# Patient Record
Sex: FEMALE | Race: WHITE | NOT HISPANIC OR LATINO | Employment: UNEMPLOYED | RURAL
[De-identification: names, ages, dates, MRNs, and addresses within clinical notes are randomized per-mention and may not be internally consistent; named-entity substitution may affect disease eponyms.]

---

## 2017-01-03 ENCOUNTER — OFFICE VISIT (OUTPATIENT)
Dept: FAMILY MEDICINE CLINIC | Facility: CLINIC | Age: 82
End: 2017-01-03

## 2017-01-03 VITALS
HEIGHT: 66 IN | TEMPERATURE: 98 F | DIASTOLIC BLOOD PRESSURE: 68 MMHG | BODY MASS INDEX: 26.16 KG/M2 | HEART RATE: 51 BPM | OXYGEN SATURATION: 97 % | SYSTOLIC BLOOD PRESSURE: 180 MMHG | WEIGHT: 162.8 LBS

## 2017-01-03 DIAGNOSIS — I10 ESSENTIAL HYPERTENSION: Primary | ICD-10-CM

## 2017-01-03 DIAGNOSIS — E03.8 OTHER SPECIFIED HYPOTHYROIDISM: ICD-10-CM

## 2017-01-03 DIAGNOSIS — R53.1 LEFT-SIDED WEAKNESS: ICD-10-CM

## 2017-01-03 DIAGNOSIS — I51.9 HEART DISEASE: ICD-10-CM

## 2017-01-03 DIAGNOSIS — E78.2 MIXED HYPERLIPIDEMIA: ICD-10-CM

## 2017-01-03 DIAGNOSIS — E03.9 HYPOTHYROIDISM, UNSPECIFIED TYPE: ICD-10-CM

## 2017-01-03 DIAGNOSIS — R42 DIZZINESS: ICD-10-CM

## 2017-01-03 LAB
ALBUMIN SERPL-MCNC: 4.6 G/DL (ref 3.4–4.8)
ALBUMIN/GLOB SERPL: 1.2 G/DL (ref 1.5–2.5)
ALP SERPL-CCNC: 64 U/L (ref 46–116)
ALT SERPL W P-5'-P-CCNC: 9 U/L (ref 10–36)
ANION GAP SERPL CALCULATED.3IONS-SCNC: 7 MMOL/L (ref 3.6–11.2)
AST SERPL-CCNC: 16 U/L (ref 10–30)
BASOPHILS # BLD AUTO: 0.03 10*3/MM3 (ref 0–0.3)
BASOPHILS NFR BLD AUTO: 0.6 % (ref 0–2)
BILIRUB SERPL-MCNC: 0.4 MG/DL (ref 0.2–1.8)
BUN BLD-MCNC: 17 MG/DL (ref 7–21)
BUN/CREAT SERPL: 16.5 (ref 7–25)
CALCIUM SPEC-SCNC: 10.7 MG/DL (ref 7.7–10)
CHLORIDE SERPL-SCNC: 102 MMOL/L (ref 99–112)
CHOLEST SERPL-MCNC: 328 MG/DL (ref 0–200)
CO2 SERPL-SCNC: 32 MMOL/L (ref 24.3–31.9)
CREAT BLD-MCNC: 1.03 MG/DL (ref 0.43–1.29)
DEPRECATED RDW RBC AUTO: 45.2 FL (ref 37–54)
EOSINOPHIL # BLD AUTO: 0.07 10*3/MM3 (ref 0–0.7)
EOSINOPHIL NFR BLD AUTO: 1.3 % (ref 0–7)
ERYTHROCYTE [DISTWIDTH] IN BLOOD BY AUTOMATED COUNT: 12.6 % (ref 11.5–14.5)
GFR SERPL CREATININE-BSD FRML MDRD: 52 ML/MIN/1.73
GLOBULIN UR ELPH-MCNC: 3.7 GM/DL
GLUCOSE BLD-MCNC: 109 MG/DL (ref 70–110)
HCT VFR BLD AUTO: 36.9 % (ref 37–47)
HDLC SERPL-MCNC: 57 MG/DL (ref 60–100)
HGB BLD-MCNC: 12 G/DL (ref 12–16)
IMM GRANULOCYTES # BLD: 0 10*3/MM3 (ref 0–0.03)
IMM GRANULOCYTES NFR BLD: 0 % (ref 0–0.5)
LDLC SERPL CALC-MCNC: 199 MG/DL (ref 0–100)
LDLC/HDLC SERPL: 3.49 {RATIO}
LYMPHOCYTES # BLD AUTO: 1.56 10*3/MM3 (ref 1–3)
LYMPHOCYTES NFR BLD AUTO: 29.9 % (ref 16–46)
MCH RBC QN AUTO: 32.9 PG (ref 27–33)
MCHC RBC AUTO-ENTMCNC: 32.5 G/DL (ref 33–37)
MCV RBC AUTO: 101.1 FL (ref 80–94)
MONOCYTES # BLD AUTO: 0.58 10*3/MM3 (ref 0.1–0.9)
MONOCYTES NFR BLD AUTO: 11.1 % (ref 0–12)
NEUTROPHILS # BLD AUTO: 2.97 10*3/MM3 (ref 1.4–6.5)
NEUTROPHILS NFR BLD AUTO: 57.1 % (ref 40–75)
OSMOLALITY SERPL CALC.SUM OF ELEC: 283.4 MOSM/KG (ref 273–305)
PLATELET # BLD AUTO: 212 10*3/MM3 (ref 130–400)
PMV BLD AUTO: 10.8 FL (ref 6–10)
POTASSIUM BLD-SCNC: 4 MMOL/L (ref 3.5–5.3)
PROT SERPL-MCNC: 8.3 G/DL (ref 6–8)
RBC # BLD AUTO: 3.65 10*6/MM3 (ref 4.2–5.4)
SODIUM BLD-SCNC: 141 MMOL/L (ref 135–153)
T4 FREE SERPL-MCNC: 0.6 NG/DL (ref 0.89–1.76)
TRIGL SERPL-MCNC: 361 MG/DL (ref 0–150)
TSH SERPL DL<=0.05 MIU/L-ACNC: 22.77 MIU/ML (ref 0.55–4.78)
VLDLC SERPL-MCNC: 72.2 MG/DL
WBC NRBC COR # BLD: 5.21 10*3/MM3 (ref 4.5–12.5)

## 2017-01-03 PROCEDURE — 36415 COLL VENOUS BLD VENIPUNCTURE: CPT | Performed by: NURSE PRACTITIONER

## 2017-01-03 PROCEDURE — 84439 ASSAY OF FREE THYROXINE: CPT | Performed by: NURSE PRACTITIONER

## 2017-01-03 PROCEDURE — 84443 ASSAY THYROID STIM HORMONE: CPT | Performed by: NURSE PRACTITIONER

## 2017-01-03 PROCEDURE — 99214 OFFICE O/P EST MOD 30 MIN: CPT | Performed by: NURSE PRACTITIONER

## 2017-01-03 PROCEDURE — 80053 COMPREHEN METABOLIC PANEL: CPT | Performed by: NURSE PRACTITIONER

## 2017-01-03 PROCEDURE — 85025 COMPLETE CBC W/AUTO DIFF WBC: CPT | Performed by: NURSE PRACTITIONER

## 2017-01-03 PROCEDURE — 80061 LIPID PANEL: CPT | Performed by: NURSE PRACTITIONER

## 2017-01-03 RX ORDER — METOPROLOL SUCCINATE 100 MG/1
100 TABLET, EXTENDED RELEASE ORAL DAILY
Qty: 30 TABLET | Refills: 0 | Status: SHIPPED | OUTPATIENT
Start: 2017-01-03 | End: 2017-01-24 | Stop reason: SDUPTHER

## 2017-01-03 RX ORDER — ATORVASTATIN CALCIUM 20 MG/1
20 TABLET, FILM COATED ORAL DAILY
Qty: 30 TABLET | Refills: 5 | Status: ON HOLD | OUTPATIENT
Start: 2017-01-03 | End: 2018-01-14

## 2017-01-03 RX ORDER — LISINOPRIL AND HYDROCHLOROTHIAZIDE 25; 20 MG/1; MG/1
1 TABLET ORAL DAILY
Qty: 30 TABLET | Refills: 0 | Status: SHIPPED | OUTPATIENT
Start: 2017-01-03 | End: 2017-01-24 | Stop reason: SDUPTHER

## 2017-01-03 RX ORDER — LEVOTHYROXINE SODIUM 0.03 MG/1
25 TABLET ORAL DAILY
Qty: 30 TABLET | Refills: 2 | Status: SHIPPED | OUTPATIENT
Start: 2017-01-03 | End: 2017-09-28 | Stop reason: SDUPTHER

## 2017-01-03 RX ORDER — AMLODIPINE BESYLATE 5 MG/1
5 TABLET ORAL DAILY
Qty: 30 TABLET | Refills: 5 | Status: SHIPPED | OUTPATIENT
Start: 2017-01-03 | End: 2017-01-24 | Stop reason: SDUPTHER

## 2017-01-03 NOTE — PROGRESS NOTES
"Subjective   Lashell Case is a 81 y.o. female.   Chief Complaint   Patient presents with   • Hypertension   • Med Refill     Synthroid,lisinopril,metoprolol 90 days     History of Present Illness     The patient presents today for routine follow-up.  She has a history of breast cancer.  Continues to follow routinely with breast surgeon, Dr. Ward.  She has history of hypertension.  This has been uncontrolled for years.  Reports she has been taking medication daily.  Also has a history of hyperlipidemia.  States she has never taken a statin, although this has been prescribed in the past.  Her last TSH was 17.  Reports she was not taking the levothyroxine daily at that time.  Has now resumed taking daily.  Today, she has complaints of dizziness and left-sided weakness.  Reports 2 days ago was working in the kitchen when she felt dizzy.  She then became very weak on the left side and had to have assistance to sit down.  After 20 minutes, her symptoms resolved.  Yesterday morning had a similar episode of left-sided weakness and dizziness.  She did not seek evaluation for this at the time.  She has no personal history of CVA or MI.  She did have a cardiac evaluation approximately one year ago.  This has been reviewed.  Overall, this is variable.    The following portions of the patient's history were reviewed and updated as appropriate: allergies, current medications, past family history, past medical history, past social history, past surgical history and problem list.  Visit Vitals   • /68 (BP Location: Right arm, Patient Position: Sitting)   • Pulse 51   • Temp 98 °F (36.7 °C) (Oral)   • Ht 66\" (167.6 cm)   • Wt 162 lb 12.8 oz (73.8 kg)   • SpO2 97%  Comment: Room air   • BMI 26.28 kg/m2     Review of Systems   Constitutional: Negative for chills, fatigue and fever.   HENT: Negative for congestion, ear pain, rhinorrhea and sore throat.    Respiratory: Negative for cough, shortness of breath and wheezing.  "   Cardiovascular: Negative for chest pain, palpitations and leg swelling.   Gastrointestinal: Negative for abdominal pain, diarrhea, nausea and vomiting.   Genitourinary: Negative for dysuria and urgency.   Musculoskeletal: Negative for back pain and myalgias.   Skin: Negative for rash and wound.   Neurological: Positive for dizziness, weakness and numbness. Negative for light-headedness and headaches.   Psychiatric/Behavioral: Negative for sleep disturbance. The patient is not nervous/anxious.        Objective   Physical Exam   Constitutional: She is oriented to person, place, and time. She appears well-developed and well-nourished.   HENT:   Head: Normocephalic and atraumatic.   Right Ear: External ear normal.   Left Ear: External ear normal.   Mouth/Throat: Oropharynx is clear and moist.   Eyes: EOM are normal. Pupils are equal, round, and reactive to light.   Cardiovascular: Normal rate, regular rhythm and normal heart sounds.    Pulmonary/Chest: Effort normal and breath sounds normal.   Abdominal: Soft. Bowel sounds are normal.   Musculoskeletal: Normal range of motion.   Neurological: She is alert and oriented to person, place, and time. No cranial nerve deficit.   Finger to nose test normal   Skin: Skin is warm and dry.   Psychiatric: She has a normal mood and affect. Her behavior is normal.       Assessment/Plan   Lashell was seen today for hypertension and med refill.    Diagnoses and all orders for this visit:    Essential hypertension  -     CBC & Differential  -     Comprehensive Metabolic Panel  -     TSH  -     T4, Free  -     Lipid Panel  -     metoprolol succinate XL (TOPROL-XL) 100 MG 24 hr tablet; Take 1 tablet by mouth Daily.  -     lisinopril-hydrochlorothiazide (PRINZIDE,ZESTORETIC) 20-25 MG per tablet; Take 1 tablet by mouth Daily.  -     amLODIPine (NORVASC) 5 MG tablet; Take 1 tablet by mouth Daily.  -     CBC Auto Differential  -     Osmolality, Calculated; Future  -     Osmolality,  Calculated    Hypothyroidism, unspecified type  -     CBC & Differential  -     Comprehensive Metabolic Panel  -     TSH  -     T4, Free  -     Lipid Panel  -     CBC Auto Differential  -     Osmolality, Calculated; Future  -     Osmolality, Calculated    Mixed hyperlipidemia  -     CBC & Differential  -     Comprehensive Metabolic Panel  -     TSH  -     T4, Free  -     Lipid Panel  -     atorvastatin (LIPITOR) 20 MG tablet; Take 1 tablet by mouth Daily.  -     US Carotid Bilateral; Future  -     aspirin 81 MG tablet; Take 1 tablet by mouth Daily.  -     CBC Auto Differential  -     Osmolality, Calculated; Future  -     Osmolality, Calculated    Dizziness  -     CBC & Differential  -     Comprehensive Metabolic Panel  -     TSH  -     T4, Free  -     Lipid Panel  -     US Carotid Bilateral; Future  -     MRI Angiogram Head With Contrast; Future  -     MRI Brain With & Without Contrast; Future  -     CBC Auto Differential  -     Osmolality, Calculated; Future  -     Osmolality, Calculated    Left-sided weakness  -     CBC & Differential  -     Comprehensive Metabolic Panel  -     TSH  -     T4, Free  -     Lipid Panel  -     US Carotid Bilateral; Future  -     MRI Angiogram Head With Contrast; Future  -     MRI Brain With & Without Contrast; Future  -     CBC Auto Differential  -     Osmolality, Calculated; Future  -     Osmolality, Calculated    Heart disease  -     CBC & Differential  -     Comprehensive Metabolic Panel  -     TSH  -     T4, Free  -     Lipid Panel  -     US Carotid Bilateral; Future  -     aspirin 81 MG tablet; Take 1 tablet by mouth Daily.  -     CBC Auto Differential  -     Osmolality, Calculated; Future  -     Osmolality, Calculated    Other specified hypothyroidism  -     CBC & Differential  -     Comprehensive Metabolic Panel  -     TSH  -     T4, Free  -     Lipid Panel  -     levothyroxine (SYNTHROID, LEVOTHROID) 25 MCG tablet; Take 1 tablet by mouth Daily.  -     CBC Auto Differential  -      Osmolality, Calculated; Future  -     Osmolality, Calculated      The recent symptoms are very concerning.  I have discussed her plan of care with Dr. Wolf.  Continue the current medication regimen.  I have stressed the importance of medication compliance.  Will obtain labs as noted.  We will start aspirin 81 mg daily.  Will also initiate Lipitor and amlodipine.  We have discussed risk factor reduction.  Will also evaluate with MRI/MRA of the brain and carotid Dopplers.  I have instructed her to call 911 immediately if the symptoms ever occur again in the future.  She voices understanding.  Follow-up in 2 weeks and as needed.

## 2017-01-03 NOTE — MR AVS SNAPSHOT
Lashell Case   1/3/2017 2:40 PM   Office Visit    Dept Phone:  [phone]   Encounter #:  [id number]    Provider:  SOFI Luevano   Department:  [facility]                Your Full Care Plan              Today's Medication Changes          These changes are accurate as of: 1/3/17  4:11 PM.  If you have any questions, ask your nurse or doctor.               New Medication(s)Ordered:     amLODIPine 5 MG tablet   Commonly known as:  NORVASC   Take 1 tablet by mouth Daily.   Started by:  SOFI Luevano       aspirin 81 MG tablet   Take 1 tablet by mouth Daily.   Started by:  SOFI Luevano       atorvastatin 20 MG tablet   Commonly known as:  LIPITOR   Take 1 tablet by mouth Daily.   Started by:  SOFI Luevano            Where to Get Your Medications      These medications were sent to [facility] [address] [place] [address] [phone] [place] [phone] [address] [address]     Phone:  [phone]     amLODIPine 5 MG tablet    aspirin 81 MG tablet    atorvastatin 20 MG tablet    levothyroxine 25 MCG tablet    lisinopril-hydrochlorothiazide 20-25 MG per tablet    metoprolol succinate  MG 24 hr tablet                  Your Updated Medication List          This list is accurate as of: 1/3/17  4:11 PM.  Always use your most recent med list.                amLODIPine 5 MG tablet   Commonly known as:  NORVASC   Take 1 tablet by mouth Daily.       anastrozole 1 MG tablet   Commonly known as:  ARIMIDEX       aspirin 81 MG tablet   Take 1 tablet by mouth Daily.       atorvastatin 20 MG tablet   Commonly known as:  LIPITOR   Take 1 tablet by mouth Daily.       levothyroxine 25 MCG tablet   Commonly known as:  SYNTHROID, LEVOTHROID   Take 1 tablet by mouth Daily.       lisinopril-hydrochlorothiazide 20-25 MG per tablet   Commonly known as:  PRINZIDE,ZESTORETIC   Take 1 tablet by mouth Daily.  "      metoprolol succinate  MG 24 hr tablet   Commonly known as:  TOPROL-XL   Take 1 tablet by mouth Daily.               We Performed the Following     CBC & Differential     CBC Auto Differential     Comprehensive Metabolic Panel     Lipid Panel     T4, Free     TSH       You Were Diagnosed With        Codes Comments    Essential hypertension    -  Primary ICD-10-CM: I10  ICD-9-CM: 401.9     Hypothyroidism, unspecified type     ICD-10-CM: E03.9  ICD-9-CM: 244.9     Mixed hyperlipidemia     ICD-10-CM: E78.2  ICD-9-CM: 272.2     Dizziness     ICD-10-CM: R42  ICD-9-CM: 780.4     Left-sided weakness     ICD-10-CM: M62.81  ICD-9-CM: 728.87     Heart disease     ICD-10-CM: I51.9  ICD-9-CM: 429.9     Other specified hypothyroidism     ICD-10-CM: E03.8  ICD-9-CM: 244.8       Instructions     None    Patient Instructions History      Upcoming Appointments     Visit Type Date Time Department    FOLLOW UP 1/3/2017  2:40 PM [facility]    FOLLOW UP 1/17/2017  2:40 PM [facility]      MyChart Signup     Our records indicate that you have declined [facility] MyMusict signup. If you would like to sign up for [employer], please email [email or web address] or call [phone] to obtain an activation code.             Other Info from Your Visit           Your Appointments     Jan 17, 2017  2:40 PM EST   Follow Up with SOFI Luevano   [facility] (--)    [address] [place] [id number] [phone]           Arrive 15 minutes prior to appointment.              Allergies     Bactrim [Sulfamethoxazole-trimethoprim]  GI Intolerance    Codeine      Penicillins        Reason for Visit     Hypertension     Med Refill Synthroid,lisinopril,metoprolol 90 days      Vital Signs     Blood Pressure Pulse Temperature Height Weight Oxygen Saturation    180/68 (BP Location: Right arm, Patient Position: Sitting) 51 98 °F (36.7 °C) (Oral) 66\" (167.6 cm) 162 lb " 12.8 oz (73.8 kg) 97%    Body Mass Index Smoking Status                26.28 kg/m2 Never Smoker          Problems and Diagnoses Noted     Dizziness    High blood pressure    Underactive thyroid    Mixed hyperlipidemia    Left-sided weakness        Heart disease          Results

## 2017-01-16 ENCOUNTER — APPOINTMENT (OUTPATIENT)
Dept: ULTRASOUND IMAGING | Facility: HOSPITAL | Age: 82
End: 2017-01-16

## 2017-01-16 ENCOUNTER — APPOINTMENT (OUTPATIENT)
Dept: MRI IMAGING | Facility: HOSPITAL | Age: 82
End: 2017-01-16

## 2017-01-20 ENCOUNTER — HOSPITAL ENCOUNTER (OUTPATIENT)
Dept: ULTRASOUND IMAGING | Facility: HOSPITAL | Age: 82
Discharge: HOME | End: 2017-01-20

## 2017-01-20 ENCOUNTER — HOSPITAL ENCOUNTER (OUTPATIENT)
Dept: MRI IMAGING | Facility: HOSPITAL | Age: 82
Discharge: HOME | End: 2017-01-20
Admitting: NURSE PRACTITIONER

## 2017-01-20 ENCOUNTER — HOSPITAL ENCOUNTER (OUTPATIENT)
Dept: MRI IMAGING | Facility: HOSPITAL | Age: 82
Discharge: HOME | End: 2017-01-20

## 2017-01-20 DIAGNOSIS — R42 DIZZINESS: ICD-10-CM

## 2017-01-20 DIAGNOSIS — R53.1 LEFT-SIDED WEAKNESS: ICD-10-CM

## 2017-01-20 DIAGNOSIS — I51.9 HEART DISEASE: ICD-10-CM

## 2017-01-20 DIAGNOSIS — E78.2 MIXED HYPERLIPIDEMIA: ICD-10-CM

## 2017-01-20 PROCEDURE — 93880 EXTRACRANIAL BILAT STUDY: CPT

## 2017-01-20 PROCEDURE — A9577 INJ MULTIHANCE: HCPCS | Performed by: NURSE PRACTITIONER

## 2017-01-20 PROCEDURE — 0 GADOBENATE DIMEGLUMINE 529 MG/ML SOLUTION: Performed by: NURSE PRACTITIONER

## 2017-01-20 PROCEDURE — 70553 MRI BRAIN STEM W/O & W/DYE: CPT

## 2017-01-20 PROCEDURE — 70553 MRI BRAIN STEM W/O & W/DYE: CPT | Performed by: RADIOLOGY

## 2017-01-20 PROCEDURE — 70544 MR ANGIOGRAPHY HEAD W/O DYE: CPT

## 2017-01-20 PROCEDURE — 93880 EXTRACRANIAL BILAT STUDY: CPT | Performed by: RADIOLOGY

## 2017-01-20 RX ADMIN — GADOBENATE DIMEGLUMINE 15 ML: 529 INJECTION, SOLUTION INTRAVENOUS at 09:45

## 2017-01-24 ENCOUNTER — OFFICE VISIT (OUTPATIENT)
Dept: FAMILY MEDICINE CLINIC | Facility: CLINIC | Age: 82
End: 2017-01-24

## 2017-01-24 VITALS
TEMPERATURE: 98.5 F | BODY MASS INDEX: 26.03 KG/M2 | OXYGEN SATURATION: 98 % | HEIGHT: 66 IN | SYSTOLIC BLOOD PRESSURE: 155 MMHG | WEIGHT: 162 LBS | DIASTOLIC BLOOD PRESSURE: 66 MMHG | HEART RATE: 53 BPM

## 2017-01-24 DIAGNOSIS — I10 ESSENTIAL HYPERTENSION: ICD-10-CM

## 2017-01-24 PROCEDURE — 99213 OFFICE O/P EST LOW 20 MIN: CPT | Performed by: NURSE PRACTITIONER

## 2017-01-24 RX ORDER — LISINOPRIL AND HYDROCHLOROTHIAZIDE 25; 20 MG/1; MG/1
1 TABLET ORAL DAILY
Qty: 30 TABLET | Refills: 0 | Status: SHIPPED | OUTPATIENT
Start: 2017-01-24 | End: 2017-03-15 | Stop reason: SDUPTHER

## 2017-01-24 RX ORDER — METOPROLOL SUCCINATE 100 MG/1
100 TABLET, EXTENDED RELEASE ORAL DAILY
Qty: 30 TABLET | Refills: 0 | Status: SHIPPED | OUTPATIENT
Start: 2017-01-24 | End: 2017-03-15 | Stop reason: SDUPTHER

## 2017-01-24 RX ORDER — AMLODIPINE BESYLATE 5 MG/1
7.5 TABLET ORAL DAILY
Qty: 45 TABLET | Refills: 5 | Status: ON HOLD | OUTPATIENT
Start: 2017-01-24 | End: 2018-01-18

## 2017-01-24 NOTE — PROGRESS NOTES
"Subjective   Lashell Case is a 81 y.o. female.   Chief Complaint   Patient presents with   • Med Refill     metoprolol   • Hypertension     History of Present Illness     The patient presents today for follow up regarding hypertension. She has been taking medications daily as prescribed. She has not monitored her blood pressure at home. She was having left sided weakness and numbness.  She did have an MRI and MRA of the brain as well as a carotid Doppler.  All of these were unremarkable.  Since starting the medication, blood pressure is improved.  Has had no more episodes of the weakness and paresthesias. She is tolerating the medications well. She denies chest pain, shortness of breath, and dizziness. Overall, this is improving.    The following portions of the patient's history were reviewed and updated as appropriate: allergies, current medications, past family history, past medical history, past social history, past surgical history and problem list.  Visit Vitals   • /66 (BP Location: Right arm, Patient Position: Sitting)   • Pulse 53   • Temp 98.5 °F (36.9 °C) (Oral)   • Ht 66\" (167.6 cm)   • Wt 162 lb (73.5 kg)   • SpO2 98%  Comment: Room air   • BMI 26.15 kg/m2     Review of Systems   Constitutional: Negative for chills, fatigue and fever.   HENT: Negative for congestion, ear pain, rhinorrhea and sore throat.    Respiratory: Negative for cough, shortness of breath and wheezing.    Cardiovascular: Negative for chest pain, palpitations and leg swelling.   Gastrointestinal: Negative for abdominal pain, diarrhea, nausea and vomiting.   Genitourinary: Negative for dysuria.   Musculoskeletal: Negative for back pain and myalgias.   Skin: Negative for rash and wound.   Neurological: Negative for dizziness, light-headedness and headaches.   Psychiatric/Behavioral: Negative for sleep disturbance. The patient is not nervous/anxious.        Objective   Physical Exam   Constitutional: She is oriented to " person, place, and time. She appears well-developed and well-nourished.   HENT:   Head: Normocephalic and atraumatic.   Eyes: EOM are normal. Pupils are equal, round, and reactive to light.   Cardiovascular: Normal rate, regular rhythm and normal heart sounds.    Pulmonary/Chest: Effort normal and breath sounds normal.   Abdominal: Soft. Bowel sounds are normal.   Neurological: She is alert and oriented to person, place, and time. No cranial nerve deficit.   Finger to nose test normal   Skin: Skin is warm and dry.   Psychiatric: She has a normal mood and affect. Her behavior is normal.       Assessment/Plan   Lashell was seen today for med refill and hypertension.    Diagnoses and all orders for this visit:    Essential hypertension  -     metoprolol succinate XL (TOPROL-XL) 100 MG 24 hr tablet; Take 1 tablet by mouth Daily.  -     lisinopril-hydrochlorothiazide (PRINZIDE,ZESTORETIC) 20-25 MG per tablet; Take 1 tablet by mouth Daily.  -     amLODIPine (NORVASC) 5 MG tablet; Take 1.5 tablets by mouth Daily.      Overall she is doing much better.  We will continue the current medication regimen.  Increase the Norvasc to 7.5 mg daily.  Monitor blood pressure at home.  Appropriate goals discussed.  Let us know if any new symptoms develop.  We'll follow-up with her in one month and as needed. Concerning signs and symptoms that would prompt urgent evaluation discussed. Patient voiced understanding.

## 2017-01-24 NOTE — MR AVS SNAPSHOT
Lashell Case   1/24/2017 1:00 PM   Office Visit    Dept Phone:  [phone]   Encounter #:  [id number]    Provider:  SOFI Luevano   Department:  [facility]                Your Full Care Plan              Today's Medication Changes          These changes are accurate as of: 1/24/17  2:26 PM.  If you have any questions, ask your nurse or doctor.               Medication(s)that have changed:     amLODIPine 5 MG tablet   Commonly known as:  NORVASC   Take 1.5 tablets by mouth Daily.   What changed:  how much to take            Where to Get Your Medications      These medications were sent to [facility] [address] [place] [address] - [phone] [place] [phone] [address] [address]     Phone:  [phone]     amLODIPine 5 MG tablet    lisinopril-hydrochlorothiazide 20-25 MG per tablet    metoprolol succinate  MG 24 hr tablet                  Your Updated Medication List          This list is accurate as of: 1/24/17  2:26 PM.  Always use your most recent med list.                amLODIPine 5 MG tablet   Commonly known as:  NORVASC   Take 1.5 tablets by mouth Daily.       anastrozole 1 MG tablet   Commonly known as:  ARIMIDEX       aspirin 81 MG tablet   Take 1 tablet by mouth Daily.       atorvastatin 20 MG tablet   Commonly known as:  LIPITOR   Take 1 tablet by mouth Daily.       levothyroxine 25 MCG tablet   Commonly known as:  SYNTHROID, LEVOTHROID   Take 1 tablet by mouth Daily.       lisinopril-hydrochlorothiazide 20-25 MG per tablet   Commonly known as:  PRINZIDE,ZESTORETIC   Take 1 tablet by mouth Daily.       metoprolol succinate  MG 24 hr tablet   Commonly known as:  TOPROL-XL   Take 1 tablet by mouth Daily.               You Were Diagnosed With        Codes Comments    Essential hypertension     ICD-10-CM: I10  ICD-9-CM: 401.9       Instructions     None    Patient Instructions History       "  Upcoming Appointments     Visit Type Date Time Department    FOLLOW UP 1/24/2017  1:00 PM [facility]    FOLLOW UP 2/21/2017 11:40 AM [facility]      MyChart Signup     Our records indicate that you have declined [facility]. If you would like to sign up for MyChart, please email [email or web address] or call [phone] to obtain an activation code.             Other Info from Your Visit           Your Appointments     Feb 21, 2017 11:40 AM EST   Follow Up with SOFI Luevano   [facility] (--)    [address] [place] [phone]           Arrive 15 minutes prior to appointment.              Allergies     Bactrim [Sulfamethoxazole-trimethoprim]  GI Intolerance    Codeine      Penicillins        Reason for Visit     Med Refill metoprolol    Hypertension           Vital Signs     Blood Pressure Pulse Temperature Height Weight Oxygen Saturation    155/66 (BP Location: Right arm, Patient Position: Sitting) 53 98.5 °F (36.9 °C) (Oral) 66\" (167.6 cm) 162 lb (73.5 kg) 98%    Body Mass Index Smoking Status                26.15 kg/m2 Never Smoker          Problems and Diagnoses Noted     High blood pressure        "

## 2017-02-08 ENCOUNTER — TELEPHONE (OUTPATIENT)
Dept: FAMILY MEDICINE CLINIC | Facility: CLINIC | Age: 82
End: 2017-02-08

## 2017-02-08 NOTE — TELEPHONE ENCOUNTER
PATIENT CALLED REQUESTING ANTIBIOTIC BE SENT IN FOR A COUGH SHE HAS HAD FOR 10 DAYS. I LET HER KNOW WE DON'T NORMALY SEND IN ANTIBIOTICS BUT I WOULD GET THE MESSAGE TO JUAN AND SHE STATED THAT IF THE COULD NOT BE CALLED IN SO WOULD GO TO THE HOSPITAL OR URGENT AND HUNG UP THE PHONE.

## 2017-03-15 DIAGNOSIS — I10 ESSENTIAL HYPERTENSION: ICD-10-CM

## 2017-03-15 RX ORDER — LISINOPRIL AND HYDROCHLOROTHIAZIDE 25; 20 MG/1; MG/1
1 TABLET ORAL DAILY
Qty: 30 TABLET | Refills: 5 | Status: SHIPPED | OUTPATIENT
Start: 2017-03-15 | End: 2018-01-03 | Stop reason: SDUPTHER

## 2017-03-15 RX ORDER — METOPROLOL SUCCINATE 100 MG/1
100 TABLET, EXTENDED RELEASE ORAL DAILY
Qty: 30 TABLET | Refills: 5 | Status: SHIPPED | OUTPATIENT
Start: 2017-03-15 | End: 2017-09-28 | Stop reason: SDUPTHER

## 2017-03-15 NOTE — TELEPHONE ENCOUNTER
LAST OV 1/24/17     ~METOPROLOL RENEWED 1/24/17 #30 0 REILLS  ~LISINOPRIL RENEWED 1/24/17 #30 0 REFILLS

## 2017-04-18 ENCOUNTER — TELEPHONE (OUTPATIENT)
Dept: FAMILY MEDICINE CLINIC | Facility: CLINIC | Age: 82
End: 2017-04-18

## 2017-04-18 NOTE — TELEPHONE ENCOUNTER
PATIENT CALLED REQUESTING REFILL ON LISINOPRIL. I ADVISED HER IT WAS SENT TO PHARMACY ON 3/15/17 #30 WITH 5 REFILLS    SHE VOICED UNDERSTANDING AND WOULD CALL PHARMACY

## 2017-07-12 ENCOUNTER — HOSPITAL ENCOUNTER (EMERGENCY)
Facility: HOSPITAL | Age: 82
Discharge: HOME | End: 2017-07-12
Attending: EMERGENCY MEDICINE | Admitting: EMERGENCY MEDICINE

## 2017-07-12 VITALS
WEIGHT: 162 LBS | HEART RATE: 58 BPM | RESPIRATION RATE: 16 BRPM | DIASTOLIC BLOOD PRESSURE: 67 MMHG | TEMPERATURE: 97.4 F | HEIGHT: 66 IN | SYSTOLIC BLOOD PRESSURE: 147 MMHG | OXYGEN SATURATION: 97 % | BODY MASS INDEX: 26.03 KG/M2

## 2017-07-12 DIAGNOSIS — J02.9 PHARYNGITIS, UNSPECIFIED ETIOLOGY: Primary | ICD-10-CM

## 2017-07-12 PROCEDURE — 99282 EMERGENCY DEPT VISIT SF MDM: CPT

## 2017-07-12 RX ORDER — AZITHROMYCIN 250 MG/1
TABLET, FILM COATED ORAL
Qty: 6 TABLET | Refills: 0 | Status: SHIPPED | OUTPATIENT
Start: 2017-07-12 | End: 2017-08-10

## 2017-07-12 NOTE — ED NOTES
Discharge instructions reviewed with patient, patient instructed to return to ED if symptoms worsen or if any new problems arise. Patient verbalizes understanding of discharge instructions, patient ambulatory out of ED. No acute distress noted.       Taylor Ervin RN  07/12/17 [id number]

## 2017-07-12 NOTE — ED PROVIDER NOTES
Subjective   Patient is a 82 y.o. female presenting with sore throat.   History provided by:  Patient   used: No    Sore Throat   Location:  Generalized  Quality:  Sore  Severity:  Moderate  Onset quality:  Sudden  Duration:  3 days  Timing:  Constant  Progression:  Unchanged  Chronicity:  New  Relieved by:  None tried  Worsened by:  Swallowing  Ineffective treatments:  None tried  Associated symptoms: ear pain and trouble swallowing    Associated symptoms: no chest pain, no cough, no fever, no headaches, no neck stiffness, no rash, no shortness of breath and no stridor    Risk factors: no exposure to strep and no exposure to mono        Review of Systems   Constitutional: Negative for activity change and fever.   HENT: Positive for ear pain, sore throat and trouble swallowing. Negative for congestion.    Eyes: Negative for pain.   Respiratory: Negative for cough, shortness of breath, wheezing and stridor.    Cardiovascular: Negative for chest pain.   Gastrointestinal: Negative for abdominal distention, diarrhea, nausea and vomiting.   Genitourinary: Negative for difficulty urinating and dysuria.   Musculoskeletal: Negative for arthralgias, myalgias and neck stiffness.   Skin: Negative for rash and wound.   Neurological: Negative for dizziness and headaches.   Psychiatric/Behavioral: Negative for agitation.   All other systems reviewed and are negative.      Past Medical History:   Diagnosis Date   • Arthritis    • Breast cancer 2015    lt br ca   • Breast cancer 10 yrs ago    rt br ca   • Hypertension    • Hypothyroidism    • Scabies exposure        Allergies   Allergen Reactions   • Bactrim [Sulfamethoxazole-Trimethoprim] GI Intolerance   • Codeine    • Penicillins        Past Surgical History:   Procedure Laterality Date   • BREAST BIOPSY     • BREAST SURGERY     • MASTECTOMY Left 2015    ca       Family History   Problem Relation Age of Onset   • Hypertension Mother    • Uterine cancer Mother     • Diabetes Mother    • Diabetes Daughter    • Prostate cancer Son    • Diabetes Son    • Throat cancer Son    • Hypertension Child        Social History     Social History   • Marital status:      Spouse name: N/A   • Number of children: N/A   • Years of education: N/A     Social History Main Topics   • Smoking status: Never Smoker   • Smokeless tobacco: Never Used   • Alcohol use None   • Drug use: Yes   • Sexual activity: Not Asked     Other Topics Concern   • None     Social History Narrative           Objective   Physical Exam   Constitutional: She is oriented to person, place, and time. She appears well-developed and well-nourished.   HENT:   Head: Normocephalic and atraumatic.   Mouth/Throat: Oropharyngeal exudate and posterior oropharyngeal erythema present. No tonsillar abscesses.   Eyes: EOM are normal. Pupils are equal, round, and reactive to light.   Neck: Normal range of motion. Neck supple.   Cardiovascular: Normal rate, regular rhythm and normal heart sounds.    Pulmonary/Chest: Effort normal and breath sounds normal.   Abdominal: Soft. Bowel sounds are normal.   Musculoskeletal: Normal range of motion.   Neurological: She is alert and oriented to person, place, and time.   Skin: Skin is warm and dry.   Psychiatric: She has a normal mood and affect. Her behavior is normal. Judgment and thought content normal.   Nursing note and vitals reviewed.      Procedures         ED Course  ED Course   Comment By Time   Pt would like to forego the strep screen and be started on abx first. Advised to return to ED with any worsening symptoms. JOHN Mathur 07/12 1452                  MDM  Number of Diagnoses or Management Options  Pharyngitis, unspecified etiology:      Amount and/or Complexity of Data Reviewed  Clinical lab tests: ordered and reviewed  Tests in the radiology section of CPT®: reviewed and ordered  Tests in the medicine section of CPT®: ordered and reviewed    Patient Progress  Patient  progress: stable      Final diagnoses:   Pharyngitis, unspecified etiology            JOHN Mathur  07/12/17 3720

## 2017-08-10 ENCOUNTER — OFFICE VISIT (OUTPATIENT)
Dept: SURGERY | Facility: CLINIC | Age: 82
End: 2017-08-10

## 2017-08-10 VITALS
BODY MASS INDEX: 26.42 KG/M2 | HEART RATE: 43 BPM | WEIGHT: 164.4 LBS | HEIGHT: 66 IN | DIASTOLIC BLOOD PRESSURE: 64 MMHG | SYSTOLIC BLOOD PRESSURE: 175 MMHG

## 2017-08-10 DIAGNOSIS — C50.912 MALIGNANT NEOPLASM OF LEFT FEMALE BREAST, UNSPECIFIED SITE OF BREAST: Primary | ICD-10-CM

## 2017-08-10 DIAGNOSIS — M85.80 OSTEOPENIA: ICD-10-CM

## 2017-08-10 DIAGNOSIS — Z79.811 AROMATASE INHIBITOR USE: ICD-10-CM

## 2017-08-10 PROCEDURE — 99214 OFFICE O/P EST MOD 30 MIN: CPT | Performed by: SURGERY

## 2017-08-10 PROCEDURE — 93702 BIS XTRACELL FLUID ANALYSIS: CPT | Performed by: SURGERY

## 2017-08-10 NOTE — PROGRESS NOTES
Subjective   Lashell Case is a 82 y.o. female is here today for follow-up breast care no studies.    History of Present Illness  Ms. Case was seen in the office today for breast cancer follow-up. She is status post a left modified radical mastectomy on 11/24/15 for a T2 N1 infiltrating ductal carcinoma, ER positive, SD positive, HER-2 negative. Mammaprint was low risk.  The patient is on anastrozole which she is tolerating well. She does state that she has some soreness under the right arm but she denies palpable mass.  She denies any palpable abnormalities in the chest wall. She denies any symptoms of metastatic disease.  The patient had a bone density on 1/14/16 which demonstrated osteopenia with a T score of -1.6.  Patient presents to the office today having had a right mammogram on 11/22/16 which demonstrated no evidence of malignancy.  Allergies   Allergen Reactions   • Bactrim [Sulfamethoxazole-Trimethoprim] GI Intolerance   • Codeine    • Penicillins          Current Outpatient Prescriptions   Medication Sig Dispense Refill   • amLODIPine (NORVASC) 5 MG tablet Take 1.5 tablets by mouth Daily. 45 tablet 5   • anastrozole (ARIMIDEX) 1 MG chemo tablet Take 1 mg by mouth Daily.     • aspirin 81 MG tablet Take 1 tablet by mouth Daily. 30 tablet 5   • atorvastatin (LIPITOR) 20 MG tablet Take 1 tablet by mouth Daily. 30 tablet 5   • levothyroxine (SYNTHROID, LEVOTHROID) 25 MCG tablet Take 1 tablet by mouth Daily. 30 tablet 2   • lisinopril-hydrochlorothiazide (PRINZIDE,ZESTORETIC) 20-25 MG per tablet Take 1 tablet by mouth Daily. 30 tablet 5   • metoprolol succinate XL (TOPROL-XL) 100 MG 24 hr tablet Take 1 tablet by mouth Daily. 30 tablet 5     No current facility-administered medications for this visit.      Past Medical History:   Diagnosis Date   • Arthritis    • Breast cancer 2015    lt br ca   • Breast cancer 10 yrs ago    rt br ca   • Hypertension    • Hypothyroidism    • Scabies exposure      Past  "Surgical History:   Procedure Laterality Date   • BREAST BIOPSY     • BREAST SURGERY     • MASTECTOMY Left 2015    ca     The following portions of the patient's history were reviewed and updated as appropriate: allergies, current medications, past family history, past medical history, past social history, past surgical history and problem list.    Review of Systems  General: negative  Integumentary: negative  Eyes: negative  ENT: negative  Respiratory: negative  Gastrointestinal: negative  Cardiovascular: negative  Neurological: negative  Psychiatric: negative  Hematologic/Lymphatic: negative  Genitourinary: negative  Musculoskeletal: negative  Endocrine: negative  Breasts: negative    Objective   /64 (BP Location: Left arm, Patient Position: Sitting)  Pulse (!) 43  Ht 66\" (167.6 cm)  Wt 164 lb 6.4 oz (74.6 kg)  BMI 26.53 kg/m2   Physical Exam  General: This is a WD WN white female in no acute distress  HEENT exam: WNL. Sclera are anicteric. EOMI  Neck: supple, FROM, without thyromegaly, cervical or supraclavicular adenopathy  Lungs: Respiratory effort normal. Auscultation: Clear, without wheezes, rhonchi, rales  Heart: Regular rate and rhythm, without murmur, gallop, rub. No pedal edema  Breasts: On visual inspection the left breast is surgically absent.Examination of the right breast demonstrates no discrete mass, skin change, or axillary adenopathy. Examination of the left chest wall demonstrates no palpable mass or axillary adenopathy  Abdomen: Nontender, without hepatosplenomegaly  Musculoskeletal: muscle strength/tone is normal. Gait and station: normal. No digital cyanosis  Psyc: alert, oriented x 3. Mood and affect are appropriate  skin: Warm with good turgor. Without rash or lesion  extremities: Examination of the extremities revealed no cyanosis, clubbing or edema.  Results/Data      Procedures   L dex was performed and was 16.7, with the prior being 13.5  Assessment/Plan     T2 N1 infiltrating " ductal carcinoma of the left breast, ER positive, LA positive, HER-2 negative. Preoperative PET CT negative for malignancy.    Low risk mammaprint    left arm lymphedema, improved  Osteopenia      Continue anastrozole  Continue calcium and vitamin D  Right mammogram in November 2017 with return to the office following  Bone density in January 2018           Discussion/Summary    Errors in dictation may reflect use of voice recognition software and not all errors in transcription may have been detected prior to signing.    No future appointments.

## 2017-08-27 PROBLEM — Z79.811 AROMATASE INHIBITOR USE: Status: ACTIVE | Noted: 2017-08-27

## 2017-08-27 PROBLEM — M85.80 OSTEOPENIA: Status: ACTIVE | Noted: 2017-08-27

## 2017-09-28 DIAGNOSIS — I10 ESSENTIAL HYPERTENSION: ICD-10-CM

## 2017-09-28 DIAGNOSIS — E03.8 OTHER SPECIFIED HYPOTHYROIDISM: ICD-10-CM

## 2017-09-28 RX ORDER — METOPROLOL SUCCINATE 100 MG/1
100 TABLET, EXTENDED RELEASE ORAL DAILY
Qty: 30 TABLET | Refills: 5 | Status: SHIPPED | OUTPATIENT
Start: 2017-09-28 | End: 2018-01-18 | Stop reason: HOSPADM

## 2017-09-28 RX ORDER — LEVOTHYROXINE SODIUM 0.03 MG/1
25 TABLET ORAL DAILY
Qty: 30 TABLET | Refills: 2 | Status: ON HOLD | OUTPATIENT
Start: 2017-09-28 | End: 2018-01-18

## 2017-09-28 NOTE — TELEPHONE ENCOUNTER
Pt requesting refills for    Metoprolol  Last refill 03/15/17 #30 with 5 refills    Levothyroxine  Last refill 01/03/17 #30 with 2 refills    Last appt 01/24/17  No appt [facility]

## 2017-09-28 NOTE — TELEPHONE ENCOUNTER
PATIENT CALLED REQUESTING REFILL ON LEVOTHYROXINE AND METOPROLOL SEND TO [facility]

## 2017-11-03 DIAGNOSIS — C50.912 INFILTRATING DUCTAL CARCINOMA OF LEFT FEMALE BREAST (HCC): Primary | ICD-10-CM

## 2017-11-22 ENCOUNTER — HOSPITAL ENCOUNTER (OUTPATIENT)
Dept: MAMMOGRAPHY | Facility: HOSPITAL | Age: 82
Discharge: HOME | End: 2017-11-22
Attending: SURGERY | Admitting: SURGERY

## 2017-11-22 DIAGNOSIS — C50.912 INFILTRATING DUCTAL CARCINOMA OF LEFT FEMALE BREAST (HCC): ICD-10-CM

## 2017-11-22 PROCEDURE — G0206 DX MAMMO INCL CAD UNI: HCPCS | Performed by: RADIOLOGY

## 2017-11-22 PROCEDURE — G0206 DX MAMMO INCL CAD UNI: HCPCS

## 2017-11-22 PROCEDURE — G0279 TOMOSYNTHESIS, MAMMO: HCPCS

## 2017-11-22 PROCEDURE — G0279 TOMOSYNTHESIS, MAMMO: HCPCS | Performed by: RADIOLOGY

## 2018-01-03 DIAGNOSIS — I10 ESSENTIAL HYPERTENSION: ICD-10-CM

## 2018-01-03 RX ORDER — LISINOPRIL AND HYDROCHLOROTHIAZIDE 25; 20 MG/1; MG/1
1 TABLET ORAL DAILY
Qty: 30 TABLET | Refills: 1 | Status: SHIPPED | OUTPATIENT
Start: 2018-01-03 | End: 2018-01-18 | Stop reason: HOSPADM

## 2018-01-09 ENCOUNTER — HOSPITAL ENCOUNTER (EMERGENCY)
Facility: HOSPITAL | Age: 83
Discharge: HOME | End: 2018-01-09
Attending: EMERGENCY MEDICINE | Admitting: EMERGENCY MEDICINE

## 2018-01-09 ENCOUNTER — APPOINTMENT (OUTPATIENT)
Dept: GENERAL RADIOLOGY | Facility: HOSPITAL | Age: 83
End: 2018-01-09

## 2018-01-09 VITALS
OXYGEN SATURATION: 97 % | BODY MASS INDEX: 25.23 KG/M2 | TEMPERATURE: 100.6 F | SYSTOLIC BLOOD PRESSURE: 114 MMHG | WEIGHT: 157 LBS | RESPIRATION RATE: 18 BRPM | HEART RATE: 68 BPM | HEIGHT: 66 IN | DIASTOLIC BLOOD PRESSURE: 58 MMHG

## 2018-01-09 DIAGNOSIS — J10.1 INFLUENZA A: Primary | ICD-10-CM

## 2018-01-09 DIAGNOSIS — J40 BRONCHITIS: ICD-10-CM

## 2018-01-09 LAB
ALBUMIN SERPL-MCNC: 4.4 G/DL (ref 3.4–4.8)
ALBUMIN/GLOB SERPL: 1.3 G/DL (ref 1.5–2.5)
ALP SERPL-CCNC: 60 U/L (ref 35–104)
ALT SERPL W P-5'-P-CCNC: 15 U/L (ref 10–36)
ANION GAP SERPL CALCULATED.3IONS-SCNC: 7.9 MMOL/L (ref 3.6–11.2)
AST SERPL-CCNC: 25 U/L (ref 10–30)
BASOPHILS # BLD AUTO: 0.02 10*3/MM3 (ref 0–0.3)
BASOPHILS NFR BLD AUTO: 0.4 % (ref 0–2)
BILIRUB SERPL-MCNC: 0.6 MG/DL (ref 0.2–1.8)
BILIRUB UR QL STRIP: NEGATIVE
BUN BLD-MCNC: 23 MG/DL (ref 7–21)
BUN/CREAT SERPL: 19.2 (ref 7–25)
CALCIUM SPEC-SCNC: 9.7 MG/DL (ref 7.7–10)
CHLORIDE SERPL-SCNC: 97 MMOL/L (ref 99–112)
CLARITY UR: CLEAR
CO2 SERPL-SCNC: 28.1 MMOL/L (ref 24.3–31.9)
COLOR UR: ABNORMAL
CREAT BLD-MCNC: 1.2 MG/DL (ref 0.43–1.29)
DEPRECATED RDW RBC AUTO: 43.9 FL (ref 37–54)
EOSINOPHIL # BLD AUTO: 0.01 10*3/MM3 (ref 0–0.7)
EOSINOPHIL NFR BLD AUTO: 0.2 % (ref 0–7)
ERYTHROCYTE [DISTWIDTH] IN BLOOD BY AUTOMATED COUNT: 12.4 % (ref 11.5–14.5)
FLUAV AG NPH QL: POSITIVE
FLUBV AG NPH QL IA: NEGATIVE
GFR SERPL CREATININE-BSD FRML MDRD: 43 ML/MIN/1.73
GLOBULIN UR ELPH-MCNC: 3.3 GM/DL
GLUCOSE BLD-MCNC: 93 MG/DL (ref 70–110)
GLUCOSE UR STRIP-MCNC: NEGATIVE MG/DL
HCT VFR BLD AUTO: 35.2 % (ref 37–47)
HGB BLD-MCNC: 11.8 G/DL (ref 12–16)
HGB UR QL STRIP.AUTO: NEGATIVE
IMM GRANULOCYTES # BLD: 0.01 10*3/MM3 (ref 0–0.03)
IMM GRANULOCYTES NFR BLD: 0.2 % (ref 0–0.5)
KETONES UR QL STRIP: ABNORMAL
LEUKOCYTE ESTERASE UR QL STRIP.AUTO: NEGATIVE
LYMPHOCYTES # BLD AUTO: 0.87 10*3/MM3 (ref 1–3)
LYMPHOCYTES NFR BLD AUTO: 17.8 % (ref 16–46)
MCH RBC QN AUTO: 33.6 PG (ref 27–33)
MCHC RBC AUTO-ENTMCNC: 33.5 G/DL (ref 33–37)
MCV RBC AUTO: 100.3 FL (ref 80–94)
MONOCYTES # BLD AUTO: 1.13 10*3/MM3 (ref 0.1–0.9)
MONOCYTES NFR BLD AUTO: 23.1 % (ref 0–12)
NEUTROPHILS # BLD AUTO: 2.86 10*3/MM3 (ref 1.4–6.5)
NEUTROPHILS NFR BLD AUTO: 58.3 % (ref 40–75)
NITRITE UR QL STRIP: NEGATIVE
OSMOLALITY SERPL CALC.SUM OF ELEC: 269.8 MOSM/KG (ref 273–305)
PH UR STRIP.AUTO: <=5 [PH] (ref 5–8)
PLAT MORPH BLD: NORMAL
PLATELET # BLD AUTO: 153 10*3/MM3 (ref 130–400)
PMV BLD AUTO: 10.6 FL (ref 6–10)
POTASSIUM BLD-SCNC: 4 MMOL/L (ref 3.5–5.3)
PROT SERPL-MCNC: 7.7 G/DL (ref 6–8)
PROT UR QL STRIP: NEGATIVE
RBC # BLD AUTO: 3.51 10*6/MM3 (ref 4.2–5.4)
RBC MORPH BLD: NORMAL
S PYO AG THROAT QL: NEGATIVE
SODIUM BLD-SCNC: 133 MMOL/L (ref 135–153)
SP GR UR STRIP: 1.02 (ref 1–1.03)
UROBILINOGEN UR QL STRIP: ABNORMAL
WBC NRBC COR # BLD: 4.9 10*3/MM3 (ref 4.5–12.5)

## 2018-01-09 PROCEDURE — 87880 STREP A ASSAY W/OPTIC: CPT | Performed by: PHYSICIAN ASSISTANT

## 2018-01-09 PROCEDURE — 80053 COMPREHEN METABOLIC PANEL: CPT | Performed by: PHYSICIAN ASSISTANT

## 2018-01-09 PROCEDURE — 71046 X-RAY EXAM CHEST 2 VIEWS: CPT

## 2018-01-09 PROCEDURE — 96361 HYDRATE IV INFUSION ADD-ON: CPT

## 2018-01-09 PROCEDURE — 87081 CULTURE SCREEN ONLY: CPT | Performed by: PHYSICIAN ASSISTANT

## 2018-01-09 PROCEDURE — 99284 EMERGENCY DEPT VISIT MOD MDM: CPT

## 2018-01-09 PROCEDURE — 87804 INFLUENZA ASSAY W/OPTIC: CPT | Performed by: PHYSICIAN ASSISTANT

## 2018-01-09 PROCEDURE — 87040 BLOOD CULTURE FOR BACTERIA: CPT | Performed by: PHYSICIAN ASSISTANT

## 2018-01-09 PROCEDURE — 85007 BL SMEAR W/DIFF WBC COUNT: CPT | Performed by: PHYSICIAN ASSISTANT

## 2018-01-09 PROCEDURE — 85025 COMPLETE CBC W/AUTO DIFF WBC: CPT | Performed by: PHYSICIAN ASSISTANT

## 2018-01-09 PROCEDURE — 71046 X-RAY EXAM CHEST 2 VIEWS: CPT | Performed by: RADIOLOGY

## 2018-01-09 PROCEDURE — 81003 URINALYSIS AUTO W/O SCOPE: CPT | Performed by: PHYSICIAN ASSISTANT

## 2018-01-09 PROCEDURE — 96360 HYDRATION IV INFUSION INIT: CPT

## 2018-01-09 RX ORDER — ONDANSETRON 4 MG/1
4 TABLET, ORALLY DISINTEGRATING ORAL EVERY 6 HOURS PRN
Qty: 12 TABLET | Refills: 0 | Status: SHIPPED | OUTPATIENT
Start: 2018-01-09 | End: 2018-07-31

## 2018-01-09 RX ORDER — ACETAMINOPHEN 325 MG/1
975 TABLET ORAL ONCE
Status: COMPLETED | OUTPATIENT
Start: 2018-01-09 | End: 2018-01-09

## 2018-01-09 RX ORDER — SODIUM CHLORIDE 9 MG/ML
125 INJECTION, SOLUTION INTRAVENOUS CONTINUOUS
Status: DISCONTINUED | OUTPATIENT
Start: 2018-01-09 | End: 2018-01-09 | Stop reason: HOSPADM

## 2018-01-09 RX ORDER — IBUPROFEN 800 MG/1
800 TABLET ORAL
Qty: 60 TABLET | Refills: 0 | Status: ON HOLD | OUTPATIENT
Start: 2018-01-09 | End: 2018-01-18

## 2018-01-09 RX ORDER — SODIUM CHLORIDE 0.9 % (FLUSH) 0.9 %
10 SYRINGE (ML) INJECTION AS NEEDED
Status: DISCONTINUED | OUTPATIENT
Start: 2018-01-09 | End: 2018-01-09 | Stop reason: HOSPADM

## 2018-01-09 RX ORDER — DOXYCYCLINE 100 MG/1
100 CAPSULE ORAL EVERY 12 HOURS SCHEDULED
Qty: 20 CAPSULE | Refills: 0 | Status: SHIPPED | OUTPATIENT
Start: 2018-01-09 | End: 2018-01-18 | Stop reason: HOSPADM

## 2018-01-09 RX ORDER — ACETAMINOPHEN 500 MG
1000 TABLET ORAL EVERY 6 HOURS PRN
Qty: 60 TABLET | Refills: 0 | Status: ON HOLD | OUTPATIENT
Start: 2018-01-09 | End: 2018-01-14

## 2018-01-09 RX ADMIN — ACETAMINOPHEN 975 MG: 325 TABLET ORAL at 13:53

## 2018-01-09 RX ADMIN — SODIUM CHLORIDE 125 ML/HR: 9 INJECTION, SOLUTION INTRAVENOUS at 13:20

## 2018-01-09 NOTE — ED PROVIDER NOTES
Subjective   Patient is a 82 y.o. female presenting with flu symptoms.   History provided by:  Patient   used: No    Flu Symptoms   Presenting symptoms: cough, fatigue, headache, myalgias and rhinorrhea    Presenting symptoms: no diarrhea, no nausea and no sore throat    Severity:  Moderate  Onset quality:  Sudden  Duration:  5 days  Progression:  Worsening  Chronicity:  New  Relieved by:  Nothing  Worsened by:  Nothing  Associated symptoms: nasal congestion and neck stiffness        Review of Systems   Constitutional: Positive for fatigue.   HENT: Positive for congestion and rhinorrhea. Negative for sore throat.    Respiratory: Positive for cough.    Gastrointestinal: Negative for diarrhea and nausea.   Musculoskeletal: Positive for myalgias and neck stiffness.   Neurological: Positive for headaches.   All other systems reviewed and are negative.      Past Medical History:   Diagnosis Date   • Arthritis    • Breast cancer 2015    lt br ca   • Breast cancer 2006    rt br ca   • Hypertension    • Hypothyroidism    • Scabies exposure        Allergies   Allergen Reactions   • Bactrim [Sulfamethoxazole-Trimethoprim] GI Intolerance   • Codeine    • Penicillins        Past Surgical History:   Procedure Laterality Date   • BREAST BIOPSY     • BREAST LUMPECTOMY Right 2006   • BREAST SURGERY     • MASTECTOMY Left 2015    ca       Family History   Problem Relation Age of Onset   • Hypertension Mother    • Uterine cancer Mother    • Diabetes Mother    • Diabetes Daughter    • Prostate cancer Son    • Diabetes Son    • Throat cancer Son    • Hypertension Child        Social History     Social History   • Marital status:      Spouse name: N/A   • Number of children: N/A   • Years of education: N/A     Social History Main Topics   • Smoking status: Never Smoker   • Smokeless tobacco: Never Used   • Alcohol use No   • Drug use: No   • Sexual activity: Defer     Other Topics Concern   • None     Social  History Narrative   • None           Objective   Physical Exam   Constitutional: She is oriented to person, place, and time. She appears well-developed and well-nourished.   HENT:   Head: Normocephalic.   Right Ear: External ear normal.   Left Ear: External ear normal.   Nose: Nose normal.   Mouth/Throat: Oropharynx is clear and moist.   Eyes: Conjunctivae and EOM are normal. Pupils are equal, round, and reactive to light.   Neck: Normal range of motion. Neck supple. No tracheal deviation present. No thyromegaly present.   Cardiovascular: Normal rate, regular rhythm, normal heart sounds and intact distal pulses.    Pulmonary/Chest: Effort normal and breath sounds normal.   Abdominal: Soft. Bowel sounds are normal.   Musculoskeletal: Normal range of motion.   Neurological: She is alert and oriented to person, place, and time. She has normal reflexes.   Skin: Skin is warm and dry.   Psychiatric: She has a normal mood and affect. Her behavior is normal. Judgment and thought content normal.   Nursing note and vitals reviewed.      Procedures         ED Course  ED Course   Comment By Time   This is a 82-year-old female that presents with chief complaint cough, congestion, flulike symptoms for the past several days.  Patient did have influenza A+.  Patient be started on doxycycline for a bronchitis. Advised to return should her symptoms worsen. Patient is stable at this time. Dani Acevedo PA-C 01/09 1516                  MDM  Number of Diagnoses or Management Options  Bronchitis: new and requires workup  Influenza A: new and requires workup     Amount and/or Complexity of Data Reviewed  Clinical lab tests: ordered and reviewed  Tests in the radiology section of CPT®: ordered and reviewed  Independent visualization of images, tracings, or specimens: yes    Risk of Complications, Morbidity, and/or Mortality  Presenting problems: moderate  Diagnostic procedures: moderate  Management options: moderate    Patient  Progress  Patient progress: stable      Final diagnoses:   Influenza A   Bronchitis            Dani Acevedo PA-C  01/09/18 1519       Dani Acevedo PA-C  01/09/18 1519

## 2018-01-10 LAB — BACTERIA SPEC AEROBE CULT: NORMAL

## 2018-01-14 ENCOUNTER — APPOINTMENT (OUTPATIENT)
Dept: CT IMAGING | Facility: HOSPITAL | Age: 83
End: 2018-01-14

## 2018-01-14 ENCOUNTER — HOSPITAL ENCOUNTER (INPATIENT)
Facility: HOSPITAL | Age: 83
LOS: 4 days | Discharge: HOME | End: 2018-01-18
Attending: EMERGENCY MEDICINE | Admitting: INTERNAL MEDICINE

## 2018-01-14 ENCOUNTER — APPOINTMENT (OUTPATIENT)
Dept: GENERAL RADIOLOGY | Facility: HOSPITAL | Age: 83
End: 2018-01-14

## 2018-01-14 DIAGNOSIS — R00.1 BRADYCARDIA: Primary | ICD-10-CM

## 2018-01-14 DIAGNOSIS — E03.8 OTHER SPECIFIED HYPOTHYROIDISM: ICD-10-CM

## 2018-01-14 DIAGNOSIS — I10 ESSENTIAL HYPERTENSION: ICD-10-CM

## 2018-01-14 DIAGNOSIS — I95.1 ORTHOSTASIS: ICD-10-CM

## 2018-01-14 DIAGNOSIS — J10.1 INFLUENZA A: ICD-10-CM

## 2018-01-14 DIAGNOSIS — R11.2 NON-INTRACTABLE VOMITING WITH NAUSEA, UNSPECIFIED VOMITING TYPE: ICD-10-CM

## 2018-01-14 LAB
A-A DO2: 24.6 MMHG (ref 0–300)
ALBUMIN SERPL-MCNC: 4.8 G/DL (ref 3.4–4.8)
ALBUMIN/GLOB SERPL: 1.3 G/DL (ref 1.5–2.5)
ALP SERPL-CCNC: 57 U/L (ref 35–104)
ALT SERPL W P-5'-P-CCNC: 13 U/L (ref 10–36)
ANION GAP SERPL CALCULATED.3IONS-SCNC: 8.5 MMOL/L (ref 3.6–11.2)
ARTERIAL PATENCY WRIST A: ABNORMAL
AST SERPL-CCNC: 24 U/L (ref 10–30)
ATMOSPHERIC PRESS: 737 MMHG
BACTERIA SPEC AEROBE CULT: NORMAL
BACTERIA SPEC AEROBE CULT: NORMAL
BASE EXCESS BLDA CALC-SCNC: -0.5 MMOL/L
BASOPHILS # BLD AUTO: 0.02 10*3/MM3 (ref 0–0.3)
BASOPHILS NFR BLD AUTO: 0.6 % (ref 0–2)
BDY SITE: ABNORMAL
BILIRUB SERPL-MCNC: 0.7 MG/DL (ref 0.2–1.8)
BILIRUB UR QL STRIP: NEGATIVE
BODY TEMPERATURE: 98.7 C
BUN BLD-MCNC: 43 MG/DL (ref 7–21)
BUN/CREAT SERPL: 29.5 (ref 7–25)
CALCIUM SPEC-SCNC: 10.3 MG/DL (ref 7.7–10)
CHLORIDE SERPL-SCNC: 98 MMOL/L (ref 99–112)
CK MB SERPL-CCNC: 1.3 NG/ML (ref 0–5)
CK MB SERPL-CCNC: 1.84 NG/ML (ref 0–5)
CK MB SERPL-RTO: 1.7 % (ref 0–3)
CK MB SERPL-RTO: 2.2 % (ref 0–3)
CK SERPL-CCNC: 77 U/L (ref 24–173)
CK SERPL-CCNC: 84 U/L (ref 24–173)
CLARITY UR: ABNORMAL
CO2 SERPL-SCNC: 26.5 MMOL/L (ref 24.3–31.9)
COHGB MFR BLD: 1.5 % (ref 0–5)
COLOR UR: YELLOW
CREAT BLD-MCNC: 1.46 MG/DL (ref 0.43–1.29)
CRP SERPL-MCNC: <0.5 MG/DL (ref 0–0.99)
D-LACTATE SERPL-SCNC: 1 MMOL/L (ref 0.5–2)
DEPRECATED RDW RBC AUTO: 43 FL (ref 37–54)
EOSINOPHIL # BLD AUTO: 0.02 10*3/MM3 (ref 0–0.7)
EOSINOPHIL NFR BLD AUTO: 0.6 % (ref 0–7)
ERYTHROCYTE [DISTWIDTH] IN BLOOD BY AUTOMATED COUNT: 12.4 % (ref 11.5–14.5)
GFR SERPL CREATININE-BSD FRML MDRD: 34 ML/MIN/1.73
GLOBULIN UR ELPH-MCNC: 3.6 GM/DL
GLUCOSE BLD-MCNC: 107 MG/DL (ref 70–110)
GLUCOSE UR STRIP-MCNC: NEGATIVE MG/DL
HCO3 BLDA-SCNC: 24.5 MMOL/L (ref 22–26)
HCT VFR BLD AUTO: 39.2 % (ref 37–47)
HCT VFR BLD CALC: 39 % (ref 37–47)
HGB BLD-MCNC: 13.3 G/DL (ref 12–16)
HGB BLDA-MCNC: 13.1 G/DL (ref 12–16)
HGB UR QL STRIP.AUTO: NEGATIVE
HOROWITZ INDEX BLD+IHG-RTO: 21 %
IMM GRANULOCYTES # BLD: 0 10*3/MM3 (ref 0–0.03)
IMM GRANULOCYTES NFR BLD: 0 % (ref 0–0.5)
KETONES UR QL STRIP: ABNORMAL
LEUKOCYTE ESTERASE UR QL STRIP.AUTO: NEGATIVE
LIPASE SERPL-CCNC: 82 U/L (ref 13–60)
LYMPHOCYTES # BLD AUTO: 1.63 10*3/MM3 (ref 1–3)
LYMPHOCYTES NFR BLD AUTO: 47.9 % (ref 16–46)
MAGNESIUM SERPL-MCNC: 2.3 MG/DL (ref 1.7–2.6)
MCH RBC QN AUTO: 33.3 PG (ref 27–33)
MCHC RBC AUTO-ENTMCNC: 33.9 G/DL (ref 33–37)
MCV RBC AUTO: 98.2 FL (ref 80–94)
METHGB BLD QL: 0.2 % (ref 0–3)
MODALITY: ABNORMAL
MONOCYTES # BLD AUTO: 0.49 10*3/MM3 (ref 0.1–0.9)
MONOCYTES NFR BLD AUTO: 14.4 % (ref 0–12)
NEUTROPHILS # BLD AUTO: 1.24 10*3/MM3 (ref 1.4–6.5)
NEUTROPHILS NFR BLD AUTO: 36.5 % (ref 40–75)
NITRITE UR QL STRIP: NEGATIVE
OSMOLALITY SERPL CALC.SUM OF ELEC: 277.7 MOSM/KG (ref 273–305)
OXYHGB MFR BLDV: 91.9 % (ref 85–100)
PCO2 BLDA: 41.7 MM HG (ref 35–45)
PH BLDA: 7.39 PH UNITS (ref 7.35–7.45)
PH UR STRIP.AUTO: <=5 [PH] (ref 5–8)
PLATELET # BLD AUTO: 167 10*3/MM3 (ref 130–400)
PMV BLD AUTO: 10.7 FL (ref 6–10)
PO2 BLDA: 69.6 MM HG (ref 80–100)
POTASSIUM BLD-SCNC: 4.3 MMOL/L (ref 3.5–5.3)
PROT SERPL-MCNC: 8.4 G/DL (ref 6–8)
PROT UR QL STRIP: NEGATIVE
RBC # BLD AUTO: 3.99 10*6/MM3 (ref 4.2–5.4)
SAO2 % BLDCOA: 93.5 % (ref 90–100)
SODIUM BLD-SCNC: 133 MMOL/L (ref 135–153)
SP GR UR STRIP: 1.02 (ref 1–1.03)
T3FREE SERPL-MCNC: 2.3 PG/ML (ref 2.3–4.2)
T4 FREE SERPL-MCNC: 0.79 NG/DL (ref 0.89–1.76)
TROPONIN I SERPL-MCNC: 0.02 NG/ML
TROPONIN I SERPL-MCNC: 0.02 NG/ML
TROPONIN I SERPL-MCNC: 0.03 NG/ML
TSH SERPL DL<=0.05 MIU/L-ACNC: 9.15 MIU/ML (ref 0.55–4.78)
UROBILINOGEN UR QL STRIP: ABNORMAL
WBC NRBC COR # BLD: 3.4 10*3/MM3 (ref 4.5–12.5)

## 2018-01-14 PROCEDURE — 99285 EMERGENCY DEPT VISIT HI MDM: CPT

## 2018-01-14 PROCEDURE — 81003 URINALYSIS AUTO W/O SCOPE: CPT | Performed by: PHYSICIAN ASSISTANT

## 2018-01-14 PROCEDURE — 99223 1ST HOSP IP/OBS HIGH 75: CPT | Performed by: HOSPITALIST

## 2018-01-14 PROCEDURE — 70450 CT HEAD/BRAIN W/O DYE: CPT | Performed by: RADIOLOGY

## 2018-01-14 PROCEDURE — 93005 ELECTROCARDIOGRAM TRACING: CPT | Performed by: HOSPITALIST

## 2018-01-14 PROCEDURE — 87040 BLOOD CULTURE FOR BACTERIA: CPT | Performed by: PHYSICIAN ASSISTANT

## 2018-01-14 PROCEDURE — 83605 ASSAY OF LACTIC ACID: CPT | Performed by: PHYSICIAN ASSISTANT

## 2018-01-14 PROCEDURE — 80053 COMPREHEN METABOLIC PANEL: CPT | Performed by: PHYSICIAN ASSISTANT

## 2018-01-14 PROCEDURE — 99222 1ST HOSP IP/OBS MODERATE 55: CPT | Performed by: INTERNAL MEDICINE

## 2018-01-14 PROCEDURE — 83690 ASSAY OF LIPASE: CPT | Performed by: PHYSICIAN ASSISTANT

## 2018-01-14 PROCEDURE — 85025 COMPLETE CBC W/AUTO DIFF WBC: CPT | Performed by: PHYSICIAN ASSISTANT

## 2018-01-14 PROCEDURE — 84439 ASSAY OF FREE THYROXINE: CPT | Performed by: HOSPITALIST

## 2018-01-14 PROCEDURE — 71045 X-RAY EXAM CHEST 1 VIEW: CPT | Performed by: RADIOLOGY

## 2018-01-14 PROCEDURE — 84484 ASSAY OF TROPONIN QUANT: CPT | Performed by: PHYSICIAN ASSISTANT

## 2018-01-14 PROCEDURE — 82550 ASSAY OF CK (CPK): CPT | Performed by: PHYSICIAN ASSISTANT

## 2018-01-14 PROCEDURE — 93010 ELECTROCARDIOGRAM REPORT: CPT | Performed by: INTERNAL MEDICINE

## 2018-01-14 PROCEDURE — 82375 ASSAY CARBOXYHB QUANT: CPT | Performed by: PHYSICIAN ASSISTANT

## 2018-01-14 PROCEDURE — 25010000002 HYDRALAZINE PER 20 MG: Performed by: PHYSICIAN ASSISTANT

## 2018-01-14 PROCEDURE — 25010000002 HEPARIN (PORCINE) PER 1000 UNITS: Performed by: PHYSICIAN ASSISTANT

## 2018-01-14 PROCEDURE — 83735 ASSAY OF MAGNESIUM: CPT | Performed by: HOSPITALIST

## 2018-01-14 PROCEDURE — 36600 WITHDRAWAL OF ARTERIAL BLOOD: CPT | Performed by: PHYSICIAN ASSISTANT

## 2018-01-14 PROCEDURE — 25010000002 ONDANSETRON PER 1 MG: Performed by: PHYSICIAN ASSISTANT

## 2018-01-14 PROCEDURE — 82553 CREATINE MB FRACTION: CPT | Performed by: PHYSICIAN ASSISTANT

## 2018-01-14 PROCEDURE — 71045 X-RAY EXAM CHEST 1 VIEW: CPT

## 2018-01-14 PROCEDURE — 82805 BLOOD GASES W/O2 SATURATION: CPT | Performed by: PHYSICIAN ASSISTANT

## 2018-01-14 PROCEDURE — 86140 C-REACTIVE PROTEIN: CPT | Performed by: PHYSICIAN ASSISTANT

## 2018-01-14 PROCEDURE — 84481 FREE ASSAY (FT-3): CPT | Performed by: HOSPITALIST

## 2018-01-14 PROCEDURE — 70450 CT HEAD/BRAIN W/O DYE: CPT

## 2018-01-14 PROCEDURE — 93005 ELECTROCARDIOGRAM TRACING: CPT | Performed by: PHYSICIAN ASSISTANT

## 2018-01-14 PROCEDURE — 84443 ASSAY THYROID STIM HORMONE: CPT | Performed by: PHYSICIAN ASSISTANT

## 2018-01-14 PROCEDURE — 25010000002 PROMETHAZINE PER 50 MG: Performed by: PHYSICIAN ASSISTANT

## 2018-01-14 PROCEDURE — 83050 HGB METHEMOGLOBIN QUAN: CPT | Performed by: PHYSICIAN ASSISTANT

## 2018-01-14 RX ORDER — HYDRALAZINE HYDROCHLORIDE 20 MG/ML
10 INJECTION INTRAMUSCULAR; INTRAVENOUS EVERY 6 HOURS PRN
Status: DISCONTINUED | OUTPATIENT
Start: 2018-01-14 | End: 2018-01-18 | Stop reason: HOSPADM

## 2018-01-14 RX ORDER — ONDANSETRON 4 MG/1
4 TABLET, ORALLY DISINTEGRATING ORAL EVERY 6 HOURS PRN
Status: CANCELLED | OUTPATIENT
Start: 2018-01-14

## 2018-01-14 RX ORDER — ACETAMINOPHEN 500 MG
1000 TABLET ORAL EVERY 6 HOURS PRN
Status: DISCONTINUED | OUTPATIENT
Start: 2018-01-14 | End: 2018-01-18 | Stop reason: HOSPADM

## 2018-01-14 RX ORDER — LEVOTHYROXINE SODIUM 0.05 MG/1
50 TABLET ORAL
Status: DISCONTINUED | OUTPATIENT
Start: 2018-01-15 | End: 2018-01-18 | Stop reason: HOSPADM

## 2018-01-14 RX ORDER — ACETAMINOPHEN 500 MG
1000 TABLET ORAL EVERY 6 HOURS PRN
Status: ON HOLD | COMMUNITY
End: 2018-01-18

## 2018-01-14 RX ORDER — ASPIRIN 81 MG/1
81 TABLET ORAL DAILY
Status: DISCONTINUED | OUTPATIENT
Start: 2018-01-15 | End: 2018-01-18 | Stop reason: HOSPADM

## 2018-01-14 RX ORDER — SODIUM CHLORIDE 0.9 % (FLUSH) 0.9 %
1-10 SYRINGE (ML) INJECTION AS NEEDED
Status: DISCONTINUED | OUTPATIENT
Start: 2018-01-14 | End: 2018-01-18 | Stop reason: HOSPADM

## 2018-01-14 RX ORDER — NITROGLYCERIN 0.4 MG/1
0.4 TABLET SUBLINGUAL
Status: DISCONTINUED | OUTPATIENT
Start: 2018-01-14 | End: 2018-01-18 | Stop reason: HOSPADM

## 2018-01-14 RX ORDER — MAGNESIUM SULFATE HEPTAHYDRATE 40 MG/ML
4 INJECTION, SOLUTION INTRAVENOUS AS NEEDED
Status: DISCONTINUED | OUTPATIENT
Start: 2018-01-14 | End: 2018-01-18 | Stop reason: HOSPADM

## 2018-01-14 RX ORDER — HEPARIN SODIUM 5000 [USP'U]/ML
5000 INJECTION, SOLUTION INTRAVENOUS; SUBCUTANEOUS EVERY 12 HOURS SCHEDULED
Status: DISCONTINUED | OUTPATIENT
Start: 2018-01-14 | End: 2018-01-18 | Stop reason: HOSPADM

## 2018-01-14 RX ORDER — PROMETHAZINE HYDROCHLORIDE 25 MG/1
25 TABLET ORAL EVERY 6 HOURS PRN
Status: DISCONTINUED | OUTPATIENT
Start: 2018-01-14 | End: 2018-01-14

## 2018-01-14 RX ORDER — HYDRALAZINE HYDROCHLORIDE 25 MG/1
25 TABLET, FILM COATED ORAL EVERY 8 HOURS SCHEDULED
Status: DISCONTINUED | OUTPATIENT
Start: 2018-01-14 | End: 2018-01-18 | Stop reason: HOSPADM

## 2018-01-14 RX ORDER — IBUPROFEN 800 MG/1
800 TABLET ORAL
Status: CANCELLED | OUTPATIENT
Start: 2018-01-14

## 2018-01-14 RX ORDER — AMLODIPINE BESYLATE 5 MG/1
7.5 TABLET ORAL DAILY
Status: DISCONTINUED | OUTPATIENT
Start: 2018-01-15 | End: 2018-01-16

## 2018-01-14 RX ORDER — ACETAMINOPHEN 500 MG
500 TABLET ORAL EVERY 8 HOURS PRN
Status: DISCONTINUED | OUTPATIENT
Start: 2018-01-14 | End: 2018-01-14 | Stop reason: SDUPTHER

## 2018-01-14 RX ORDER — LEVOTHYROXINE SODIUM 0.03 MG/1
25 TABLET ORAL DAILY
Status: DISCONTINUED | OUTPATIENT
Start: 2018-01-15 | End: 2018-01-14 | Stop reason: DRUGHIGH

## 2018-01-14 RX ORDER — MAGNESIUM SULFATE 1 G/100ML
1 INJECTION INTRAVENOUS AS NEEDED
Status: DISCONTINUED | OUTPATIENT
Start: 2018-01-14 | End: 2018-01-18 | Stop reason: HOSPADM

## 2018-01-14 RX ORDER — LEVOTHYROXINE SODIUM 0.05 MG/1
50 TABLET ORAL
Status: DISCONTINUED | OUTPATIENT
Start: 2018-01-15 | End: 2018-01-14

## 2018-01-14 RX ORDER — SODIUM CHLORIDE 0.9 % (FLUSH) 0.9 %
10 SYRINGE (ML) INJECTION AS NEEDED
Status: DISCONTINUED | OUTPATIENT
Start: 2018-01-14 | End: 2018-01-18 | Stop reason: HOSPADM

## 2018-01-14 RX ORDER — MAGNESIUM SULFATE HEPTAHYDRATE 40 MG/ML
2 INJECTION, SOLUTION INTRAVENOUS AS NEEDED
Status: DISCONTINUED | OUTPATIENT
Start: 2018-01-14 | End: 2018-01-18 | Stop reason: HOSPADM

## 2018-01-14 RX ORDER — SODIUM CHLORIDE 9 MG/ML
100 INJECTION, SOLUTION INTRAVENOUS CONTINUOUS
Status: DISPENSED | OUTPATIENT
Start: 2018-01-14 | End: 2018-01-15

## 2018-01-14 RX ORDER — SODIUM CHLORIDE 9 MG/ML
125 INJECTION, SOLUTION INTRAVENOUS CONTINUOUS
Status: DISCONTINUED | OUTPATIENT
Start: 2018-01-14 | End: 2018-01-14

## 2018-01-14 RX ORDER — METOPROLOL SUCCINATE 50 MG/1
100 TABLET, EXTENDED RELEASE ORAL DAILY
Status: CANCELLED | OUTPATIENT
Start: 2018-01-14

## 2018-01-14 RX ORDER — DOXYCYCLINE 100 MG/1
100 CAPSULE ORAL EVERY 12 HOURS SCHEDULED
Status: CANCELLED | OUTPATIENT
Start: 2018-01-14 | End: 2018-01-19

## 2018-01-14 RX ORDER — ONDANSETRON 2 MG/ML
4 INJECTION INTRAMUSCULAR; INTRAVENOUS ONCE
Status: COMPLETED | OUTPATIENT
Start: 2018-01-14 | End: 2018-01-14

## 2018-01-14 RX ADMIN — SODIUM CHLORIDE 125 ML/HR: 9 INJECTION, SOLUTION INTRAVENOUS at 14:07

## 2018-01-14 RX ADMIN — ONDANSETRON 4 MG: 2 INJECTION, SOLUTION INTRAMUSCULAR; INTRAVENOUS at 14:07

## 2018-01-14 RX ADMIN — HYDRALAZINE HYDROCHLORIDE 25 MG: 25 TABLET ORAL at 18:17

## 2018-01-14 RX ADMIN — HYDRALAZINE HYDROCHLORIDE 10 MG: 20 INJECTION INTRAMUSCULAR; INTRAVENOUS at 19:18

## 2018-01-14 RX ADMIN — PROMETHAZINE HYDROCHLORIDE 12.5 MG: 25 INJECTION INTRAMUSCULAR; INTRAVENOUS at 19:32

## 2018-01-14 RX ADMIN — HEPARIN SODIUM 5000 UNITS: 5000 INJECTION, SOLUTION INTRAVENOUS; SUBCUTANEOUS at 20:01

## 2018-01-14 RX ADMIN — SODIUM CHLORIDE 1000 ML: 9 INJECTION, SOLUTION INTRAVENOUS at 14:08

## 2018-01-14 NOTE — PROGRESS NOTES
Discharge Planning Assessment   [facility]     Patient Name: Lashell Case  MRN: [id number]  Today's Date: 1/14/2018    Admit Date: 1/14/2018          Discharge Needs Assessment       01/14/18 1757    Living Environment    Lives With child(carmina), dependent    Living Arrangements apartment    Transportation Available car;family or friend will provide    Discharge Needs Assessment    Concerns To Be Addressed no discharge needs identified    Readmission Within The Last 30 Days no previous admission in last 30 days    Equipment Currently Used at Home none    Discharge Planning Comments PT BEING ADMITTED TO DR HERNANDEZ TO TELE FOR BRADYCARDIA, INTRACTABLE NAUSEA AND VOMITING, FLU A, AND ORTHOSTASIS. PT WAS IN CONTACT ISOLATION PER MD ORDERS, DID NOT GET TO SPEAK TO PT DIRECTLY.   VS: 97.5, 40-58 HR, 18, 139/72, 98%. ORTHOSTATIC BP LYING 187/73, SITTING 162/70, STANDING 135/73.  CO: WEAKNESS AND VOMITING , DX WITH FLU ON 1/9/18, S/S WORSENING   ER TX: ZOFRAN IV , NS BOLUS 1000 ML  LABS: BUN 43, WBC 3.40  EKG: SINUS MARIUM 41 BPM  HX: BREAST CANCER , HTN  FLOOR ORDERS: CARDIAC MONITORING, CONT PULSE OX, O2 TITRATE, EKG/ABG IN AM, BMP/CBC IN AM, TROPONIN Q 6 HRS, BMP/CBC IN AM, HEPARIN SQ BID, ECHO, ISOLATION CONTACT PRECAUTIONS, PHENERGAN IV/PO PRN NAUSEA, NS 75 ML/HR              Discharge Plan             Discharge Placement                     Demographic Summary       01/14/18 1756    Referral Information    Admission Type inpatient    Arrived From home or self-care    Referral Source admission list;emergency department    Reason For Consult discharge planning    Record Reviewed history and physical;medical record;patient profile    Primary Care Physician Information    Name JUAN BROWN            Functional Status       01/14/18 1756    Functional Status Current    Ambulation 0-->independent    Transferring 0-->independent    Toileting 0-->independent    Bathing 0-->independent    Dressing 0-->independent     Eating 0-->independent    Communication 0-->understands/communicates without difficulty    Current Functional Level Comment PER NURSING ASSESSMENT    Functional Status Prior    Ambulation 0-->independent    Transferring 0-->independent    Toileting 0-->independent    Bathing 0-->independent    Dressing 0-->independent    Eating 0-->independent    Communication 0-->understands/communicates without difficulty    Prior Functional Level Comment PER NURSING ASSESSMENT            Psychosocial                 Abuse/Neglect                 Legal       01/14/18 2604    Legal    Legal Comments SEE NURSING NOTES CONCERNING POA AND ADVANCED DIRECTIVES.            Substance Abuse                 Patient Forms               Jacquelyn Britt, RN

## 2018-01-14 NOTE — ED PROVIDER NOTES
Subjective   Patient is a 82 y.o. female presenting with vomiting.   Vomiting   The primary symptoms include fever, fatigue, nausea and vomiting. Primary symptoms do not include abdominal pain, diarrhea, dysuria or rash. Episode onset: 9 days ago. The onset was sudden.   The illness is also significant for chills. Associated symptoms comments: Patient reports that she began having symptoms 9 days ago.  She's had cough congestion.  She's had nausea vomiting.  No diarrhea.  She's not been eating or drinking well.  She's had progressive worsening weakness.  She was seen on Tuesday and diagnosed with influenza.  He is not been tolerating her medication.  She denies any abdominal pain.  No chest pain.  She has been extremely lightheaded and dizzy when she stands.  No syncope..       Review of Systems   Constitutional: Positive for chills, fatigue and fever.   HENT: Negative.    Respiratory: Negative.    Cardiovascular: Negative.  Negative for chest pain.   Gastrointestinal: Positive for nausea and vomiting. Negative for abdominal pain and diarrhea.   Endocrine: Negative.    Genitourinary: Negative.  Negative for dysuria.   Skin: Negative.  Negative for rash.   Neurological: Negative.    Psychiatric/Behavioral: Negative.    All other systems reviewed and are negative.      Past Medical History:   Diagnosis Date   • Arthritis    • Breast cancer 2015    lt br ca   • Breast cancer 2006    rt br ca   • Hypertension    • Hypothyroidism    • Scabies exposure        Allergies   Allergen Reactions   • Bactrim [Sulfamethoxazole-Trimethoprim] GI Intolerance   • Codeine    • Penicillins        Past Surgical History:   Procedure Laterality Date   • BREAST BIOPSY     • BREAST LUMPECTOMY Right 2006   • BREAST SURGERY     • MASTECTOMY Left 2015    ca       Family History   Problem Relation Age of Onset   • Hypertension Mother    • Uterine cancer Mother    • Diabetes Mother    • Diabetes Daughter    • Prostate cancer Son    • Diabetes  Son    • Throat cancer Son    • Hypertension Child        Social History     Social History   • Marital status:      Spouse name: N/A   • Number of children: N/A   • Years of education: N/A     Social History Main Topics   • Smoking status: Never Smoker   • Smokeless tobacco: Never Used   • Alcohol use No   • Drug use: No   • Sexual activity: Defer     Other Topics Concern   • None     Social History Narrative           Objective   Physical Exam   Constitutional: She is oriented to person, place, and time. She appears well-developed and well-nourished. No distress.   HENT:   Head: Normocephalic and atraumatic.   Right Ear: External ear normal.   Left Ear: External ear normal.   Nose: Nose normal.   Eyes: Conjunctivae and EOM are normal. Pupils are equal, round, and reactive to light.   Neck: Normal range of motion. Neck supple. No JVD present. No tracheal deviation present.   Cardiovascular: Normal rate and regular rhythm.    Murmur heard.  Pulmonary/Chest: Effort normal and breath sounds normal. No respiratory distress. She has no wheezes.   Abdominal: Soft. Bowel sounds are normal. There is no tenderness.   Musculoskeletal: Normal range of motion. She exhibits no edema or deformity.   Neurological: She is alert and oriented to person, place, and time. No cranial nerve deficit.   Skin: Skin is warm and dry. No rash noted. She is not diaphoretic. No erythema. No pallor.   Psychiatric: She has a normal mood and affect. Her behavior is normal. Thought content normal.   Nursing note and vitals reviewed.      Procedures         ED Course  ED Course   Comment By Time   Paged JOHN Leal 01/14 1601   D/w Dr Gant- tele admit JOHN Austin 01/14 1610                  MDM  Number of Diagnoses or Management Options  Bradycardia: new and requires workup  Influenza A: established and worsening  Non-intractable vomiting with nausea, unspecified vomiting type: new and requires workup  Orthostasis:  new and requires workup     Amount and/or Complexity of Data Reviewed  Clinical lab tests: reviewed and ordered  Tests in the radiology section of CPT®: reviewed and ordered  Tests in the medicine section of CPT®: reviewed and ordered  Decide to obtain previous medical records or to obtain history from someone other than the patient: yes  Discuss the patient with other providers: yes  Independent visualization of images, tracings, or specimens: yes    Risk of Complications, Morbidity, and/or Mortality  Presenting problems: moderate        Final diagnoses:   Bradycardia   Non-intractable vomiting with nausea, unspecified vomiting type   Influenza A   Orthostasis            JOHN Austin  01/14/18 5068

## 2018-01-14 NOTE — CONSULTS
Date of Admit: 1/14/2018  Date of Consult: 01/14/18  No ref. provider found      Active Problems:    Bradycardia        Assessment:    1. Sinus bradycardia in the 40s which is probably due to patient being on metoprolol (Toprol- mg daily which she has taken earlier today) along with some degree of hypothyroidism with a TSH level of 9.155 although patient is on levothyroxine 25 µg daily.  2. Influenza A illness.  3. Orthostasis due to possibly dehydration from nausea and vomiting due to influenza illness.  4. Acute renal failure possibly from dehydration.  5. Systolic murmur.  6. History of hypothyroidism.  7. Right-sided breast cancer, status post partial mastectomy on the right about 10 years ago followed by radiation chemotherapy with recurrence on the left side, status post left mastectomy about a year and half ago.  8. Essential hypertension.  9. Dyslipidemia, on atorvastatin.      Recommendations:    1. Hold metoprolol.  2. Increase levothyroxine to 50 µg daily.  3. Continue to monitor her heart rate and rhythm.  4. Adjust her antihypertensive medications as needed.  5. IV fluids as needed and tolerated.  6. Evaluate her heart murmur with an echo Doppler study.  7. I do not think that she would need a pacemaker implantation.  I think her sinus bradycardia should resolve with adjustment of her medications.    Reason for consultation: Sinus bradycardia.    Subjective     History of Present Illness: Ms. Case is a pleasant 82-year-old  female with history of hypertension, and previous history of breast cancer, status post partial mastectomy on the right side about 10 years ago followed by radiation and chemotherapy with recurrence on the left side about a year and a half ago for which she has had left mastectomy, presents with flulike illness since past Saturday.  She's been having fevers, chills, body aches since this past Saturday along with nausea and vomiting.  She was not able to keep  anything down.  She started to get weak and dizzy especially when she stands up.  She denies any syncope.  She presented to the emergency department here this past Tuesday and was prescribed some antibiotics and sent home.  She did not get any better and she presented again today with increasing weakness and dizziness.  She was noted to be also bradycardic with sinus bradycardia in the 40s.  And she is admitted for further management.  He denies any complaints of chest pains or shortness of breath.    Last Echo:      Past Medical History:   Diagnosis Date   • Arthritis    • Breast cancer 2015    lt br ca   • Breast cancer 2006    rt br ca   • Hypertension    • Hypothyroidism    • Scabies exposure      Past Surgical History:   Procedure Laterality Date   • BREAST BIOPSY     • BREAST LUMPECTOMY Right 2006   • BREAST SURGERY     • MASTECTOMY Left 2015    ca     Family History   Problem Relation Age of Onset   • Hypertension Mother    • Uterine cancer Mother    • Diabetes Mother    • Diabetes Daughter    • Prostate cancer Son    • Diabetes Son    • Throat cancer Son    • Hypertension Child      Social History   Substance Use Topics   • Smoking status: Never Smoker   • Smokeless tobacco: Never Used   • Alcohol use No     Prescriptions Prior to Admission   Medication Sig Dispense Refill Last Dose   • acetaminophen (TYLENOL) 500 MG tablet Take 2 tablets by mouth Every 6 (Six) Hours As Needed for Mild Pain . 60 tablet 0    • amLODIPine (NORVASC) 5 MG tablet Take 1.5 tablets by mouth Daily. 45 tablet 5 Taking   • anastrozole (ARIMIDEX) 1 MG chemo tablet Take 1 mg by mouth Daily.   Taking   • aspirin 81 MG tablet Take 1 tablet by mouth Daily. 30 tablet 5 Taking   • atorvastatin (LIPITOR) 20 MG tablet Take 1 tablet by mouth Daily. 30 tablet 5 Taking   • doxycycline (MONODOX) 100 MG capsule Take 1 capsule by mouth Every 12 (Twelve) Hours. 20 capsule 0    • ibuprofen (ADVIL,MOTRIN) 800 MG tablet Take 1 tablet by mouth 3 (Three)  Times a Day With Meals. 60 tablet 0    • levothyroxine (SYNTHROID, LEVOTHROID) 25 MCG tablet Take 1 tablet by mouth Daily. 30 tablet 2    • lisinopril-hydrochlorothiazide (PRINZIDE,ZESTORETIC) 20-25 MG per tablet Take 1 tablet by mouth Daily. 30 tablet 1    • metoprolol succinate XL (TOPROL-XL) 100 MG 24 hr tablet Take 1 tablet by mouth Daily. 30 tablet 5    • ondansetron ODT (ZOFRAN-ODT) 4 MG disintegrating tablet Take 1 tablet by mouth Every 6 (Six) Hours As Needed for Nausea or Vomiting. 12 tablet 0      Allergies:  Bactrim [sulfamethoxazole-trimethoprim]; Codeine; and Penicillins    Review of Systems   Constitutional: Positive for chills, fatigue and fever. Negative for appetite change.   HENT: Negative for congestion, ear discharge, ear pain and sore throat.    Eyes: Negative for pain and redness.   Respiratory: Negative for cough, shortness of breath and wheezing.    Cardiovascular: Negative for palpitations and leg swelling.   Gastrointestinal: Positive for nausea and vomiting. Negative for abdominal pain and diarrhea.   Endocrine: Negative for cold intolerance, heat intolerance, polydipsia and polyuria.   Genitourinary: Negative for dysuria and hematuria.   Skin: Negative for rash.   Neurological: Positive for dizziness and weakness. Negative for seizures, syncope and headaches.   Psychiatric/Behavioral: Negative for confusion. The patient is not nervous/anxious.    :      Objective      Vital Signs  Temp:  [97.5 °F (36.4 °C)-97.7 °F (36.5 °C)] 97.6 °F (36.4 °C)  Heart Rate:  [39-58] 55  Resp:  [18] 18  BP: (133-201)/(64-88) 201/88  Vital Signs (last 72 hrs)       01/11 0700  -  01/12 0659 01/12 0700  -  01/13 0659 01/13 0700  -  01/14 0659 01/14 0700  -  01/14 1825   Most Recent    Temp (°F)       97.5 -  97.7     97.6 (36.4)    Heart Rate       (!)39 -  58     55    Resp         18     18    BP       133/73 -  (!) 201/88     (!) 201/88    SpO2 (%)       98 -  99     98        Body mass index is 25.34  kg/(m^2).  No intake or output data in the 24 hours ending 01/14/18 1825  Physical Exam   Constitutional: She appears well-developed and well-nourished.   HENT:   Head: Normocephalic and atraumatic.   Mucous members are mildly dry.   Eyes: EOM are normal. No scleral icterus.   Neck: No JVD present. No tracheal deviation present. No thyromegaly present.   Cardiovascular: Normal rate, regular rhythm, S1 normal and S2 normal.  Exam reveals no gallop, no S3, no S4 and no friction rub.    Murmur heard.   Systolic murmur is present with a grade of 3/6   Pulses:       Dorsalis pedis pulses are 1+ on the right side, and 1+ on the left side.        Posterior tibial pulses are 2+ on the right side, and 2+ on the left side.   Grade 3/6 systolic ejection murmur at the left lower sternal border and right second intercostal space.   Pulmonary/Chest: No respiratory distress. She has no wheezes. She has no rales.   Abdominal: She exhibits no distension and no mass. There is no tenderness. There is no guarding.   Musculoskeletal: She exhibits no edema.   Neurological: She is alert. No cranial nerve deficit.   No focal neurologic deficits noted.   Skin: Skin is warm and dry.   Psychiatric: She has a normal mood and affect.         Results Review:    I reviewed the patient's new clinical results.    Results from last 7 days  Lab Units 01/14/18  1710 01/14/18  1346   CK TOTAL U/L 77  --    TROPONIN I ng/mL 0.024 0.022   CKMB ng/mL 1.30  --        Results from last 7 days  Lab Units 01/14/18  1346 01/09/18  1330   WBC 10*3/mm3 3.40* 4.90   HEMOGLOBIN g/dL 13.3 11.8*   PLATELETS 10*3/mm3 167 153       Results from last 7 days  Lab Units 01/14/18  1346 01/09/18  1330   SODIUM mmol/L 133* 133*   POTASSIUM mmol/L 4.3 4.0   CHLORIDE mmol/L 98* 97*   CO2 mmol/L 26.5 28.1   BUN mg/dL 43* 23*   CREATININE mg/dL 1.46* 1.20   CALCIUM mg/dL 10.3* 9.7   GLUCOSE mg/dL 107 93   ALT (SGPT) U/L 13 15   AST (SGOT) U/L 24 25     No results found for:  INR  No results found for: MG  Lab Results   Component Value Date    TSH 9.155 (H) 01/14/2018    CHLPL 241 (H) 05/06/2014    TRIG 361 (H) 01/03/2017    HDL 57 (L) 01/03/2017     (H) 05/06/2014      No results found for: BNP    ECG              Thank you very much for asking us to be involved in this patient's care.  We will follow along with you.      Raymon Corona MD,[facility]  01/14/18  6:25 PM    Dragon disclaimer:  Much of this encounter note is an electronic transcription/translation of spoken language to printed text. The electronic translation of spoken language may permit erroneous, or at times, nonsensical words or phrases to be inadvertently transcribed; Although I have reviewed the note for such errors, some may still exist.

## 2018-01-14 NOTE — H&P
Patient Identification:  Name:  Lashell Case  Age:  82 y.o.  Sex:  female  :  1935  MRN:  [id number]   Visit Number:  [id number]  Primary Care Physician:  SOFI Luevano    I have seen the patient in conjunction with Megha Bender PA-C and I agree with the following statements:     Chief complaint: Nausea, vomiting, dyspnea    History of presenting illness: Patient is an 82 y.o. female who presented to the ED due to nausea, vomiting, and near syncopal events.  Her heart rate was found to be in the 40s.  She was recently evaluated in the ED and diagnosed with Influenza A and bronchitis.  She was discharged with doxycycline.  She reports that she has been ill for around 10 total days started last 17.  She reports she has not left the bed very often since that time until Tuesday.  She reports when she attempts to stand she becomes light-headed and near syncopal.  She denies chest pain.  She reports nausea and vomiting on and off. She reports lack of appetite.  She continues to take Metoprolol for her blood pressure.  Heart rate has been in the 30s-50s in the ED.  She denies chest pain but does report near syncope.  She reports since diagnosis of influenza she continues to feel worse rather than better.  She reports due to difficulty eating and drinking with poor appetite and periodic vomiting, she has had little oral intake.      Mrs. Case reports that when she was hospitalized in  after her mastectomy she did have some bradycardia at that time as well.    ---------------------------------------------------------------------------------------------------------------------   Review of Systems   Constitutional: Positive for activity change, appetite change and fatigue. Negative for chills, diaphoresis and fever.   HENT: Negative for congestion, drooling, postnasal drip, rhinorrhea and sinus pressure.    Eyes: Negative for pain and discharge.   Respiratory: Positive for cough and  shortness of breath. Negative for wheezing.    Cardiovascular: Negative for chest pain, palpitations and leg swelling.   Gastrointestinal: Positive for nausea and vomiting. Negative for abdominal distention, abdominal pain, constipation and diarrhea.   Endocrine: Negative for cold intolerance, heat intolerance, polydipsia, polyphagia and polyuria.   Genitourinary: Negative for difficulty urinating, dysuria, flank pain and hematuria.   Musculoskeletal: Negative for arthralgias, gait problem, myalgias, neck pain and neck stiffness.   Skin: Negative for color change, pallor, rash and wound.   Allergic/Immunologic: Negative for environmental allergies, food allergies and immunocompromised state.   Neurological: Positive for weakness and light-headedness. Negative for dizziness, tremors and headaches.   Hematological: Negative for adenopathy. Does not bruise/bleed easily.   Psychiatric/Behavioral: Negative for agitation, behavioral problems and confusion.      ---------------------------------------------------------------------------------------------------------------------   Past Medical History:   Diagnosis Date   • Arthritis    • Breast cancer 2015    lt br ca   • Breast cancer 2006    rt br ca   • Hypertension    • Hypothyroidism    • Scabies exposure      Past Surgical History:   Procedure Laterality Date   • BREAST BIOPSY     • BREAST LUMPECTOMY Right 2006   • BREAST SURGERY     • MASTECTOMY Left 2015    ca     Family History   Problem Relation Age of Onset   • Hypertension Mother    • Uterine cancer Mother    • Diabetes Mother    • Diabetes Daughter    • Prostate cancer Son    • Diabetes Son    • Throat cancer Son    • Hypertension Child      Social History     Social History   • Marital status:      Spouse name: N/A   • Number of children: N/A   • Years of education: N/A     Social History Main Topics   • Smoking status: Never Smoker   • Smokeless tobacco: Never Used   • Alcohol use No   • Drug use: No    • Sexual activity: Defer     Other Topics Concern   • None     Social History Narrative     ---------------------------------------------------------------------------------------------------------------------   Allergies:  Bactrim [sulfamethoxazole-trimethoprim]; Codeine; Penicillins; and Phenergan [promethazine hcl]  ---------------------------------------------------------------------------------------------------------------------   Prior to Admission Medications     Prescriptions Last Dose Informant Patient Reported? Taking?    acetaminophen (TYLENOL) 500 MG tablet   No No    Take 2 tablets by mouth Every 6 (Six) Hours As Needed for Mild Pain .    amLODIPine (NORVASC) 5 MG tablet   No No    Take 1.5 tablets by mouth Daily.    anastrozole (ARIMIDEX) 1 MG chemo tablet  Self Yes No    Take 1 mg by mouth Daily.    aspirin 81 MG tablet   No No    Take 1 tablet by mouth Daily.    atorvastatin (LIPITOR) 20 MG tablet   No No    Take 1 tablet by mouth Daily.    doxycycline (MONODOX) 100 MG capsule   No No    Take 1 capsule by mouth Every 12 (Twelve) Hours.    ibuprofen (ADVIL,MOTRIN) 800 MG tablet   No No    Take 1 tablet by mouth 3 (Three) Times a Day With Meals.    levothyroxine (SYNTHROID, LEVOTHROID) 25 MCG tablet   No No    Take 1 tablet by mouth Daily.    lisinopril-hydrochlorothiazide (PRINZIDE,ZESTORETIC) 20-25 MG per tablet   No No    Take 1 tablet by mouth Daily.    metoprolol succinate XL (TOPROL-XL) 100 MG 24 hr tablet   No No    Take 1 tablet by mouth Daily.    ondansetron ODT (ZOFRAN-ODT) 4 MG disintegrating tablet   No No    Take 1 tablet by mouth Every 6 (Six) Hours As Needed for Nausea or Vomiting.        Hospital Scheduled Meds:    [START ON 1/15/2018] amLODIPine 7.5 mg Oral Daily   [START ON 1/15/2018] aspirin 81 mg Oral Daily   heparin (porcine) 5,000 Units Subcutaneous Q12H   hydrALAZINE 25 mg Oral Q8H   [START ON 1/15/2018] levothyroxine 25 mcg Oral Daily       sodium chloride 125 mL/hr Last  Rate: 125 mL/hr (01/14/18 1919)     ---------------------------------------------------------------------------------------------------------------------   Vital Signs:  Temp:  [97.5 °F (36.4 °C)-97.7 °F (36.5 °C)] 97.7 °F (36.5 °C)  Heart Rate:  [39-58] 56  Resp:  [18] 18  BP: (133-201)/(54-88) 170/60  Last 3 weights    01/14/18  1319 01/14/18  1651   Weight: 71.2 kg (157 lb) 71.2 kg (157 lb)     Body mass index is 25.34 kg/(m^2).  ---------------------------------------------------------------------------------------------------------------------       Physical Exam    Physical Exam:  Constitutional:  Well-developed and well-nourished. Uncomfortable but no acute distress.    HENT:  Head: Normocephalic and atraumatic.  Mouth:  Dry mucous membranes. Oropharynx clear.    Eyes:  Conjunctivae and EOM are normal.  Pupils are equal, round, and reactive to light.  No scleral icterus.  Neck:  Neck supple.  No JVD present.    Cardiovascular:  Regular rate, regular rhythm.  Normal s1/s2 with no murmur.  Pulmonary/Chest:  No respiratory distress, no wheezes, no crackles, with normal breath sounds and good air movement.  Abdominal:  Soft.  Bowel sounds are present.  No distension and no tenderness.   Musculoskeletal:  Left upper extremity edema (chronic), no tenderness, and no deformity.  No red or swollen joints anywhere.    Neurological:  Alert and oriented to person, place, and time.  No cranial nerve deficit.  No tongue deviation.  No facial droop.  No slurred speech. Having some lower extremity restlessness with occasional jerking, suspects this is related to phenergan she received earlier this evening for nausea.  Skin:  Skin is warm and dry.  No rash noted.  No pallor.   Psychiatric:  Normal mood and affect.  Behavior is normal.  Judgment and thought content normal.   Peripheral vascular:  Left upper extremity edema (chronic s/p mastectomy) and strong pulses on all 4  extremities.  ---------------------------------------------------------------------------------------------------------------------  EKG:  Marked sinus bradycardia, HR 41.  but QTc only 409 when corrected for bradycardia. Moderate voltage criteria for LVH. No overt ST changes to suggest acute ischemia.        ---------------------------------------------------------------------------------------------------------------------     Results from last 7 days  Lab Units 01/14/18  1616 01/14/18  1346 01/09/18  1330   CRP mg/dL <0.50  --   --    LACTATE mmol/L  --  1.0  --    WBC 10*3/mm3  --  3.40* 4.90   HEMOGLOBIN g/dL  --  13.3 11.8*   HEMATOCRIT %  --  39.2 35.2*   MCV fL  --  98.2* 100.3*   MCHC g/dL  --  33.9 33.5   PLATELETS 10*3/mm3  --  167 153       Results from last 7 days  Lab Units 01/14/18  1414   PH, ARTERIAL pH units 7.387   PO2 ART mm Hg 69.6*   PCO2, ARTERIAL mm Hg 41.7   HCO3 ART mmol/L 24.5       Results from last 7 days  Lab Units 01/14/18  1346 01/09/18  1330   SODIUM mmol/L 133* 133*   POTASSIUM mmol/L 4.3 4.0   CHLORIDE mmol/L 98* 97*   CO2 mmol/L 26.5 28.1   BUN mg/dL 43* 23*   CREATININE mg/dL 1.46* 1.20   EGFR IF NONAFRICN AM mL/min/1.73 34* 43*   CALCIUM mg/dL 10.3* 9.7   GLUCOSE mg/dL 107 93   ALBUMIN g/dL 4.80 4.40   BILIRUBIN mg/dL 0.7 0.6   ALK PHOS U/L 57 60   AST (SGOT) U/L 24 25   ALT (SGPT) U/L 13 15   Estimated Creatinine Clearance: 31.1 mL/min (by C-G formula based on Cr of 1.46).  No results found for: AMMONIA    Results from last 7 days  Lab Units 01/14/18  1710 01/14/18  1346   CK TOTAL U/L 77  --    TROPONIN I ng/mL 0.024 0.022   CK MB INDEX % 1.7  --          No results found for: HGBA1C  Lab Results   Component Value Date    TSH 9.155 (H) 01/14/2018    FREET4 0.60 (L) 01/03/2017     No results found for: PREGTESTUR, PREGSERUM, HCG, HCGQUANT  Pain Management Panel     There is no flowsheet data to display.        Blood Culture   Date Value Ref Range Status   01/09/2018 No  growth at 5 days  Final   01/09/2018 No growth at 5 days  Final                  ---------------------------------------------------------------------------------------------------------------------  Imaging Results (last 7 days)     Procedure Component Value Units Date/Time    CT Head Without Contrast [[id number]] Resulted:  01/14/18 1523     Updated:  01/14/18 1523    XR Chest 1 View [[id number]] Resulted:  01/14/18 1523     Updated:  01/14/18 1523          Cultures:  Blood Culture   Date Value Ref Range Status   01/09/2018 No growth at 5 days  Final   01/09/2018 No growth at 5 days  Final         I have personally reviewed the radiology images and read the final radiology report.  ---------------------------------------------------------------------------------------------------------------------  Assessment and Plan:    -Influenza A:  Given patient was diagnosed on 1/9, tamiflu would offer little if any benefit this far out and could cause more GI side effects so will hold and treat symptomatically with IV fluids.     -Persistent nausea and vomiting: responded to zofran in the ED. Was given phenergan upon arrival to the floor but suffered uncomfortable side effects (restless legs, dry mouth, confusion) so will discontinue. I'm assuming zofran was not continued because of her prolonged QT, although when corrected for bradycardia her QTc was actually normal. Repeating EKG now that heart rate has improved. Continue IV fluid hydration. If QTc ok then will make zofran available PRN.    -Near syncope, likely secondary to bradycardia and hypovolemia:  Will hydrate with IV fluids as noted above.  As part of syncope workup, will continue to monitor on telemetry, obtain US carotid doppler and ECHO. CT head unremarkable.     -Sinus bradycardia: Continue with telemetry monitoring.Cardiology following. Holding metoprolol. TSH came back elevated and free T4 was low recently; patient has hypothyroidism and based on these values  she is inadequately supplemented which could be playing a role in her bradycardia as well. Cardiology has already increased her dose of synthroid.    -Uncontrolled hypertension: IV Hydralazine 10 mg PRN q6 hours has been ordered. Add back home amlodipine but hold metoprolol (as noted above), along with HCTZ/lisinopril combo pill due to dehydration and acute on stage III CKD.    -Mild leukopenia: likely secondary to influenza, continue to follow.    -Acute on stage III CKD: hydrate with IV fluids as noted above. Hold nephrotoxic home meds. Repeat labs in the morning.    -Mild hypercalcemia, likely 2/2 to poor oral intake and dehydration: Will continue to follow calcium and hydrate with IV fluids.  Mrs. Case does have history of breast cancer with recent mammogram November 2017 with benign findings.      -Mild hyponatremia: again, likely secondary to hypovolemia/dehydration. Will hydrate and continue to follow.     -DVT prophylaxis with SQ Heparin    Plan of care discussed with patient and her RN April on [facility]. Also reviewed Dr Corona's consult note and appreciate his assistance.    * patient is high risk due to influenza A, acute on stage III CKD, near syncope, sinus bradycardia    González Rodríguez MD  01/14/18  8:42 PM  ---------------------------------------------------------------------------------------------------------------------     * I have seen the patient in conjunction with Megha Bender PA-C, and have amended her note to reflect my own findings, assessment and plan.

## 2018-01-14 NOTE — PLAN OF CARE
Problem: Patient Care Overview (Adult)  Goal: Plan of Care Review  Outcome: Ongoing (interventions implemented as appropriate)    Goal: Adult Individualization and Mutuality  Outcome: Ongoing (interventions implemented as appropriate)    Goal: Discharge Needs Assessment  Outcome: Ongoing (interventions implemented as appropriate)      Problem: Cardiac Output, Decreased (Adult)  Goal: Identify Related Risk Factors and Signs and Symptoms  Outcome: Ongoing (interventions implemented as appropriate)    Goal: Adequate Cardiac Output/Effective Tissue Perfusion  Outcome: Ongoing (interventions implemented as appropriate)

## 2018-01-15 ENCOUNTER — APPOINTMENT (OUTPATIENT)
Dept: CARDIOLOGY | Facility: HOSPITAL | Age: 83
End: 2018-01-15
Attending: INTERNAL MEDICINE

## 2018-01-15 ENCOUNTER — APPOINTMENT (OUTPATIENT)
Dept: ULTRASOUND IMAGING | Facility: HOSPITAL | Age: 83
End: 2018-01-15

## 2018-01-15 LAB
ALBUMIN SERPL-MCNC: 3.7 G/DL (ref 3.4–4.8)
ALBUMIN/GLOB SERPL: 1.2 G/DL (ref 1.5–2.5)
ALP SERPL-CCNC: 45 U/L (ref 35–104)
ALT SERPL W P-5'-P-CCNC: 13 U/L (ref 10–36)
ANION GAP SERPL CALCULATED.3IONS-SCNC: 7.7 MMOL/L (ref 3.6–11.2)
AST SERPL-CCNC: 20 U/L (ref 10–30)
BASOPHILS # BLD AUTO: 0.02 10*3/MM3 (ref 0–0.3)
BASOPHILS NFR BLD AUTO: 0.6 % (ref 0–2)
BH CV ECHO MEAS - % IVS THICK: 12 %
BH CV ECHO MEAS - % LVPW THICK: 100 %
BH CV ECHO MEAS - ACS: 1.4 CM
BH CV ECHO MEAS - AO MAX PG (FULL): 25.6 MMHG
BH CV ECHO MEAS - AO MAX PG: 32.8 MMHG
BH CV ECHO MEAS - AO MEAN PG (FULL): 12.8 MMHG
BH CV ECHO MEAS - AO MEAN PG: 17 MMHG
BH CV ECHO MEAS - AO ROOT AREA (BSA CORRECTED): 1.5
BH CV ECHO MEAS - AO ROOT AREA: 6 CM^2
BH CV ECHO MEAS - AO ROOT DIAM: 2.8 CM
BH CV ECHO MEAS - AO V2 MAX: 286.5 CM/SEC
BH CV ECHO MEAS - AO V2 MEAN: 186.2 CM/SEC
BH CV ECHO MEAS - AO V2 VTI: 54.4 CM
BH CV ECHO MEAS - AVA(I,A): 1.7 CM^2
BH CV ECHO MEAS - AVA(I,D): 1.7 CM^2
BH CV ECHO MEAS - AVA(V,A): 1.3 CM^2
BH CV ECHO MEAS - AVA(V,D): 1.3 CM^2
BH CV ECHO MEAS - BSA(HAYCOCK): 1.8 M^2
BH CV ECHO MEAS - BSA: 1.8 M^2
BH CV ECHO MEAS - BZI_BMI: 25 KILOGRAMS/M^2
BH CV ECHO MEAS - BZI_METRIC_HEIGHT: 167.6 CM
BH CV ECHO MEAS - BZI_METRIC_WEIGHT: 70.3 KG
BH CV ECHO MEAS - CONTRAST EF 4CH: 80 ML/M^2
BH CV ECHO MEAS - EDV(CUBED): 161.2 ML
BH CV ECHO MEAS - EDV(MOD-SP4): 50 ML
BH CV ECHO MEAS - EDV(TEICH): 143.9 ML
BH CV ECHO MEAS - EF(CUBED): 77.1 %
BH CV ECHO MEAS - EF(MOD-SP4): 80 %
BH CV ECHO MEAS - EF(TEICH): 68.7 %
BH CV ECHO MEAS - ESV(CUBED): 36.9 ML
BH CV ECHO MEAS - ESV(MOD-SP4): 10 ML
BH CV ECHO MEAS - ESV(TEICH): 45.1 ML
BH CV ECHO MEAS - FS: 38.8 %
BH CV ECHO MEAS - IVS/LVPW: 1.3
BH CV ECHO MEAS - IVSD: 0.9 CM
BH CV ECHO MEAS - IVSS: 1 CM
BH CV ECHO MEAS - LA DIMENSION: 3.6 CM
BH CV ECHO MEAS - LA/AO: 1.3
BH CV ECHO MEAS - LV DIASTOLIC VOL/BSA (35-75): 27.9 ML/M^2
BH CV ECHO MEAS - LV MASS(C)D: 158.5 GRAMS
BH CV ECHO MEAS - LV MASS(C)DI: 88.3 GRAMS/M^2
BH CV ECHO MEAS - LV MASS(C)S: 129.3 GRAMS
BH CV ECHO MEAS - LV MASS(C)SI: 72 GRAMS/M^2
BH CV ECHO MEAS - LV MAX PG: 7.2 MMHG
BH CV ECHO MEAS - LV MEAN PG: 4.2 MMHG
BH CV ECHO MEAS - LV SYSTOLIC VOL/BSA (12-30): 5.6 ML/M^2
BH CV ECHO MEAS - LV V1 MAX: 134.4 CM/SEC
BH CV ECHO MEAS - LV V1 MEAN: 93.1 CM/SEC
BH CV ECHO MEAS - LV V1 VTI: 33.1 CM
BH CV ECHO MEAS - LVIDD: 5.4 CM
BH CV ECHO MEAS - LVIDS: 3.3 CM
BH CV ECHO MEAS - LVLD AP4: 7.5 CM
BH CV ECHO MEAS - LVLS AP4: 5.7 CM
BH CV ECHO MEAS - LVOT AREA (M): 2.8 CM^2
BH CV ECHO MEAS - LVOT AREA: 2.8 CM^2
BH CV ECHO MEAS - LVOT DIAM: 1.9 CM
BH CV ECHO MEAS - LVPWD: 0.72 CM
BH CV ECHO MEAS - LVPWS: 1.4 CM
BH CV ECHO MEAS - MV A MAX VEL: 60.3 CM/SEC
BH CV ECHO MEAS - MV DEC SLOPE: 504.1 CM/SEC^2
BH CV ECHO MEAS - MV E MAX VEL: 84.3 CM/SEC
BH CV ECHO MEAS - MV E/A: 1.4
BH CV ECHO MEAS - MV P1/2T MAX VEL: 118.1 CM/SEC
BH CV ECHO MEAS - MV P1/2T: 68.6 MSEC
BH CV ECHO MEAS - MVA P1/2T LCG: 1.9 CM^2
BH CV ECHO MEAS - MVA(P1/2T): 3.2 CM^2
BH CV ECHO MEAS - PA ACC SLOPE: 1524 CM/SEC^2
BH CV ECHO MEAS - PA ACC TIME: 0.08 SEC
BH CV ECHO MEAS - PA PR(ACCEL): 44.1 MMHG
BH CV ECHO MEAS - RAP SYSTOLE: 10 MMHG
BH CV ECHO MEAS - RVDD: 2.6 CM
BH CV ECHO MEAS - RVSP: 42.8 MMHG
BH CV ECHO MEAS - SI(AO): 182.4 ML/M^2
BH CV ECHO MEAS - SI(CUBED): 69.2 ML/M^2
BH CV ECHO MEAS - SI(LVOT): 52.4 ML/M^2
BH CV ECHO MEAS - SI(MOD-SP4): 22.3 ML/M^2
BH CV ECHO MEAS - SI(TEICH): 55 ML/M^2
BH CV ECHO MEAS - SV(AO): 327.4 ML
BH CV ECHO MEAS - SV(CUBED): 124.3 ML
BH CV ECHO MEAS - SV(LVOT): 94.1 ML
BH CV ECHO MEAS - SV(MOD-SP4): 40 ML
BH CV ECHO MEAS - SV(TEICH): 98.8 ML
BH CV ECHO MEAS - TR MAX VEL: 286.4 CM/SEC
BILIRUB SERPL-MCNC: 0.7 MG/DL (ref 0.2–1.8)
BUN BLD-MCNC: 26 MG/DL (ref 7–21)
BUN/CREAT SERPL: 25.2 (ref 7–25)
CALCIUM SPEC-SCNC: 9.3 MG/DL (ref 7.7–10)
CHLORIDE SERPL-SCNC: 107 MMOL/L (ref 99–112)
CK MB SERPL-CCNC: 1.68 NG/ML (ref 0–5)
CK MB SERPL-RTO: 1.9 % (ref 0–3)
CK SERPL-CCNC: 88 U/L (ref 24–173)
CO2 SERPL-SCNC: 22.3 MMOL/L (ref 24.3–31.9)
CREAT BLD-MCNC: 1.03 MG/DL (ref 0.43–1.29)
DEPRECATED RDW RBC AUTO: 41.8 FL (ref 37–54)
EOSINOPHIL # BLD AUTO: 0.02 10*3/MM3 (ref 0–0.7)
EOSINOPHIL NFR BLD AUTO: 0.6 % (ref 0–7)
ERYTHROCYTE [DISTWIDTH] IN BLOOD BY AUTOMATED COUNT: 12.2 % (ref 11.5–14.5)
FOLATE SERPL-MCNC: >24 NG/ML (ref 5.4–20)
GFR SERPL CREATININE-BSD FRML MDRD: 52 ML/MIN/1.73
GLOBULIN UR ELPH-MCNC: 3.1 GM/DL
GLUCOSE BLD-MCNC: 86 MG/DL (ref 70–110)
HCT VFR BLD AUTO: 33.9 % (ref 37–47)
HGB BLD-MCNC: 11.6 G/DL (ref 12–16)
IMM GRANULOCYTES # BLD: 0.01 10*3/MM3 (ref 0–0.03)
IMM GRANULOCYTES NFR BLD: 0.3 % (ref 0–0.5)
LYMPHOCYTES # BLD AUTO: 1.89 10*3/MM3 (ref 1–3)
LYMPHOCYTES NFR BLD AUTO: 54.6 % (ref 16–46)
MAXIMAL PREDICTED HEART RATE: 140 BPM
MCH RBC QN AUTO: 33.3 PG (ref 27–33)
MCHC RBC AUTO-ENTMCNC: 34.2 G/DL (ref 33–37)
MCV RBC AUTO: 97.4 FL (ref 80–94)
MONOCYTES # BLD AUTO: 0.52 10*3/MM3 (ref 0.1–0.9)
MONOCYTES NFR BLD AUTO: 15 % (ref 0–12)
NEUTROPHILS # BLD AUTO: 1 10*3/MM3 (ref 1.4–6.5)
NEUTROPHILS NFR BLD AUTO: 28.9 % (ref 40–75)
OSMOLALITY SERPL CALC.SUM OF ELEC: 277.9 MOSM/KG (ref 273–305)
PLATELET # BLD AUTO: 156 10*3/MM3 (ref 130–400)
PMV BLD AUTO: 11.1 FL (ref 6–10)
POTASSIUM BLD-SCNC: 3.9 MMOL/L (ref 3.5–5.3)
PROT SERPL-MCNC: 6.8 G/DL (ref 6–8)
RBC # BLD AUTO: 3.48 10*6/MM3 (ref 4.2–5.4)
SODIUM BLD-SCNC: 137 MMOL/L (ref 135–153)
STRESS TARGET HR: 119 BPM
TROPONIN I SERPL-MCNC: 0.02 NG/ML
VIT B12 BLD-MCNC: 350 PG/ML (ref 211–911)
WBC NRBC COR # BLD: 3.46 10*3/MM3 (ref 4.5–12.5)

## 2018-01-15 PROCEDURE — 94799 UNLISTED PULMONARY SVC/PX: CPT

## 2018-01-15 PROCEDURE — 82746 ASSAY OF FOLIC ACID SERUM: CPT | Performed by: PHYSICIAN ASSISTANT

## 2018-01-15 PROCEDURE — 93010 ELECTROCARDIOGRAM REPORT: CPT | Performed by: INTERNAL MEDICINE

## 2018-01-15 PROCEDURE — 99232 SBSQ HOSP IP/OBS MODERATE 35: CPT | Performed by: INTERNAL MEDICINE

## 2018-01-15 PROCEDURE — 82550 ASSAY OF CK (CPK): CPT | Performed by: PHYSICIAN ASSISTANT

## 2018-01-15 PROCEDURE — 85025 COMPLETE CBC W/AUTO DIFF WBC: CPT | Performed by: PHYSICIAN ASSISTANT

## 2018-01-15 PROCEDURE — 80053 COMPREHEN METABOLIC PANEL: CPT | Performed by: HOSPITALIST

## 2018-01-15 PROCEDURE — 93005 ELECTROCARDIOGRAM TRACING: CPT | Performed by: HOSPITALIST

## 2018-01-15 PROCEDURE — 25010000002 HEPARIN (PORCINE) PER 1000 UNITS: Performed by: PHYSICIAN ASSISTANT

## 2018-01-15 PROCEDURE — 93306 TTE W/DOPPLER COMPLETE: CPT | Performed by: INTERNAL MEDICINE

## 2018-01-15 PROCEDURE — 93880 EXTRACRANIAL BILAT STUDY: CPT | Performed by: RADIOLOGY

## 2018-01-15 PROCEDURE — 84484 ASSAY OF TROPONIN QUANT: CPT | Performed by: PHYSICIAN ASSISTANT

## 2018-01-15 PROCEDURE — 93306 TTE W/DOPPLER COMPLETE: CPT

## 2018-01-15 PROCEDURE — 82553 CREATINE MB FRACTION: CPT | Performed by: PHYSICIAN ASSISTANT

## 2018-01-15 PROCEDURE — 93880 EXTRACRANIAL BILAT STUDY: CPT

## 2018-01-15 PROCEDURE — 82607 VITAMIN B-12: CPT | Performed by: PHYSICIAN ASSISTANT

## 2018-01-15 RX ORDER — SODIUM CHLORIDE 9 MG/ML
50 INJECTION, SOLUTION INTRAVENOUS CONTINUOUS
Status: DISCONTINUED | OUTPATIENT
Start: 2018-01-15 | End: 2018-01-16

## 2018-01-15 RX ORDER — ATORVASTATIN CALCIUM 40 MG/1
80 TABLET, FILM COATED ORAL NIGHTLY
Status: DISCONTINUED | OUTPATIENT
Start: 2018-01-15 | End: 2018-01-18 | Stop reason: HOSPADM

## 2018-01-15 RX ADMIN — LEVOTHYROXINE SODIUM 50 MCG: 50 TABLET ORAL at 04:51

## 2018-01-15 RX ADMIN — Medication 81 MG: at 08:00

## 2018-01-15 RX ADMIN — HEPARIN SODIUM 5000 UNITS: 5000 INJECTION, SOLUTION INTRAVENOUS; SUBCUTANEOUS at 08:00

## 2018-01-15 RX ADMIN — SODIUM CHLORIDE 50 ML/HR: 9 INJECTION, SOLUTION INTRAVENOUS at 21:27

## 2018-01-15 RX ADMIN — HYDRALAZINE HYDROCHLORIDE 25 MG: 25 TABLET ORAL at 17:48

## 2018-01-15 RX ADMIN — HYDRALAZINE HYDROCHLORIDE 25 MG: 25 TABLET ORAL at 21:02

## 2018-01-15 RX ADMIN — ATORVASTATIN CALCIUM 80 MG: 40 TABLET, FILM COATED ORAL at 21:02

## 2018-01-15 RX ADMIN — SODIUM CHLORIDE 100 ML/HR: 9 INJECTION, SOLUTION INTRAVENOUS at 13:47

## 2018-01-15 RX ADMIN — HYDRALAZINE HYDROCHLORIDE 25 MG: 25 TABLET ORAL at 04:51

## 2018-01-15 RX ADMIN — HEPARIN SODIUM 5000 UNITS: 5000 INJECTION, SOLUTION INTRAVENOUS; SUBCUTANEOUS at 21:01

## 2018-01-15 RX ADMIN — SODIUM CHLORIDE 100 ML/HR: 9 INJECTION, SOLUTION INTRAVENOUS at 01:57

## 2018-01-15 NOTE — PROGRESS NOTES
Home admission medication reconciliation is pending and ready for physician review.    Kenny Frank RPH  7:04 PM  01/14/18

## 2018-01-15 NOTE — PLAN OF CARE
Problem: Patient Care Overview (Adult)  Goal: Plan of Care Review  Outcome: Ongoing (interventions implemented as appropriate)      Problem: Cardiac Output, Decreased (Adult)  Goal: Identify Related Risk Factors and Signs and Symptoms  Outcome: Outcome(s) achieved Date Met: 01/15/18    Goal: Adequate Cardiac Output/Effective Tissue Perfusion  Outcome: Ongoing (interventions implemented as appropriate)

## 2018-01-15 NOTE — PLAN OF CARE
Problem: Patient Care Overview (Adult)  Goal: Plan of Care Review  Outcome: Ongoing (interventions implemented as appropriate)   01/14/18 1846 01/14/18 2000   Coping/Psychosocial Response Interventions   Plan Of Care Reviewed With --  patient   Patient Care Overview   Progress no change --      Goal: Discharge Needs Assessment  Outcome: Ongoing (interventions implemented as appropriate)   01/14/18 1757 01/14/18 1846   Discharge Needs Assessment   Concerns To Be Addressed --  no discharge needs identified   Readmission Within The Last 30 Days --  no previous admission in last 30 days   Equipment Needed After Discharge --  none   Discharge Planning Comments PT BEING ADMITTED TO DR HERNANDEZ TO [facility] FOR BRADYCARDIA, INTRACTABLE NAUSEA AND VOMITING, FLU A, AND ORTHOSTASIS. --    Current Health   Anticipated Changes Related to Illness --  none   Living Environment   Transportation Available car;family or friend will provide --    Self-Care   Equipment Currently Used at Home none --        Problem: Cardiac Output, Decreased (Adult)  Goal: Identify Related Risk Factors and Signs and Symptoms  Outcome: Ongoing (interventions implemented as appropriate)   01/14/18 1846   Cardiac Output, Decreased   Cardiac Output, Decreased: Related Risk Factors heart rate altered     Goal: Adequate Cardiac Output/Effective Tissue Perfusion  Outcome: Ongoing (interventions implemented as appropriate)   01/14/18 2227   Cardiac Output, Decreased (Adult)   Adequate Cardiac Output/Effective Tissue Perfusion making progress toward outcome

## 2018-01-15 NOTE — PROGRESS NOTES
Discharge Planning Assessment   [place]     Patient Name: Lashell Case  MRN: [id number]  Today's Date: 1/15/2018    Admit Date: 1/14/2018          Discharge Needs Assessment       01/15/18 1602    Living Environment    Lives With child(carmina), dependent    Living Arrangements house    Home Accessibility no concerns    Stair Railings at Home none    Type of Financial/Environmental Concern none    Transportation Available car    Living Environment    Quality Of Family Relationships supportive    Able to Return to Prior Living Arrangements yes            Discharge Plan       01/15/18 1603    Case Management/Social Work Plan    Plan Pt admitted on 1/14/18.  SS received consult per Case Management for discharge planning due to advanced age.  SS spoke with pt on this date.  Pt states she lives at home with her 17 year old Great Granddaughter of whom she has custody.   Pt currently uitlizes no home health or DME.  Pt utilizes [facility].  Pt states no POA or Living Will.  Pt's PCP is [facility] with Dr. Alli Wolf.  Pt states no needs at this time.  SS will follow and assist with discharge needs.     Patient/Family In Agreement With Plan yes        Discharge Placement     No information found        Expected Discharge Date and Time     Expected Discharge Date Expected Discharge Time    Jan 18, 2018               Demographic Summary       01/15/18 1602    Referral Information    Admission Type inpatient    Arrived From admitted as an inpatient    Referral Source nursing    Reason For Consult discharge planning            Functional Status     None            Psychosocial     None            Abuse/Neglect     None            Legal     None            Substance Abuse     None            Patient Forms     None          Shelly Servin

## 2018-01-15 NOTE — PROGRESS NOTES
LOS: 1 day   Patient Care Team:  SOFI Luevano as PCP - General (Family Medicine)  Cynthia Ward MD as PCP - Claims Attributed      Subjective     Admission information:    Ms. Case is a pleasant 82-year-old  female with history of hypertension, and previous history of breast cancer, status post partial mastectomy on the right side about 10 years ago followed by radiation and chemotherapy with recurrence on the left side about a year and a half ago for which she has had left mastectomy, presents with flulike illness since past Saturday.  She's been having fevers, chills, body aches since this past Saturday along with nausea and vomiting.  She was not able to keep anything down.  She started to get weak and dizzy especially when she stands up.  She denies any syncope.  She presented to the emergency department here this past Tuesday and was prescribed some antibiotics and sent home.  She did not get any better and she presented again today with increasing weakness and dizziness.  She was noted to be also bradycardic with sinus bradycardia in the 40s.  And she is admitted for further management.  He denies any complaints of chest pains or shortness of breath.    Interval History:     According to the patient she is doing better today.  She denies any chest pain, shortness breath or palpitations.  She is laying comfortably on bed.    History taken from: patient chart    Vital Signs  Temp:  [97.5 °F (36.4 °C)-97.7 °F (36.5 °C)] 97.7 °F (36.5 °C)  Heart Rate:  [39-61] 61  Resp:  [18] 18  BP: (120-201)/(52-88) 120/52    Physical Exam:     Physical Exam   Constitutional: She is oriented to person, place, and time. She appears well-developed and well-nourished.   Elderly white female lying comfortably on bed.   HENT:   Mouth/Throat: Oropharynx is clear and moist.   Eyes: EOM are normal. Pupils are equal, round, and reactive to light.   Neck: Neck supple. No JVD present. No tracheal deviation  present. No thyromegaly present.   Cardiovascular: Normal rate, regular rhythm, S1 normal and S2 normal.  Exam reveals no gallop and no friction rub.    Murmur heard.   Harsh midsystolic murmur is present with a grade of 2/6  at the upper right sternal border radiating to the neck  Pulmonary/Chest: Effort normal and breath sounds normal. No respiratory distress. She has no wheezes. She has no rales.   Abdominal: Soft. Bowel sounds are normal. She exhibits no mass. There is no tenderness.   Musculoskeletal: Normal range of motion. She exhibits no edema.   Lymphadenopathy:     She has no cervical adenopathy.   Neurological: She is alert and oriented to person, place, and time.   Skin: Skin is warm and dry. No rash noted.   Psychiatric: She has a normal mood and affect.       Results Review:     I reviewed the patient's new clinical results.  I reviewed the patient's new imaging results and agree with the interpretation.  I reviewed the patient's other test results and agree with the interpretation  I personally viewed and interpreted the patient's EKG/Telemetry data    Medication:  Scheduled Meds:  amLODIPine 7.5 mg Oral Daily   aspirin 81 mg Oral Daily   heparin (porcine) 5,000 Units Subcutaneous Q12H   hydrALAZINE 25 mg Oral Q8H   levothyroxine 50 mcg Oral Q AM     Continuous Infusions:  sodium chloride 100 mL/hr Last Rate: 100 mL/hr (01/15/18 0157)     PRN Meds:.•  acetaminophen  •  hydrALAZINE  •  magnesium sulfate **OR** magnesium sulfate in D5W 1g/100mL (PREMIX) **OR** magnesium sulfate  •  nitroglycerin  •  sodium chloride  •  Insert peripheral IV **AND** sodium chloride    Telemetry: Sinus rhythm and sinus tachycardia in the mid-40s to 60s.      Assessment/Plan     1. Sinus bradycardia in the 40s which is probably due to patient being on metoprolol (Toprol- mg daily which she has taken earlier today) along with some degree of hypothyroidism with a TSH level of 9.155 although patient is on levothyroxine  25 µg daily.  2. Influenza A illness.  3. Orthostasis due to possibly dehydration from nausea and vomiting due to influenza illness.  4. Acute renal failure possibly from dehydration.  5. Systolic ejection murmur.  6. History of hypothyroidism.  7. Right-sided breast cancer, status post partial mastectomy on the right about 10 years ago followed by radiation chemotherapy with recurrence on the left side, status post left mastectomy about a year and half ago.  8. Essential hypertension.  9. Dyslipidemia.    Plan:    1.  Bradycardia: Patient bradycardia appears to be related to metoprolol intake.  Bradycardia.  Improving but is still present.  At this point we will continue to hold rate control medications and monitor.  An echocardiogram has been ordered but is still pending.  Will follow results.    2.  Low blood pressure: Patient with what appears to be orthostasis due to dehydration admission that appears to have resolved.  We'll need to continue to monitor.    3.  Hypothyroidism: Patient with mild hypothyroidism who underwent increasing on thyroid supplementation.  This is being followed by the primary team.    4.  Murmur: Patient with a systolic ejection murmur that appears to be related to aortic stenosis.  An echocardiogram has been ordered but is still pending.  Will follow.    5.  Dyslipidemia: Patient with a history of dyslipidemia currently on no medication.  She has a very abnormal lipid panel.  We will start atorvastatin dose.      Anderson Burch MD  01/15/18  11:21 AM    Dragon disclaimer:  Much of this encounter note is an electronic transcription/translation of spoken language to printed text. The electronic translation of spoken language may permit erroneous, or at times, nonsensical words or phrases to be inadvertently transcribed; Although I have reviewed the note for such errors, some may still exist.

## 2018-01-15 NOTE — NURSING NOTE
Patient complaining of restless legs, jitters, and dry mouth after administration of Phenergan. MD notified and assessed pt at bedside. Phenergan discontinued and added to allergies. Will continue to monitor.

## 2018-01-15 NOTE — NURSING NOTE
Patient is resting comfortable and states that the restless legs, jitters, and dry mouth are gone. Not distress noted. Will continue to monitor.

## 2018-01-16 LAB
ANION GAP SERPL CALCULATED.3IONS-SCNC: 7.5 MMOL/L (ref 3.6–11.2)
BUN BLD-MCNC: 16 MG/DL (ref 7–21)
BUN/CREAT SERPL: 17.8 (ref 7–25)
CALCIUM SPEC-SCNC: 9 MG/DL (ref 7.7–10)
CHLORIDE SERPL-SCNC: 110 MMOL/L (ref 99–112)
CO2 SERPL-SCNC: 21.5 MMOL/L (ref 24.3–31.9)
CREAT BLD-MCNC: 0.9 MG/DL (ref 0.43–1.29)
DEPRECATED RDW RBC AUTO: 45.2 FL (ref 37–54)
EOSINOPHIL # BLD MANUAL: 0.03 10*3/MM3 (ref 0–0.7)
EOSINOPHIL NFR BLD MANUAL: 1 % (ref 0–7)
ERYTHROCYTE [DISTWIDTH] IN BLOOD BY AUTOMATED COUNT: 12.4 % (ref 11.5–14.5)
GFR SERPL CREATININE-BSD FRML MDRD: 60 ML/MIN/1.73
GLUCOSE BLD-MCNC: 94 MG/DL (ref 70–110)
HCT VFR BLD AUTO: 35 % (ref 37–47)
HGB BLD-MCNC: 11.2 G/DL (ref 12–16)
HYPOCHROMIA BLD QL: ABNORMAL
LYMPHOCYTES # BLD MANUAL: 1.94 10*3/MM3 (ref 1–3)
LYMPHOCYTES NFR BLD MANUAL: 12 % (ref 0–12)
LYMPHOCYTES NFR BLD MANUAL: 60 % (ref 16–46)
MACROCYTES BLD QL SMEAR: ABNORMAL
MCH RBC QN AUTO: 32.8 PG (ref 27–33)
MCHC RBC AUTO-ENTMCNC: 32 G/DL (ref 33–37)
MCV RBC AUTO: 102.6 FL (ref 80–94)
METAMYELOCYTES NFR BLD MANUAL: 1 % (ref 0–0)
MONOCYTES # BLD AUTO: 0.39 10*3/MM3 (ref 0.1–0.9)
NEUTROPHILS # BLD AUTO: 0.84 10*3/MM3 (ref 1.4–6.5)
NEUTROPHILS NFR BLD MANUAL: 26 % (ref 40–75)
OSMOLALITY SERPL CALC.SUM OF ELEC: 278.5 MOSM/KG (ref 273–305)
PLAT MORPH BLD: NORMAL
PLATELET # BLD AUTO: 170 10*3/MM3 (ref 130–400)
PMV BLD AUTO: 10.5 FL (ref 6–10)
POTASSIUM BLD-SCNC: 4.1 MMOL/L (ref 3.5–5.3)
RBC # BLD AUTO: 3.41 10*6/MM3 (ref 4.2–5.4)
SODIUM BLD-SCNC: 139 MMOL/L (ref 135–153)
WBC NRBC COR # BLD: 3.24 10*3/MM3 (ref 4.5–12.5)

## 2018-01-16 PROCEDURE — 80048 BASIC METABOLIC PNL TOTAL CA: CPT | Performed by: INTERNAL MEDICINE

## 2018-01-16 PROCEDURE — 85007 BL SMEAR W/DIFF WBC COUNT: CPT | Performed by: INTERNAL MEDICINE

## 2018-01-16 PROCEDURE — 99231 SBSQ HOSP IP/OBS SF/LOW 25: CPT | Performed by: INTERNAL MEDICINE

## 2018-01-16 PROCEDURE — 85025 COMPLETE CBC W/AUTO DIFF WBC: CPT | Performed by: INTERNAL MEDICINE

## 2018-01-16 PROCEDURE — 94799 UNLISTED PULMONARY SVC/PX: CPT

## 2018-01-16 PROCEDURE — 25010000002 HEPARIN (PORCINE) PER 1000 UNITS: Performed by: PHYSICIAN ASSISTANT

## 2018-01-16 RX ORDER — AMLODIPINE BESYLATE 10 MG/1
10 TABLET ORAL DAILY
Status: DISCONTINUED | OUTPATIENT
Start: 2018-01-17 | End: 2018-01-18 | Stop reason: HOSPADM

## 2018-01-16 RX ADMIN — HEPARIN SODIUM 5000 UNITS: 5000 INJECTION, SOLUTION INTRAVENOUS; SUBCUTANEOUS at 08:18

## 2018-01-16 RX ADMIN — HYDRALAZINE HYDROCHLORIDE 25 MG: 25 TABLET ORAL at 05:22

## 2018-01-16 RX ADMIN — SODIUM CHLORIDE 50 ML/HR: 9 INJECTION, SOLUTION INTRAVENOUS at 08:19

## 2018-01-16 RX ADMIN — LEVOTHYROXINE SODIUM 50 MCG: 50 TABLET ORAL at 05:22

## 2018-01-16 RX ADMIN — AMLODIPINE BESYLATE 7.5 MG: 5 TABLET ORAL at 08:17

## 2018-01-16 RX ADMIN — HEPARIN SODIUM 5000 UNITS: 5000 INJECTION, SOLUTION INTRAVENOUS; SUBCUTANEOUS at 20:06

## 2018-01-16 RX ADMIN — Medication 81 MG: at 08:17

## 2018-01-16 RX ADMIN — HYDRALAZINE HYDROCHLORIDE 25 MG: 25 TABLET ORAL at 17:09

## 2018-01-16 RX ADMIN — ATORVASTATIN CALCIUM 80 MG: 40 TABLET, FILM COATED ORAL at 20:07

## 2018-01-16 RX ADMIN — HYDRALAZINE HYDROCHLORIDE 25 MG: 25 TABLET ORAL at 20:07

## 2018-01-16 NOTE — NURSING NOTE
Patient noted with Limb alert bracelet and No Sticks in Left Arm, Sign above bed, and on patient board.

## 2018-01-16 NOTE — PROGRESS NOTES
HOSPITALIST PROGRESS NOTE    Patient Identification:  Name:  Lashell Case  Age:  82 y.o.  Sex:  female  :  1935  MRN:  [id number]  Visit Number:  [id number]  Primary Care Provider:  SOFI Luevano    Length of stay:  1     HPI: 83 yo female admitted with nausea, vomiting and shortness of breath    Subjective:  The patient was seen this evening and is sitting in bed watching TV.  She says that overall she is feeling much better and currently denies any shortness of breath, nausea and/or vomiting.  She has been up out of bed ambulating in her room and denies any dizziness and/or lightheadedness.    Present during exam: N/A    Current Hospital Meds:    amLODIPine 7.5 mg Oral Daily   aspirin 81 mg Oral Daily   atorvastatin 80 mg Oral Nightly   heparin (porcine) 5,000 Units Subcutaneous Q12H   hydrALAZINE 25 mg Oral Q8H   levothyroxine 50 mcg Oral Q AM     Vital Signs  Temp:  [97.7 °F (36.5 °C)-98 °F (36.7 °C)] 97.7 °F (36.5 °C)  Heart Rate:  [51-61] 52  Resp:  [18] 18  BP: (120-160)/(52-62) 160/60  Last 3 weights    18  1319 18  1651 01/15/18  0255   Weight: 71.2 kg (157 lb) 71.2 kg (157 lb) 70.4 kg (155 lb 1.6 oz)     Body mass index is 25.03 kg/(m^2).    Physical exam:  Physical Exam   Constitutional: She is oriented to person, place, and time. She appears well-developed and well-nourished. No distress.   HENT:   Head: Normocephalic and atraumatic.   Nose: Nose normal.   Mouth/Throat: Oropharynx is clear and moist and mucous membranes are normal.   Eyes: Conjunctivae and EOM are normal. Pupils are equal, round, and reactive to light. No scleral icterus.   Neck: Trachea normal. Neck supple. No JVD present. Carotid bruit is not present. No thyromegaly present.   Cardiovascular: Regular rhythm and normal pulses.  Bradycardia present.  Exam reveals no gallop and no friction rub.    Murmur heard.  Pulmonary/Chest: Effort normal. No respiratory distress. She has no wheezes. She has no rales.    Abdominal: Soft. Bowel sounds are normal. She exhibits no distension. There is no tenderness. There is no guarding.   Musculoskeletal: Normal range of motion.   Neurological: She is alert and oriented to person, place, and time. She has normal strength. No cranial nerve deficit.   Skin: Skin is warm, dry and intact. No rash noted. No erythema.   Psychiatric: She has a normal mood and affect. Her speech is normal.      Results Review:    Results from last 7 days  Lab Units 01/15/18  0420 01/14/18  1346 01/09/18  1330   WBC 10*3/mm3 3.46* 3.40* 4.90   HEMOGLOBIN g/dL 11.6* 13.3 11.8*   HEMATOCRIT % 33.9* 39.2 35.2*   PLATELETS 10*3/mm3 156 167 153       Results from last 7 days  Lab Units 01/15/18  0420 01/14/18  1346 01/09/18  1330   SODIUM mmol/L 137 133* 133*   POTASSIUM mmol/L 3.9 4.3 4.0   CHLORIDE mmol/L 107 98* 97*   CO2 mmol/L 22.3* 26.5 28.1   BUN mg/dL 26* 43* 23*   CREATININE mg/dL 1.03 1.46* 1.20   CALCIUM mg/dL 9.3 10.3* 9.7   GLUCOSE mg/dL 86 107 93       Results from last 7 days  Lab Units 01/15/18  0420 01/14/18  1346 01/09/18  1330   BILIRUBIN mg/dL 0.7 0.7 0.6   ALK PHOS U/L 45 57 60   AST (SGOT) U/L 20 24 25   ALT (SGPT) U/L 13 13 15       Results from last 7 days  Lab Units 01/14/18  1710   MAGNESIUM mg/dL 2.3           Results from last 7 days  Lab Units 01/15/18  0420 01/14/18  2313 01/14/18  1710   CK TOTAL U/L 88 84 77   TROPONIN I ng/mL 0.023 0.031 0.024   CK MB INDEX % 1.9 2.2 1.7     Portable CXR, 1/14/18:  FINDINGS:   The lungs remain aerated.  Heart and mediastinum contours are unremarkable.  No pleural effusion.  No pneumothorax.   Bony and soft tissue structures are unremarkable.      IMPRESSION:  No radiographic evidence of acute cardiac or pulmonary  disease.      Assessment/Plan     -Sinus bradycardia to be multifactorial in nature and possibly medication induced in setting of a beta blocker +/- controlled hypothyroidism  -Pre-syncope secondary to bradycardia and  hypovolemia  -Influenza A infection  -Uncontrolled hypertension   -Mild leukopenia  -Drop in hgb/hct; likely hemo-dilutional in the setting of dehydration  -RADHA in the setting of Stage III CKD  -Hyponatremia; resolved  -Mildly prolonged QT  -Pre-renal azotemia    Continue to hold the patient's metoprolol.  Her levothyroxine dose has been increased.  Her echocardiogram and bilateral carotid duplex ultrasound studies were overall unremarkable.    She was started on Tamiflu as she was out of the treatment window by the time of admission.  Continue with droplet precautions.    Continue with gentle IVF hydration.    Repeat her CBC and BMP in the morning.    I discussed the patients findings and my recommendations with patient.    Disposition  Home when medically stable      Shannan Ware,   01/15/18  8:07 PM

## 2018-01-16 NOTE — PLAN OF CARE
Problem: Patient Care Overview (Adult)  Goal: Plan of Care Review  Outcome: Ongoing (interventions implemented as appropriate)    Goal: Adult Individualization and Mutuality  Outcome: Ongoing (interventions implemented as appropriate)    Goal: Discharge Needs Assessment  Outcome: Ongoing (interventions implemented as appropriate)      Problem: Cardiac Output, Decreased (Adult)  Goal: Adequate Cardiac Output/Effective Tissue Perfusion  Outcome: Ongoing (interventions implemented as appropriate)

## 2018-01-16 NOTE — PROGRESS NOTES
LOS: 2 days     Name: Lashell Case  Age/Sex: 82 y.o. female  :  1935        PCP: SOFI Luevano  REF: No ref. provider found    Active Problems:    Bradycardia      Reason for follow-up: For sinus bradycardia.    Subjective  Ms. Case is a pleasant 82-year-old  female with history of hypertension, and previous history of breast cancer, status post partial mastectomy on the right side about 10 years ago followed by radiation and chemotherapy with recurrence on the left side about a year and a half ago for which she has had left mastectomy, presents with flulike illness since past Saturday.  She's been having fevers, chills, body aches since this past Saturday along with nausea and vomiting.  She was not able to keep anything down.  She started to get weak and dizzy especially when she stands up.  She denies any syncope.  She presented to the emergency department here this past Tuesday and was prescribed some antibiotics and sent home.  She did not get any better and she presented again today with increasing weakness and dizziness.  She was noted to be also bradycardic with sinus bradycardia in the 40s.  And she is admitted for further management.  He denies any complaints of chest pains or shortness of breath.      Interval History: No complaints of dizziness.    ROS    Vital Signs  Temp:  [97.7 °F (36.5 °C)-98.2 °F (36.8 °C)] 97.9 °F (36.6 °C)  Heart Rate:  [52-68] 64  Resp:  [18] 18  BP: (140-160)/(56-68) 140/60  Vital Signs (last 72 hrs)        0700  -   0659  07  -  01/15 0659 01/15 07  -   0659  07  -   1600   Most Recent    Temp (°F)   97.5 -  97.7    97.7 -  98.2      97.9     97.9 (36.6)    Heart Rate   (!)39 -  58    51 -  62    64 -  68     64    Resp     18      18      18     18    BP   133/73 -  (!) 201/88    120/52 -  160/60    140/60 -  148/68     140/60    SpO2 (%)   97 -  99    95 -  99    97 -  98     98        Body mass index  is 25.08 kg/(m^2).    Intake/Output Summary (Last 24 hours) at 01/16/18 1600  Last data filed at 01/16/18 0301   Gross per 24 hour   Intake              500 ml   Output              200 ml   Net              300 ml     Objective        Physical Exam:     General Appearance:    Alert, cooperative, in no acute distress   Head:    Normocephalic, without obvious abnormality, atraumatic   Eyes:            Conjunctivae and sclerae normal, no   icterus, no pallor, corneas clear.   Neck:   No adenopathy, supple, trachea midline, no thyromegaly, no   carotid bruit, no JVD   Lungs:     Clear to auscultation,respirations regular, even and                  unlabored    Heart:    Regular rhythm and normal rate, normal S1 and S2, no            murmur, no gallop, no rub, no click   Chest Wall:    No abnormalities observed   Abdomen:     Normal bowel sounds, no masses, no organomegaly, soft        non-tender, non-distended, no guarding, no rebound                tenderness   Extremities:   Moves all extremities well, no edema, no cyanosis, no             redness   Pulses:   Pulses palpable and equal bilaterally   Skin:   No bleeding, bruising or rash              Procedures    Results Review:     Results from last 7 days  Lab Units 01/16/18  0310 01/15/18  0420 01/14/18  1346   WBC 10*3/mm3 3.24* 3.46* 3.40*   HEMOGLOBIN g/dL 11.2* 11.6* 13.3   PLATELETS 10*3/mm3 170 156 167       Results from last 7 days  Lab Units 01/16/18  0310 01/15/18  0420 01/14/18  1346   SODIUM mmol/L 139 137 133*   POTASSIUM mmol/L 4.1 3.9 4.3   CHLORIDE mmol/L 110 107 98*   CO2 mmol/L 21.5* 22.3* 26.5   BUN mg/dL 16 26* 43*   CREATININE mg/dL 0.90 1.03 1.46*   CALCIUM mg/dL 9.0 9.3 10.3*   GLUCOSE mg/dL 94 86 107   ALT (SGPT) U/L  --  13 13   AST (SGOT) U/L  --  20 24       Results from last 7 days  Lab Units 01/15/18  0420 01/14/18  2313 01/14/18  1710 01/14/18  1346   CK TOTAL U/L 88 84 77  --    TROPONIN I ng/mL 0.023 0.031 0.024 0.022   CKMB ng/mL  1.68 1.84 1.30  --      No results found for: INR  Lab Results   Component Value Date    MG 2.3 01/14/2018     Lab Results   Component Value Date    TSH 9.155 (H) 01/14/2018    CHLPL 241 (H) 05/06/2014    TRIG 361 (H) 01/03/2017    HDL 57 (L) 01/03/2017     (H) 05/06/2014        ECG      Echo   No results found for: ECHOEFEST    Nuclear Stress Test  No components found for: NUCEF     I reviewed the patient's new clinical results.    Telemetry:Sinus rhythm in the 70s for the most part with occasional tachycardia up to 130s.       Medication Review:     amLODIPine 7.5 mg Oral Daily   aspirin 81 mg Oral Daily   atorvastatin 80 mg Oral Nightly   heparin (porcine) 5,000 Units Subcutaneous Q12H   hydrALAZINE 25 mg Oral Q8H   levothyroxine 50 mcg Oral Q AM         sodium chloride 50 mL/hr Last Rate: 50 mL/hr (01/16/18 0819)       Assessment:    1. Sinus bradycardia in the 40s which is probably due to patient being on metoprolol (Toprol- mg daily which she has taken earlier today) along with some degree of hypothyroidism with a TSH level of 9.155 although patient is on levothyroxine 25 µg daily.   2. Influenza A illness.  3. Orthostasis due to possibly dehydration from nausea and vomiting due to influenza illness.  4. Acute renal failure possibly from dehydration.  5. Systolic murmur.  6. History of hypothyroidism.  7. Right-sided breast cancer, status post partial mastectomy on the right about 10 years ago followed by radiation chemotherapy with recurrence on the left side, status post left mastectomy about a year and half ago.  8. Essential hypertension.  9. Dyslipidemia, on atorvastatin.    Recommendations:    Continue to monitor her heart rate and rhythm.  Restart smaller dose of metoprolol if she tends to have recurrent tachycardia.    I discussed the patients findings and my recommendations with patient and family        PARUL Vaca  01/16/18  4:00 PM    Dragon disclaimer:  Much of this  encounter note is an electronic transcription/translation of spoken language to printed text. The electronic translation of spoken language may permit erroneous, or at times, nonsensical words or phrases to be inadvertently transcribed; Although I have reviewed the note for such errors, some may still exist.

## 2018-01-16 NOTE — PLAN OF CARE
Problem: Patient Care Overview (Adult)  Goal: Plan of Care Review  Outcome: Ongoing (interventions implemented as appropriate)      Problem: Cardiac Output, Decreased (Adult)  Goal: Adequate Cardiac Output/Effective Tissue Perfusion  Outcome: Ongoing (interventions implemented as appropriate)

## 2018-01-17 LAB
ANION GAP SERPL CALCULATED.3IONS-SCNC: 5.6 MMOL/L (ref 3.6–11.2)
BASOPHILS # BLD AUTO: 0.01 10*3/MM3 (ref 0–0.3)
BASOPHILS NFR BLD AUTO: 0.3 % (ref 0–2)
BUN BLD-MCNC: 11 MG/DL (ref 7–21)
BUN/CREAT SERPL: 13.9 (ref 7–25)
CALCIUM SPEC-SCNC: 9.7 MG/DL (ref 7.7–10)
CHLORIDE SERPL-SCNC: 107 MMOL/L (ref 99–112)
CO2 SERPL-SCNC: 24.4 MMOL/L (ref 24.3–31.9)
CREAT BLD-MCNC: 0.79 MG/DL (ref 0.43–1.29)
DEPRECATED RDW RBC AUTO: 46.5 FL (ref 37–54)
EOSINOPHIL # BLD AUTO: 0.03 10*3/MM3 (ref 0–0.7)
EOSINOPHIL NFR BLD AUTO: 1 % (ref 0–7)
ERYTHROCYTE [DISTWIDTH] IN BLOOD BY AUTOMATED COUNT: 12.6 % (ref 11.5–14.5)
GFR SERPL CREATININE-BSD FRML MDRD: 70 ML/MIN/1.73
GLUCOSE BLD-MCNC: 87 MG/DL (ref 70–110)
HCT VFR BLD AUTO: 36.3 % (ref 37–47)
HGB BLD-MCNC: 11.9 G/DL (ref 12–16)
IMM GRANULOCYTES # BLD: 0.02 10*3/MM3 (ref 0–0.03)
IMM GRANULOCYTES NFR BLD: 0.6 % (ref 0–0.5)
LYMPHOCYTES # BLD AUTO: 1.35 10*3/MM3 (ref 1–3)
LYMPHOCYTES NFR BLD AUTO: 42.9 % (ref 16–46)
MCH RBC QN AUTO: 33 PG (ref 27–33)
MCHC RBC AUTO-ENTMCNC: 32.8 G/DL (ref 33–37)
MCV RBC AUTO: 100.6 FL (ref 80–94)
MONOCYTES # BLD AUTO: 0.53 10*3/MM3 (ref 0.1–0.9)
MONOCYTES NFR BLD AUTO: 16.8 % (ref 0–12)
NEUTROPHILS # BLD AUTO: 1.21 10*3/MM3 (ref 1.4–6.5)
NEUTROPHILS NFR BLD AUTO: 38.4 % (ref 40–75)
OSMOLALITY SERPL CALC.SUM OF ELEC: 272.6 MOSM/KG (ref 273–305)
PLATELET # BLD AUTO: 169 10*3/MM3 (ref 130–400)
PMV BLD AUTO: 11 FL (ref 6–10)
POTASSIUM BLD-SCNC: 3.9 MMOL/L (ref 3.5–5.3)
RBC # BLD AUTO: 3.61 10*6/MM3 (ref 4.2–5.4)
SODIUM BLD-SCNC: 137 MMOL/L (ref 135–153)
WBC NRBC COR # BLD: 3.15 10*3/MM3 (ref 4.5–12.5)

## 2018-01-17 PROCEDURE — 80048 BASIC METABOLIC PNL TOTAL CA: CPT | Performed by: INTERNAL MEDICINE

## 2018-01-17 PROCEDURE — 99232 SBSQ HOSP IP/OBS MODERATE 35: CPT | Performed by: INTERNAL MEDICINE

## 2018-01-17 PROCEDURE — 85025 COMPLETE CBC W/AUTO DIFF WBC: CPT | Performed by: INTERNAL MEDICINE

## 2018-01-17 PROCEDURE — 94799 UNLISTED PULMONARY SVC/PX: CPT

## 2018-01-17 PROCEDURE — 25010000002 HEPARIN (PORCINE) PER 1000 UNITS: Performed by: PHYSICIAN ASSISTANT

## 2018-01-17 RX ADMIN — HEPARIN SODIUM 5000 UNITS: 5000 INJECTION, SOLUTION INTRAVENOUS; SUBCUTANEOUS at 08:02

## 2018-01-17 RX ADMIN — HYDRALAZINE HYDROCHLORIDE 25 MG: 25 TABLET ORAL at 17:25

## 2018-01-17 RX ADMIN — LEVOTHYROXINE SODIUM 50 MCG: 50 TABLET ORAL at 05:19

## 2018-01-17 RX ADMIN — HYDRALAZINE HYDROCHLORIDE 25 MG: 25 TABLET ORAL at 20:24

## 2018-01-17 RX ADMIN — HEPARIN SODIUM 5000 UNITS: 5000 INJECTION, SOLUTION INTRAVENOUS; SUBCUTANEOUS at 20:24

## 2018-01-17 RX ADMIN — HYDRALAZINE HYDROCHLORIDE 25 MG: 25 TABLET ORAL at 05:19

## 2018-01-17 RX ADMIN — METOPROLOL TARTRATE 25 MG: 25 TABLET, FILM COATED ORAL at 21:29

## 2018-01-17 RX ADMIN — ATORVASTATIN CALCIUM 80 MG: 40 TABLET, FILM COATED ORAL at 21:29

## 2018-01-17 RX ADMIN — METOPROLOL TARTRATE 25 MG: 25 TABLET, FILM COATED ORAL at 12:21

## 2018-01-17 RX ADMIN — Medication 81 MG: at 08:02

## 2018-01-17 RX ADMIN — AMLODIPINE BESYLATE 10 MG: 10 TABLET ORAL at 08:02

## 2018-01-17 NOTE — PROGRESS NOTES
HOSPITALIST PROGRESS NOTE    Patient Identification:  Name:  Lashell Case  Age:  82 y.o.  Sex:  female  :  1935  MRN:  [id number]  Visit Number:  [id number]  Primary Care Provider:  SOFI Luevano    Length of stay:  2     HPI: 81 yo female admitted with nausea, vomiting and shortness of breath    Subjective:  The patient was seen this evening and is sitting in bed eating dinner.  She states that she is feeling much better.  She denies any shortness of breath at this time.  She denies any cough, chest pain and/or palpitations.  The patient continues to ambulate in her room and denies any symptoms.  She specifically denies any dizziness.    Patient had brief episode of tachycardia today.     Present during exam: Patient's granddaughter    Current Hospital Meds:    amLODIPine 7.5 mg Oral Daily   aspirin 81 mg Oral Daily   atorvastatin 80 mg Oral Nightly   heparin (porcine) 5,000 Units Subcutaneous Q12H   hydrALAZINE 25 mg Oral Q8H   levothyroxine 50 mcg Oral Q AM     Vital Signs  Temp:  [96.4 °F (35.8 °C)-98.2 °F (36.8 °C)] 96.4 °F (35.8 °C)  Heart Rate:  [] 107  Resp:  [18] 18  BP: (140-170)/(56-78) 158/74  Last 3 weights    18  1651 01/15/18  0255 18  0301   Weight: 71.2 kg (157 lb) 70.4 kg (155 lb 1.6 oz) 70.5 kg (155 lb 6.4 oz)     Body mass index is 25.08 kg/(m^2).    Physical exam:  Physical Exam   Constitutional: She is oriented to person, place, and time. She appears well-developed and well-nourished. No distress.   HENT:   Head: Normocephalic and atraumatic.   Nose: Nose normal.   Mouth/Throat: Oropharynx is clear and moist and mucous membranes are normal.   Eyes: Conjunctivae and EOM are normal. Pupils are equal, round, and reactive to light. No scleral icterus.   Neck: Trachea normal. Neck supple. No JVD present. Carotid bruit is not present. No thyromegaly present.   Cardiovascular: Normal rate, regular rhythm and normal pulses.  Exam reveals no gallop and no friction  rub.    Murmur heard.  No lower extremity edema is appreciated   Pulmonary/Chest: Effort normal. No respiratory distress. She has no wheezes. She has no rales.   Abdominal: Soft. Bowel sounds are normal. She exhibits no distension. There is no tenderness. There is no guarding.   Musculoskeletal: Normal range of motion.   Neurological: She is alert and oriented to person, place, and time. She has normal strength. No cranial nerve deficit.   Skin: Skin is warm, dry and intact. No rash noted. No erythema.   Psychiatric: She has a normal mood and affect. Her speech is normal.      Results Review:    Results from last 7 days  Lab Units 01/16/18  0310 01/15/18  0420 01/14/18  1346   WBC 10*3/mm3 3.24* 3.46* 3.40*   HEMOGLOBIN g/dL 11.2* 11.6* 13.3   HEMATOCRIT % 35.0* 33.9* 39.2   PLATELETS 10*3/mm3 170 156 167       Results from last 7 days  Lab Units 01/16/18  0310 01/15/18  0420 01/14/18  1346   SODIUM mmol/L 139 137 133*   POTASSIUM mmol/L 4.1 3.9 4.3   CHLORIDE mmol/L 110 107 98*   CO2 mmol/L 21.5* 22.3* 26.5   BUN mg/dL 16 26* 43*   CREATININE mg/dL 0.90 1.03 1.46*   CALCIUM mg/dL 9.0 9.3 10.3*   GLUCOSE mg/dL 94 86 107       Results from last 7 days  Lab Units 01/15/18  0420 01/14/18  1346   BILIRUBIN mg/dL 0.7 0.7   ALK PHOS U/L 45 57   AST (SGOT) U/L 20 24   ALT (SGPT) U/L 13 13       Results from last 7 days  Lab Units 01/14/18  1710   MAGNESIUM mg/dL 2.3           Results from last 7 days  Lab Units 01/15/18  0420 01/14/18  2313 01/14/18  1710   CK TOTAL U/L 88 84 77   TROPONIN I ng/mL 0.023 0.031 0.024   CK MB INDEX % 1.9 2.2 1.7     Portable CXR, 1/14/18:  FINDINGS:   The lungs remain aerated.  Heart and mediastinum contours are unremarkable.  No pleural effusion.  No pneumothorax.   Bony and soft tissue structures are unremarkable.      IMPRESSION:  No radiographic evidence of acute cardiac or pulmonary  disease.      Assessment/Plan     -Sinus bradycardia to be multifactorial in nature and possibly  medication induced in setting of a beta blocker +/- controlled hypothyroidism  -Pre-syncope secondary to bradycardia and hypovolemia  -Influenza A infection  -Mild leukopenia likely related to acute viral illness  -Uncontrolled hypertension   -Orthostasis due to dehydration  -Drop in hgb/hct; likely hemo-dilutional in the setting of dehydration  -Hyponatremia; resolved  -Mildly prolonged QT  -Pre-renal azotemia    Continue to hold the patient's metoprolol and restart a lower dose if patient becomes tachycardic again.  Her levothyroxine dose has been increased; continue the same for now.  Her echocardiogram and bilateral carotid duplex ultrasound studies were overall unremarkable.    She was started on Tamiflu as she was out of the treatment window by the time of admission.  Continue with droplet precautions.    Discontinue with gentle IVF hydration and repeat labs in the morning.     Repeat her CBC and BMP in the morning.    I discussed the patients findings and my recommendations with patient and family.    Disposition  Home when medically stable; possibly in 24 hours if remains stable    Shannan Ware,   01/16/18  7:01 PM

## 2018-01-17 NOTE — PROGRESS NOTES
Discharge Planning Assessment   [facility]     Patient Name: Lashell Case  MRN: [id number]  Today's Date: 1/17/2018    Admit Date: 1/14/2018          Discharge Needs Assessment     None            Discharge Plan       01/17/18 1422    Case Management/Social Work Plan    Plan Pt admitted on 1/14/18.  Pt lives at home with family and plans to return home at discharge.  Pt currently does not utilize home health services or DME.  SS will follow and assist with discharge needs.         Discharge Placement     No information found        Expected Discharge Date and Time     Expected Discharge Date Expected Discharge Time    Jan 18, 2018               Demographic Summary     None            Functional Status     None            Psychosocial     None            Abuse/Neglect     None            Legal     None            Substance Abuse     None            Patient Forms     None          Shelly Servin

## 2018-01-17 NOTE — PROGRESS NOTES
HOSPITALIST PROGRESS NOTE    Patient Identification:  Name:  Lashell Case  Age:  82 y.o.  Sex:  female  :  1935  MRN:  [id number]  Visit Number:  [id number]  Primary Care Provider:  SOFI Luevano    Length of stay:  3     HPI: 83 yo female admitted with nausea, vomiting and shortness of breath    Subjective:  The patient was seen this afternoon and is resting in bed.  The patient had an episode of asymptomatic tachycardia yesterday to the 130s with a symptomatic episode of tachycardia today into the 140s.  Reported palpitations at that time but states they have since resolved.  Otherwise she continues to do well and denies chest pain.  She denies shortness of breath, nausea, diaphoresis and/or dizziness.    Patient had brief episode of tachycardia today.     Present during exam: Patient's granddaughter and Aleta VALLEJO RN    Current Hospital Meds:    amLODIPine 10 mg Oral Daily   aspirin 81 mg Oral Daily   atorvastatin 80 mg Oral Nightly   heparin (porcine) 5,000 Units Subcutaneous Q12H   hydrALAZINE 25 mg Oral Q8H   levothyroxine 50 mcg Oral Q AM   metoprolol tartrate 25 mg Oral Q12H     Vital Signs  Temp:  [96.4 °F (35.8 °C)-98.7 °F (37.1 °C)] 98.7 °F (37.1 °C)  Heart Rate:  [] 81  Resp:  [18] 18  BP: (142-170)/(70-80) 170/80  Last 3 weights    01/15/18  0255 18  0301 18  0300   Weight: 70.4 kg (155 lb 1.6 oz) 70.5 kg (155 lb 6.4 oz) 69.9 kg (154 lb 3.2 oz)     Body mass index is 24.89 kg/(m^2).    Physical exam:  Physical Exam   Constitutional: She is oriented to person, place, and time. She appears well-developed and well-nourished. No distress.   HENT:   Head: Normocephalic and atraumatic.   Nose: Nose normal.   Mouth/Throat: Oropharynx is clear and moist and mucous membranes are normal.   Eyes: Conjunctivae and EOM are normal. Pupils are equal, round, and reactive to light. No scleral icterus.   Neck: Trachea normal. Neck supple. No JVD present. Carotid bruit is not  present. No thyromegaly present.   Cardiovascular: Normal rate, regular rhythm and normal pulses.  Exam reveals no gallop and no friction rub.    Murmur heard.  No lower extremity edema is appreciated   Pulmonary/Chest: Effort normal. No respiratory distress. She has no wheezes. She has no rales.   Abdominal: Soft. Bowel sounds are normal. She exhibits no distension. There is no tenderness. There is no guarding.   Musculoskeletal: Normal range of motion.   Neurological: She is alert and oriented to person, place, and time. She has normal strength. No cranial nerve deficit.   Skin: Skin is warm, dry and intact. No rash noted. No erythema.   Psychiatric: She has a normal mood and affect. Her speech is normal.      Results Review:    Telemetry: Sinus rhythm 70s-80s      Results from last 7 days  Lab Units 01/17/18  0414 01/16/18  0310 01/15/18  0420 01/14/18  1346   WBC 10*3/mm3 3.15* 3.24* 3.46* 3.40*   HEMOGLOBIN g/dL 11.9* 11.2* 11.6* 13.3   HEMATOCRIT % 36.3* 35.0* 33.9* 39.2   PLATELETS 10*3/mm3 169 170 156 167       Results from last 7 days  Lab Units 01/17/18  0414 01/16/18  0310 01/15/18  0420 01/14/18  1346   SODIUM mmol/L 137 139 137 133*   POTASSIUM mmol/L 3.9 4.1 3.9 4.3   CHLORIDE mmol/L 107 110 107 98*   CO2 mmol/L 24.4 21.5* 22.3* 26.5   BUN mg/dL 11 16 26* 43*   CREATININE mg/dL 0.79 0.90 1.03 1.46*   CALCIUM mg/dL 9.7 9.0 9.3 10.3*   GLUCOSE mg/dL 87 94 86 107       Results from last 7 days  Lab Units 01/15/18  0420 01/14/18  1346   BILIRUBIN mg/dL 0.7 0.7   ALK PHOS U/L 45 57   AST (SGOT) U/L 20 24   ALT (SGPT) U/L 13 13       Results from last 7 days  Lab Units 01/14/18  1710   MAGNESIUM mg/dL 2.3           Results from last 7 days  Lab Units 01/15/18  0420 01/14/18  2313 01/14/18  1710   CK TOTAL U/L 88 84 77   TROPONIN I ng/mL 0.023 0.031 0.024   CK MB INDEX % 1.9 2.2 1.7     Portable CXR, 1/14/18:  FINDINGS:   The lungs remain aerated.  Heart and mediastinum contours are unremarkable.  No pleural  effusion.  No pneumothorax.   Bony and soft tissue structures are unremarkable.      IMPRESSION:  No radiographic evidence of acute cardiac or pulmonary  disease.      Assessment/Plan     -Sinus bradycardia to be multifactorial in nature and possibly medication induced in setting of a beta blocker +/- controlled hypothyroidism  -Sinus tachycardia today, likely reflex tachycardia after holding metoprolol  -Pre-syncope secondary to bradycardia and hypovolemia  -Influenza A infection  -Mild leukopenia likely related to acute viral illness  -Uncontrolled hypertension   -Orthostasis due to dehydration  -Drop in hgb/hct; likely hemo-dilutional in the setting of dehydration  -Hyponatremia; resolved  -Mildly prolonged QT  -Pre-renal azotemia    The patient has been restarted on metoprolol tartrate at a slower dose given her 2 episodes of tachycardia over the past 24 hours.  I have also increased the dose of the patient's amlodipine due to hypertension.  The patient is to notify her nurse should she have further episodes of symptomatic tachycardia.      Continue with her current dose of Synthroid which have been adjusted here.    She was not started on Tamiflu as she was out of the treatment window by the time of admission.  Continue with droplet precautions.    Repeat her CBC in the morning.    I discussed the patients findings and my recommendations with patient, nursing staff, Dr. Vaughan and family.    Disposition  Home when medically stable; possibly in 24 hours if heart rate/blood pressure remain acceptable.    Shannan Ware DO  01/17/18  4:33 PM

## 2018-01-17 NOTE — PROGRESS NOTES
LOS: 3 days   Patient Care Team:  SOFI Luevano as PCP - General (Family Medicine)  Cynthia Ward MD as PCP - Claims Attributed      Subjective     Admission information:    Ms. Case is a pleasant 82-year-old  female with history of hypertension, and previous history of breast cancer, status post partial mastectomy on the right side about 10 years ago followed by radiation and chemotherapy with recurrence on the left side about a year and a half ago for which she has had left mastectomy, presents with flulike illness since past Saturday.  She's been having fevers, chills, body aches since this past Saturday along with nausea and vomiting.  She was not able to keep anything down.  She started to get weak and dizzy especially when she stands up.  She denies any syncope.  She presented to the emergency department here this past Tuesday and was prescribed some antibiotics and sent home.  She did not get any better and she presented again today with increasing weakness and dizziness.  She was noted to be also bradycardic with sinus bradycardia in the 40s.  And she is admitted for further management.  He denies any complaints of chest pains or shortness of breath.    Interval History:     According to the patient she is doing well today.  She denies any chest pain, shortness breath or palpitations.    History taken from: patient chart    Vital Signs  Temp:  [96.4 °F (35.8 °C)-98.7 °F (37.1 °C)] 98.7 °F (37.1 °C)  Heart Rate:  [] 81  Resp:  [18] 18  BP: (142-170)/(70-80) 170/80    Physical Exam:     Physical Exam   Constitutional: She is oriented to person, place, and time. She appears well-developed and well-nourished.   Elderly white female lying comfortably on bed.   HENT:   Mouth/Throat: Oropharynx is clear and moist.   Eyes: EOM are normal. Pupils are equal, round, and reactive to light.   Neck: Neck supple. No JVD present. No tracheal deviation present. No thyromegaly present.    Cardiovascular: Normal rate, regular rhythm, S1 normal and S2 normal.  Exam reveals no gallop and no friction rub.    Murmur heard.   Harsh midsystolic murmur is present with a grade of 2/6  at the upper right sternal border radiating to the neck  Pulmonary/Chest: Effort normal and breath sounds normal. No respiratory distress. She has no wheezes. She has no rales.   Abdominal: Soft. Bowel sounds are normal. She exhibits no mass. There is no tenderness.   Musculoskeletal: Normal range of motion. She exhibits no edema.   Lymphadenopathy:     She has no cervical adenopathy.   Neurological: She is alert and oriented to person, place, and time.   Skin: Skin is warm and dry. No rash noted.   Psychiatric: She has a normal mood and affect.       Results Review:     I reviewed the patient's new clinical results.  I reviewed the patient's new imaging results and agree with the interpretation.  I reviewed the patient's other test results and agree with the interpretation  I personally viewed and interpreted the patient's EKG/Telemetry data    Medication:  Scheduled Meds:    amLODIPine 10 mg Oral Daily   aspirin 81 mg Oral Daily   atorvastatin 80 mg Oral Nightly   heparin (porcine) 5,000 Units Subcutaneous Q12H   hydrALAZINE 25 mg Oral Q8H   levothyroxine 50 mcg Oral Q AM     Continuous Infusions:   PRN Meds:.•  acetaminophen  •  hydrALAZINE  •  magnesium sulfate **OR** magnesium sulfate in D5W 1g/100mL (PREMIX) **OR** magnesium sulfate  •  nitroglycerin  •  sodium chloride  •  Insert peripheral IV **AND** sodium chloride    Telemetry: Sinus rhythm in the 60s to 80s with occasional episodes of supraventricular tachycardia into the 130s      Assessment/Plan     1. Sinus bradycardia in the 40s which is probably due to patient being on metoprolol (Toprol- mg daily which she has taken earlier today) along with some degree of hypothyroidism with a TSH level of 9.155 although patient is on levothyroxine 25 µg  daily.  2. Influenza A illness.  3. Orthostasis due to possibly dehydration from nausea and vomiting due to influenza illness.  4. Acute renal failure possibly from dehydration.  5. Systolic ejection murmur.  6. History of hypothyroidism.  7. Right-sided breast cancer, status post partial mastectomy on the right about 10 years ago followed by radiation chemotherapy with recurrence on the left side, status post left mastectomy about a year and half ago.  8. Essential hypertension.  9. Dyslipidemia.    Plan:    1.  Bradycardia: The patient's heart rate is in improved into the 60s to 80s for the most part but she is starting to have occasional episodes of supraventricular tachycardia.  At this point we will restart metoprolol at 25 mg by mouth twice a day and monitor for stability.    2.  Hypertension: Patient with a history of hypertension who is now hypertensive on current regimen.  We will need to monitor for improvement after amlodipine has been increased the primary team and she will be started on metorpolol.    3.  Mild aortic stenosis: Patient with mild aortic stenosis noted on echocardiogram.  This is consistent with a murmur noted on physical exam.  We'll need to continue to monitor    Anderson Burch MD  01/17/18  11:11 AM    Dragon disclaimer:  Much of this encounter note is an electronic transcription/translation of spoken language to printed text. The electronic translation of spoken language may permit erroneous, or at times, nonsensical words or phrases to be inadvertently transcribed; Although I have reviewed the note for such errors, some may still exist.

## 2018-01-18 VITALS
HEIGHT: 66 IN | SYSTOLIC BLOOD PRESSURE: 137 MMHG | DIASTOLIC BLOOD PRESSURE: 64 MMHG | HEART RATE: 59 BPM | RESPIRATION RATE: 16 BRPM | TEMPERATURE: 97.6 F | WEIGHT: 154.19 LBS | BODY MASS INDEX: 24.78 KG/M2 | OXYGEN SATURATION: 96 %

## 2018-01-18 PROBLEM — R00.1 BRADYCARDIA: Status: RESOLVED | Noted: 2018-01-14 | Resolved: 2018-01-18

## 2018-01-18 LAB
BASOPHILS # BLD AUTO: 0.01 10*3/MM3 (ref 0–0.3)
BASOPHILS NFR BLD AUTO: 0.3 % (ref 0–2)
DEPRECATED RDW RBC AUTO: 43.3 FL (ref 37–54)
EOSINOPHIL # BLD AUTO: 0.06 10*3/MM3 (ref 0–0.7)
EOSINOPHIL NFR BLD AUTO: 1.7 % (ref 0–7)
ERYTHROCYTE [DISTWIDTH] IN BLOOD BY AUTOMATED COUNT: 12.4 % (ref 11.5–14.5)
HCT VFR BLD AUTO: 35 % (ref 37–47)
HGB BLD-MCNC: 11.6 G/DL (ref 12–16)
IMM GRANULOCYTES # BLD: 0.02 10*3/MM3 (ref 0–0.03)
IMM GRANULOCYTES NFR BLD: 0.6 % (ref 0–0.5)
LYMPHOCYTES # BLD AUTO: 1.41 10*3/MM3 (ref 1–3)
LYMPHOCYTES NFR BLD AUTO: 39.1 % (ref 16–46)
MCH RBC QN AUTO: 33 PG (ref 27–33)
MCHC RBC AUTO-ENTMCNC: 33.1 G/DL (ref 33–37)
MCV RBC AUTO: 99.7 FL (ref 80–94)
MONOCYTES # BLD AUTO: 0.48 10*3/MM3 (ref 0.1–0.9)
MONOCYTES NFR BLD AUTO: 13.3 % (ref 0–12)
NEUTROPHILS # BLD AUTO: 1.63 10*3/MM3 (ref 1.4–6.5)
NEUTROPHILS NFR BLD AUTO: 45 % (ref 40–75)
PLATELET # BLD AUTO: 191 10*3/MM3 (ref 130–400)
PMV BLD AUTO: 10.3 FL (ref 6–10)
RBC # BLD AUTO: 3.51 10*6/MM3 (ref 4.2–5.4)
WBC NRBC COR # BLD: 3.61 10*3/MM3 (ref 4.5–12.5)

## 2018-01-18 PROCEDURE — 25010000002 HEPARIN (PORCINE) PER 1000 UNITS: Performed by: PHYSICIAN ASSISTANT

## 2018-01-18 PROCEDURE — 85025 COMPLETE CBC W/AUTO DIFF WBC: CPT | Performed by: INTERNAL MEDICINE

## 2018-01-18 PROCEDURE — 99232 SBSQ HOSP IP/OBS MODERATE 35: CPT | Performed by: INTERNAL MEDICINE

## 2018-01-18 PROCEDURE — 99239 HOSP IP/OBS DSCHRG MGMT >30: CPT | Performed by: INTERNAL MEDICINE

## 2018-01-18 PROCEDURE — 94799 UNLISTED PULMONARY SVC/PX: CPT

## 2018-01-18 RX ORDER — AMLODIPINE BESYLATE 5 MG/1
10 TABLET ORAL DAILY
Qty: 45 TABLET | Refills: 5 | Status: SHIPPED | OUTPATIENT
Start: 2018-01-18 | End: 2018-07-31 | Stop reason: SDUPTHER

## 2018-01-18 RX ORDER — HYDRALAZINE HYDROCHLORIDE 25 MG/1
25 TABLET, FILM COATED ORAL EVERY 8 HOURS SCHEDULED
Qty: 90 TABLET | Refills: 0 | Status: SHIPPED | OUTPATIENT
Start: 2018-01-18 | End: 2018-07-31

## 2018-01-18 RX ORDER — ACETAMINOPHEN 500 MG
500 TABLET ORAL EVERY 6 HOURS PRN
Start: 2018-01-18 | End: 2018-07-31

## 2018-01-18 RX ORDER — IBUPROFEN 800 MG/1
800 TABLET ORAL EVERY 8 HOURS PRN
Qty: 60 TABLET | Refills: 0 | Status: SHIPPED | OUTPATIENT
Start: 2018-01-18 | End: 2018-07-31

## 2018-01-18 RX ORDER — ATORVASTATIN CALCIUM 80 MG/1
40 TABLET, FILM COATED ORAL NIGHTLY
Qty: 30 TABLET | Refills: 0 | Status: SHIPPED | OUTPATIENT
Start: 2018-01-18 | End: 2018-07-31

## 2018-01-18 RX ORDER — LEVOTHYROXINE SODIUM 0.03 MG/1
50 TABLET ORAL DAILY
Qty: 30 TABLET | Refills: 2 | Status: SHIPPED | OUTPATIENT
Start: 2018-01-18 | End: 2018-07-31

## 2018-01-18 RX ADMIN — HEPARIN SODIUM 5000 UNITS: 5000 INJECTION, SOLUTION INTRAVENOUS; SUBCUTANEOUS at 08:36

## 2018-01-18 RX ADMIN — LEVOTHYROXINE SODIUM 50 MCG: 50 TABLET ORAL at 05:03

## 2018-01-18 RX ADMIN — HYDRALAZINE HYDROCHLORIDE 25 MG: 25 TABLET ORAL at 05:03

## 2018-01-18 RX ADMIN — METOPROLOL TARTRATE 25 MG: 25 TABLET, FILM COATED ORAL at 08:36

## 2018-01-18 RX ADMIN — Medication 81 MG: at 08:35

## 2018-01-18 RX ADMIN — AMLODIPINE BESYLATE 10 MG: 10 TABLET ORAL at 08:35

## 2018-01-18 NOTE — DISCHARGE SUMMARY
Discharge Summary:    Date of Admission: 1/14/2018  Date of Discharge:  1/18/2018    PCP:     DISCHARGE DIAGNOSIS  -Sinus bradycardia to be multifactorial in nature and possibly medication induced in setting of a beta blocker +/- uncontrolled hypothyroidism with dose adjustments made during this hospitalization  -Sinus tachycardia today, likely reflex tachycardia after holding metoprolol; restarted on lower dose metoprolol  -Pre-syncope secondary to bradycardia and hypovolemia, resolved  -Influenza A infection  -Mild leukopenia likely related to acute viral illness  -Uncontrolled hypertension, improved  -Orthostasis due to dehydration  -Drop in hgb/hct; likely hemo-dilutional in the setting of dehydration  -Hyponatremia; resolved  -Mildly prolonged QT  -Pre-renal azotemia    SECONDARY DIAGNOSES  Past Medical History:   Diagnosis Date   • Arthritis    • Breast cancer 2015    lt br ca   • Breast cancer 2006    rt br ca   • Hypertension    • Hypothyroidism    • Scabies exposure        CONSULTS   Dr. Vaughan/Cj - Cardiology    PROCEDURES PERFORMED  Echocardiogram:      Interpretation Summary      · Normal left ventricular cavity size and wall thickness noted. All left ventricular wall segments contract normally.  · Estimated EF appears to be in the range of greater than 70%.  · The aortic valve is abnormal in structure. There is mild calcification of the aortic valve mainly affecting the left coronary cusp(s).Trace aortic valve regurgitation is present. Mild aortic valve stenosis is present.  · The mitral valve is normal in structure. Mild mitral valve regurgitation is present. No significant mitral valve stenosis is present.  · The tricuspid valve is normal. No tricuspid valve stenosis is present. Mild tricuspid valve regurgitation is present. Estimated right ventricular systolic pressure from tricuspid regurgitation is mildly elevated (35-45 mmHg).  · There is no evidence of pericardial effusion.       HOSPITAL  COURSE  Patient is a 82 y.o. female presented to [facility] complaining of near syncope.  Please see the admitting history and physical for further details.      She was admitted to the telemetry unit.  She was found to be bradycardic in the emergency department and initial labs revealed acute renal failure that was likely due to poor oral intake as the patient reported at least a 10 day history of illness prior to admission.  Patient states that she had been diagnosed with bronchitis and influenza a that she was not treated with Tamiflu during her hospitalization because she was well out of the treatment window.    The patient's heart rate was found to be in the 30s to 50s in the emergency department and it was found that the patient was taking a large dose of metoprolol.  The patient was also noted to be on levothyroxine for hypothyroidism and as such her TSH was checked and was found to be slightly elevated.  The patient was hydrated with IV fluids and her metoprolol was held.  The patient's dose of Synthroid was increased and she will need repeat thyroid function tests in approximately 4-8 weeks.    The patient's dose of amlodipine was increased as she was noted to have some hypertension once the metoprolol was held.  After she was hydrated the patient's IV fluids were discontinued.  She was evaluated by cardiology during her hospitalization and underwent an echocardiogram that was overall unremarkable with the exception of some mild elevation of her right ventricular systolic pressure and calcification of aortic aortic valve causing trace aortic valve regurgitation and mild aortic valve stenosis.    After holding the patient's metoprolol, she started to have tachycardia that was mostly present with exertion.  The patient did complain of palpitations and it was felt that she was most likely having reflex tachycardia from holding her metoprolol.  The patient was restarted on metoprolol at a much lower  "dose and seemed to tolerate this well with no further episodes of tachycardia.  The patient was ambulating in her room and denied any symptoms of dizziness and/or lightheadedness.  She had no further episodes of nausea, vomiting and/or diarrhea.  Those symptoms resolved in the emergency department.  It was felt that the patient was stable for discharge home.    The patient will follow up with Cardiology after discharge.    CONDITION ON DISCHARGE  Stable.      VITAL SIGNS  /64  Pulse 59  Temp 97.6 °F (36.4 °C) (Oral)   Resp 16  Ht 167.6 cm (66\")  Wt 69.9 kg (154 lb 3 oz)  SpO2 96%  BMI 24.89 kg/m2  Objective:  General Appearance:  Comfortable, well-appearing and in no acute distress.    Vital signs: (most recent): Blood pressure 137/64, pulse 59, temperature 97.6 °F (36.4 °C), temperature source Oral, resp. rate 16, height 167.6 cm (66\"), weight 69.9 kg (154 lb 3 oz), SpO2 96 %.  Vital signs are normal.    HEENT: Normal HEENT exam.    Lungs:  Normal effort.  Breath sounds clear to auscultation.  No wheezes, rales or rhonchi.    Heart: Normal rate.  S1 normal and S2 normal.  Positive for murmur.  No gallop or friction rub.   Chest: Symmetric chest wall expansion.   Abdomen: Abdomen is soft and non-distended.  Bowel sounds are normal.   There is no abdominal tenderness.     Extremities: Normal range of motion.  There is no deformity or dependent edema.    Pulses: Distal pulses are intact.    Neurological: Patient is alert and oriented to person, place and time.  Normal strength.    Skin:  Warm and dry.  No rash or ulceration.           Present during visit: MARIANNA Rolon and the patient's granddaughter    DISCHARGE DISPOSITION   Home or Self Care    DISCHARGE MEDICATIONS   Lashell Case   Home Medication Instructions SOFIYA:[id number]    Printed on:01/18/18 1112   Medication Information                      acetaminophen (TYLENOL) 500 MG tablet  Take 1 tablet by mouth Every 6 (Six) Hours As Needed for " Mild Pain  or Fever.             amLODIPine (NORVASC) 5 MG tablet  Take 2 tablets by mouth Daily. Hold for BP <110/60             aspirin 81 MG tablet  Take 1 tablet by mouth Daily.             atorvastatin (LIPITOR) 80 MG tablet  Take 0.5 tablets by mouth Every Night.             hydrALAZINE (APRESOLINE) 25 MG tablet  Take 1 tablet by mouth Every 8 (Eight) Hours. Do not take if BP <110/60             ibuprofen (ADVIL,MOTRIN) 800 MG tablet  Take 1 tablet by mouth Every 8 (Eight) Hours As Needed for Mild Pain .             levothyroxine (SYNTHROID, LEVOTHROID) 25 MCG tablet  Take 2 tablets by mouth Daily.             metoprolol tartrate (LOPRESSOR) 25 MG tablet  Take 1 tablet by mouth Every 12 (Twelve) Hours.             ondansetron ODT (ZOFRAN-ODT) 4 MG disintegrating tablet  Take 1 tablet by mouth Every 6 (Six) Hours As Needed for Nausea or Vomiting.                 DISCHARGE DIET  cardiac diet    ACTIVITY AT DISCHARGE   activity as tolerated      Additional Instructions for the Follow-ups that You Need to Schedule     Discharge Follow-up with Specified Provider: Dr. Monica Richardson (pt requesting new pcp); 1 Week    As directed    To:  Dr. Monica Richardson (pt requesting new pcp)    Follow Up:  1 Week           Discharge Follow-up with Specified Provider: Dr. Vaughan; 1 Month    As directed    To:  Dr. Vaughan    Follow Up:  1 Month    Follow Up Details:  hospital follow up for bradycardia and tachycardia                     TEST  RESULTS PENDING AT DISCHARGE   Order Current Status    Blood Culture - Blood, Preliminary result    Blood Culture - Blood, Preliminary result             Shannan Ware DO  01/18/18  11:12 AM      Time: greater than 30 minutes.

## 2018-01-18 NOTE — DISCHARGE INSTRUCTIONS
Bradycardia, Adult  Bradycardia is a slower-than-normal heartbeat. A normal resting heart rate for an adult ranges from 60 to 100 beats per minute. With bradycardia, the resting heart rate is less than 60 beats per minute.  Bradycardia can prevent enough oxygen from reaching certain areas of your body when you are active. It can be serious if it keeps enough oxygen from reaching your brain and other parts of your body. Bradycardia is not a problem for everyone. For some healthy adults, a slow resting heart rate is normal.  What are the causes?  This condition may be caused by:  · A problem with the heart, including:  ¨ A problem with the heart's electrical system, such as a heart block.  ¨ A problem with the heart's natural pacemaker (sinus node).  ¨ Heart disease.  ¨ A heart attack.  ¨ Heart damage.  ¨ A heart infection.  ¨ A heart condition that is present at birth (congenital heart defect).  · Certain medicines that treat heart conditions.  · Certain conditions, such as hypothyroidism and obstructive sleep apnea.  · Problems with the balance of chemicals and other substances, like potassium, in the blood.  What increases the risk?  This condition is more likely to develop in adults who:  · Are age 65 or older.  · Have high blood pressure (hypertension), high cholesterol (hyperlipidemia), or diabetes.  · Drink heavily, use tobacco or nicotine products, or use drugs.  · Are stressed.  What are the signs or symptoms?  Symptoms of this condition include:  · Light-headedness.  · Feeling faint or fainting.  · Fatigue and weakness.  · Shortness of breath.  · Chest pain (angina).  · Drowsiness.  · Confusion.  · Dizziness.  How is this diagnosed?  This condition may be diagnosed based on:  · Your symptoms.  · Your medical history.  · A physical exam.  During the exam, your health care provider will listen to your heartbeat and check your pulse. To confirm the diagnosis, your health care provider may order tests, such  as:  · Blood tests.  · An electrocardiogram (ECG). This test records the heart's electrical activity. The test can show how fast your heart is beating and whether the heartbeat is steady.  · A test in which you wear a portable device (event recorder or Holter monitor) to record your heart's electrical activity while you go about your day.  · An exercise test.  How is this treated?  Treatment for this condition depends on the cause of the condition and how severe your symptoms are. Treatment may involve:  · Treatment of the underlying condition.  · Changing your medicines or how much medicine you take.  · Having a small, battery-operated device called a pacemaker implanted under the skin. When bradycardia occurs, this device can be used to increase your heart rate and help your heart to beat in a regular rhythm.  Follow these instructions at home:  Lifestyle  · Manage any health conditions that contribute to bradycardia as told by your health care provider.  · Follow a heart-healthy diet. A nutrition specialist (dietitian) can help to educate you about healthy food options and changes.  · Follow an exercise program that is approved by your health care provider.  · Maintain a healthy weight.  · Try to reduce or manage your stress, such as with yoga or meditation. If you need help reducing stress, ask your health care provider.  · Do not use use any products that contain nicotine or tobacco, such as cigarettes and e-cigarettes. If you need help quitting, ask your health care provider.  · Do not use illegal drugs.  · Limit alcohol intake to no more than 1 drink per day for nonpregnant women and 2 drinks per day for men. One drink equals 12 oz of beer, 5 oz of wine, or 1½ oz of hard liquor.  General instructions  · Take over-the-counter and prescription medicines only as told by your health care provider.  · Keep all follow-up visits as directed by your health care provider. This is important.  How is this prevented?  In  some cases, bradycardia may be prevented by:  · Treating underlying medical problems.  · Stopping behaviors or medicines that can trigger the condition.  Contact a health care provider if:  · You feel light-headed or dizzy.  · You almost faint.  · You feel weak or are easily fatigued during physical activity.  · You experience confusion or have memory problems.  Get help right away if:  · You faint.  · You have an irregular heartbeat (palpitations).  · You have chest pain.  · You have trouble breathing.  This information is not intended to replace advice given to you by your health care provider. Make sure you discuss any questions you have with your health care provider.  Document Released: 09/09/2003 Document Revised: 08/15/2017 Document Reviewed: 06/08/2017  Elsevier Interactive Patient Education © 2017 [employer]

## 2018-01-18 NOTE — PROGRESS NOTES
Discharge Planning Assessment  RAFAEL Jain     Patient Name: Lashell Case  MRN: [id number]  Today's Date: 1/18/2018    Admit Date: 1/14/2018          Discharge Needs Assessment     None            Discharge Plan       01/18/18 1124    Final Note    Final Note Pt to be discharged home on this date.  No further intervention needed          Discharge Placement     No information found        Expected Discharge Date and Time     Expected Discharge Date Expected Discharge Time    Jan 18, 2018               Demographic Summary     None            Functional Status     None            Psychosocial     None            Abuse/Neglect     None            Legal     None            Substance Abuse     None            Patient Forms     None          Shelly Servin

## 2018-01-19 LAB
BACTERIA SPEC AEROBE CULT: NORMAL
BACTERIA SPEC AEROBE CULT: NORMAL

## 2018-01-25 ENCOUNTER — OFFICE VISIT (OUTPATIENT)
Dept: SURGERY | Facility: CLINIC | Age: 83
End: 2018-01-25

## 2018-01-25 VITALS
DIASTOLIC BLOOD PRESSURE: 76 MMHG | HEIGHT: 66 IN | WEIGHT: 158.6 LBS | HEART RATE: 58 BPM | SYSTOLIC BLOOD PRESSURE: 171 MMHG | BODY MASS INDEX: 25.49 KG/M2

## 2018-01-25 DIAGNOSIS — M80.00XA AGE-RELATED OSTEOPOROSIS WITH CURRENT PATHOLOGICAL FRACTURE, INITIAL ENCOUNTER: ICD-10-CM

## 2018-01-25 DIAGNOSIS — M81.0 AGE-RELATED OSTEOPOROSIS WITHOUT CURRENT PATHOLOGICAL FRACTURE: ICD-10-CM

## 2018-01-25 DIAGNOSIS — Z17.0 MALIGNANT NEOPLASM OF LEFT BREAST IN FEMALE, ESTROGEN RECEPTOR POSITIVE, UNSPECIFIED SITE OF BREAST (HCC): Primary | ICD-10-CM

## 2018-01-25 DIAGNOSIS — C50.912 MALIGNANT NEOPLASM OF LEFT BREAST IN FEMALE, ESTROGEN RECEPTOR POSITIVE, UNSPECIFIED SITE OF BREAST (HCC): Primary | ICD-10-CM

## 2018-01-25 DIAGNOSIS — C50.012 MALIGNANT NEOPLASM INVOLVING BOTH NIPPLE AND AREOLA OF LEFT BREAST IN FEMALE, UNSPECIFIED ESTROGEN RECEPTOR STATUS (HCC): Primary | ICD-10-CM

## 2018-01-25 DIAGNOSIS — M85.80 OSTEOPENIA, UNSPECIFIED LOCATION: Primary | ICD-10-CM

## 2018-01-25 DIAGNOSIS — Z79.811 AROMATASE INHIBITOR USE: ICD-10-CM

## 2018-01-25 DIAGNOSIS — M85.80 OSTEOPENIA, UNSPECIFIED LOCATION: ICD-10-CM

## 2018-01-25 PROCEDURE — 99214 OFFICE O/P EST MOD 30 MIN: CPT | Performed by: SURGERY

## 2018-01-25 PROCEDURE — 93702 BIS XTRACELL FLUID ANALYSIS: CPT | Performed by: SURGERY

## 2018-01-25 NOTE — PROGRESS NOTES
Subjective   Lashell Case is a 82 y.o. female here today for breast care and mammogram review.    History of Present Illness  Ms. Case was seen in the office today for breast cancer follow-up. She is status post a left modified radical mastectomy on 11/24/15 for a T2 N1 infiltrating ductal carcinoma, ER positive, MT positive, HER-2 negative. Mammaprint was low risk.  The patient is on anastrozole which she is tolerating well. She does state that she has some soreness under the right arm but she denies palpable mass.  She denies any palpable abnormalities in the chest wall. She denies any symptoms of metastatic disease.  The patient had a bone density on 1/14/16 which demonstrated osteopenia with a T score of -1.6.  Patient presents to the office today having had a right mammogram on 11/22/17 which demonstrated no evidence of malignancy.  The patient states that she was hospitalized last week for the flu.  She actually became so dehydrated that she required a five-day hospitalization stay.  She states she has recovered from this.  Allergies   Allergen Reactions   • Bactrim [Sulfamethoxazole-Trimethoprim] GI Intolerance   • Codeine    • Penicillins    • Phenergan [Promethazine Hcl] Other (See Comments)     Restless legs and trouble swallowing       Current Outpatient Prescriptions   Medication Sig Dispense Refill   • acetaminophen (TYLENOL) 500 MG tablet Take 1 tablet by mouth Every 6 (Six) Hours As Needed for Mild Pain  or Fever.     • amLODIPine (NORVASC) 5 MG tablet Take 2 tablets by mouth Daily. Hold for BP <110/60 45 tablet 5   • aspirin 81 MG tablet Take 1 tablet by mouth Daily. 30 tablet 5   • atorvastatin (LIPITOR) 80 MG tablet Take 0.5 tablets by mouth Every Night. 30 tablet 0   • hydrALAZINE (APRESOLINE) 25 MG tablet Take 1 tablet by mouth Every 8 (Eight) Hours. Do not take if BP <110/60 90 tablet 0   • ibuprofen (ADVIL,MOTRIN) 800 MG tablet Take 1 tablet by mouth Every 8 (Eight) Hours As Needed for  "Mild Pain . 60 tablet 0   • levothyroxine (SYNTHROID, LEVOTHROID) 25 MCG tablet Take 2 tablets by mouth Daily. 30 tablet 2   • metoprolol tartrate (LOPRESSOR) 25 MG tablet Take 1 tablet by mouth Every 12 (Twelve) Hours. 60 tablet 0   • ondansetron ODT (ZOFRAN-ODT) 4 MG disintegrating tablet Take 1 tablet by mouth Every 6 (Six) Hours As Needed for Nausea or Vomiting. 12 tablet 0     No current facility-administered medications for this visit.      Past Medical History:   Diagnosis Date   • Arthritis    • Breast cancer 2015    lt br ca   • Breast cancer 2006    rt br ca   • Hypertension    • Hypothyroidism    • Scabies exposure      Past Surgical History:   Procedure Laterality Date   • BREAST BIOPSY     • BREAST LUMPECTOMY Right 2006   • BREAST SURGERY     • MASTECTOMY Left 2015    ca     The following portions of the patient's history were reviewed and updated as appropriate: allergies, current medications, past family history, past medical history, past social history, past surgical history and problem list.    Review of systems:  Unchanged from prior except HPI    Objective   /76 (BP Location: Left arm, Patient Position: Sitting)  Pulse 58  Ht 167.6 cm (66\")  Wt 71.9 kg (158 lb 9.6 oz)  BMI 25.6 kg/m2   Physical Exam  General: This is a WD WN white female in no acute distress  HEENT exam: WNL. Sclera are anicteric. EOMI  Neck: supple, FROM, without thyromegaly, cervical or supraclavicular adenopathy  Lungs: Respiratory effort normal. Auscultation: Clear, without wheezes, rhonchi, rales  Heart: Regular rate and rhythm, without murmur, gallop, rub. No pedal edema  Breasts: On visual inspection the left breast is surgically absent.Examination of the right breast demonstrates no discrete mass, skin change, or axillary adenopathy. Examination of the left chest wall demonstrates no palpable mass or axillary adenopathy  Abdomen: Nontender, without hepatosplenomegaly  Musculoskeletal: muscle strength/tone is " normal. Gait and station: normal. No digital cyanosis  Psyc: alert, oriented x 3. Mood and affect are appropriate  skin: Warm with good turgor. Without rash or lesion  extremities: Examination of the extremities revealed no cyanosis, clubbing or edema.  Results/Data  Mammogram and reports from 11/22/17 were reviewed and I agree with the assessment    Procedures   L dex was performed and was 11.2, with the prior being 16.7  Assessment/Plan   T2 N1 infiltrating ductal carcinoma of the left breast, ER positive, OK positive, HER-2 negative. Preoperative PET CT negative for malignancy.    Low risk mammaprint    left arm lymphedema, improved  Osteopenia       Continue anastrozole  Continue calcium and vitamin D  Bone density now  Office follow-up in May 2018 with no studies         Discussion/Summary    Errors in dictation may reflect use of voice recognition software and not all errors in transcription may have been detected prior to signing.    Future Appointments  Date Time Provider Department Center   2/27/2018 8:40 AM Anderson Burch MD [facility] None

## 2018-02-01 ENCOUNTER — TELEPHONE (OUTPATIENT)
Dept: SURGERY | Facility: CLINIC | Age: 83
End: 2018-02-01

## 2018-02-01 NOTE — TELEPHONE ENCOUNTER
Have tried to reach patient several times with no answer, message was left asking patient to return call regarding Bone Density scan

## 2018-04-17 ENCOUNTER — TELEPHONE (OUTPATIENT)
Dept: FAMILY MEDICINE CLINIC | Facility: CLINIC | Age: 83
End: 2018-04-17
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
## 2018-07-26 DIAGNOSIS — I10 ESSENTIAL HYPERTENSION: ICD-10-CM

## 2018-07-26 RX ORDER — AMLODIPINE BESYLATE 5 MG/1
10 TABLET ORAL DAILY
Qty: 45 TABLET | Refills: 5 | Status: CANCELLED | OUTPATIENT
Start: 2018-07-26

## 2018-07-31 ENCOUNTER — OFFICE VISIT (OUTPATIENT)
Dept: FAMILY MEDICINE CLINIC | Facility: CLINIC | Age: 83
End: 2018-07-31

## 2018-07-31 VITALS
BODY MASS INDEX: 25.1 KG/M2 | WEIGHT: 156.2 LBS | DIASTOLIC BLOOD PRESSURE: 71 MMHG | SYSTOLIC BLOOD PRESSURE: 183 MMHG | HEART RATE: 53 BPM | TEMPERATURE: 97.3 F | OXYGEN SATURATION: 99 % | HEIGHT: 66 IN

## 2018-07-31 DIAGNOSIS — I10 ESSENTIAL HYPERTENSION: Primary | ICD-10-CM

## 2018-07-31 DIAGNOSIS — E03.4 HYPOTHYROIDISM DUE TO ACQUIRED ATROPHY OF THYROID: ICD-10-CM

## 2018-07-31 LAB
ALBUMIN SERPL-MCNC: 4.5 G/DL (ref 3.4–4.8)
ALBUMIN/GLOB SERPL: 1.3 G/DL (ref 1.5–2.5)
ALP SERPL-CCNC: 65 U/L (ref 35–104)
ALT SERPL W P-5'-P-CCNC: 12 U/L (ref 10–36)
ANION GAP SERPL CALCULATED.3IONS-SCNC: 4.9 MMOL/L (ref 3.6–11.2)
AST SERPL-CCNC: 21 U/L (ref 10–30)
BASOPHILS # BLD AUTO: 0.03 10*3/MM3 (ref 0–0.3)
BASOPHILS NFR BLD AUTO: 0.5 % (ref 0–2)
BILIRUB SERPL-MCNC: 0.4 MG/DL (ref 0.2–1.8)
BUN BLD-MCNC: 19 MG/DL (ref 7–21)
BUN/CREAT SERPL: 19.4 (ref 7–25)
CALCIUM SPEC-SCNC: 9.8 MG/DL (ref 7.7–10)
CHLORIDE SERPL-SCNC: 108 MMOL/L (ref 99–112)
CO2 SERPL-SCNC: 28.1 MMOL/L (ref 24.3–31.9)
CREAT BLD-MCNC: 0.98 MG/DL (ref 0.43–1.29)
DEPRECATED RDW RBC AUTO: 44 FL (ref 37–54)
EOSINOPHIL # BLD AUTO: 0.08 10*3/MM3 (ref 0–0.7)
EOSINOPHIL NFR BLD AUTO: 1.4 % (ref 0–7)
ERYTHROCYTE [DISTWIDTH] IN BLOOD BY AUTOMATED COUNT: 12.4 % (ref 11.5–14.5)
GFR SERPL CREATININE-BSD FRML MDRD: 54 ML/MIN/1.73
GLOBULIN UR ELPH-MCNC: 3.4 GM/DL
GLUCOSE BLD-MCNC: 104 MG/DL (ref 70–110)
HCT VFR BLD AUTO: 37 % (ref 37–47)
HGB BLD-MCNC: 12 G/DL (ref 12–16)
IMM GRANULOCYTES # BLD: 0.01 10*3/MM3 (ref 0–0.03)
IMM GRANULOCYTES NFR BLD: 0.2 % (ref 0–0.5)
LYMPHOCYTES # BLD AUTO: 1.73 10*3/MM3 (ref 1–3)
LYMPHOCYTES NFR BLD AUTO: 29.6 % (ref 16–46)
MCH RBC QN AUTO: 32.9 PG (ref 27–33)
MCHC RBC AUTO-ENTMCNC: 32.4 G/DL (ref 33–37)
MCV RBC AUTO: 101.4 FL (ref 80–94)
MONOCYTES # BLD AUTO: 0.65 10*3/MM3 (ref 0.1–0.9)
MONOCYTES NFR BLD AUTO: 11.1 % (ref 0–12)
NEUTROPHILS # BLD AUTO: 3.35 10*3/MM3 (ref 1.4–6.5)
NEUTROPHILS NFR BLD AUTO: 57.2 % (ref 40–75)
OSMOLALITY SERPL CALC.SUM OF ELEC: 283.8 MOSM/KG (ref 273–305)
PLATELET # BLD AUTO: 190 10*3/MM3 (ref 130–400)
PMV BLD AUTO: 10.7 FL (ref 6–10)
POTASSIUM BLD-SCNC: 4.4 MMOL/L (ref 3.5–5.3)
PROT SERPL-MCNC: 7.9 G/DL (ref 6–8)
RBC # BLD AUTO: 3.65 10*6/MM3 (ref 4.2–5.4)
SODIUM BLD-SCNC: 141 MMOL/L (ref 135–153)
T4 FREE SERPL-MCNC: 0.68 NG/DL (ref 0.89–1.76)
TSH SERPL DL<=0.05 MIU/L-ACNC: 8.59 MIU/ML (ref 0.55–4.78)
WBC NRBC COR # BLD: 5.85 10*3/MM3 (ref 4.5–12.5)

## 2018-07-31 PROCEDURE — 85025 COMPLETE CBC W/AUTO DIFF WBC: CPT | Performed by: FAMILY MEDICINE

## 2018-07-31 PROCEDURE — 84439 ASSAY OF FREE THYROXINE: CPT | Performed by: FAMILY MEDICINE

## 2018-07-31 PROCEDURE — 80053 COMPREHEN METABOLIC PANEL: CPT | Performed by: FAMILY MEDICINE

## 2018-07-31 PROCEDURE — 84443 ASSAY THYROID STIM HORMONE: CPT | Performed by: FAMILY MEDICINE

## 2018-07-31 PROCEDURE — 36415 COLL VENOUS BLD VENIPUNCTURE: CPT | Performed by: FAMILY MEDICINE

## 2018-07-31 PROCEDURE — 99214 OFFICE O/P EST MOD 30 MIN: CPT | Performed by: FAMILY MEDICINE

## 2018-07-31 RX ORDER — LISINOPRIL AND HYDROCHLOROTHIAZIDE 20; 12.5 MG/1; MG/1
1 TABLET ORAL DAILY
Qty: 30 TABLET | Refills: 5 | Status: SHIPPED | OUTPATIENT
Start: 2018-07-31 | End: 2018-12-03 | Stop reason: DRUGHIGH

## 2018-07-31 RX ORDER — METOPROLOL SUCCINATE 100 MG/1
100 TABLET, EXTENDED RELEASE ORAL DAILY
Qty: 30 TABLET | Refills: 5 | Status: SHIPPED | OUTPATIENT
Start: 2018-07-31 | End: 2018-12-03 | Stop reason: SDUPTHER

## 2018-07-31 RX ORDER — METOPROLOL SUCCINATE 100 MG/1
100 TABLET, EXTENDED RELEASE ORAL DAILY
Refills: 5 | COMMUNITY
Start: 2018-06-13 | End: 2018-07-31 | Stop reason: SDUPTHER

## 2018-07-31 RX ORDER — AMLODIPINE BESYLATE 5 MG/1
10 TABLET ORAL DAILY
Qty: 45 TABLET | Refills: 5 | Status: SHIPPED | OUTPATIENT
Start: 2018-07-31 | End: 2018-12-03 | Stop reason: SDUPTHER

## 2018-07-31 NOTE — PROGRESS NOTES
"Subjective     Chief Complaint   Patient presents with   • Med Refill       Lashell Case is a 83 y.o. female.     History of Present Illness follow-up regarding hypertension medication management.  Has mostly followed with general surgery after the left mastectomy.  Has not visited here in a while.  States is taking amlodipine lisinopril and metoprolol for blood pressure.  Has not taken thyroid replacement in quite some time.  States feels quite well.  Denies fevers chills CP SOB GI .  Had brief hospitalization earlier this year was some attempted medication manipulations.    The following portions of the patient's history were reviewed and updated as appropriate: allergies, past family history, past medical history, past social history, past surgical history and problem list.    Review of Systems the history of present illness    Objective   Physical Exam   Constitutional: She is oriented to person, place, and time. She appears well-developed and well-nourished.   HENT:   Head: Normocephalic.   Mouth/Throat: Oropharynx is clear and moist.   Eyes: Pupils are equal, round, and reactive to light. Conjunctivae are normal.   Neck: Normal range of motion. Neck supple. No tracheal deviation present. No thyromegaly present.   Cardiovascular: Normal rate, regular rhythm, normal heart sounds and intact distal pulses.    No murmur heard.  Pulmonary/Chest: Effort normal and breath sounds normal.   Abdominal: She exhibits no distension. There is no tenderness.   Musculoskeletal: She exhibits no edema.   Neurological: She is alert and oriented to person, place, and time.   Skin: Skin is warm and dry.   Psychiatric: She has a normal mood and affect.   Vitals reviewed.    BP (!) 183/71 (BP Location: Right arm, Patient Position: Sitting, Cuff Size: Adult)   Pulse 53   Temp 97.3 °F (36.3 °C) (Oral)   Ht 167.6 cm (65.98\")   Wt 70.9 kg (156 lb 3.2 oz)   SpO2 99%   BMI 25.22 kg/m²   Assessment/Plan   Lashell was seen today " for med refill.    Diagnoses and all orders for this visit:    Essential hypertension  -     CBC & Differential  -     Comprehensive Metabolic Panel  -     CBC Auto Differential  -     lisinopril-hydrochlorothiazide (PRINZIDE,ZESTORETIC) 20-12.5 MG per tablet; Take 1 tablet by mouth Daily.  -     amLODIPine (NORVASC) 5 MG tablet; Take 2 tablets by mouth Daily. Hold for BP <110/60  -     metoprolol succinate XL (TOPROL-XL) 100 MG 24 hr tablet; Take 1 tablet by mouth Daily. for blood pressure    Hypothyroidism due to acquired atrophy of thyroid  -     TSH  -     T4, Free      Blood pressure not to goal.  Medication compliance definitely comes into question.  Today renewed amlodipine metoprolol.  Also ordered lisinopril HCT.  Will check labs to include thyroid.  Stay safely active.  Encourage follow-up as requested.  I do appreciate the stresses you are under with the granddaughter and great grandbaby.  Follow-up pending results.

## 2018-08-05 DIAGNOSIS — E03.8 OTHER SPECIFIED HYPOTHYROIDISM: ICD-10-CM

## 2018-08-05 RX ORDER — LEVOTHYROXINE SODIUM 0.03 MG/1
25 TABLET ORAL DAILY
Qty: 30 TABLET | Refills: 4 | Status: SHIPPED | OUTPATIENT
Start: 2018-08-05 | End: 2018-12-05 | Stop reason: SDUPTHER

## 2018-08-21 ENCOUNTER — HOSPITAL ENCOUNTER (EMERGENCY)
Facility: HOSPITAL | Age: 83
Discharge: HOME | End: 2018-08-21
Attending: EMERGENCY MEDICINE | Admitting: EMERGENCY MEDICINE

## 2018-08-21 ENCOUNTER — APPOINTMENT (OUTPATIENT)
Dept: CT IMAGING | Facility: HOSPITAL | Age: 83
End: 2018-08-21

## 2018-08-21 ENCOUNTER — APPOINTMENT (OUTPATIENT)
Dept: GENERAL RADIOLOGY | Facility: HOSPITAL | Age: 83
End: 2018-08-21

## 2018-08-21 VITALS
BODY MASS INDEX: 24.27 KG/M2 | SYSTOLIC BLOOD PRESSURE: 188 MMHG | HEART RATE: 51 BPM | TEMPERATURE: 97.5 F | RESPIRATION RATE: 18 BRPM | DIASTOLIC BLOOD PRESSURE: 75 MMHG | OXYGEN SATURATION: 99 % | WEIGHT: 151 LBS | HEIGHT: 66 IN

## 2018-08-21 DIAGNOSIS — M54.5 ACUTE RIGHT-SIDED LOW BACK PAIN, WITH SCIATICA PRESENCE UNSPECIFIED: Primary | ICD-10-CM

## 2018-08-21 LAB
ALBUMIN SERPL-MCNC: 4.6 G/DL (ref 3.4–4.8)
ALBUMIN/GLOB SERPL: 1.4 G/DL (ref 1.5–2.5)
ALP SERPL-CCNC: 65 U/L (ref 35–104)
ALT SERPL W P-5'-P-CCNC: 11 U/L (ref 10–36)
ANION GAP SERPL CALCULATED.3IONS-SCNC: 6.3 MMOL/L (ref 3.6–11.2)
AST SERPL-CCNC: 19 U/L (ref 10–30)
BACTERIA UR QL AUTO: ABNORMAL /HPF
BASOPHILS # BLD AUTO: 0.04 10*3/MM3 (ref 0–0.3)
BASOPHILS NFR BLD AUTO: 0.8 % (ref 0–2)
BILIRUB SERPL-MCNC: 0.6 MG/DL (ref 0.2–1.8)
BILIRUB UR QL STRIP: NEGATIVE
BUN BLD-MCNC: 19 MG/DL (ref 7–21)
BUN/CREAT SERPL: 19.2 (ref 7–25)
CALCIUM SPEC-SCNC: 9.9 MG/DL (ref 7.7–10)
CHLORIDE SERPL-SCNC: 108 MMOL/L (ref 99–112)
CLARITY UR: CLEAR
CO2 SERPL-SCNC: 25.7 MMOL/L (ref 24.3–31.9)
COLOR UR: YELLOW
CREAT BLD-MCNC: 0.99 MG/DL (ref 0.43–1.29)
DEPRECATED RDW RBC AUTO: 44.8 FL (ref 37–54)
EOSINOPHIL # BLD AUTO: 0.06 10*3/MM3 (ref 0–0.7)
EOSINOPHIL NFR BLD AUTO: 1.1 % (ref 0–7)
ERYTHROCYTE [DISTWIDTH] IN BLOOD BY AUTOMATED COUNT: 12.6 % (ref 11.5–14.5)
GFR SERPL CREATININE-BSD FRML MDRD: 54 ML/MIN/1.73
GLOBULIN UR ELPH-MCNC: 3.3 GM/DL
GLUCOSE BLD-MCNC: 97 MG/DL (ref 70–110)
GLUCOSE UR STRIP-MCNC: NEGATIVE MG/DL
HCT VFR BLD AUTO: 35.9 % (ref 37–47)
HGB BLD-MCNC: 12.1 G/DL (ref 12–16)
HGB UR QL STRIP.AUTO: NEGATIVE
HYALINE CASTS UR QL AUTO: ABNORMAL /LPF
IMM GRANULOCYTES # BLD: 0.01 10*3/MM3 (ref 0–0.03)
IMM GRANULOCYTES NFR BLD: 0.2 % (ref 0–0.5)
KETONES UR QL STRIP: ABNORMAL
LEUKOCYTE ESTERASE UR QL STRIP.AUTO: ABNORMAL
LIPASE SERPL-CCNC: 56 U/L (ref 13–60)
LYMPHOCYTES # BLD AUTO: 1.55 10*3/MM3 (ref 1–3)
LYMPHOCYTES NFR BLD AUTO: 29.5 % (ref 16–46)
MCH RBC QN AUTO: 32.8 PG (ref 27–33)
MCHC RBC AUTO-ENTMCNC: 33.7 G/DL (ref 33–37)
MCV RBC AUTO: 97.3 FL (ref 80–94)
MONOCYTES # BLD AUTO: 0.62 10*3/MM3 (ref 0.1–0.9)
MONOCYTES NFR BLD AUTO: 11.8 % (ref 0–12)
NEUTROPHILS # BLD AUTO: 2.97 10*3/MM3 (ref 1.4–6.5)
NEUTROPHILS NFR BLD AUTO: 56.6 % (ref 40–75)
NITRITE UR QL STRIP: NEGATIVE
OSMOLALITY SERPL CALC.SUM OF ELEC: 281.6 MOSM/KG (ref 273–305)
PH UR STRIP.AUTO: 5.5 [PH] (ref 5–8)
PLATELET # BLD AUTO: 182 10*3/MM3 (ref 130–400)
PMV BLD AUTO: 10.2 FL (ref 6–10)
POTASSIUM BLD-SCNC: 4.2 MMOL/L (ref 3.5–5.3)
PROT SERPL-MCNC: 7.9 G/DL (ref 6–8)
PROT UR QL STRIP: NEGATIVE
RBC # BLD AUTO: 3.69 10*6/MM3 (ref 4.2–5.4)
RBC # UR: ABNORMAL /HPF
REF LAB TEST METHOD: ABNORMAL
SODIUM BLD-SCNC: 140 MMOL/L (ref 135–153)
SP GR UR STRIP: 1.02 (ref 1–1.03)
SQUAMOUS #/AREA URNS HPF: ABNORMAL /HPF
TROPONIN I SERPL-MCNC: <0.006 NG/ML
UROBILINOGEN UR QL STRIP: ABNORMAL
WBC NRBC COR # BLD: 5.25 10*3/MM3 (ref 4.5–12.5)
WBC UR QL AUTO: ABNORMAL /HPF

## 2018-08-21 PROCEDURE — 99284 EMERGENCY DEPT VISIT MOD MDM: CPT

## 2018-08-21 PROCEDURE — 74176 CT ABD & PELVIS W/O CONTRAST: CPT

## 2018-08-21 PROCEDURE — 72131 CT LUMBAR SPINE W/O DYE: CPT | Performed by: RADIOLOGY

## 2018-08-21 PROCEDURE — 83690 ASSAY OF LIPASE: CPT | Performed by: EMERGENCY MEDICINE

## 2018-08-21 PROCEDURE — 93010 ELECTROCARDIOGRAM REPORT: CPT | Performed by: INTERNAL MEDICINE

## 2018-08-21 PROCEDURE — 80053 COMPREHEN METABOLIC PANEL: CPT | Performed by: EMERGENCY MEDICINE

## 2018-08-21 PROCEDURE — 71045 X-RAY EXAM CHEST 1 VIEW: CPT

## 2018-08-21 PROCEDURE — 74176 CT ABD & PELVIS W/O CONTRAST: CPT | Performed by: RADIOLOGY

## 2018-08-21 PROCEDURE — 84484 ASSAY OF TROPONIN QUANT: CPT | Performed by: EMERGENCY MEDICINE

## 2018-08-21 PROCEDURE — 72131 CT LUMBAR SPINE W/O DYE: CPT

## 2018-08-21 PROCEDURE — 81001 URINALYSIS AUTO W/SCOPE: CPT | Performed by: EMERGENCY MEDICINE

## 2018-08-21 PROCEDURE — 93005 ELECTROCARDIOGRAM TRACING: CPT | Performed by: EMERGENCY MEDICINE

## 2018-08-21 PROCEDURE — 85025 COMPLETE CBC W/AUTO DIFF WBC: CPT | Performed by: EMERGENCY MEDICINE

## 2018-08-21 PROCEDURE — 71045 X-RAY EXAM CHEST 1 VIEW: CPT | Performed by: RADIOLOGY

## 2018-08-21 RX ORDER — ONDANSETRON 2 MG/ML
4 INJECTION INTRAMUSCULAR; INTRAVENOUS ONCE
Status: DISCONTINUED | OUTPATIENT
Start: 2018-08-21 | End: 2018-08-21 | Stop reason: HOSPADM

## 2018-08-21 RX ORDER — HYDROCODONE BITARTRATE AND ACETAMINOPHEN 5; 325 MG/1; MG/1
1 TABLET ORAL EVERY 6 HOURS PRN
Qty: 10 TABLET | Refills: 0 | Status: SHIPPED | OUTPATIENT
Start: 2018-08-21 | End: 2018-12-03

## 2018-08-21 RX ORDER — SODIUM CHLORIDE 0.9 % (FLUSH) 0.9 %
10 SYRINGE (ML) INJECTION AS NEEDED
Status: DISCONTINUED | OUTPATIENT
Start: 2018-08-21 | End: 2018-08-21 | Stop reason: HOSPADM

## 2018-08-21 RX ORDER — CYCLOBENZAPRINE HCL 5 MG
5 TABLET ORAL 3 TIMES DAILY PRN
Qty: 10 TABLET | Refills: 0 | Status: SHIPPED | OUTPATIENT
Start: 2018-08-21 | End: 2018-12-03

## 2018-08-21 NOTE — DISCHARGE INSTRUCTIONS
Home in care of family.  Rest.  Local heat as we discussed.  Dr. Wolf in the next 1-2 days.  Also see Dr. Truong regarding your heart rate in the next 1-2 days.  Taking medications as prescribed.  Return to the emergency Department right away if symptoms worsen/any problems.

## 2018-08-21 NOTE — ED PROVIDER NOTES
Subjective   Patient is an 83-year-old white female who presents today complaining of severe right lumbar back pain for the last 4 days.  As long as she sits still it is tolerable, but is very severe with any movement.  This pain does not radiate.  She has no numbness or weakness.  She has no bowel or bladder dysfunction.  She does not recall any injury.  She denies fever, chills, chest pain, shortness of breath, abdominal pain, nausea, vomiting, diarrhea, any sort of blood loss, syncope or near syncope, focal numbness or weakness, other symptoms or other complaints.  Her pulse in triage was 44; patient states that her heart rate always runs in the 40s and 50s, and that this is nothing out of the ordinary for her at all.  She denies any symptoms related to this.            Review of Systems   Constitutional: Negative for chills, diaphoresis and fever.   HENT: Negative for ear pain, sore throat and trouble swallowing.    Eyes: Negative for photophobia and pain.   Respiratory: Negative for shortness of breath, wheezing and stridor.    Cardiovascular: Negative for chest pain and palpitations.   Gastrointestinal: Negative for abdominal distention, abdominal pain, blood in stool, diarrhea, nausea and vomiting.   Endocrine: Negative for polydipsia and polyphagia.   Genitourinary: Negative for difficulty urinating and flank pain.   Musculoskeletal: Negative for neck pain and neck stiffness.   Skin: Negative for color change and pallor.   Neurological: Negative for seizures, syncope and speech difficulty.   Psychiatric/Behavioral: Negative for confusion.   All other systems reviewed and are negative.      Past Medical History:   Diagnosis Date   • Arthritis    • Breast cancer (CMS/HCC) 2015    lt br ca   • Breast cancer (CMS/HCC) 2006    rt br ca   • Hypertension    • Hypothyroidism    • Scabies exposure        Allergies   Allergen Reactions   • Bactrim [Sulfamethoxazole-Trimethoprim] GI Intolerance   • Codeine    •  Penicillins    • Phenergan [Promethazine Hcl] Other (See Comments)     Restless legs and trouble swallowing       Past Surgical History:   Procedure Laterality Date   • BREAST BIOPSY     • BREAST LUMPECTOMY Right 2006   • BREAST SURGERY     • MASTECTOMY Left 2015    ca       Family History   Problem Relation Age of Onset   • Hypertension Mother    • Uterine cancer Mother    • Diabetes Mother    • Diabetes Daughter    • Prostate cancer Son    • Diabetes Son    • Throat cancer Son    • Hypertension Child        Social History     Social History   • Marital status:      Social History Main Topics   • Smoking status: Never Smoker   • Smokeless tobacco: Never Used   • Alcohol use No   • Drug use: No   • Sexual activity: Defer     Other Topics Concern   • Not on file           Objective   Physical Exam   Constitutional: She is oriented to person, place, and time. She appears well-developed and well-nourished. No distress.   HENT:   Head: Normocephalic and atraumatic.   Eyes: Pupils are equal, round, and reactive to light. EOM are normal. No scleral icterus.   Neck: Normal range of motion. Neck supple. No neck rigidity. No tracheal deviation present.   Cardiovascular: Regular rhythm and intact distal pulses.    Pulmonary/Chest: Effort normal and breath sounds normal. No respiratory distress. She exhibits no tenderness.   Abdominal: Soft. Bowel sounds are normal. There is no tenderness. There is no rebound and no guarding.   Musculoskeletal:   There is much tenderness and palpable spasm throughout the lumbar paraspinous musculature, especially in the midportion.  This palpation reproduces her pain.  There is no costovertebral angle tenderness.  No other musculoskeletal tenderness is present.  There is a negative straight leg raise test bilaterally.  All symptoms have good distal pulses.   Neurological: She is alert and oriented to person, place, and time. She has normal strength. She displays normal reflexes. No  sensory deficit. She exhibits normal muscle tone. Coordination normal. GCS eye subscore is 4. GCS verbal subscore is 5. GCS motor subscore is 6.   Skin: Skin is warm and dry. Capillary refill takes less than 2 seconds. She is not diaphoretic. No cyanosis. No pallor.   Psychiatric: She has a normal mood and affect. Her behavior is normal.   Nursing note and vitals reviewed.      Procedures  EKG shows sinus bradycardia with a rate of 50.  Incomplete right bundle-branch block.  No apparent acute ischemia.  Compared with 1/15/18, the rate at that time was 63, otherwise not significantly different.  XR Chest 1 View   Final Result   No evidence of active or acute cardiopulmonary disease on today's chest   radiograph.           This report was finalized on 8/21/2018 3:35 PM by Dr. Erich Jenkins MD.          CT Abdomen Pelvis Without Contrast   Final Result   :    1. Hepatic cysts   2. Tiny hyperdense nodule in the left kidney   3. Nonobstructing right intrarenal stone   4. Moderate to large volume stool in the colon.                     This report was finalized on 8/21/2018 3:32 PM by Dr. Erich Jenkins MD.          CT Lumbar Spine Without Contrast   Final Result   1. Arthritic change in the spine but no acute bony abnormality   2. Cystic mass in the liver       This report was finalized on 8/21/2018 3:26 PM by Dr. Erich Jenkins MD.            Results for orders placed or performed during the hospital encounter of 08/21/18   Comprehensive Metabolic Panel   Result Value Ref Range    Glucose 97 70 - 110 mg/dL    BUN 19 7 - 21 mg/dL    Creatinine 0.99 0.43 - 1.29 mg/dL    Sodium 140 135 - 153 mmol/L    Potassium 4.2 3.5 - 5.3 mmol/L    Chloride 108 99 - 112 mmol/L    CO2 25.7 24.3 - 31.9 mmol/L    Calcium 9.9 7.7 - 10.0 mg/dL    Total Protein 7.9 6.0 - 8.0 g/dL    Albumin 4.60 3.40 - 4.80 g/dL    ALT (SGPT) 11 10 - 36 U/L    AST (SGOT) 19 10 - 30 U/L    Alkaline Phosphatase 65 35 - 104 U/L    Total Bilirubin 0.6 0.2 - 1.8  mg/dL    eGFR Non African Amer 54 (L) >60 mL/min/1.73    Globulin 3.3 gm/dL    A/G Ratio 1.4 (L) 1.5 - 2.5 g/dL    BUN/Creatinine Ratio 19.2 7.0 - 25.0    Anion Gap 6.3 3.6 - 11.2 mmol/L   Lipase   Result Value Ref Range    Lipase 56 13 - 60 U/L   Urinalysis With Microscopic If Indicated (No Culture) - Urine, Clean Catch   Result Value Ref Range    Color, UA Yellow Yellow, Straw    Appearance, UA Clear Clear    pH, UA 5.5 5.0 - 8.0    Specific Gravity, UA 1.023 1.005 - 1.030    Glucose, UA Negative Negative    Ketones, UA Trace (A) Negative    Bilirubin, UA Negative Negative    Blood, UA Negative Negative    Protein, UA Negative Negative    Leuk Esterase, UA Trace (A) Negative    Nitrite, UA Negative Negative    Urobilinogen, UA 1.0 E.U./dL 0.2 - 1.0 E.U./dL   Troponin   Result Value Ref Range    Troponin I <0.006 <=0.040 ng/mL   CBC Auto Differential   Result Value Ref Range    WBC 5.25 4.50 - 12.50 10*3/mm3    RBC 3.69 (L) 4.20 - 5.40 10*6/mm3    Hemoglobin 12.1 12.0 - 16.0 g/dL    Hematocrit 35.9 (L) 37.0 - 47.0 %    MCV 97.3 (H) 80.0 - 94.0 fL    MCH 32.8 27.0 - 33.0 pg    MCHC 33.7 33.0 - 37.0 g/dL    RDW 12.6 11.5 - 14.5 %    RDW-SD 44.8 37.0 - 54.0 fl    MPV 10.2 (H) 6.0 - 10.0 fL    Platelets 182 130 - 400 10*3/mm3    Neutrophil % 56.6 40.0 - 75.0 %    Lymphocyte % 29.5 16.0 - 46.0 %    Monocyte % 11.8 0.0 - 12.0 %    Eosinophil % 1.1 0.0 - 7.0 %    Basophil % 0.8 0.0 - 2.0 %    Immature Grans % 0.2 0.0 - 0.5 %    Neutrophils, Absolute 2.97 1.40 - 6.50 10*3/mm3    Lymphocytes, Absolute 1.55 1.00 - 3.00 10*3/mm3    Monocytes, Absolute 0.62 0.10 - 0.90 10*3/mm3    Eosinophils, Absolute 0.06 0.00 - 0.70 10*3/mm3    Basophils, Absolute 0.04 0.00 - 0.30 10*3/mm3    Immature Grans, Absolute 0.01 0.00 - 0.03 10*3/mm3   Urinalysis, Microscopic Only - Urine, Clean Catch   Result Value Ref Range    RBC, UA 0-2 None Seen, 0-2 /HPF    WBC, UA 6-12 (A) None Seen, 0-2 /HPF    Bacteria, UA 1+ (A) None Seen /HPF    Squamous  Epithelial Cells, UA 7-12 (A) None Seen, 0-2 /HPF    Hyaline Casts, UA 3-6 None Seen /LPF    Methodology Automated Microscopy    Osmolality, Calculated   Result Value Ref Range    Osmolality Calc 281.6 273.0 - 305.0 mOsm/kg                ED Course  Patient's emergency department stay has been uneventful.  Never has she shown any signs of distress.  She declined all of her analgesics, stating that she does not like to take pain medication.  Patient, her family and I discussed all of her test results and her plan of care.  She voices understanding and agreement.                  MDM      Final diagnoses:   Acute right-sided low back pain, with sciatica presence unspecified             Please note that portions of this note were completed with a voice recognition program. Efforts were made to edit the dictations, but occasionally words are mistranscribed.       Jaguar Townsend MD  08/21/18 [id number]

## 2018-08-21 NOTE — ED NOTES
PT is in the restroom at this time, trying to provide urine collection.      Karlene Moyer  08/21/18 [id number]

## 2018-10-29 DIAGNOSIS — C50.912 MALIGNANT NEOPLASM OF LEFT FEMALE BREAST, UNSPECIFIED ESTROGEN RECEPTOR STATUS, UNSPECIFIED SITE OF BREAST (HCC): ICD-10-CM

## 2018-10-29 DIAGNOSIS — R93.6 ABNORMAL FINDINGS ON DIAGNOSTIC IMAGING OF LIMBS: ICD-10-CM

## 2018-10-29 DIAGNOSIS — M85.80 OSTEOPENIA, UNSPECIFIED LOCATION: Primary | ICD-10-CM

## 2018-10-29 DIAGNOSIS — M19.90 ARTHRITIS: ICD-10-CM

## 2018-12-03 ENCOUNTER — TELEPHONE (OUTPATIENT)
Dept: SURGERY | Facility: CLINIC | Age: 83
End: 2018-12-03

## 2018-12-03 ENCOUNTER — OFFICE VISIT (OUTPATIENT)
Dept: FAMILY MEDICINE CLINIC | Facility: CLINIC | Age: 83
End: 2018-12-03

## 2018-12-03 VITALS
BODY MASS INDEX: 24.3 KG/M2 | HEART RATE: 55 BPM | SYSTOLIC BLOOD PRESSURE: 175 MMHG | DIASTOLIC BLOOD PRESSURE: 69 MMHG | HEIGHT: 66 IN | WEIGHT: 151.2 LBS | TEMPERATURE: 97.3 F

## 2018-12-03 DIAGNOSIS — I10 ESSENTIAL HYPERTENSION: ICD-10-CM

## 2018-12-03 DIAGNOSIS — E03.4 HYPOTHYROIDISM DUE TO ACQUIRED ATROPHY OF THYROID: Primary | ICD-10-CM

## 2018-12-03 PROBLEM — K76.89 HEPATIC CYST: Status: ACTIVE | Noted: 2018-12-03

## 2018-12-03 PROBLEM — D73.4 SPLENIC CYST: Status: ACTIVE | Noted: 2018-12-03

## 2018-12-03 LAB
ALBUMIN SERPL-MCNC: 4.6 G/DL (ref 3.4–4.8)
ALBUMIN/GLOB SERPL: 1.4 G/DL (ref 1.5–2.5)
ALP SERPL-CCNC: 64 U/L (ref 35–104)
ALT SERPL W P-5'-P-CCNC: 16 U/L (ref 10–36)
ANION GAP SERPL CALCULATED.3IONS-SCNC: 4.8 MMOL/L (ref 3.6–11.2)
AST SERPL-CCNC: 20 U/L (ref 10–30)
BASOPHILS # BLD AUTO: 0.09 10*3/MM3 (ref 0–0.3)
BASOPHILS NFR BLD AUTO: 1.4 % (ref 0–2)
BILIRUB SERPL-MCNC: 0.4 MG/DL (ref 0.2–1.8)
BUN BLD-MCNC: 15 MG/DL (ref 7–21)
BUN/CREAT SERPL: 16.3 (ref 7–25)
CALCIUM SPEC-SCNC: 10 MG/DL (ref 7.7–10)
CHLORIDE SERPL-SCNC: 105 MMOL/L (ref 99–112)
CO2 SERPL-SCNC: 30.2 MMOL/L (ref 24.3–31.9)
CREAT BLD-MCNC: 0.92 MG/DL (ref 0.43–1.29)
DEPRECATED RDW RBC AUTO: 45 FL (ref 37–54)
EOSINOPHIL # BLD AUTO: 0.08 10*3/MM3 (ref 0–0.7)
EOSINOPHIL NFR BLD AUTO: 1.2 % (ref 0–7)
ERYTHROCYTE [DISTWIDTH] IN BLOOD BY AUTOMATED COUNT: 12.5 % (ref 11.5–14.5)
GFR SERPL CREATININE-BSD FRML MDRD: 59 ML/MIN/1.73
GLOBULIN UR ELPH-MCNC: 3.2 GM/DL
GLUCOSE BLD-MCNC: 101 MG/DL (ref 70–110)
HCT VFR BLD AUTO: 37.8 % (ref 37–47)
HGB BLD-MCNC: 12.5 G/DL (ref 12–16)
IMM GRANULOCYTES # BLD: 0.01 10*3/MM3 (ref 0–0.03)
IMM GRANULOCYTES NFR BLD: 0.2 % (ref 0–0.5)
LYMPHOCYTES # BLD AUTO: 1.97 10*3/MM3 (ref 1–3)
LYMPHOCYTES NFR BLD AUTO: 30.1 % (ref 16–46)
MCH RBC QN AUTO: 33.5 PG (ref 27–33)
MCHC RBC AUTO-ENTMCNC: 33.1 G/DL (ref 33–37)
MCV RBC AUTO: 101.3 FL (ref 80–94)
MONOCYTES # BLD AUTO: 0.72 10*3/MM3 (ref 0.1–0.9)
MONOCYTES NFR BLD AUTO: 11 % (ref 0–12)
NEUTROPHILS # BLD AUTO: 3.68 10*3/MM3 (ref 1.4–6.5)
NEUTROPHILS NFR BLD AUTO: 56.1 % (ref 40–75)
OSMOLALITY SERPL CALC.SUM OF ELEC: 280.4 MOSM/KG (ref 273–305)
PLATELET # BLD AUTO: 193 10*3/MM3 (ref 130–400)
PMV BLD AUTO: 10.4 FL (ref 6–10)
POTASSIUM BLD-SCNC: 4.3 MMOL/L (ref 3.5–5.3)
PROT SERPL-MCNC: 7.8 G/DL (ref 6–8)
RBC # BLD AUTO: 3.73 10*6/MM3 (ref 4.2–5.4)
SODIUM BLD-SCNC: 140 MMOL/L (ref 135–153)
T4 FREE SERPL-MCNC: 0.78 NG/DL (ref 0.89–1.76)
TSH SERPL DL<=0.05 MIU/L-ACNC: 8.1 MIU/ML (ref 0.55–4.78)
WBC NRBC COR # BLD: 6.55 10*3/MM3 (ref 4.5–12.5)

## 2018-12-03 PROCEDURE — 36415 COLL VENOUS BLD VENIPUNCTURE: CPT | Performed by: FAMILY MEDICINE

## 2018-12-03 PROCEDURE — 85025 COMPLETE CBC W/AUTO DIFF WBC: CPT | Performed by: FAMILY MEDICINE

## 2018-12-03 PROCEDURE — 84443 ASSAY THYROID STIM HORMONE: CPT | Performed by: FAMILY MEDICINE

## 2018-12-03 PROCEDURE — 84439 ASSAY OF FREE THYROXINE: CPT | Performed by: FAMILY MEDICINE

## 2018-12-03 PROCEDURE — 99214 OFFICE O/P EST MOD 30 MIN: CPT | Performed by: FAMILY MEDICINE

## 2018-12-03 PROCEDURE — 80053 COMPREHEN METABOLIC PANEL: CPT | Performed by: FAMILY MEDICINE

## 2018-12-03 RX ORDER — METOPROLOL SUCCINATE 100 MG/1
100 TABLET, EXTENDED RELEASE ORAL DAILY
Qty: 30 TABLET | Refills: 6 | Status: SHIPPED | OUTPATIENT
Start: 2018-12-03 | End: 2019-11-26 | Stop reason: SDUPTHER

## 2018-12-03 RX ORDER — LISINOPRIL AND HYDROCHLOROTHIAZIDE 25; 20 MG/1; MG/1
1 TABLET ORAL DAILY
Qty: 30 TABLET | Refills: 6 | Status: SHIPPED | OUTPATIENT
Start: 2018-12-03 | End: 2020-02-04 | Stop reason: SDUPTHER

## 2018-12-03 RX ORDER — AMLODIPINE BESYLATE 10 MG/1
10 TABLET ORAL DAILY
Qty: 30 TABLET | Refills: 6 | Status: SHIPPED | OUTPATIENT
Start: 2018-12-03 | End: 2020-02-04 | Stop reason: SDUPTHER

## 2018-12-03 NOTE — PROGRESS NOTES
Subjective   Lashell Case is a 83 y.o. female.     History of Present Illness follow-up regarding hypertension hypothyroid.  Presents no new concerns.  States energy is reasonable.  Denies CP SOB palpitations GI  changes.  Will be following with general surgery soon.  States is compliant with medications as reconciled.  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    The following portions of the patient's history were reviewed and updated as appropriate: allergies, past family history, past medical history, past social history, past surgical history and problem list.    Review of Systems see the history of present illness    Objective   Physical Exam   Constitutional: She is oriented to person, place, and time. She appears well-developed and well-nourished.   HENT:   Head: Normocephalic.   Mouth/Throat: Oropharynx is clear and moist.   Eyes: Conjunctivae are normal.   Neck: Normal range of motion. Neck supple.   Cardiovascular: Normal rate, regular rhythm and normal heart sounds.   No murmur heard.  Pulmonary/Chest: Effort normal and breath sounds normal.   Musculoskeletal: She exhibits no edema.   Neurological: She is alert and oriented to person, place, and time.   Skin: Skin is warm and dry.   Psychiatric: She has a normal mood and affect.   Vitals reviewed.      Assessment/Plan   Lashell was seen today for essential hypertension.    Diagnoses and all orders for this visit:    Hypothyroidism due to acquired atrophy of thyroid  -     Comprehensive Metabolic Panel  -     TSH  -     T4, Free    Essential hypertension  -     amLODIPine (NORVASC) 10 MG tablet; Take 1 tablet by mouth Daily. Hold for BP <110/60  -     CBC & Differential  -     Comprehensive Metabolic Panel  -     lisinopril-hydrochlorothiazide (PRINZIDE,ZESTORETIC) 20-25 MG per tablet; Take 1 tablet by mouth Daily.  -     metoprolol succinate XL (TOPROL-XL) 100 MG 24 hr tablet; Take 1 tablet by mouth Daily. for blood pressure  -     CBC Auto  Differential     Blood pressure not to goal.  Will increase the strength of lisinopril HCT to a dose of 20-25.  Continue all other medications as reconciled ordered.  Will check labs to monitor metabolic status thyroid level.  You have had vaccines.  Keep follow-up as scheduled with general surgery.  Recheck here in about 3-4 months will notify of results and make dosing adjustments if needed.  Stay safely active.  Maintain heart healthy diet.    Patient's Body mass index is 24.42 kg/m². BMI is within normal parameters. No follow-up required.

## 2018-12-03 NOTE — TELEPHONE ENCOUNTER
Tried to contact patient to get her scheduled for a mammogram and bone density as well as a follow up with Dr. Ward. Left a message at [phone]. I will send patient a letter also.

## 2018-12-05 DIAGNOSIS — E03.8 OTHER SPECIFIED HYPOTHYROIDISM: ICD-10-CM

## 2018-12-05 RX ORDER — LEVOTHYROXINE SODIUM 0.05 MG/1
50 TABLET ORAL DAILY
Qty: 30 TABLET | Refills: 6 | Status: SHIPPED | OUTPATIENT
Start: 2018-12-05 | End: 2019-06-25 | Stop reason: SDUPTHER

## 2018-12-05 NOTE — PROGRESS NOTES
Blood chemistries are stable.  Thyroid level still low.  New dose has been sent to pharmacy.  Please take 1 tablet each a.m. empty stomach like we discussed.  Keep follow-up as scheduled.  See us otherwise as needed.

## 2019-06-24 ENCOUNTER — OFFICE VISIT (OUTPATIENT)
Dept: FAMILY MEDICINE CLINIC | Facility: CLINIC | Age: 84
End: 2019-06-24

## 2019-06-24 VITALS
WEIGHT: 148.6 LBS | HEART RATE: 57 BPM | SYSTOLIC BLOOD PRESSURE: 122 MMHG | TEMPERATURE: 97.7 F | HEIGHT: 66 IN | OXYGEN SATURATION: 98 % | BODY MASS INDEX: 23.88 KG/M2 | DIASTOLIC BLOOD PRESSURE: 62 MMHG

## 2019-06-24 DIAGNOSIS — E03.4 HYPOTHYROIDISM DUE TO ACQUIRED ATROPHY OF THYROID: Primary | ICD-10-CM

## 2019-06-24 DIAGNOSIS — I10 ESSENTIAL HYPERTENSION: ICD-10-CM

## 2019-06-24 DIAGNOSIS — R00.2 PALPITATIONS: ICD-10-CM

## 2019-06-24 LAB
ALBUMIN SERPL-MCNC: 4.3 G/DL (ref 3.5–5.2)
ALBUMIN/GLOB SERPL: 1.3 G/DL
ALP SERPL-CCNC: 59 U/L (ref 39–117)
ALT SERPL W P-5'-P-CCNC: 12 U/L (ref 1–33)
ANION GAP SERPL CALCULATED.3IONS-SCNC: 11.7 MMOL/L
AST SERPL-CCNC: 16 U/L (ref 1–32)
BASOPHILS # BLD AUTO: 0.04 10*3/MM3 (ref 0–0.2)
BASOPHILS NFR BLD AUTO: 0.8 % (ref 0–1.5)
BILIRUB SERPL-MCNC: 0.5 MG/DL (ref 0.2–1.2)
BUN BLD-MCNC: 17 MG/DL (ref 8–23)
BUN/CREAT SERPL: 13.4 (ref 7–25)
CALCIUM SPEC-SCNC: 9.9 MG/DL (ref 8.6–10.5)
CHLORIDE SERPL-SCNC: 102 MMOL/L (ref 98–107)
CO2 SERPL-SCNC: 26.3 MMOL/L (ref 22–29)
CREAT BLD-MCNC: 1.27 MG/DL (ref 0.57–1)
DEPRECATED RDW RBC AUTO: 46.8 FL (ref 37–54)
EOSINOPHIL # BLD AUTO: 0.12 10*3/MM3 (ref 0–0.4)
EOSINOPHIL NFR BLD AUTO: 2.4 % (ref 0.3–6.2)
ERYTHROCYTE [DISTWIDTH] IN BLOOD BY AUTOMATED COUNT: 12.4 % (ref 12.3–15.4)
GFR SERPL CREATININE-BSD FRML MDRD: 40 ML/MIN/1.73
GLOBULIN UR ELPH-MCNC: 3.2 GM/DL
GLUCOSE BLD-MCNC: 116 MG/DL (ref 65–99)
HCT VFR BLD AUTO: 34.8 % (ref 34–46.6)
HGB BLD-MCNC: 11.4 G/DL (ref 12–15.9)
IMM GRANULOCYTES # BLD AUTO: 0.01 10*3/MM3 (ref 0–0.05)
IMM GRANULOCYTES NFR BLD AUTO: 0.2 % (ref 0–0.5)
LYMPHOCYTES # BLD AUTO: 1.54 10*3/MM3 (ref 0.7–3.1)
LYMPHOCYTES NFR BLD AUTO: 31.4 % (ref 19.6–45.3)
MCH RBC QN AUTO: 33.8 PG (ref 26.6–33)
MCHC RBC AUTO-ENTMCNC: 32.8 G/DL (ref 31.5–35.7)
MCV RBC AUTO: 103.3 FL (ref 79–97)
MONOCYTES # BLD AUTO: 0.53 10*3/MM3 (ref 0.1–0.9)
MONOCYTES NFR BLD AUTO: 10.8 % (ref 5–12)
NEUTROPHILS # BLD AUTO: 2.66 10*3/MM3 (ref 1.7–7)
NEUTROPHILS NFR BLD AUTO: 54.4 % (ref 42.7–76)
NRBC BLD AUTO-RTO: 0 /100 WBC (ref 0–0.2)
PLATELET # BLD AUTO: 178 10*3/MM3 (ref 140–450)
PMV BLD AUTO: 10.8 FL (ref 6–12)
POTASSIUM BLD-SCNC: 4.4 MMOL/L (ref 3.5–5.2)
PROT SERPL-MCNC: 7.5 G/DL (ref 6–8.5)
RBC # BLD AUTO: 3.37 10*6/MM3 (ref 3.77–5.28)
SODIUM BLD-SCNC: 140 MMOL/L (ref 136–145)
T4 FREE SERPL-MCNC: 0.67 NG/DL (ref 0.93–1.7)
TSH SERPL DL<=0.05 MIU/L-ACNC: 11.6 MIU/ML (ref 0.27–4.2)
WBC NRBC COR # BLD: 4.9 10*3/MM3 (ref 3.4–10.8)

## 2019-06-24 PROCEDURE — 99214 OFFICE O/P EST MOD 30 MIN: CPT | Performed by: FAMILY MEDICINE

## 2019-06-24 PROCEDURE — 36415 COLL VENOUS BLD VENIPUNCTURE: CPT | Performed by: FAMILY MEDICINE

## 2019-06-24 PROCEDURE — 80053 COMPREHEN METABOLIC PANEL: CPT | Performed by: FAMILY MEDICINE

## 2019-06-24 PROCEDURE — 85025 COMPLETE CBC W/AUTO DIFF WBC: CPT | Performed by: FAMILY MEDICINE

## 2019-06-24 PROCEDURE — 84443 ASSAY THYROID STIM HORMONE: CPT | Performed by: FAMILY MEDICINE

## 2019-06-24 PROCEDURE — 84439 ASSAY OF FREE THYROXINE: CPT | Performed by: FAMILY MEDICINE

## 2019-06-24 NOTE — PROGRESS NOTES
Subjective   Lashell Case is a 84 y.o. female.     History of Present Illness follow-up regarding hypertension hypothyroid state.  Generally no significant new problems although in the last couple of weeks has noted some occasional positional dizziness.  Has also noted occasional palpitations of unknown determine duration.  Has had no associated chest pain diaphoresis.  Has not had cardiology follow-up in quite a while.  Energy is good.  Sleep patterns good.  Denies GI  skin orthopedic concerns.  Lots of responsibility with great grandchild.  States is compliant with medications as reconciled.    The following portions of the patient's history were reviewed and updated as appropriate: allergies, current medications, past medical history, past social history, past surgical history and problem list.    Review of Systems  See history of Present Illness     Objective     Physical Exam   Constitutional: She is oriented to person, place, and time. She appears well-developed and well-nourished.   HENT:   Head: Normocephalic.   Mouth/Throat: Oropharynx is clear and moist.   Eyes: Conjunctivae are normal. Pupils are equal, round, and reactive to light.   Neck: Normal range of motion. Neck supple. No tracheal deviation present. No thyromegaly present.   Cardiovascular: Normal rate, regular rhythm, normal heart sounds and intact distal pulses.   No murmur heard.  Pulmonary/Chest: Effort normal and breath sounds normal.   Musculoskeletal: She exhibits no edema.   Neurological: She is alert and oriented to person, place, and time.   Skin: Skin is warm and dry.   Psychiatric: She has a normal mood and affect.   Vitals reviewed.      PHQ-9 Total Score:      Patient's There is no height or weight on file to calculate BMI. BMI is within normal parameters. No follow-up required..   (Normal BMI:  18.5-24.9, OW 25-29.9, Obesity 30 or greater)      Assessment/Plan     Lashell was seen today for hypothyroidism.    Diagnoses and  all orders for this visit:    Hypothyroidism due to acquired atrophy of thyroid  -     TSH  -     T4, Free    Essential hypertension  -     CBC & Differential  -     Comprehensive Metabolic Panel    Palpitations  -     CBC & Differential  -     Ambulatory Referral to Cardiology    We will continue medications as reconciled ordered.  Today we will check labs to monitor metabolic status.  Will make referral to cardiology in regard to the history of palpitations which may be increasing.  Dizziness is potentially multifactorial and further recommendations will be pending outcome of the lab and cardiology consultation.  Recheck here in a few months routinely.  Contact us sooner if problems of course.                     This document has been electronically signed by Alli Wolf MD   June 24, 2019 9:11 AM

## 2019-06-25 DIAGNOSIS — E03.8 OTHER SPECIFIED HYPOTHYROIDISM: ICD-10-CM

## 2019-06-25 RX ORDER — LEVOTHYROXINE SODIUM 88 UG/1
88 TABLET ORAL DAILY
Qty: 30 TABLET | Refills: 6 | Status: SHIPPED | OUTPATIENT
Start: 2019-06-25 | End: 2020-02-06 | Stop reason: SDUPTHER

## 2019-06-25 NOTE — PROGRESS NOTES
Thyroid level is still low.  Start taking the new dose sent to pharmacy.  Take each a.m. empty stomach.  Also start taking OTC vitamin B12 with multivitamin daily.  Keep follow-up as scheduled.  Encourage medication compliance.

## 2019-11-26 DIAGNOSIS — I10 ESSENTIAL HYPERTENSION: ICD-10-CM

## 2019-11-26 RX ORDER — METOPROLOL SUCCINATE 100 MG/1
TABLET, EXTENDED RELEASE ORAL
Qty: 30 TABLET | Refills: 6 | Status: SHIPPED | OUTPATIENT
Start: 2019-11-26 | End: 2020-02-04 | Stop reason: SDUPTHER

## 2020-02-04 ENCOUNTER — OFFICE VISIT (OUTPATIENT)
Dept: FAMILY MEDICINE CLINIC | Facility: CLINIC | Age: 85
End: 2020-02-04

## 2020-02-04 VITALS
WEIGHT: 148.9 LBS | HEIGHT: 66 IN | HEART RATE: 54 BPM | OXYGEN SATURATION: 94 % | TEMPERATURE: 97.3 F | SYSTOLIC BLOOD PRESSURE: 142 MMHG | DIASTOLIC BLOOD PRESSURE: 82 MMHG | BODY MASS INDEX: 23.93 KG/M2

## 2020-02-04 DIAGNOSIS — E03.4 HYPOTHYROIDISM DUE TO ACQUIRED ATROPHY OF THYROID: ICD-10-CM

## 2020-02-04 DIAGNOSIS — I10 ESSENTIAL HYPERTENSION: Primary | ICD-10-CM

## 2020-02-04 DIAGNOSIS — S60.022A CONTUSION OF LEFT INDEX FINGER WITHOUT DAMAGE TO NAIL, INITIAL ENCOUNTER: ICD-10-CM

## 2020-02-04 PROCEDURE — 84443 ASSAY THYROID STIM HORMONE: CPT | Performed by: FAMILY MEDICINE

## 2020-02-04 PROCEDURE — 84439 ASSAY OF FREE THYROXINE: CPT | Performed by: FAMILY MEDICINE

## 2020-02-04 PROCEDURE — 99214 OFFICE O/P EST MOD 30 MIN: CPT | Performed by: FAMILY MEDICINE

## 2020-02-04 PROCEDURE — 80048 BASIC METABOLIC PNL TOTAL CA: CPT | Performed by: FAMILY MEDICINE

## 2020-02-04 PROCEDURE — 85025 COMPLETE CBC W/AUTO DIFF WBC: CPT | Performed by: FAMILY MEDICINE

## 2020-02-04 RX ORDER — METOPROLOL SUCCINATE 100 MG/1
100 TABLET, EXTENDED RELEASE ORAL DAILY
Qty: 30 TABLET | Refills: 6 | Status: SHIPPED | OUTPATIENT
Start: 2020-02-04 | End: 2020-10-27 | Stop reason: SDUPTHER

## 2020-02-04 RX ORDER — LISINOPRIL AND HYDROCHLOROTHIAZIDE 25; 20 MG/1; MG/1
1 TABLET ORAL DAILY
Qty: 30 TABLET | Refills: 6 | Status: SHIPPED | OUTPATIENT
Start: 2020-02-04 | End: 2020-10-27 | Stop reason: SDUPTHER

## 2020-02-04 RX ORDER — AMLODIPINE BESYLATE 10 MG/1
10 TABLET ORAL DAILY
Qty: 30 TABLET | Refills: 6 | Status: SHIPPED | OUTPATIENT
Start: 2020-02-04 | End: 2020-10-27 | Stop reason: SDUPTHER

## 2020-02-04 NOTE — PROGRESS NOTES
Subjective   Lashell aCse is a 84 y.o. female.     History of Present Illness follow-up.  Generally no new problems.  Unfortunately did not tolerate a higher dose of Synthroid and discontinued the medicine.  Denies respiratory CDV  GI skin orthopedic concerns.  Fairly active.  Has not had follow-up with breast and does not plan to.  Hurtt left index finger on door frame.  2 days ago.    The following portions of the patient's history were reviewed and updated as appropriate: allergies, past family history, past medical history, past social history, past surgical history and problem list.    Review of Systems  See history of Present Illness     Objective     Physical Exam   Constitutional: She is oriented to person, place, and time. She appears well-developed and well-nourished.   HENT:   Head: Normocephalic.   Mouth/Throat: Oropharynx is clear and moist.   Eyes: Pupils are equal, round, and reactive to light. Conjunctivae are normal.   Neck: Normal range of motion. Neck supple. No tracheal deviation present. No thyromegaly present.   Cardiovascular: Normal rate, regular rhythm, normal heart sounds and intact distal pulses.   No murmur heard.  Pulmonary/Chest: Effort normal and breath sounds normal.   Musculoskeletal: She exhibits no edema.   Left index finger is a little bruised tender.  Range of motion good.   Neurological: She is alert and oriented to person, place, and time.   Skin: Skin is warm and dry.   Psychiatric: She has a normal mood and affect.   Vitals reviewed.      PHQ-9 Total Score:      Patient's There is no height or weight on file to calculate BMI. BMI is within normal parameters. No follow-up required..   (Normal BMI:  18.5-24.9, OW 25-29.9, Obesity 30 or greater)      Assessment/Plan     Lashell was seen today for hypothyroidism.    Diagnoses and all orders for this visit:    Essential hypertension  -     CBC & Differential  -     Basic Metabolic Panel  -     amLODIPine (NORVASC) 10 MG  tablet; Take 1 tablet by mouth Daily. Hold for BP <110/60  -     lisinopril-hydrochlorothiazide (PRINZIDE,ZESTORETIC) 20-25 MG per tablet; Take 1 tablet by mouth Daily.  -     metoprolol succinate XL (TOPROL-XL) 100 MG 24 hr tablet; Take 1 tablet by mouth Daily. for blood pressure  -     CBC Auto Differential    Hypothyroidism due to acquired atrophy of thyroid  -     Basic Metabolic Panel  -     T4, Free  -     TSH    Contusion of left index finger without damage to nail, initial encounter      Will check labs.  Notify of results.  Discussed getting x-ray of that finger you declined.  Will probably resume lower dose of Synthroid pending lab results.  Recheck in a few months routinely.  Stay safely active maintain reasonable heart healthy diet.  You seem to be doing quite well and are content with things.                   This document has been electronically signed by Alli Wolf MD   February 4, 2020 1:53 PM

## 2020-02-05 LAB
ANION GAP SERPL CALCULATED.3IONS-SCNC: 13.6 MMOL/L (ref 5–15)
BASOPHILS # BLD AUTO: 0.03 10*3/MM3 (ref 0–0.2)
BASOPHILS NFR BLD AUTO: 0.7 % (ref 0–1.5)
BUN BLD-MCNC: 17 MG/DL (ref 8–23)
BUN/CREAT SERPL: 18.3 (ref 7–25)
CALCIUM SPEC-SCNC: 10 MG/DL (ref 8.6–10.5)
CHLORIDE SERPL-SCNC: 103 MMOL/L (ref 98–107)
CO2 SERPL-SCNC: 23.4 MMOL/L (ref 22–29)
CREAT BLD-MCNC: 0.93 MG/DL (ref 0.57–1)
DEPRECATED RDW RBC AUTO: 44.3 FL (ref 37–54)
EOSINOPHIL # BLD AUTO: 0.06 10*3/MM3 (ref 0–0.4)
EOSINOPHIL NFR BLD AUTO: 1.3 % (ref 0.3–6.2)
ERYTHROCYTE [DISTWIDTH] IN BLOOD BY AUTOMATED COUNT: 12.2 % (ref 12.3–15.4)
GFR SERPL CREATININE-BSD FRML MDRD: 58 ML/MIN/1.73
GLUCOSE BLD-MCNC: 102 MG/DL (ref 65–99)
HCT VFR BLD AUTO: 33.8 % (ref 34–46.6)
HGB BLD-MCNC: 11.7 G/DL (ref 12–15.9)
IMM GRANULOCYTES # BLD AUTO: 0.01 10*3/MM3 (ref 0–0.05)
IMM GRANULOCYTES NFR BLD AUTO: 0.2 % (ref 0–0.5)
LYMPHOCYTES # BLD AUTO: 1.36 10*3/MM3 (ref 0.7–3.1)
LYMPHOCYTES NFR BLD AUTO: 30.5 % (ref 19.6–45.3)
MCH RBC QN AUTO: 34.1 PG (ref 26.6–33)
MCHC RBC AUTO-ENTMCNC: 34.6 G/DL (ref 31.5–35.7)
MCV RBC AUTO: 98.5 FL (ref 79–97)
MONOCYTES # BLD AUTO: 0.53 10*3/MM3 (ref 0.1–0.9)
MONOCYTES NFR BLD AUTO: 11.9 % (ref 5–12)
NEUTROPHILS # BLD AUTO: 2.47 10*3/MM3 (ref 1.7–7)
NEUTROPHILS NFR BLD AUTO: 55.4 % (ref 42.7–76)
NRBC BLD AUTO-RTO: 0 /100 WBC (ref 0–0.2)
PLATELET # BLD AUTO: 179 10*3/MM3 (ref 140–450)
PMV BLD AUTO: 10.5 FL (ref 6–12)
POTASSIUM BLD-SCNC: 4.1 MMOL/L (ref 3.5–5.2)
RBC # BLD AUTO: 3.43 10*6/MM3 (ref 3.77–5.28)
SODIUM BLD-SCNC: 140 MMOL/L (ref 136–145)
T4 FREE SERPL-MCNC: 0.51 NG/DL (ref 0.93–1.7)
TSH SERPL DL<=0.05 MIU/L-ACNC: 15.8 UIU/ML (ref 0.27–4.2)
WBC NRBC COR # BLD: 4.46 10*3/MM3 (ref 3.4–10.8)

## 2020-02-06 DIAGNOSIS — E03.8 OTHER SPECIFIED HYPOTHYROIDISM: ICD-10-CM

## 2020-02-06 RX ORDER — LEVOTHYROXINE SODIUM 0.07 MG/1
75 TABLET ORAL DAILY
Qty: 30 TABLET | Refills: 6 | Status: SHIPPED | OUTPATIENT
Start: 2020-02-06 | End: 2020-10-27 | Stop reason: SDUPTHER

## 2020-02-06 NOTE — PROGRESS NOTES
Lab testing is stable.  As expected thyroid level is low.  Will resume at lower dose.  Take each a.m.  Please take the medicine.  Continue all other medications same.

## 2020-08-14 ENCOUNTER — OFFICE VISIT (OUTPATIENT)
Dept: FAMILY MEDICINE CLINIC | Facility: CLINIC | Age: 85
End: 2020-08-14

## 2020-08-14 VITALS — BODY MASS INDEX: 24.66 KG/M2 | HEIGHT: 65 IN | WEIGHT: 148 LBS

## 2020-08-14 DIAGNOSIS — Z00.00 MEDICARE ANNUAL WELLNESS VISIT, SUBSEQUENT: Primary | ICD-10-CM

## 2020-08-14 PROCEDURE — G0439 PPPS, SUBSEQ VISIT: HCPCS | Performed by: NURSE PRACTITIONER

## 2020-08-14 NOTE — PROGRESS NOTES
The ABCs of the Annual Wellness Visit  Subsequent Medicare Wellness Visit    Chief Complaint   Patient presents with   • Medicare Wellness-subsequent       Subjective   History of Present Illness:  Lashell Case is a 85 y.o. female who presents for a Subsequent Medicare Wellness Visit.    HEALTH RISK ASSESSMENT    Recent Hospitalizations:  No hospitalization(s) within the last year.    Current Medical Providers:  Patient Care Team:  Alli Wolf MD as PCP - General (Family Medicine)    Smoking Status:  Social History     Tobacco Use   Smoking Status Never Smoker   Smokeless Tobacco Never Used       Alcohol Consumption:  Social History     Substance and Sexual Activity   Alcohol Use No       Depression Screen:   PHQ-2/PHQ-9 Depression Screening 8/14/2020   Little interest or pleasure in doing things 0   Feeling down, depressed, or hopeless 0   Trouble falling or staying asleep, or sleeping too much -   Feeling tired or having little energy -   Poor appetite or overeating -   Feeling bad about yourself - or that you are a failure or have let yourself or your family down -   Trouble concentrating on things, such as reading the newspaper or watching television -   Moving or speaking so slowly that other people could have noticed. Or the opposite - being so fidgety or restless that you have been moving around a lot more than usual -   Thoughts that you would be better off dead, or of hurting yourself in some way -   Total Score 0       Fall Risk Screen:  STEADI Fall Risk Assessment was completed, and patient is at LOW risk for falls.Assessment completed on:8/14/2020    Health Habits and Functional and Cognitive Screening:  Functional & Cognitive Status 8/14/2020   Do you have difficulty preparing food and eating? No   Do you have difficulty bathing yourself, getting dressed or grooming yourself? No   Do you have difficulty using the toilet? No   Do you have difficulty moving around from place to place? No    Do you have trouble with steps or getting out of a bed or a chair? No   Current Diet Well Balanced Diet   Dental Exam Up to date   Eye Exam Up to date   Exercise (times per week) 4 times per week   Current Exercise Activities Include Walking   Do you need help using the phone?  No   Are you deaf or do you have serious difficulty hearing?  No   Do you need help with transportation? No   Do you need help shopping? No   Do you need help preparing meals?  No   Do you need help with housework?  No   Do you need help with laundry? No   Do you need help taking your medications? No   Do you need help managing money? No   Do you ever drive or ride in a car without wearing a seat belt? No   Have you felt unusual stress, anger or loneliness in the last month? No   Who do you live with? Child   If you need help, do you have trouble finding someone available to you? No   Have you been bothered in the last four weeks by sexual problems? No   Do you have difficulty concentrating, remembering or making decisions? No         Does the patient have evidence of cognitive impairment? No    Asprin use counseling:Does not need ASA (and currently is not on it)    Age-appropriate Screening Schedule:  Refer to the list below for future screening recommendations based on patient's age, sex and/or medical conditions. Orders for these recommended tests are listed in the plan section. The patient has been provided with a written plan.    Health Maintenance   Topic Date Due   • ZOSTER VACCINE (1 of 2) 06/04/1987   • LIPID PANEL  01/03/2018   • DXA SCAN  01/14/2018   • INFLUENZA VACCINE  08/01/2020   • TDAP/TD VACCINES (3 - Td) 04/27/2024   • MAMMOGRAM  Discontinued          The following portions of the patient's history were reviewed and updated as appropriate: allergies, current medications, past family history, past medical history, past social history, past surgical history and problem list.    Outpatient Medications Prior to Visit    Medication Sig Dispense Refill   • amLODIPine (NORVASC) 10 MG tablet Take 1 tablet by mouth Daily. Hold for BP <110/60 30 tablet 6   • levothyroxine (SYNTHROID, LEVOTHROID) 75 MCG tablet Take 1 tablet by mouth Daily. 30 tablet 6   • lisinopril-hydrochlorothiazide (PRINZIDE,ZESTORETIC) 20-25 MG per tablet Take 1 tablet by mouth Daily. 30 tablet 6   • metoprolol succinate XL (TOPROL-XL) 100 MG 24 hr tablet Take 1 tablet by mouth Daily. for blood pressure 30 tablet 6     No facility-administered medications prior to visit.        Patient Active Problem List   Diagnosis   • Arthritis   • Dizziness   • Abnormal ambulatory electrocardiogram   • Essential hypertension   • Hypothyroidism due to acquired atrophy of thyroid   • Malignant neoplasm of left female breast (CMS/HCC)   • Mixed hyperlipidemia   • Aromatase inhibitor use   • Osteopenia   • Hepatic cyst   • Splenic cyst       Advanced Care Planning:  ACP discussion was held with the patient during this visit. Patient does not have an advance directive, declines further assistance.    Review of Systems   Constitutional: Negative for fatigue, fever and unexpected weight change.   HENT: Negative for ear pain, rhinorrhea and sore throat.    Eyes: Negative for visual disturbance.   Respiratory: Negative for cough, chest tightness and shortness of breath.    Cardiovascular: Negative for chest pain, palpitations and leg swelling.   Gastrointestinal: Negative for abdominal pain, blood in stool, constipation, diarrhea, nausea and vomiting.   Endocrine: Negative for cold intolerance and heat intolerance.   Genitourinary: Negative for dysuria and hematuria.   Musculoskeletal: Negative for arthralgias and myalgias.   Skin: Negative for color change.   Allergic/Immunologic: Negative for environmental allergies.   Neurological: Negative for dizziness, seizures, syncope and headaches.   Hematological: Negative for adenopathy.   Psychiatric/Behavioral: Negative for suicidal ideas.  "The patient is not nervous/anxious.        Compared to one year ago, the patient feels her physical health is the same.  Compared to one year ago, the patient feels her mental health is the same.    Reviewed chart for potential of high risk medication in the elderly: yes  Reviewed chart for potential of harmful drug interactions in the elderly:yes    Objective         Vitals:    08/14/20 1500   Weight: 67.1 kg (148 lb)   Height: 165.1 cm (65\")       Body mass index is 24.63 kg/m².  Discussed the patient's BMI with her. The BMI is in the acceptable range.    Physical Exam   Constitutional: She is oriented to person, place, and time.   Neurological: She is alert and oriented to person, place, and time.   Psychiatric: She has a normal mood and affect. Her behavior is normal. Judgment and thought content normal.             Assessment/Plan   Medicare Risks and Personalized Health Plan  CMS Preventative Services Quick Reference  Advance Directive Discussion  Breast Cancer/Mammogram Screening  Colon Cancer Screening  Fall Risk  Immunizations Discussed/Encouraged (specific immunizations; Hepatitis A Vaccine/Series and Pneumococcal 23 )    The above risks/problems have been discussed with the patient.  Pertinent information has been shared with the patient in the After Visit Summary.  Follow up plans and orders are seen below in the Assessment/Plan Section.    There are no diagnoses linked to this encounter.  Follow Up:  No follow-ups on file.     An After Visit Summary and PPPS were given to the patient.       You have chosen to receive care through a telephone visit. Do you consent to use a telephone visit for your medical care today? Yes    Plan: She just had a mastectomy. She no longer gets colonoscopies. She denies falls at home. [id number] and 2nd hepatitis a vaccines recommended. Keep scheduled follow up.  "

## 2020-09-15 ENCOUNTER — HOSPITAL ENCOUNTER (EMERGENCY)
Facility: HOSPITAL | Age: 85
Discharge: HOME | End: 2020-09-15
Attending: EMERGENCY MEDICINE | Admitting: EMERGENCY MEDICINE

## 2020-09-15 ENCOUNTER — APPOINTMENT (OUTPATIENT)
Dept: GENERAL RADIOLOGY | Facility: HOSPITAL | Age: 85
End: 2020-09-15

## 2020-09-15 VITALS
OXYGEN SATURATION: 99 % | HEIGHT: 66 IN | HEART RATE: 81 BPM | RESPIRATION RATE: 19 BRPM | DIASTOLIC BLOOD PRESSURE: 88 MMHG | WEIGHT: 146 LBS | SYSTOLIC BLOOD PRESSURE: 171 MMHG | BODY MASS INDEX: 23.46 KG/M2 | TEMPERATURE: 98 F

## 2020-09-15 DIAGNOSIS — M25.562 ACUTE PAIN OF LEFT KNEE: Primary | ICD-10-CM

## 2020-09-15 DIAGNOSIS — M25.462 EFFUSION OF LEFT KNEE: ICD-10-CM

## 2020-09-15 LAB
ALBUMIN SERPL-MCNC: 4.23 G/DL (ref 3.5–5.2)
ALBUMIN/GLOB SERPL: 1.3 G/DL
ALP SERPL-CCNC: 72 U/L (ref 39–117)
ALT SERPL W P-5'-P-CCNC: 6 U/L (ref 1–33)
ANION GAP SERPL CALCULATED.3IONS-SCNC: 12.5 MMOL/L (ref 5–15)
APPEARANCE FLD: ABNORMAL
AST SERPL-CCNC: 13 U/L (ref 1–32)
BASOPHILS # BLD AUTO: 0.03 10*3/MM3 (ref 0–0.2)
BASOPHILS NFR BLD AUTO: 0.4 % (ref 0–1.5)
BILIRUB SERPL-MCNC: 1 MG/DL (ref 0–1.2)
BUN SERPL-MCNC: 21 MG/DL (ref 8–23)
BUN/CREAT SERPL: 22.6 (ref 7–25)
CALCIUM SPEC-SCNC: 10.1 MG/DL (ref 8.6–10.5)
CHLORIDE SERPL-SCNC: 96 MMOL/L (ref 98–107)
CO2 SERPL-SCNC: 24.5 MMOL/L (ref 22–29)
COLOR FLD: YELLOW
CREAT SERPL-MCNC: 0.93 MG/DL (ref 0.57–1)
CRP SERPL-MCNC: 15.75 MG/DL (ref 0–0.5)
DEPRECATED RDW RBC AUTO: 44.9 FL (ref 37–54)
EOSINOPHIL # BLD AUTO: 0 10*3/MM3 (ref 0–0.4)
EOSINOPHIL NFR BLD AUTO: 0 % (ref 0.3–6.2)
ERYTHROCYTE [DISTWIDTH] IN BLOOD BY AUTOMATED COUNT: 12.2 % (ref 12.3–15.4)
ERYTHROCYTE [SEDIMENTATION RATE] IN BLOOD: 85 MM/HR (ref 0–30)
GFR SERPL CREATININE-BSD FRML MDRD: 58 ML/MIN/1.73
GLOBULIN UR ELPH-MCNC: 3.2 GM/DL
GLUCOSE SERPL-MCNC: 116 MG/DL (ref 65–99)
HCT VFR BLD AUTO: 33.5 % (ref 34–46.6)
HGB BLD-MCNC: 10.9 G/DL (ref 12–15.9)
HOLD SPECIMEN: NORMAL
HOLD SPECIMEN: NORMAL
IMM GRANULOCYTES # BLD AUTO: 0.02 10*3/MM3 (ref 0–0.05)
IMM GRANULOCYTES NFR BLD AUTO: 0.3 % (ref 0–0.5)
LYMPHOCYTES # BLD AUTO: 0.9 10*3/MM3 (ref 0.7–3.1)
LYMPHOCYTES NFR BLD AUTO: 11.8 % (ref 19.6–45.3)
LYMPHOCYTES NFR FLD MANUAL: 12 %
MAGNESIUM SERPL-MCNC: 2 MG/DL (ref 1.6–2.4)
MCH RBC QN AUTO: 32.6 PG (ref 26.6–33)
MCHC RBC AUTO-ENTMCNC: 32.5 G/DL (ref 31.5–35.7)
MCV RBC AUTO: 100.3 FL (ref 79–97)
MONOCYTES # BLD AUTO: 1.28 10*3/MM3 (ref 0.1–0.9)
MONOCYTES NFR BLD AUTO: 16.7 % (ref 5–12)
MONOS+MACROS NFR FLD: 26 %
NEUTROPHILS NFR BLD AUTO: 5.42 10*3/MM3 (ref 1.7–7)
NEUTROPHILS NFR BLD AUTO: 70.8 % (ref 42.7–76)
NEUTROPHILS NFR FLD MANUAL: 62 %
NRBC BLD AUTO-RTO: 0 /100 WBC (ref 0–0.2)
NUC CELL # FLD: ABNORMAL /MM3
PLATELET # BLD AUTO: 178 10*3/MM3 (ref 140–450)
PMV BLD AUTO: 9.8 FL (ref 6–12)
POTASSIUM SERPL-SCNC: 4.1 MMOL/L (ref 3.5–5.2)
PROT SERPL-MCNC: 7.4 G/DL (ref 6–8.5)
RBC # BLD AUTO: 3.34 10*6/MM3 (ref 3.77–5.28)
SODIUM SERPL-SCNC: 133 MMOL/L (ref 136–145)
WBC # BLD AUTO: 7.65 10*3/MM3 (ref 3.4–10.8)
WHOLE BLOOD HOLD SPECIMEN: NORMAL
WHOLE BLOOD HOLD SPECIMEN: NORMAL

## 2020-09-15 PROCEDURE — 73562 X-RAY EXAM OF KNEE 3: CPT | Performed by: RADIOLOGY

## 2020-09-15 PROCEDURE — 87075 CULTR BACTERIA EXCEPT BLOOD: CPT | Performed by: EMERGENCY MEDICINE

## 2020-09-15 PROCEDURE — 85652 RBC SED RATE AUTOMATED: CPT | Performed by: PHYSICIAN ASSISTANT

## 2020-09-15 PROCEDURE — 83735 ASSAY OF MAGNESIUM: CPT | Performed by: PHYSICIAN ASSISTANT

## 2020-09-15 PROCEDURE — 87070 CULTURE OTHR SPECIMN AEROBIC: CPT | Performed by: EMERGENCY MEDICINE

## 2020-09-15 PROCEDURE — 85025 COMPLETE CBC W/AUTO DIFF WBC: CPT | Performed by: PHYSICIAN ASSISTANT

## 2020-09-15 PROCEDURE — 89051 BODY FLUID CELL COUNT: CPT | Performed by: EMERGENCY MEDICINE

## 2020-09-15 PROCEDURE — 82945 GLUCOSE OTHER FLUID: CPT | Performed by: EMERGENCY MEDICINE

## 2020-09-15 PROCEDURE — 80053 COMPREHEN METABOLIC PANEL: CPT | Performed by: PHYSICIAN ASSISTANT

## 2020-09-15 PROCEDURE — 73502 X-RAY EXAM HIP UNI 2-3 VIEWS: CPT | Performed by: RADIOLOGY

## 2020-09-15 PROCEDURE — 86140 C-REACTIVE PROTEIN: CPT | Performed by: PHYSICIAN ASSISTANT

## 2020-09-15 PROCEDURE — 87205 SMEAR GRAM STAIN: CPT | Performed by: EMERGENCY MEDICINE

## 2020-09-15 PROCEDURE — 99285 EMERGENCY DEPT VISIT HI MDM: CPT

## 2020-09-15 PROCEDURE — 73502 X-RAY EXAM HIP UNI 2-3 VIEWS: CPT

## 2020-09-15 PROCEDURE — 73562 X-RAY EXAM OF KNEE 3: CPT

## 2020-09-15 PROCEDURE — 84157 ASSAY OF PROTEIN OTHER: CPT | Performed by: EMERGENCY MEDICINE

## 2020-09-15 RX ORDER — SODIUM CHLORIDE 0.9 % (FLUSH) 0.9 %
10 SYRINGE (ML) INJECTION AS NEEDED
Status: DISCONTINUED | OUTPATIENT
Start: 2020-09-15 | End: 2020-09-15 | Stop reason: HOSPADM

## 2020-09-15 RX ORDER — LIDOCAINE HYDROCHLORIDE 10 MG/ML
10 INJECTION, SOLUTION EPIDURAL; INFILTRATION; INTRACAUDAL; PERINEURAL ONCE
Status: COMPLETED | OUTPATIENT
Start: 2020-09-15 | End: 2020-09-15

## 2020-09-15 RX ORDER — METOPROLOL SUCCINATE 25 MG/1
100 TABLET, EXTENDED RELEASE ORAL ONCE
Status: COMPLETED | OUTPATIENT
Start: 2020-09-15 | End: 2020-09-15

## 2020-09-15 RX ADMIN — METOPROLOL SUCCINATE 100 MG: 25 TABLET, EXTENDED RELEASE ORAL at 15:05

## 2020-09-15 RX ADMIN — LIDOCAINE HYDROCHLORIDE 10 ML: 10 INJECTION, SOLUTION EPIDURAL; INFILTRATION; INTRACAUDAL; PERINEURAL at 14:23

## 2020-09-15 NOTE — ED PROVIDER NOTES
Subjective   85-year-old female who presents to the ED today for left knee pain.  She states this all started about 4 days ago.  She states she stepped wrong off of a step and twisted and began to have pain in her left hip.  She states the pain in her hip lasted for about a day and then she began to have pain in her left knee.  She states the pain in her left knee has become very severe and continues to get worse.  She states her knee is swollen.  She states last night she felt a knot behind her knee and it felt like she had a charley horse.  She states she is unable to bear weight on her left leg due to the pain.  She denies any past injury or surgeries to this knee.  She states she does have a history of arthritis.  She states she did take half a Tylenol this morning with no relief.  She denies any fever.  She denies any numbness or tingling in her extremities.  She denies any back pain.  She denies any difficulty urinating.      History provided by:  Patient  Leg Pain  Location:  Knee and hip  Time since incident:  4 days  Injury: no    Hip location:  L hip  Knee location:  L knee  Pain details:     Quality:  Throbbing and cramping    Radiates to:  Does not radiate    Severity:  Severe    Onset quality:  Gradual    Timing:  Constant    Progression:  Worsening  Chronicity:  New  Dislocation: no    Prior injury to area:  No  Relieved by:  Nothing  Worsened by:  Activity and bearing weight  Ineffective treatments:  Acetaminophen  Associated symptoms: decreased ROM and swelling    Associated symptoms: no back pain, no fever, no numbness and no tingling        Review of Systems   Constitutional: Negative.  Negative for fever.   HENT: Negative.    Eyes: Negative.    Respiratory: Negative.    Cardiovascular: Negative.    Gastrointestinal: Negative.    Genitourinary: Negative.    Musculoskeletal: Positive for arthralgias and joint swelling. Negative for back pain.   Skin: Negative.    Neurological: Negative.     Psychiatric/Behavioral: Negative.    All other systems reviewed and are negative.      Past Medical History:   Diagnosis Date   • Arthritis    • Breast cancer (CMS/HCC) 2015    lt br ca   • Breast cancer (CMS/HCC) 2006    rt br ca   • Hypertension    • Hypothyroidism    • Scabies exposure        Allergies   Allergen Reactions   • Bactrim [Sulfamethoxazole-Trimethoprim] GI Intolerance   • Codeine    • Penicillins    • Phenergan [Promethazine Hcl] Other (See Comments)     Restless legs and trouble swallowing       Past Surgical History:   Procedure Laterality Date   • BREAST BIOPSY     • BREAST LUMPECTOMY Right 2006   • BREAST SURGERY     • MASTECTOMY Left 2015    ca       Family History   Problem Relation Age of Onset   • Hypertension Mother    • Uterine cancer Mother    • Diabetes Mother    • Diabetes Daughter    • Prostate cancer Son    • Diabetes Son    • Throat cancer Son    • Hypertension Child        Social History     Socioeconomic History   • Marital status:      Spouse name: Not on file   • Number of children: Not on file   • Years of education: Not on file   • Highest education level: Not on file   Tobacco Use   • Smoking status: Never Smoker   • Smokeless tobacco: Never Used   Substance and Sexual Activity   • Alcohol use: No   • Drug use: No   • Sexual activity: Defer           Objective   Physical Exam  Vitals signs and nursing note reviewed.   Constitutional:       General: She is not in acute distress.     Appearance: Normal appearance. She is normal weight.   HENT:      Head: Normocephalic.      Right Ear: External ear normal.      Left Ear: External ear normal.   Eyes:      Extraocular Movements: Extraocular movements intact.      Conjunctiva/sclera: Conjunctivae normal.      Pupils: Pupils are equal, round, and reactive to light.   Neck:      Musculoskeletal: Normal range of motion and neck supple.   Cardiovascular:      Rate and Rhythm: Normal rate and regular rhythm.      Pulses: Normal  pulses.      Heart sounds: Normal heart sounds.   Pulmonary:      Effort: Pulmonary effort is normal.      Breath sounds: Normal breath sounds.   Abdominal:      General: Bowel sounds are normal.      Palpations: Abdomen is soft.   Musculoskeletal:         General: Swelling (left knee) and tenderness (to superior aspect of left knee) present.      Comments: Left knee is swollen, effusion noted.  No open wounds noted.  No redness or heat noted.  Patient has pain with any movement.  Distal pulses intact, sensation intact.  No tenderness to either hip bilaterally, no lumbar spine tenderness.   Skin:     General: Skin is warm and dry.      Capillary Refill: Capillary refill takes less than 2 seconds.      Findings: No bruising, erythema or rash.   Neurological:      General: No focal deficit present.      Mental Status: She is alert and oriented to person, place, and time.   Psychiatric:         Mood and Affect: Mood normal.         Behavior: Behavior normal.         Procedures           ED Course  ED Course as of Sep 19 0900   Tue Sep 15, 2020   1244 Patient declines any medication for pain at this time.    [AH]   1246 Patient has not had her BP med this morning, typically takes Toprol  mg, this has been ordered.    [AH]   1355 Case discussed with Dr. Townsend    [AH]   1416 Dr. Townsend has seen and evaluated this patient.  He will perform an arthrocentesis of the left knee.  This was discussed with the patient and she is agreeable to the plan.    [AH]   1417 FINDINGS: One view of the pelvis and 2 views of the left hip show no  fracture or dislocation. There are no blastic or lytic lesions.     IMPRESSION:  No acute bony abnormality.    XR Hip With or Without Pelvis 2 - 3 View Left [AH]   1417 FINDINGS: 3 views of the left knee show patellofemoral narrowing     Joint space narrowing medially     Small spurs are present     No blastic or lytic lesions.     IMPRESSION:  Patellofemoral narrowing.  No acute bony  abnormality    XR Knee 3 View Left []   1546 Endorsed to Dr. Townsend    []   1838 I assumed this patient's care at the end of Lynne shift.  Please see her chart for further details.  I performed the arthrocentesis.  Gram stain is negative.  Cell count is 14,830.  Protein and glucose still pending.  Patient is anxious to go home.  She is discharged home, will follow-up closely with her PCP, will return to the emergency department right away if her symptoms worsen or if she has any problems.    [CM]      ED Course User Index  [] Lynne Pride, PA  [CM] Jaguar Townsend MD                                           MDM  Number of Diagnoses or Management Options  Acute pain of left knee:   Effusion of left knee:      Amount and/or Complexity of Data Reviewed  Clinical lab tests: reviewed  Tests in the radiology section of CPT®: reviewed  Discuss the patient with other providers: yes    Patient Progress  Patient progress: stable      Final diagnoses:   Acute pain of left knee   Effusion of left knee            Lynne Pride PA  09/15/20 1548       Lynne Pride PA  09/16/20 0712       Lynne Pride PA  09/19/20 0900

## 2020-09-15 NOTE — ED PROVIDER NOTES
Subjective   History of Present Illness    Review of Systems    Past Medical History:   Diagnosis Date   • Arthritis    • Breast cancer (CMS/HCC) 2015    lt br ca   • Breast cancer (CMS/HCC) 2006    rt br ca   • Hypertension    • Hypothyroidism    • Scabies exposure        Allergies   Allergen Reactions   • Bactrim [Sulfamethoxazole-Trimethoprim] GI Intolerance   • Codeine    • Penicillins    • Phenergan [Promethazine Hcl] Other (See Comments)     Restless legs and trouble swallowing       Past Surgical History:   Procedure Laterality Date   • BREAST BIOPSY     • BREAST LUMPECTOMY Right 2006   • BREAST SURGERY     • MASTECTOMY Left 2015    ca       Family History   Problem Relation Age of Onset   • Hypertension Mother    • Uterine cancer Mother    • Diabetes Mother    • Diabetes Daughter    • Prostate cancer Son    • Diabetes Son    • Throat cancer Son    • Hypertension Child        Social History     Socioeconomic History   • Marital status:      Spouse name: Not on file   • Number of children: Not on file   • Years of education: Not on file   • Highest education level: Not on file   Tobacco Use   • Smoking status: Never Smoker   • Smokeless tobacco: Never Used   Substance and Sexual Activity   • Alcohol use: No   • Drug use: No   • Sexual activity: Defer           Objective   Physical Exam    Joint Aspiration/Injection    Date/Time: 9/15/2020 3:50 PM  Performed by: Jaguar Townsend MD  Authorized by: Jaguar Townsend MD     Consent:     Consent obtained:  Verbal and written    Consent given by:  Patient  Location:     Location:  Knee    Knee:  L knee  Anesthesia (see MAR for exact dosages):     Anesthesia method:  Local infiltration    Local anesthetic:  Lidocaine 1% w/o epi  Procedure details:     Preparation: Patient was prepped and draped in usual sterile fashion      Needle gauge:  18 G    Approach:  Medial    Aspirate amount:  30 ml    Aspirate characteristics:  Yellow and cloudy    Steroid  injected: no      Specimen collected: yes    Post-procedure details:     Dressing:  Adhesive bandage    Patient tolerance of procedure:  Tolerated well, no immediate complications      XR Knee 3 View Left   Final Result   Patellofemoral narrowing.   No acute bony abnormality        This report was finalized on 9/15/2020 1:45 PM by Dr. Erich Jenkins MD.          XR Hip With or Without Pelvis 2 - 3 View Left   Final Result   No acute bony abnormality.        This report was finalized on 9/15/2020 1:47 PM by Dr. Erich Jenkins MD.            Results for orders placed or performed during the hospital encounter of 09/15/20   Body Fluid Culture - Body Fluid, Knee, Left    Specimen: Knee, Left; Body Fluid   Result Value Ref Range    Body Fluid Culture No growth at 2 days     Gram Stain No organisms seen    Anaerobic Culture - Body Fluid, Knee, Left    Specimen: Knee, Left; Body Fluid   Result Value Ref Range    Anaerobic Culture No anaerobes isolated at 3 days    Comprehensive Metabolic Panel    Specimen: Blood   Result Value Ref Range    Glucose 116 (H) 65 - 99 mg/dL    BUN 21 8 - 23 mg/dL    Creatinine 0.93 0.57 - 1.00 mg/dL    Sodium 133 (L) 136 - 145 mmol/L    Potassium 4.1 3.5 - 5.2 mmol/L    Chloride 96 (L) 98 - 107 mmol/L    CO2 24.5 22.0 - 29.0 mmol/L    Calcium 10.1 8.6 - 10.5 mg/dL    Total Protein 7.4 6.0 - 8.5 g/dL    Albumin 4.23 3.50 - 5.20 g/dL    ALT (SGPT) 6 1 - 33 U/L    AST (SGOT) 13 1 - 32 U/L    Alkaline Phosphatase 72 39 - 117 U/L    Total Bilirubin 1.0 0.0 - 1.2 mg/dL    eGFR Non African Amer 58 (L) >60 mL/min/1.73    Globulin 3.2 gm/dL    A/G Ratio 1.3 g/dL    BUN/Creatinine Ratio 22.6 7.0 - 25.0    Anion Gap 12.5 5.0 - 15.0 mmol/L   Sedimentation Rate    Specimen: Blood   Result Value Ref Range    Sed Rate 85 (H) 0 - 30 mm/hr   C-reactive Protein    Specimen: Blood   Result Value Ref Range    C-Reactive Protein 15.75 (H) 0.00 - 0.50 mg/dL   Magnesium    Specimen: Blood   Result Value Ref Range     Magnesium 2.0 1.6 - 2.4 mg/dL   CBC Auto Differential    Specimen: Blood   Result Value Ref Range    WBC 7.65 3.40 - 10.80 10*3/mm3    RBC 3.34 (L) 3.77 - 5.28 10*6/mm3    Hemoglobin 10.9 (L) 12.0 - 15.9 g/dL    Hematocrit 33.5 (L) 34.0 - 46.6 %    .3 (H) 79.0 - 97.0 fL    MCH 32.6 26.6 - 33.0 pg    MCHC 32.5 31.5 - 35.7 g/dL    RDW 12.2 (L) 12.3 - 15.4 %    RDW-SD 44.9 37.0 - 54.0 fl    MPV 9.8 6.0 - 12.0 fL    Platelets 178 140 - 450 10*3/mm3    Neutrophil % 70.8 42.7 - 76.0 %    Lymphocyte % 11.8 (L) 19.6 - 45.3 %    Monocyte % 16.7 (H) 5.0 - 12.0 %    Eosinophil % 0.0 (L) 0.3 - 6.2 %    Basophil % 0.4 0.0 - 1.5 %    Immature Grans % 0.3 0.0 - 0.5 %    Neutrophils, Absolute 5.42 1.70 - 7.00 10*3/mm3    Lymphocytes, Absolute 0.90 0.70 - 3.10 10*3/mm3    Monocytes, Absolute 1.28 (H) 0.10 - 0.90 10*3/mm3    Eosinophils, Absolute 0.00 0.00 - 0.40 10*3/mm3    Basophils, Absolute 0.03 0.00 - 0.20 10*3/mm3    Immature Grans, Absolute 0.02 0.00 - 0.05 10*3/mm3    nRBC 0.0 0.0 - 0.2 /100 WBC   Glucose, Body Fluid - Synovial Fluid, Knee, Left    Specimen: Knee, Left; Synovial Fluid   Result Value Ref Range    Glucose, Fluid 75 mg/dL   Protein, Body Fluid - Synovial Fluid, Knee, Left    Specimen: Knee, Left; Synovial Fluid   Result Value Ref Range    Protein, Total, Fluid 4.6 g/dL   Body fluid cell count - Synovial Fluid, Knee, Left    Specimen: Knee, Left; Synovial Fluid   Result Value Ref Range    Color, Fluid Yellow     Appearance, Fluid Cloudy (A) Clear    Nucleated Cells, Fluid 14,830 /mm3   Body fluid differential - Synovial Fluid, Knee, Left    Specimen: Knee, Left; Synovial Fluid   Result Value Ref Range    Neutrophils, Fluid 62 %    Lymphocytes, Fluid 12 %    Mononuclear, Fluid 26 %   Light Blue Top   Result Value Ref Range    Extra Tube hold for add-on    Green Top (Gel)   Result Value Ref Range    Extra Tube Hold for add-ons.    Lavender Top   Result Value Ref Range    Extra Tube hold for add-on    Gold  Top - SST   Result Value Ref Range    Extra Tube Hold for add-ons.                   ED Course  ED Course as of Sep 18 1202   Tue Sep 15, 2020   1244 Patient declines any medication for pain at this time.    [AH]   1246 Patient has not had her BP med this morning, typically takes Toprol  mg, this has been ordered.    [AH]   1355 Case discussed with Dr. Twonsend    [AH]   1416 Dr. Townsend has seen and evaluated this patient.  He will perform an arthrocentesis of the left knee.  This was discussed with the patient and she is agreeable to the plan.    [AH]   1417 FINDINGS: One view of the pelvis and 2 views of the left hip show no  fracture or dislocation. There are no blastic or lytic lesions.     IMPRESSION:  No acute bony abnormality.    XR Hip With or Without Pelvis 2 - 3 View Left [AH]   1417 FINDINGS: 3 views of the left knee show patellofemoral narrowing     Joint space narrowing medially     Small spurs are present     No blastic or lytic lesions.     IMPRESSION:  Patellofemoral narrowing.  No acute bony abnormality    XR Knee 3 View Left [AH]   1546 Endorsed to Dr. Townsend    []   1838 I assumed this patient's care at the end of Lynne shift.  Please see her chart for further details.  I performed the arthrocentesis.  Gram stain is negative.  Cell count is 14,830.  Protein and glucose still pending.  Patient is anxious to go home.  She is discharged home, will follow-up closely with her PCP, will return to the emergency department right away if her symptoms worsen or if she has any problems.    [CM]      ED Course User Index  [AH] Lynne Pride, PA  [CM] Jaguar Townsend MD                                           [facility]    Final diagnoses:   Acute pain of left knee   Effusion of left knee             Please note that portions of this note were completed with a voice recognition program.        Jaguar Townsend MD  09/18/20 1202

## 2020-09-15 NOTE — DISCHARGE INSTRUCTIONS
Rest, ice, elevate the leg.  Wear the Ace wrap.  Tylenol as directed as needed for pain.  See Dr. Wolf in the office in the next couple days for recheck.  Return to the emergency department right away symptoms worsen/any problems.

## 2020-09-16 ENCOUNTER — EPISODE CHANGES (OUTPATIENT)
Dept: CASE MANAGEMENT | Facility: OTHER | Age: 85
End: 2020-09-16

## 2020-09-16 LAB
GLUCOSE FLD-MCNC: 75 MG/DL
PROT FLD-MCNC: 4.6 G/DL

## 2020-09-18 ENCOUNTER — EPISODE CHANGES (OUTPATIENT)
Dept: CASE MANAGEMENT | Facility: OTHER | Age: 85
End: 2020-09-18

## 2020-09-18 ENCOUNTER — PATIENT OUTREACH (OUTPATIENT)
Dept: CASE MANAGEMENT | Facility: OTHER | Age: 85
End: 2020-09-18

## 2020-09-18 NOTE — OUTREACH NOTE
Patient Outreach Note    RN-ACM outreach to patient.  Patient had ED visit at [facility] 09/16/20.  Patient presented with c/o knee and hip pain.  Patient was treated and discharged to home to follow with PCP.  AVS and education reviewed.  Patient voice understanding and compliance.  Patient stated knee and hip were feeling considerably better after ED intervention.  No discharge medication changes noted.  Patient has f/u appointment scheduled with OLGA Wolf on 09/22/20.  Other needs/questions/concerns denied.  Patient not interested in ongoing HRCM, MyChart or ACP.  AWV is up to date.      Lakia Rivera RN  Ambulatory     9/18/2020, 14:06 EDT

## 2020-09-18 NOTE — OUTREACH NOTE
Care Coordination Assessment    Documented/Reviewed By: Lakia Rivera RN Date/time: 9/18/2020  2:04 PM   Assessment completed with: patient  Enrolled in care management program: No  Living arrangement: children  Support system: family  Type of residence: private residence        Care Plan Note      Responses   Annual Wellness Visit:   Patient Has Completed   Care Gaps Addressed  Colon Cancer Screening, Mammogram, Flu Shot   Colon Cancer Screening Type  Exempt   Flu Shot Status  Patient will Complete   Mammogram Status  Exempt   Does patient have depression diagnosis?  No   Advanced Directives:  Not Interested At This Time   Ed Visits past 12 months:  1   Hospitalizations past 12 months  None

## 2020-09-20 LAB
BACTERIA FLD CULT: NORMAL
BACTERIA SPEC ANAEROBE CULT: NORMAL
GRAM STN SPEC: NORMAL

## 2020-10-27 ENCOUNTER — OFFICE VISIT (OUTPATIENT)
Dept: FAMILY MEDICINE CLINIC | Facility: CLINIC | Age: 85
End: 2020-10-27

## 2020-10-27 VITALS
HEART RATE: 86 BPM | OXYGEN SATURATION: 99 % | TEMPERATURE: 97.3 F | DIASTOLIC BLOOD PRESSURE: 80 MMHG | HEIGHT: 66 IN | WEIGHT: 144 LBS | SYSTOLIC BLOOD PRESSURE: 144 MMHG | BODY MASS INDEX: 23.14 KG/M2 | RESPIRATION RATE: 20 BRPM

## 2020-10-27 DIAGNOSIS — N63.10 MASS OF BREAST, RIGHT: ICD-10-CM

## 2020-10-27 DIAGNOSIS — I10 ESSENTIAL HYPERTENSION: Primary | ICD-10-CM

## 2020-10-27 DIAGNOSIS — E03.4 HYPOTHYROIDISM DUE TO ACQUIRED ATROPHY OF THYROID: ICD-10-CM

## 2020-10-27 PROCEDURE — 84439 ASSAY OF FREE THYROXINE: CPT | Performed by: FAMILY MEDICINE

## 2020-10-27 PROCEDURE — 90694 VACC AIIV4 NO PRSRV 0.5ML IM: CPT | Performed by: FAMILY MEDICINE

## 2020-10-27 PROCEDURE — 85025 COMPLETE CBC W/AUTO DIFF WBC: CPT | Performed by: FAMILY MEDICINE

## 2020-10-27 PROCEDURE — 84443 ASSAY THYROID STIM HORMONE: CPT | Performed by: FAMILY MEDICINE

## 2020-10-27 PROCEDURE — 99214 OFFICE O/P EST MOD 30 MIN: CPT | Performed by: FAMILY MEDICINE

## 2020-10-27 PROCEDURE — G0008 ADMIN INFLUENZA VIRUS VAC: HCPCS | Performed by: FAMILY MEDICINE

## 2020-10-27 PROCEDURE — 80053 COMPREHEN METABOLIC PANEL: CPT | Performed by: FAMILY MEDICINE

## 2020-10-27 RX ORDER — LISINOPRIL AND HYDROCHLOROTHIAZIDE 25; 20 MG/1; MG/1
1 TABLET ORAL DAILY
Qty: 30 TABLET | Refills: 6 | Status: SHIPPED | OUTPATIENT
Start: 2020-10-27 | End: 2021-11-05 | Stop reason: HOSPADM

## 2020-10-27 RX ORDER — LEVOTHYROXINE SODIUM 0.07 MG/1
75 TABLET ORAL DAILY
Qty: 30 TABLET | Refills: 6 | Status: SHIPPED | OUTPATIENT
Start: 2020-10-27 | End: 2021-07-20

## 2020-10-27 RX ORDER — AMLODIPINE BESYLATE 10 MG/1
10 TABLET ORAL DAILY
Qty: 30 TABLET | Refills: 6 | Status: SHIPPED | OUTPATIENT
Start: 2020-10-27 | End: 2021-11-05 | Stop reason: HOSPADM

## 2020-10-27 RX ORDER — METOPROLOL SUCCINATE 100 MG/1
100 TABLET, EXTENDED RELEASE ORAL DAILY
Qty: 30 TABLET | Refills: 6 | Status: SHIPPED | OUTPATIENT
Start: 2020-10-27 | End: 2021-07-29 | Stop reason: SDUPTHER

## 2020-10-27 NOTE — PROGRESS NOTES
Subjective   Lashell Case is a 85 y.o. female.     History of Present Illness follow-up on hypertension hypothyroid state.  Also for the last several months has had a slowly enlarging it painful mass right breast.  Remote right breast lumpectomy.  More recent left breast mastectomy.  Unfortunately because of illness and family has postponed caring for and looking into on problems.  Denies respiratory CDV GI  concerns.    The following portions of the patient's history were reviewed and updated as appropriate: allergies, past family history, past medical history, past social history, past surgical history and problem list.    Review of Systems  See history of Present Illness     Objective     Physical Exam  Vitals signs reviewed.   Constitutional:       Appearance: She is well-developed.   HENT:      Head: Normocephalic.      Right Ear: External ear normal.      Left Ear: External ear normal.   Neck:      Musculoskeletal: Normal range of motion and neck supple.      Thyroid: No thyromegaly.      Trachea: No tracheal deviation.   Cardiovascular:      Rate and Rhythm: Normal rate and regular rhythm.      Heart sounds: Normal heart sounds. No murmur.   Pulmonary:      Effort: Pulmonary effort is normal.      Breath sounds: Normal breath sounds.   Chest:      Breasts:         Right: Mass and tenderness present.         Left: Absent.      Comments: Lemon-sized mass right breast.  No axillary adenopathy noted  Lymphadenopathy:      Cervical: No cervical adenopathy.   Skin:     General: Skin is warm and dry.   Neurological:      Mental Status: She is alert and oriented to person, place, and time.   Psychiatric:         Mood and Affect: Mood normal.         PHQ-9 Total Score:      Patient's Body mass index is 23.25 kg/m². BMI is within normal parameters. No follow-up required..   (Normal BMI:  18.5-24.9, OW 25-29.9, Obesity 30 or greater)      Assessment/Plan     Diagnoses and all orders for this visit:    1.  Essential hypertension (Primary)  -     lisinopril-hydrochlorothiazide (PRINZIDE,ZESTORETIC) 20-25 MG per tablet; Take 1 tablet by mouth Daily.  Dispense: 30 tablet; Refill: 6  -     amLODIPine (NORVASC) 10 MG tablet; Take 1 tablet by mouth Daily. Hold for BP <110/60  Dispense: 30 tablet; Refill: 6  -     metoprolol succinate XL (TOPROL-XL) 100 MG 24 hr tablet; Take 1 tablet by mouth Daily. for blood pressure  Dispense: 30 tablet; Refill: 6  -     CBC & Differential  -     Comprehensive Metabolic Panel  -     CBC Auto Differential    2. Hypothyroidism due to acquired atrophy of thyroid  -     levothyroxine (SYNTHROID, LEVOTHROID) 75 MCG tablet; Take 1 tablet by mouth Daily.  Dispense: 30 tablet; Refill: 6  -     T4, Free  -     TSH    3. Mass of breast, right  -     Ambulatory Referral to General Surgery    Other orders  -     Fluad Quad >65 years    Flu shot.  Check labs.  To try to expedite evaluation and further intervention regarding the right breast mass will refer back to    who has seen and treated in past.  Explained concerning nature of course.  Follow-ups pending.                     This document has been electronically signed by Alli Wolf MD   October 27, 2020 14:43 EDT    Part of this note may be an electronic transcription/translation of spoken language to printed text using the Dragon Dictation System.

## 2020-10-28 ENCOUNTER — TELEPHONE (OUTPATIENT)
Dept: SURGERY | Facility: CLINIC | Age: 85
End: 2020-10-28

## 2020-10-28 DIAGNOSIS — C50.912 MALIGNANT NEOPLASM OF LEFT FEMALE BREAST, UNSPECIFIED ESTROGEN RECEPTOR STATUS, UNSPECIFIED SITE OF BREAST (HCC): Primary | ICD-10-CM

## 2020-10-28 LAB
ALBUMIN SERPL-MCNC: 4.3 G/DL (ref 3.5–5.2)
ALBUMIN/GLOB SERPL: 1.4 G/DL
ALP SERPL-CCNC: 66 U/L (ref 39–117)
ALT SERPL W P-5'-P-CCNC: 11 U/L (ref 1–33)
ANION GAP SERPL CALCULATED.3IONS-SCNC: 10.3 MMOL/L (ref 5–15)
AST SERPL-CCNC: 17 U/L (ref 1–32)
BASOPHILS # BLD AUTO: 0.05 10*3/MM3 (ref 0–0.2)
BASOPHILS NFR BLD AUTO: 1 % (ref 0–1.5)
BILIRUB SERPL-MCNC: 0.3 MG/DL (ref 0–1.2)
BUN SERPL-MCNC: 17 MG/DL (ref 8–23)
BUN/CREAT SERPL: 20.7 (ref 7–25)
CALCIUM SPEC-SCNC: 9.6 MG/DL (ref 8.6–10.5)
CHLORIDE SERPL-SCNC: 104 MMOL/L (ref 98–107)
CO2 SERPL-SCNC: 23.7 MMOL/L (ref 22–29)
CREAT SERPL-MCNC: 0.82 MG/DL (ref 0.57–1)
DEPRECATED RDW RBC AUTO: 44.8 FL (ref 37–54)
EOSINOPHIL # BLD AUTO: 0.03 10*3/MM3 (ref 0–0.4)
EOSINOPHIL NFR BLD AUTO: 0.6 % (ref 0.3–6.2)
ERYTHROCYTE [DISTWIDTH] IN BLOOD BY AUTOMATED COUNT: 12.6 % (ref 12.3–15.4)
GFR SERPL CREATININE-BSD FRML MDRD: 67 ML/MIN/1.73
GLOBULIN UR ELPH-MCNC: 3.1 GM/DL
GLUCOSE SERPL-MCNC: 92 MG/DL (ref 65–99)
HCT VFR BLD AUTO: 33 % (ref 34–46.6)
HGB BLD-MCNC: 11.3 G/DL (ref 12–15.9)
IMM GRANULOCYTES # BLD AUTO: 0.01 10*3/MM3 (ref 0–0.05)
IMM GRANULOCYTES NFR BLD AUTO: 0.2 % (ref 0–0.5)
LYMPHOCYTES # BLD AUTO: 1.51 10*3/MM3 (ref 0.7–3.1)
LYMPHOCYTES NFR BLD AUTO: 30.2 % (ref 19.6–45.3)
MCH RBC QN AUTO: 32.9 PG (ref 26.6–33)
MCHC RBC AUTO-ENTMCNC: 34.2 G/DL (ref 31.5–35.7)
MCV RBC AUTO: 96.2 FL (ref 79–97)
MONOCYTES # BLD AUTO: 0.61 10*3/MM3 (ref 0.1–0.9)
MONOCYTES NFR BLD AUTO: 12.2 % (ref 5–12)
NEUTROPHILS NFR BLD AUTO: 2.79 10*3/MM3 (ref 1.7–7)
NEUTROPHILS NFR BLD AUTO: 55.8 % (ref 42.7–76)
NRBC BLD AUTO-RTO: 0 /100 WBC (ref 0–0.2)
PLATELET # BLD AUTO: 218 10*3/MM3 (ref 140–450)
PMV BLD AUTO: 10.3 FL (ref 6–12)
POTASSIUM SERPL-SCNC: 3.9 MMOL/L (ref 3.5–5.2)
PROT SERPL-MCNC: 7.4 G/DL (ref 6–8.5)
RBC # BLD AUTO: 3.43 10*6/MM3 (ref 3.77–5.28)
SODIUM SERPL-SCNC: 138 MMOL/L (ref 136–145)
T4 FREE SERPL-MCNC: 0.72 NG/DL (ref 0.93–1.7)
TSH SERPL DL<=0.05 MIU/L-ACNC: 6.29 UIU/ML (ref 0.27–4.2)
WBC # BLD AUTO: 5 10*3/MM3 (ref 3.4–10.8)

## 2020-10-28 NOTE — TELEPHONE ENCOUNTER
Our office had tried and tried to call Lashell to come back into the office. Also, the [facility] had tried to get her to come in for a mammogram with no luck. Our  sent letters also. Today Dr. Wolf office called and stated they felt a lump on exam and wanted her to be seen. Mammogram ordered and office visit made patient informed.

## 2020-10-29 NOTE — PROGRESS NOTES
Labs are very stable although thyroid level low.  Continue to take medications as prescribed.  Encourage compliance.  Please encourage to keep visit with mammogram and general surgery as scheduled.

## 2020-11-05 ENCOUNTER — HOSPITAL ENCOUNTER (OUTPATIENT)
Dept: MAMMOGRAPHY | Facility: HOSPITAL | Age: 85
Discharge: HOME | End: 2020-11-05

## 2020-11-05 ENCOUNTER — HOSPITAL ENCOUNTER (OUTPATIENT)
Dept: ULTRASOUND IMAGING | Facility: HOSPITAL | Age: 85
Discharge: HOME | End: 2020-11-05

## 2020-11-05 DIAGNOSIS — C50.912 MALIGNANT NEOPLASM OF LEFT FEMALE BREAST, UNSPECIFIED ESTROGEN RECEPTOR STATUS, UNSPECIFIED SITE OF BREAST (HCC): ICD-10-CM

## 2020-11-05 PROCEDURE — G0279 TOMOSYNTHESIS, MAMMO: HCPCS

## 2020-11-05 PROCEDURE — 76642 ULTRASOUND BREAST LIMITED: CPT

## 2020-11-05 PROCEDURE — G0279 TOMOSYNTHESIS, MAMMO: HCPCS | Performed by: RADIOLOGY

## 2020-11-05 PROCEDURE — 76642 ULTRASOUND BREAST LIMITED: CPT | Performed by: RADIOLOGY

## 2020-11-05 PROCEDURE — 77065 DX MAMMO INCL CAD UNI: CPT | Performed by: RADIOLOGY

## 2020-11-05 PROCEDURE — 77065 DX MAMMO INCL CAD UNI: CPT

## 2020-11-06 ENCOUNTER — OFFICE VISIT (OUTPATIENT)
Dept: SURGERY | Facility: CLINIC | Age: 85
End: 2020-11-06

## 2020-11-06 VITALS
DIASTOLIC BLOOD PRESSURE: 86 MMHG | BODY MASS INDEX: 22.66 KG/M2 | HEART RATE: 61 BPM | WEIGHT: 141 LBS | HEIGHT: 66 IN | SYSTOLIC BLOOD PRESSURE: 190 MMHG

## 2020-11-06 DIAGNOSIS — N63.0 BREAST MASS: Primary | ICD-10-CM

## 2020-11-06 DIAGNOSIS — Z91.14 NONCOMPLIANCE WITH MEDICATIONS: ICD-10-CM

## 2020-11-06 DIAGNOSIS — Z17.0 MALIGNANT NEOPLASM OF LEFT BREAST IN FEMALE, ESTROGEN RECEPTOR POSITIVE, UNSPECIFIED SITE OF BREAST (HCC): ICD-10-CM

## 2020-11-06 DIAGNOSIS — C50.912 MALIGNANT NEOPLASM OF LEFT BREAST IN FEMALE, ESTROGEN RECEPTOR POSITIVE, UNSPECIFIED SITE OF BREAST (HCC): ICD-10-CM

## 2020-11-06 DIAGNOSIS — N63.12 UNSPECIFIED LUMP IN THE RIGHT BREAST, UPPER INNER QUADRANT: ICD-10-CM

## 2020-11-06 DIAGNOSIS — Z01.818 PRE-OP TESTING: ICD-10-CM

## 2020-11-06 PROCEDURE — 99214 OFFICE O/P EST MOD 30 MIN: CPT | Performed by: SURGERY

## 2020-11-06 NOTE — PROGRESS NOTES
Subjective   Lashell Case is a 85 y.o. female is here today for follow-up for abnormal mammogram. PCP made her come to appointment.    History of Present Illness  Ms. Case was seen in the office today for breast evaluation. She is status post a left modified radical mastectomy on 11/24/15 for a T2 N1 infiltrating ductal carcinoma, ER positive, AL positive, HER-2 negative. Mammaprint was low risk.  At the time of her last visit on 1/25/2018 the patient was compliant with anastrozole.  However she has not been seen in the office since then and multiple requests for follow-up both with the breast center and my office have been unsuccessful.  Sometime in this 2-1/2+ year.  The patient discontinued her anastrozole and has no idea when she stopped this.  She presents to the office today with a several month history of an enlarging right breast mass.  She underwent a right diagnostic mammogram with tomosynthesis and right breast ultrasound on 11/5/2020 which demonstrated a large right breast mass highly suspicious for malignancy.  Patient denies any axillary adenopathy.  She denies any new bone pain, chest pain, shortness of breath.  In short, she has no symptoms of metastatic disease related to her left breast malignancy.  Other than a hospitalization for the flu she denies any significant changes in her health since her last visit in January, 2018.  Patient states that her granddaughter and great granddaughter are currently living with her and she is helping to raise her great granddaughter.  Allergies   Allergen Reactions   • Bactrim [Sulfamethoxazole-Trimethoprim] GI Intolerance   • Codeine    • Ketorolac Nausea And Vomiting   • Penicillins    • Phenergan [Promethazine Hcl] Other (See Comments)     Restless legs and trouble swallowing         Current Outpatient Medications   Medication Sig Dispense Refill   • amLODIPine (NORVASC) 10 MG tablet Take 1 tablet by mouth Daily. Hold for BP <110/60 30 tablet 6   •  "levothyroxine (SYNTHROID, LEVOTHROID) 75 MCG tablet Take 1 tablet by mouth Daily. 30 tablet 6   • lisinopril-hydrochlorothiazide (PRINZIDE,ZESTORETIC) 20-25 MG per tablet Take 1 tablet by mouth Daily. 30 tablet 6   • metoprolol succinate XL (TOPROL-XL) 100 MG 24 hr tablet Take 1 tablet by mouth Daily. for blood pressure 30 tablet 6     No current facility-administered medications for this visit.      Past Medical History:   Diagnosis Date   • Arthritis    • Breast cancer (CMS/HCC) 2015    lt br ca   • Breast cancer (CMS/HCC) 2006    rt br ca   • Hypertension    • Hypothyroidism    • Scabies exposure      Past Surgical History:   Procedure Laterality Date   • BREAST BIOPSY     • BREAST LUMPECTOMY Right 2006   • BREAST SURGERY     • MASTECTOMY Left 2015    ca       The following portions of the patient's history were reviewed and updated as appropriate: allergies, current medications, past family history, past medical history, past social history, past surgical history and problem list.    Review of Systems  General: negative  Integumentary: negative  Eyes: negative  ENT: negative  Respiratory: negative  Gastrointestinal: negative  Cardiovascular: negative  Neurological: negative  Psychiatric: negative  Hematologic/Lymphatic: negative  Genitourinary: negative  Musculoskeletal: negative  Endocrine: history of thyroid problem  Breasts: breast lump    Objective   BP (!) 190/86 (BP Location: Left arm) Comment: Patient is out of BP meds are at Pharm to   Pulse 61   Ht 167.6 cm (66\")   Wt 64 kg (141 lb)   BMI 22.76 kg/m²    Physical Exam  General:  This is a WD WN thin female in no acute distress  Vital signs stable, afebrile  HEENT exam:  WNL. Sclera are anicteric.  EOMI  Neck:  supple, FROM.  No JVD.  Trachea midline.  No palpable thyroid nodules. No supraclavicular adenopathy  Lungs:  Respiratory effort normal. Auscultation: Clear, without wheezes, rhonchi, rales  Heart:  Regular rate and rhythm, without " murmur, gallop, rub.  No pedal edema  Breasts: On visual inspection the left breast is surgically absent.  examination of the right breast demonstrates a dominant mass in the upper inner quadrant at least 3 cm.  There is also some scarring along the inframammary fold medial aspect and a healed surgical scar in the upper outer quadrant of the right breast.  There is no axillary adenopathy.  Examination of the left chest wall demonstrates no palpable mass or adenopathy.  Abdomen: Nontender, without hepatosplenomegaly  Musculoskeletal:  muscle strength/tone is normal.    Psyc:  alert, oriented x 3.  Mood and affect are appropriate  skin:  Warm with good turgor.  Without rash or lesion  extremities:  Examination of the extremities revealed no cyanosis, clubbing or edema.    Results/Data  Mammogram and ultrasound reports and images were reviewed and I agree with the assessment    Procedures   Ultrasound Axilla right    Indication: Probable right breast cancer  Description: With use of the 12.5 MHz transducer the area of clinical concern was scanned in multiple planes.  Findings: The right axilla was scanned in its entirety.  There is a relatively superficial 1 cm hypoechoic mass with posterior shadowing in the low axilla.  This appears to be more related to the surgical scar from prior procedure than a primary axillary process.  No additional masses or adenopathy are identified.  Impression: Hypoechoic mass low left axilla likely post operative from remote prior surgery  Plan: Follow-up as clinically indicated      Ultrasound Guided Breast Biopsy Procedure Note    Preoperative Diagnosis: Hypoechoic lesion right breast, upper inner quadrant.    Postoperative Diagnosis: Same, pending final pathology report.    Operative Procedure Performed: Ultrasound guided core biopsy, right breast-14-gauge Paul-Cut    Operating surgeon: Cynthia Ward M.D.    Anesthesia: 1% lidocaine with epinephrine 10 ccs.    Complications:  none    Approach: Inferior medial to superior lateral    Description of Procedure: After discussing the risks and benefits of the procedure and obtaining consent, the patient was placed on the procedure table in the supine position.  The right breast was scanned with a hand-held high frequency linear array ultrasound transducer, identifying the focal abnormality previously demonstrated on ultrasound examination.  The operative site was prepped in a sterile fashion with alcohol; local anesthetic was used to infiltrate the skin and subcutaneous tissue.  Under direct ultrasound observation, local anesthetic was placed around the focal abnormality.  A small incision was made with a scalpel blade.  Under direct ultrasound observation the core needle device was placed through the incision with the tip of the needle core device just proximal to the lesion.  Biopsy was obtained.  Post-fire images demonstrated the needle core device going through the abnormality.  Multiple cores were taken in a similar fashion.  A gel-parminder clip was not placed under ultrasound guidance.  Following removal of 2 cores, the instrument was removed and the area was cleansed and a dressing was placed.  The patient tolerated the procedure well.  Specimens were placed in formalin and sent for pathologic evaluation.    Assessment/Plan   Clinical T2N0 mammary carcinoma of the right breast, pending pathology confirmation  History of left breast cancer with noncompliance with hormonal therapy    Plan: Await pathology results.  If right pathology is positive it is suspected patient would like to go ahead with right mastectomy with sentinel node biopsy           Discussion/Summary    Patient's Body mass index is 22.76 kg/m². BMI is within normal parameters. No follow-up required..         Future Appointments   Date Time Provider Department Center   11/11/2020 12:00 PM [facility] US 1 [facility]   4/27/2021  1:00 PM Alli Wolf MD E   [facility]         Please note that portions of this note were completed with a voice recognition program.

## 2020-11-11 ENCOUNTER — HOSPITAL ENCOUNTER (OUTPATIENT)
Dept: ULTRASOUND IMAGING | Facility: HOSPITAL | Age: 85
End: 2020-11-11

## 2020-11-11 DIAGNOSIS — Z01.818 PRE-OP TESTING: Primary | ICD-10-CM

## 2020-11-11 RX ORDER — CLINDAMYCIN PHOSPHATE 900 MG/50ML
900 INJECTION INTRAVENOUS ONCE
Status: CANCELLED | OUTPATIENT
Start: 2020-11-11 | End: 2020-11-11

## 2020-11-11 NOTE — H&P
Subjective   Lashell Case is a 85 y.o. female is here today for follow-up for abnormal mammogram. PCP made her come to appointment.    History of Present Illness  Ms. Case was seen in the office today for breast evaluation. 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 Mammaprint was low risk.  At the time of her last visit on 1/25/2018 the patient was compliant with anastrozole.  However she has not been seen in the office since then and multiple requests for follow-up both with the breast center and my office have been unsuccessful.  Sometime in this 2-1/2+ year.  The patient discontinued her anastrozole and has no idea when she stopped this.  She presents to the office today with a several month history of an enlarging right breast mass.  She underwent a right diagnostic mammogram with tomosynthesis and right breast ultrasound on 11/5/2020 which demonstrated a large right breast mass highly suspicious for malignancy.  Patient denies any axillary adenopathy.  She denies any new bone pain, chest pain, shortness of breath.  In short, she has no symptoms of metastatic disease related to her left breast malignancy.  Other than a hospitalization for the flu she denies any significant changes in her health since her last visit in January, 2018.  Patient states that her granddaughter and great granddaughter are currently living with her and she is helping to raise her great granddaughter.  Allergies   Allergen Reactions   • Bactrim [Sulfamethoxazole-Trimethoprim] GI Intolerance   • Codeine    • Ketorolac Nausea And Vomiting   • Penicillins    • Phenergan [Promethazine Hcl] Other (See Comments)     Restless legs and trouble swallowing         Current Outpatient Medications   Medication Sig Dispense Refill   • amLODIPine (NORVASC) 10 MG tablet Take 1 tablet by mouth Daily. Hold for BP <110/60 30 tablet 6   •  "levothyroxine (SYNTHROID, LEVOTHROID) 75 MCG tablet Take 1 tablet by mouth Daily. 30 tablet 6   • lisinopril-hydrochlorothiazide (PRINZIDE,ZESTORETIC) 20-25 MG per tablet Take 1 tablet by mouth Daily. 30 tablet 6   • metoprolol succinate XL (TOPROL-XL) 100 MG 24 hr tablet Take 1 tablet by mouth Daily. for blood pressure 30 tablet 6     No current facility-administered medications for this visit.      Past Medical History:   Diagnosis Date   • Arthritis    • Breast cancer (CMS/HCC) 2015    lt br ca   • Breast cancer (CMS/HCC) 2006    rt br ca   • Hypertension    • Hypothyroidism    • Scabies exposure      Past Surgical History:   Procedure Laterality Date   • BREAST BIOPSY     • BREAST LUMPECTOMY Right 2006   • BREAST SURGERY     • MASTECTOMY Left 2015    ca       The following portions of the patient's history were reviewed and updated as appropriate: allergies, current medications, past family history, past medical history, past social history, past surgical history and problem list.    Review of Systems  General: negative  Integumentary: negative  Eyes: negative  ENT: negative  Respiratory: negative  Gastrointestinal: negative  Cardiovascular: negative  Neurological: negative  Psychiatric: negative  Hematologic/Lymphatic: negative  Genitourinary: negative  Musculoskeletal: negative  Endocrine: history of thyroid problem  Breasts: breast lump    Objective   BP (!) 190/86 (BP Location: Left arm) Comment: Patient is out of BP meds are at Pharm to   Pulse 61   Ht 167.6 cm (66\")   Wt 64 kg (141 lb)   BMI 22.76 kg/m²    Physical Exam  General:  This is a WD WN thin female in no acute distress  Vital signs stable, afebrile  HEENT exam:  WNL. Sclera are anicteric.  EOMI  Neck:  supple, FROM.  No JVD.  Trachea midline.  No palpable thyroid nodules. No supraclavicular adenopathy  Lungs:  Respiratory effort normal. Auscultation: Clear, without wheezes, rhonchi, rales  Heart:  Regular rate and rhythm, without " murmur, gallop, rub.  No pedal edema  Breasts: On visual inspection the left breast is surgically absent.  examination of the right breast demonstrates a dominant mass in the upper inner quadrant at least 3 cm.  There is also some scarring along the inframammary fold medial aspect and a healed surgical scar in the upper outer quadrant of the right breast.  There is no axillary adenopathy.  Examination of the left chest wall demonstrates no palpable mass or adenopathy.  Abdomen: Nontender, without hepatosplenomegaly  Musculoskeletal:  muscle strength/tone is normal.    Psyc:  alert, oriented x 3.  Mood and affect are appropriate  skin:  Warm with good turgor.  Without rash or lesion  extremities:  Examination of the extremities revealed no cyanosis, clubbing or edema.    Results/Data  Mammogram and ultrasound reports and images were reviewed and I agree with the assessment    Procedures   Ultrasound Axilla right    Indication: Probable right breast cancer  Description: With use of the 12.5 MHz transducer the area of clinical concern was scanned in multiple planes.  Findings: The right axilla was scanned in its entirety.  There is a relatively superficial 1 cm hypoechoic mass with posterior shadowing in the low axilla.  This appears to be more related to the surgical scar from prior procedure than a primary axillary process.  No additional masses or adenopathy are identified.  Impression: Hypoechoic mass low left axilla likely post operative from remote prior surgery  Plan: Follow-up as clinically indicated      Ultrasound Guided Breast Biopsy Procedure Note    Preoperative Diagnosis: Hypoechoic lesion right breast, upper inner quadrant.    Postoperative Diagnosis: Same, pending final pathology report.    Operative Procedure Performed: Ultrasound guided core biopsy, right breast-14-gauge Paul-Cut    Operating surgeon: Cynthia Ward M.D.    Anesthesia: 1% lidocaine with epinephrine 10 ccs.    Complications:  none    Approach: Inferior medial to superior lateral    Description of Procedure: After discussing the risks and benefits of the procedure and obtaining consent, the patient was placed on the procedure table in the supine position.  The right breast was scanned with a hand-held high frequency linear array ultrasound transducer, identifying the focal abnormality previously demonstrated on ultrasound examination.  The operative site was prepped in a sterile fashion with alcohol; local anesthetic was used to infiltrate the skin and subcutaneous tissue.  Under direct ultrasound observation, local anesthetic was placed around the focal abnormality.  A small incision was made with a scalpel blade.  Under direct ultrasound observation the core needle device was placed through the incision with the tip of the needle core device just proximal to the lesion.  Biopsy was obtained.  Post-fire images demonstrated the needle core device going through the abnormality.  Multiple cores were taken in a similar fashion.  A gel-parminder clip was not placed under ultrasound guidance.  Following removal of 2 cores, the instrument was removed and the area was cleansed and a dressing was placed.  The patient tolerated the procedure well.  Specimens were placed in formalin and sent for pathologic evaluation.    Assessment/Plan   Clinical T2N0 mammary carcinoma of the right breast, pending pathology confirmation  History of left breast cancer with noncompliance with hormonal therapy    Plan: Await pathology results.  If right pathology is positive it is suspected patient would like to go ahead with right mastectomy with sentinel node biopsy           Discussion/Summary    Patient's Body mass index is 22.76 kg/m². BMI is within normal parameters. No follow-up required..         Future Appointments   Date Time Provider Department Center   11/11/2020 12:00 PM [facility]   4/27/2021  1:00 PM Alli Wolf MD E   [facility]         Please note that portions of this note were completed with a voice recognition program.  This document has been electronically signed by Cynthia RICHARDSON MD on November 11, 2020 16:26 EST

## 2020-11-12 ENCOUNTER — TELEPHONE (OUTPATIENT)
Dept: SURGERY | Facility: CLINIC | Age: 85
End: 2020-11-12

## 2020-11-12 DIAGNOSIS — C50.911 INFILTRATING DUCTAL CARCINOMA OF RIGHT BREAST (HCC): Primary | ICD-10-CM

## 2020-11-12 PROBLEM — N63.0 BREAST MASS: Status: ACTIVE | Noted: 2020-11-12

## 2020-11-12 NOTE — TELEPHONE ENCOUNTER
[facility] IS AWARE OF PAT TIME TOMORROW.   I DID INFORM MISS ANDRES THAT SHE WILL ALSO NEED TO GET HER COVID TEST TOMORROW AS WELL. SHE VERBALIZED UNDERSTANDING.     DL 11/12/20 10:40AM

## 2020-11-13 ENCOUNTER — APPOINTMENT (OUTPATIENT)
Dept: PREADMISSION TESTING | Facility: HOSPITAL | Age: 85
End: 2020-11-13

## 2020-11-13 ENCOUNTER — LAB (OUTPATIENT)
Dept: LAB | Facility: HOSPITAL | Age: 85
End: 2020-11-13

## 2020-11-13 DIAGNOSIS — N63.0 BREAST MASS: ICD-10-CM

## 2020-11-13 DIAGNOSIS — Z01.818 PRE-OP TESTING: ICD-10-CM

## 2020-11-13 LAB
ALBUMIN SERPL-MCNC: 4.34 G/DL (ref 3.5–5.2)
ALBUMIN/GLOB SERPL: 1.3 G/DL
ALP SERPL-CCNC: 73 U/L (ref 39–117)
ALT SERPL W P-5'-P-CCNC: 7 U/L (ref 1–33)
ANION GAP SERPL CALCULATED.3IONS-SCNC: 7.7 MMOL/L (ref 5–15)
AST SERPL-CCNC: 12 U/L (ref 1–32)
BASOPHILS # BLD AUTO: 0.07 10*3/MM3 (ref 0–0.2)
BASOPHILS NFR BLD AUTO: 1.2 % (ref 0–1.5)
BILIRUB SERPL-MCNC: 0.4 MG/DL (ref 0–1.2)
BUN SERPL-MCNC: 16 MG/DL (ref 8–23)
BUN/CREAT SERPL: 18 (ref 7–25)
CALCIUM SPEC-SCNC: 10.1 MG/DL (ref 8.6–10.5)
CHLORIDE SERPL-SCNC: 103 MMOL/L (ref 98–107)
CO2 SERPL-SCNC: 25.3 MMOL/L (ref 22–29)
CREAT SERPL-MCNC: 0.89 MG/DL (ref 0.57–1)
DEPRECATED RDW RBC AUTO: 44.3 FL (ref 37–54)
EOSINOPHIL # BLD AUTO: 0.04 10*3/MM3 (ref 0–0.4)
EOSINOPHIL NFR BLD AUTO: 0.7 % (ref 0.3–6.2)
ERYTHROCYTE [DISTWIDTH] IN BLOOD BY AUTOMATED COUNT: 12.2 % (ref 12.3–15.4)
GFR SERPL CREATININE-BSD FRML MDRD: 61 ML/MIN/1.73
GLOBULIN UR ELPH-MCNC: 3.5 GM/DL
GLUCOSE SERPL-MCNC: 100 MG/DL (ref 65–99)
HCT VFR BLD AUTO: 34.4 % (ref 34–46.6)
HGB BLD-MCNC: 11.3 G/DL (ref 12–15.9)
IMM GRANULOCYTES # BLD AUTO: 0.01 10*3/MM3 (ref 0–0.05)
IMM GRANULOCYTES NFR BLD AUTO: 0.2 % (ref 0–0.5)
LYMPHOCYTES # BLD AUTO: 1.45 10*3/MM3 (ref 0.7–3.1)
LYMPHOCYTES NFR BLD AUTO: 24.9 % (ref 19.6–45.3)
MCH RBC QN AUTO: 32.6 PG (ref 26.6–33)
MCHC RBC AUTO-ENTMCNC: 32.8 G/DL (ref 31.5–35.7)
MCV RBC AUTO: 99.1 FL (ref 79–97)
MONOCYTES # BLD AUTO: 0.65 10*3/MM3 (ref 0.1–0.9)
MONOCYTES NFR BLD AUTO: 11.1 % (ref 5–12)
NEUTROPHILS NFR BLD AUTO: 3.61 10*3/MM3 (ref 1.7–7)
NEUTROPHILS NFR BLD AUTO: 61.9 % (ref 42.7–76)
NRBC BLD AUTO-RTO: 0 /100 WBC (ref 0–0.2)
PLATELET # BLD AUTO: 172 10*3/MM3 (ref 140–450)
PMV BLD AUTO: 9.8 FL (ref 6–12)
POTASSIUM SERPL-SCNC: 4.1 MMOL/L (ref 3.5–5.2)
PROT SERPL-MCNC: 7.8 G/DL (ref 6–8.5)
QT INTERVAL: 416 MS
QTC INTERVAL: 439 MS
RBC # BLD AUTO: 3.47 10*6/MM3 (ref 3.77–5.28)
SODIUM SERPL-SCNC: 136 MMOL/L (ref 136–145)
WBC # BLD AUTO: 5.83 10*3/MM3 (ref 3.4–10.8)

## 2020-11-13 PROCEDURE — C9803 HOPD COVID-19 SPEC COLLECT: HCPCS

## 2020-11-13 PROCEDURE — U0004 COV-19 TEST NON-CDC HGH THRU: HCPCS | Performed by: SURGERY

## 2020-11-13 PROCEDURE — 93010 ELECTROCARDIOGRAM REPORT: CPT | Performed by: INTERNAL MEDICINE

## 2020-11-13 PROCEDURE — 93005 ELECTROCARDIOGRAM TRACING: CPT

## 2020-11-13 PROCEDURE — 85025 COMPLETE CBC W/AUTO DIFF WBC: CPT

## 2020-11-13 PROCEDURE — 80053 COMPREHEN METABOLIC PANEL: CPT

## 2020-11-13 PROCEDURE — 36415 COLL VENOUS BLD VENIPUNCTURE: CPT

## 2020-11-13 NOTE — DISCHARGE INSTRUCTIONS
TAKE the following medications the morning of surgery:    All heart or blood pressure medications    Please discontinue all blood thinners and anticoagulants (except aspirin) prior to surgery as per your surgeon and cardiologist instructions.  Aspirin may be continued up to the day prior to surgery.    HOLD all diabetic medications the morning of surgery as order by physician.    Please follow instructions on use of prep cloths provided by nurse. Return instruction sheet to pre-op nurse on day of surgery.    Arrival time for surgery on 11/17/20 will be given to you by Dr. Ward's office.    A RESPONSIBLE PERSON MUST REMAIN IN THE WAITING ROOM DURING YOUR PROCEDURE AND A RESPONSIBLE  MUST BE AVAILABLE UPON YOUR DISCHARGE.    General Instructions:  • Do NOT eat or drink after midnight 11/16/20 which includes water, mints, or gum.  • You may brush your teeth. Dental appliances that are removable must be taken out day of surgery.  • Do NOT smoke, chew tobacco, or drink alcohol within 24 hours prior to surgery.  • Bring medications in original bottles, any inhalers and if applicable your C-PAP/BI-PAP machine  • Bring any papers given to you in the doctor’s office  • Wear clean, comfortable clothes and socks  • Do NOT wear contact lenses or make-up or dark nail polish.  Bring a case for your glasses if applicable.  • Bring crutches or walker if applicable  • Leave all other valuables and jewelry at home  • If you were given a blood bank armband, continue to wear it until discharged.    Preventing a Surgical Site Infection:  • Shower the night before surgery (unless instructed otherwise) using a fresh bar of anti-bacterial soap (such as Dial) and clean washcloth.  Dry with a clean towel and dress in clean clothing.  • For 2 to 3 days before surgery, avoid shaving with a razor near where you will have surgery because the razor can irritate skin and make it easier to develop an infection.  Ask your surgeon if you will  be receiving antibiotics prior to surgery.  • Make sure you, your family, and all healthcare providers clean their hands with soap and water or an alcohol-based hand  before caring for you or your wound.  • If at all possible, quit smoking as many days before surgery as you can.    Day of Surgery:  Upon arrival, a pre-op nurse and anesthesiologist will review your health history, obtain vital signs, and answer questions you may have.  The only belongings needed at this time will be your home medications and if applicable you C-PAP/BI-PAP machine.  If you are staying overnight, your family can leave the rest of your belongings in the car and bring them to your room later.  A pre-op nurse will start an IV and you may receive medication in preparation for surgery.  Due to patient privacy and limited space, only one member of your family will be able to accompany you in the pre-op area.  While you are in surgery your family should notify the waiting room  if they leave the waiting room area and provide a contact number.  Please be aware that surgery does come with discomfort.  We want to make every effort to control your discomfort so please discuss any uncontrolled symptoms with your nurse.  Your doctor will most likely have prescribed pain medications.  If you are going home after surgery you will receive individualized written care instructions before being discharged.  A responsible adult must drive you to and from the hospital on the day of surgery and stay with you for 24 hours.  If you are staying overnight following surgery, you will be transported to your hospital room following the recovery period.

## 2020-11-14 LAB — SARS-COV-2 RNA RESP QL NAA+PROBE: NOT DETECTED

## 2020-11-17 ENCOUNTER — HOSPITAL ENCOUNTER (OUTPATIENT)
Facility: HOSPITAL | Age: 85
Discharge: HOME | End: 2020-11-18
Attending: SURGERY | Admitting: SURGERY

## 2020-11-17 ENCOUNTER — HOSPITAL ENCOUNTER (OUTPATIENT)
Dept: NUCLEAR MEDICINE | Facility: HOSPITAL | Age: 85
Discharge: HOME | End: 2020-11-17

## 2020-11-17 ENCOUNTER — ANESTHESIA EVENT (OUTPATIENT)
Dept: PERIOP | Facility: HOSPITAL | Age: 85
End: 2020-11-17

## 2020-11-17 ENCOUNTER — ANESTHESIA (OUTPATIENT)
Dept: PERIOP | Facility: HOSPITAL | Age: 85
End: 2020-11-17

## 2020-11-17 DIAGNOSIS — C50.911 INFILTRATING DUCTAL CARCINOMA OF RIGHT BREAST (HCC): ICD-10-CM

## 2020-11-17 DIAGNOSIS — N63.0 BREAST MASS: ICD-10-CM

## 2020-11-17 PROCEDURE — 25010000002 PROPOFOL 10 MG/ML EMULSION: Performed by: NURSE ANESTHETIST, CERTIFIED REGISTERED

## 2020-11-17 PROCEDURE — 88305 TISSUE EXAM BY PATHOLOGIST: CPT | Performed by: SURGERY

## 2020-11-17 PROCEDURE — A9270 NON-COVERED ITEM OR SERVICE: HCPCS | Performed by: SURGERY

## 2020-11-17 PROCEDURE — 25010000002 DEXAMETHASONE PER 1 MG: Performed by: ANESTHESIOLOGY

## 2020-11-17 PROCEDURE — 88309 TISSUE EXAM BY PATHOLOGIST: CPT | Performed by: SURGERY

## 2020-11-17 PROCEDURE — 25010000002 ONDANSETRON PER 1 MG: Performed by: SURGERY

## 2020-11-17 PROCEDURE — A9541 TC99M SULFUR COLLOID: HCPCS | Performed by: SURGERY

## 2020-11-17 PROCEDURE — 25010000002 FENTANYL CITRATE (PF) 100 MCG/2ML SOLUTION: Performed by: NURSE ANESTHETIST, CERTIFIED REGISTERED

## 2020-11-17 PROCEDURE — 88307 TISSUE EXAM BY PATHOLOGIST: CPT | Performed by: SURGERY

## 2020-11-17 PROCEDURE — 25010000002 BUPRENORPHINE PER 0.1 MG: Performed by: ANESTHESIOLOGY

## 2020-11-17 PROCEDURE — 19307 MAST MOD RAD: CPT | Performed by: SURGERY

## 2020-11-17 PROCEDURE — 63710000001 ACETAMINOPHEN 500 MG TABLET: Performed by: SURGERY

## 2020-11-17 PROCEDURE — 25010000002 ROPIVACAINE PER 1 MG: Performed by: ANESTHESIOLOGY

## 2020-11-17 PROCEDURE — 88331 PATH CONSLTJ SURG 1 BLK 1SPC: CPT | Performed by: SURGERY

## 2020-11-17 PROCEDURE — 38900 IO MAP OF SENT LYMPH NODE: CPT | Performed by: SURGERY

## 2020-11-17 PROCEDURE — 25010000002 ONDANSETRON PER 1 MG: Performed by: NURSE ANESTHETIST, CERTIFIED REGISTERED

## 2020-11-17 PROCEDURE — 63710000001 DOCUSATE SODIUM 100 MG CAPSULE: Performed by: SURGERY

## 2020-11-17 PROCEDURE — 25010000002 KETOROLAC TROMETHAMINE PER 15 MG: Performed by: SURGERY

## 2020-11-17 PROCEDURE — 0 TECHNETIUM FILTERED SULFUR COLLOID: Performed by: SURGERY

## 2020-11-17 PROCEDURE — 88333 PATH CONSLTJ SURG CYTO XM 1: CPT | Performed by: SURGERY

## 2020-11-17 PROCEDURE — 38792 RA TRACER ID OF SENTINL NODE: CPT

## 2020-11-17 DEVICE — LIGACLIP EXTRA LIGATING CLIP CARTRIDGES: 6 TITANIUM CLIPS/ CARTRIDGE (SMALL)
Type: IMPLANTABLE DEVICE | Status: FUNCTIONAL
Brand: LIGACLIP

## 2020-11-17 DEVICE — LIGACLIP EXTRA LIGATING CLIP CARTRIDGES: 6 TITANIUM CLIPS/ CARTRIDGE (MEDIUM)
Type: IMPLANTABLE DEVICE | Status: FUNCTIONAL
Brand: LIGACLIP

## 2020-11-17 RX ORDER — DROPERIDOL 2.5 MG/ML
0.62 INJECTION, SOLUTION INTRAMUSCULAR; INTRAVENOUS ONCE AS NEEDED
Status: DISCONTINUED | OUTPATIENT
Start: 2020-11-17 | End: 2020-11-17 | Stop reason: HOSPADM

## 2020-11-17 RX ORDER — CLINDAMYCIN PHOSPHATE 900 MG/50ML
900 INJECTION INTRAVENOUS EVERY 8 HOURS
Status: COMPLETED | OUTPATIENT
Start: 2020-11-17 | End: 2020-11-18

## 2020-11-17 RX ORDER — BUPRENORPHINE HYDROCHLORIDE 0.32 MG/ML
INJECTION INTRAMUSCULAR; INTRAVENOUS
Status: COMPLETED | OUTPATIENT
Start: 2020-11-17 | End: 2020-11-17

## 2020-11-17 RX ORDER — FENTANYL CITRATE 50 UG/ML
50 INJECTION, SOLUTION INTRAMUSCULAR; INTRAVENOUS
Status: DISCONTINUED | OUTPATIENT
Start: 2020-11-17 | End: 2020-11-17 | Stop reason: HOSPADM

## 2020-11-17 RX ORDER — SODIUM CHLORIDE 0.9 % (FLUSH) 0.9 %
10 SYRINGE (ML) INJECTION AS NEEDED
Status: DISCONTINUED | OUTPATIENT
Start: 2020-11-17 | End: 2020-11-17 | Stop reason: HOSPADM

## 2020-11-17 RX ORDER — MIDAZOLAM HYDROCHLORIDE 1 MG/ML
1 INJECTION INTRAMUSCULAR; INTRAVENOUS
Status: DISCONTINUED | OUTPATIENT
Start: 2020-11-17 | End: 2020-11-17 | Stop reason: HOSPADM

## 2020-11-17 RX ORDER — SODIUM CHLORIDE 0.9 % (FLUSH) 0.9 %
3 SYRINGE (ML) INJECTION EVERY 12 HOURS SCHEDULED
Status: DISCONTINUED | OUTPATIENT
Start: 2020-11-17 | End: 2020-11-18 | Stop reason: HOSPADM

## 2020-11-17 RX ORDER — IPRATROPIUM BROMIDE AND ALBUTEROL SULFATE 2.5; .5 MG/3ML; MG/3ML
3 SOLUTION RESPIRATORY (INHALATION) ONCE AS NEEDED
Status: DISCONTINUED | OUTPATIENT
Start: 2020-11-17 | End: 2020-11-17 | Stop reason: HOSPADM

## 2020-11-17 RX ORDER — ONDANSETRON 2 MG/ML
INJECTION INTRAMUSCULAR; INTRAVENOUS AS NEEDED
Status: DISCONTINUED | OUTPATIENT
Start: 2020-11-17 | End: 2020-11-17 | Stop reason: SURG

## 2020-11-17 RX ORDER — ACETAMINOPHEN 500 MG
1000 TABLET ORAL EVERY 6 HOURS SCHEDULED
Status: DISCONTINUED | OUTPATIENT
Start: 2020-11-17 | End: 2020-11-18 | Stop reason: HOSPADM

## 2020-11-17 RX ORDER — DOCUSATE SODIUM 100 MG/1
100 CAPSULE, LIQUID FILLED ORAL 2 TIMES DAILY
Status: DISCONTINUED | OUTPATIENT
Start: 2020-11-17 | End: 2020-11-18 | Stop reason: HOSPADM

## 2020-11-17 RX ORDER — MAGNESIUM HYDROXIDE 1200 MG/15ML
LIQUID ORAL AS NEEDED
Status: DISCONTINUED | OUTPATIENT
Start: 2020-11-17 | End: 2020-11-17 | Stop reason: HOSPADM

## 2020-11-17 RX ORDER — SODIUM CHLORIDE, SODIUM LACTATE, POTASSIUM CHLORIDE, CALCIUM CHLORIDE 600; 310; 30; 20 MG/100ML; MG/100ML; MG/100ML; MG/100ML
125 INJECTION, SOLUTION INTRAVENOUS CONTINUOUS
Status: DISCONTINUED | OUTPATIENT
Start: 2020-11-17 | End: 2020-11-17

## 2020-11-17 RX ORDER — SODIUM CHLORIDE 0.9 % (FLUSH) 0.9 %
10 SYRINGE (ML) INJECTION AS NEEDED
Status: DISCONTINUED | OUTPATIENT
Start: 2020-11-17 | End: 2020-11-18 | Stop reason: HOSPADM

## 2020-11-17 RX ORDER — MEPERIDINE HYDROCHLORIDE 25 MG/ML
12.5 INJECTION INTRAMUSCULAR; INTRAVENOUS; SUBCUTANEOUS
Status: DISCONTINUED | OUTPATIENT
Start: 2020-11-17 | End: 2020-11-17 | Stop reason: HOSPADM

## 2020-11-17 RX ORDER — SODIUM CHLORIDE, SODIUM LACTATE, POTASSIUM CHLORIDE, CALCIUM CHLORIDE 600; 310; 30; 20 MG/100ML; MG/100ML; MG/100ML; MG/100ML
100 INJECTION, SOLUTION INTRAVENOUS ONCE AS NEEDED
Status: DISCONTINUED | OUTPATIENT
Start: 2020-11-17 | End: 2020-11-17 | Stop reason: HOSPADM

## 2020-11-17 RX ORDER — PANTOPRAZOLE SODIUM 40 MG/1
40 TABLET, DELAYED RELEASE ORAL
Status: DISCONTINUED | OUTPATIENT
Start: 2020-11-17 | End: 2020-11-18 | Stop reason: HOSPADM

## 2020-11-17 RX ORDER — POLYETHYLENE GLYCOL 3350 17 G/17G
17 POWDER, FOR SOLUTION ORAL 2 TIMES DAILY
Status: DISCONTINUED | OUTPATIENT
Start: 2020-11-17 | End: 2020-11-18 | Stop reason: HOSPADM

## 2020-11-17 RX ORDER — HYDROMORPHONE HYDROCHLORIDE 1 MG/ML
0.5 INJECTION, SOLUTION INTRAMUSCULAR; INTRAVENOUS; SUBCUTANEOUS
Status: DISCONTINUED | OUTPATIENT
Start: 2020-11-17 | End: 2020-11-17 | Stop reason: HOSPADM

## 2020-11-17 RX ORDER — LEVOTHYROXINE SODIUM 0.07 MG/1
75 TABLET ORAL
Status: DISCONTINUED | OUTPATIENT
Start: 2020-11-18 | End: 2020-11-18 | Stop reason: HOSPADM

## 2020-11-17 RX ORDER — ONDANSETRON 2 MG/ML
4 INJECTION INTRAMUSCULAR; INTRAVENOUS EVERY 4 HOURS PRN
Status: DISCONTINUED | OUTPATIENT
Start: 2020-11-17 | End: 2020-11-18 | Stop reason: HOSPADM

## 2020-11-17 RX ORDER — PROPOFOL 10 MG/ML
VIAL (ML) INTRAVENOUS AS NEEDED
Status: DISCONTINUED | OUTPATIENT
Start: 2020-11-17 | End: 2020-11-17 | Stop reason: SURG

## 2020-11-17 RX ORDER — ROPIVACAINE HYDROCHLORIDE 5 MG/ML
INJECTION, SOLUTION EPIDURAL; INFILTRATION; PERINEURAL
Status: COMPLETED | OUTPATIENT
Start: 2020-11-17 | End: 2020-11-17

## 2020-11-17 RX ORDER — ISOSULFAN BLUE 50 MG/5ML
INJECTION, SOLUTION SUBCUTANEOUS AS NEEDED
Status: DISCONTINUED | OUTPATIENT
Start: 2020-11-17 | End: 2020-11-17 | Stop reason: HOSPADM

## 2020-11-17 RX ORDER — AMLODIPINE BESYLATE 10 MG/1
10 TABLET ORAL DAILY
Status: DISCONTINUED | OUTPATIENT
Start: 2020-11-18 | End: 2020-11-18 | Stop reason: HOSPADM

## 2020-11-17 RX ORDER — DEXAMETHASONE SODIUM PHOSPHATE 4 MG/ML
INJECTION, SOLUTION INTRA-ARTICULAR; INTRALESIONAL; INTRAMUSCULAR; INTRAVENOUS; SOFT TISSUE
Status: COMPLETED | OUTPATIENT
Start: 2020-11-17 | End: 2020-11-17

## 2020-11-17 RX ORDER — OXYCODONE HYDROCHLORIDE AND ACETAMINOPHEN 5; 325 MG/1; MG/1
1 TABLET ORAL ONCE AS NEEDED
Status: DISCONTINUED | OUTPATIENT
Start: 2020-11-17 | End: 2020-11-17 | Stop reason: HOSPADM

## 2020-11-17 RX ORDER — METOPROLOL SUCCINATE 50 MG/1
100 TABLET, EXTENDED RELEASE ORAL DAILY
Status: DISCONTINUED | OUTPATIENT
Start: 2020-11-18 | End: 2020-11-18 | Stop reason: HOSPADM

## 2020-11-17 RX ORDER — GLYCOPYRROLATE 0.2 MG/ML
INJECTION INTRAMUSCULAR; INTRAVENOUS AS NEEDED
Status: DISCONTINUED | OUTPATIENT
Start: 2020-11-17 | End: 2020-11-17 | Stop reason: SURG

## 2020-11-17 RX ORDER — SODIUM CHLORIDE 0.9 % (FLUSH) 0.9 %
10 SYRINGE (ML) INJECTION EVERY 12 HOURS SCHEDULED
Status: DISCONTINUED | OUTPATIENT
Start: 2020-11-17 | End: 2020-11-17 | Stop reason: HOSPADM

## 2020-11-17 RX ORDER — FENTANYL CITRATE 50 UG/ML
INJECTION, SOLUTION INTRAMUSCULAR; INTRAVENOUS AS NEEDED
Status: DISCONTINUED | OUTPATIENT
Start: 2020-11-17 | End: 2020-11-17 | Stop reason: SURG

## 2020-11-17 RX ORDER — CLINDAMYCIN PHOSPHATE 900 MG/50ML
900 INJECTION INTRAVENOUS ONCE
Status: COMPLETED | OUTPATIENT
Start: 2020-11-17 | End: 2020-11-17

## 2020-11-17 RX ORDER — SODIUM CHLORIDE, SODIUM LACTATE, POTASSIUM CHLORIDE, CALCIUM CHLORIDE 600; 310; 30; 20 MG/100ML; MG/100ML; MG/100ML; MG/100ML
100 INJECTION, SOLUTION INTRAVENOUS CONTINUOUS
Status: DISCONTINUED | OUTPATIENT
Start: 2020-11-17 | End: 2020-11-18 | Stop reason: HOSPADM

## 2020-11-17 RX ORDER — ONDANSETRON 2 MG/ML
4 INJECTION INTRAMUSCULAR; INTRAVENOUS AS NEEDED
Status: DISCONTINUED | OUTPATIENT
Start: 2020-11-17 | End: 2020-11-17 | Stop reason: HOSPADM

## 2020-11-17 RX ORDER — FAMOTIDINE 10 MG/ML
INJECTION, SOLUTION INTRAVENOUS AS NEEDED
Status: DISCONTINUED | OUTPATIENT
Start: 2020-11-17 | End: 2020-11-17 | Stop reason: SURG

## 2020-11-17 RX ORDER — LIDOCAINE HYDROCHLORIDE 20 MG/ML
INJECTION, SOLUTION INFILTRATION; PERINEURAL AS NEEDED
Status: DISCONTINUED | OUTPATIENT
Start: 2020-11-17 | End: 2020-11-17 | Stop reason: SURG

## 2020-11-17 RX ORDER — HEPARIN SODIUM 5000 [USP'U]/ML
5000 INJECTION, SOLUTION INTRAVENOUS; SUBCUTANEOUS EVERY 12 HOURS SCHEDULED
Status: DISCONTINUED | OUTPATIENT
Start: 2020-11-18 | End: 2020-11-18 | Stop reason: HOSPADM

## 2020-11-17 RX ORDER — KETOROLAC TROMETHAMINE 30 MG/ML
15 INJECTION, SOLUTION INTRAMUSCULAR; INTRAVENOUS EVERY 6 HOURS
Status: DISCONTINUED | OUTPATIENT
Start: 2020-11-17 | End: 2020-11-18 | Stop reason: HOSPADM

## 2020-11-17 RX ADMIN — DEXAMETHASONE SODIUM PHOSPHATE 4 MG: 4 INJECTION, SOLUTION INTRAMUSCULAR; INTRAVENOUS at 07:55

## 2020-11-17 RX ADMIN — SODIUM CHLORIDE, POTASSIUM CHLORIDE, SODIUM LACTATE AND CALCIUM CHLORIDE 100 ML/HR: 600; 310; 30; 20 INJECTION, SOLUTION INTRAVENOUS at 19:40

## 2020-11-17 RX ADMIN — PROPOFOL 130 MG: 10 INJECTION, EMULSION INTRAVENOUS at 07:44

## 2020-11-17 RX ADMIN — FENTANYL CITRATE 25 MCG: 50 INJECTION INTRAMUSCULAR; INTRAVENOUS at 10:10

## 2020-11-17 RX ADMIN — LIDOCAINE HYDROCHLORIDE 40 MG: 20 INJECTION, SOLUTION INFILTRATION; PERINEURAL at 07:44

## 2020-11-17 RX ADMIN — EPHEDRINE SULFATE 10 MG: 50 INJECTION, SOLUTION INTRAVENOUS at 08:03

## 2020-11-17 RX ADMIN — EPHEDRINE SULFATE 5 MG: 50 INJECTION, SOLUTION INTRAVENOUS at 09:12

## 2020-11-17 RX ADMIN — SODIUM CHLORIDE, POTASSIUM CHLORIDE, SODIUM LACTATE AND CALCIUM CHLORIDE: 600; 310; 30; 20 INJECTION, SOLUTION INTRAVENOUS at 07:37

## 2020-11-17 RX ADMIN — CLINDAMYCIN PHOSPHATE 900 MG: 900 INJECTION, SOLUTION INTRAVENOUS at 07:47

## 2020-11-17 RX ADMIN — BUPRENORPHINE HYDROCHLORIDE 0.3 MG: 0.32 INJECTION INTRAMUSCULAR; INTRAVENOUS at 07:55

## 2020-11-17 RX ADMIN — FENTANYL CITRATE 25 MCG: 50 INJECTION INTRAMUSCULAR; INTRAVENOUS at 08:34

## 2020-11-17 RX ADMIN — ONDANSETRON 4 MG: 2 INJECTION INTRAMUSCULAR; INTRAVENOUS at 17:11

## 2020-11-17 RX ADMIN — ONDANSETRON 4 MG: 2 INJECTION INTRAMUSCULAR; INTRAVENOUS at 07:41

## 2020-11-17 RX ADMIN — ACETAMINOPHEN 1000 MG: 500 TABLET ORAL at 23:42

## 2020-11-17 RX ADMIN — FAMOTIDINE 20 MG: 10 INJECTION INTRAVENOUS at 07:41

## 2020-11-17 RX ADMIN — GLYCOPYRROLATE 0.2 MG: 0.2 INJECTION, SOLUTION INTRAMUSCULAR; INTRAVENOUS at 08:15

## 2020-11-17 RX ADMIN — SODIUM CHLORIDE, POTASSIUM CHLORIDE, SODIUM LACTATE AND CALCIUM CHLORIDE: 600; 310; 30; 20 INJECTION, SOLUTION INTRAVENOUS at 08:25

## 2020-11-17 RX ADMIN — DOCUSATE SODIUM 100 MG: 100 CAPSULE ORAL at 22:09

## 2020-11-17 RX ADMIN — TECHNETIUM TC 99M SULFUR COLLOID 1 DOSE: KIT at 07:40

## 2020-11-17 RX ADMIN — ROPIVACAINE HYDROCHLORIDE 150 MG: 5 INJECTION, SOLUTION EPIDURAL; INFILTRATION; PERINEURAL at 07:55

## 2020-11-17 RX ADMIN — CLINDAMYCIN PHOSPHATE 900 MG: 900 INJECTION, SOLUTION INTRAVENOUS at 16:50

## 2020-11-17 RX ADMIN — FENTANYL CITRATE 50 MCG: 50 INJECTION INTRAMUSCULAR; INTRAVENOUS at 07:41

## 2020-11-17 NOTE — ANESTHESIA POSTPROCEDURE EVALUATION
Patient: Lashell Case    Procedure Summary     Date: 11/17/20 Room / Location: [facility] OR 01 /  [facility] OR    Anesthesia Start: 0737 Anesthesia Stop: 1023    Procedure: BREAST MASTECTOMY WITH SENTINEL NODE BIOPSY AND AXILLARY NODE DISSECTION (Right Breast) Diagnosis:       Breast mass      (Breast mass [N63.0])    Surgeon: Cynthia Ward MD Provider: Ismael Leggett DO    Anesthesia Type: general ASA Status: 3          Anesthesia Type: general    Vitals  Vitals Value Taken Time   /82 11/17/20 1054   Temp 97.5 °F (36.4 °C) 11/17/20 1054   Pulse 67 11/17/20 1054   Resp 14 11/17/20 1054   SpO2 100 % 11/17/20 1054           Post Anesthesia Care and Evaluation    Patient location during evaluation: PHASE II  Patient participation: complete - patient participated  Level of consciousness: awake and alert  Pain score: 1  Pain management: adequate  Airway patency: patent  Anesthetic complications: No anesthetic complications  PONV Status: controlled  Cardiovascular status: acceptable  Respiratory status: acceptable  Hydration status: acceptable

## 2020-11-17 NOTE — ANESTHESIA PREPROCEDURE EVALUATION
Anesthesia Evaluation     Patient summary reviewed and Nursing notes reviewed   no history of anesthetic complications:               Airway   Mallampati: II  TM distance: >3 FB  Neck ROM: limited  No difficulty expected  Dental - normal exam     Pulmonary - negative pulmonary ROS and normal exam   Cardiovascular - normal exam  Exercise tolerance: good (4-7 METS)    NYHA Classification: II  ECG reviewed    (+) hypertension, hyperlipidemia,       Neuro/Psych  (+) dizziness/light headedness,     GI/Hepatic/Renal/Endo    (+)   liver disease,     Musculoskeletal     Abdominal  - normal exam    Bowel sounds: normal.   Substance History - negative use     OB/GYN negative ob/gyn ROS         Other   arthritis,                      Anesthesia Plan    ASA 3     general     intravenous induction     Anesthetic plan, all risks, benefits, and alternatives have been provided, discussed and informed consent has been obtained with: patient.

## 2020-11-17 NOTE — ANESTHESIA PROCEDURE NOTES
Peripheral Block      Patient location during procedure: OR  Start time: 11/17/2020 7:53 AM  Stop time: 11/17/2020 7:56 AM  Reason for block: at surgeon's request and post-op pain management  Performed by  CRNA: Jennifer Sunshine CRNA  Preanesthetic Checklist  Completed: patient identified, site marked, surgical consent, pre-op evaluation, timeout performed, IV checked, risks and benefits discussed and monitors and equipment checked  Prep:  Pt Position: supine  Sterile barriers:cap, gloves, gown and mask  Prep: ChloraPrep  Patient monitoring: blood pressure monitoring, continuous pulse oximetry and EKG  Procedure  Sedation:yes (General Anesthesia)    Guidance:ultrasound guided and landmark technique  Images:still images obtained    Block Type:PECS I and PECS II (PECS)  Injection Technique:single-shot  Needle Type:short-bevel  Needle Gauge:20 G  Resistance on Injection: none    Medications Used: buprenorphine (BUPRENEX) injection, 0.3 mg  dexamethasone (DECADRON) injection, 4 mg  ropivacaine (NAROPIN) injection 0.5 %, 150 mg  Med admintered at 11/17/2020 7:55 AM      Medications  Preservative Free Saline:10ml  Comment:Block Injection: 20ml of LA mix injected at each PECS 2 site, 10ml of LA mix injected at each PECS 1 site          Post Assessment  Injection Assessment: negative aspiration for heme, incremental injection and no paresthesia on injection  Patient Tolerance:comfortable throughout block  Complications:no  Additional Notes  The pt. was placed in the Supine Position and was placed under GA.     The insertion site was prepped with CHG and Ultrasound guidance with In-Plane technique was  a 4inch BBraun 360 degree echogenic needle was visualized.  Normal Saline PSF was utilized for hydrodissection of tissue. PECS 2-  Pectoralis Minor and Serratus muscle where identified and the needle was advanced laterally in-plane with the 4th rib as a backstop, pleura was monitored.  LA was injected between SA and PmM at  the level of 4th rib( 20ml). PECS 1 Block- Pectoralis Major and Minor where identified and LA was injected between PMM and PmM at the level of the 3rd Rib(10ml)  LA injection spread was visualized, injection was incremental 1-5ml, normal or low injection pressure, no intravascular injection, no pneumothorax appreciated. Thank You.

## 2020-11-17 NOTE — ANESTHESIA PROCEDURE NOTES
Airway  Urgency: elective    Date/Time: 11/17/2020 7:45 AM  Airway not difficult    General Information and Staff    Patient location during procedure: OR  CRNA: Jennifer Sunshine CRNA    Indications and Patient Condition  Indications for airway management: airway protection    Preoxygenated: yes  MILS maintained throughout  Mask difficulty assessment: 0 - not attempted    Final Airway Details  Final airway type: supraglottic airway      Successful airway: unique  Size 3    Number of attempts at approach: 1  Assessment: lips, teeth, and gum same as pre-op

## 2020-11-18 VITALS
HEART RATE: 50 BPM | BODY MASS INDEX: 22.53 KG/M2 | RESPIRATION RATE: 12 BRPM | DIASTOLIC BLOOD PRESSURE: 64 MMHG | TEMPERATURE: 97.4 F | HEIGHT: 66 IN | WEIGHT: 140.2 LBS | OXYGEN SATURATION: 98 % | SYSTOLIC BLOOD PRESSURE: 160 MMHG

## 2020-11-18 PROCEDURE — 63710000001 AMLODIPINE 10 MG TABLET: Performed by: SURGERY

## 2020-11-18 PROCEDURE — 63710000001 LEVOTHYROXINE 75 MCG TABLET: Performed by: SURGERY

## 2020-11-18 PROCEDURE — 63710000001 HYDROCHLOROTHIAZIDE 25 MG TABLET 100 EACH BOTTLE: Performed by: SURGERY

## 2020-11-18 PROCEDURE — A9270 NON-COVERED ITEM OR SERVICE: HCPCS | Performed by: SURGERY

## 2020-11-18 PROCEDURE — 63710000001 LISINOPRIL 10 MG TABLET 100 EACH BOTTLE: Performed by: SURGERY

## 2020-11-18 PROCEDURE — 63710000001 METOPROLOL SUCCINATE XL 50 MG TABLET SUSTAINED-RELEASE 24 HOUR: Performed by: SURGERY

## 2020-11-18 PROCEDURE — 63710000001 DOCUSATE SODIUM 100 MG CAPSULE: Performed by: SURGERY

## 2020-11-18 PROCEDURE — 25010000002 HEPARIN (PORCINE) PER 1000 UNITS: Performed by: SURGERY

## 2020-11-18 PROCEDURE — 99024 POSTOP FOLLOW-UP VISIT: CPT | Performed by: SURGERY

## 2020-11-18 RX ORDER — CEFDINIR 300 MG/1
300 CAPSULE ORAL 2 TIMES DAILY
Qty: 14 CAPSULE | Refills: 0 | Status: SHIPPED | OUTPATIENT
Start: 2020-11-18 | End: 2020-11-25

## 2020-11-18 RX ADMIN — LISINOPRIL: 10 TABLET ORAL at 08:23

## 2020-11-18 RX ADMIN — AMLODIPINE BESYLATE 10 MG: 10 TABLET ORAL at 08:24

## 2020-11-18 RX ADMIN — METOPROLOL SUCCINATE 100 MG: 50 TABLET, EXTENDED RELEASE ORAL at 08:24

## 2020-11-18 RX ADMIN — CLINDAMYCIN PHOSPHATE 900 MG: 900 INJECTION, SOLUTION INTRAVENOUS at 00:04

## 2020-11-18 RX ADMIN — HEPARIN SODIUM 5000 UNITS: 5000 INJECTION INTRAVENOUS; SUBCUTANEOUS at 10:45

## 2020-11-18 RX ADMIN — DOCUSATE SODIUM 100 MG: 100 CAPSULE ORAL at 08:24

## 2020-11-18 RX ADMIN — LEVOTHYROXINE SODIUM 75 MCG: 75 TABLET ORAL at 05:33

## 2020-11-23 ENCOUNTER — OFFICE VISIT (OUTPATIENT)
Dept: SURGERY | Facility: CLINIC | Age: 85
End: 2020-11-23

## 2020-11-23 VITALS
BODY MASS INDEX: 22.11 KG/M2 | HEIGHT: 66 IN | HEART RATE: 80 BPM | SYSTOLIC BLOOD PRESSURE: 174 MMHG | DIASTOLIC BLOOD PRESSURE: 71 MMHG | WEIGHT: 137.6 LBS

## 2020-11-23 DIAGNOSIS — Z17.1 MALIGNANT NEOPLASM OF LOWER-INNER QUADRANT OF RIGHT BREAST OF FEMALE, ESTROGEN RECEPTOR NEGATIVE (HCC): Primary | ICD-10-CM

## 2020-11-23 DIAGNOSIS — C50.311 MALIGNANT NEOPLASM OF LOWER-INNER QUADRANT OF RIGHT BREAST OF FEMALE, ESTROGEN RECEPTOR NEGATIVE (HCC): Primary | ICD-10-CM

## 2020-11-23 DIAGNOSIS — Z09 POSTOP CHECK: ICD-10-CM

## 2020-11-23 LAB
LAB AP CASE REPORT: NORMAL
LAB AP CASE REPORT: NORMAL
PATH REPORT.FINAL DX SPEC: NORMAL
PATH REPORT.FINAL DX SPEC: NORMAL

## 2020-11-23 PROCEDURE — 99024 POSTOP FOLLOW-UP VISIT: CPT | Performed by: SURGERY

## 2020-11-23 NOTE — PROGRESS NOTES
"Subjective   Lashell Case is a 85 y.o. female here today for post op and pathology review.    History of Present Illness  Ms. Case was seen in the office today for her first postoperative visit following a right mastectomy with sentinel node biopsy and completion axillary node dissection.  Final pathology demonstrated total of 10 lymph nodes negative for malignancy.  Areas of fat necrosis were identified.  Final tumor size was 4 cm.  Allergies   Allergen Reactions   • Bactrim [Sulfamethoxazole-Trimethoprim] GI Intolerance   • Ketorolac Nausea And Vomiting   • Phenergan [Promethazine Hcl] Other (See Comments)     Restless legs and trouble swallowing, seizures   • Codeine Nausea And Vomiting   • Penicillins Rash     tolerates Keflex per patient         Current Outpatient Medications   Medication Sig Dispense Refill   • amLODIPine (NORVASC) 10 MG tablet Take 1 tablet by mouth Daily. Hold for BP <110/60 30 tablet 6   • cefdinir (OMNICEF) 300 MG capsule Take 1 capsule by mouth 2 (Two) Times a Day for 7 days. 14 capsule 0   • levothyroxine (SYNTHROID, LEVOTHROID) 75 MCG tablet Take 1 tablet by mouth Daily. 30 tablet 6   • lisinopril-hydrochlorothiazide (PRINZIDE,ZESTORETIC) 20-25 MG per tablet Take 1 tablet by mouth Daily. 30 tablet 6   • metoprolol succinate XL (TOPROL-XL) 100 MG 24 hr tablet Take 1 tablet by mouth Daily. for blood pressure 30 tablet 6     No current facility-administered medications for this visit.        Objective   Ht 167.6 cm (66\")   Wt 62.4 kg (137 lb 9.6 oz)   BMI 22.21 kg/m²    Physical Exam  On examination of the right chest wall there is a healing mastectomy scar.  Steri-Strips are in place.  After review of FRANCHESCA outputs the medial FRANCHESCA drain was removed.    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  Patient is not interested in adjuvant chemotherapy and is not interested in consultation with medical oncology  Patient's Body mass index is 22.21 kg/m². BMI is within normal parameters. No follow-up required..    Future Appointments   Date Time Provider Department Center   4/27/2021  1:00 PM Alli Wolf MD [facility]         Please note that portions of this note were completed with a voice recognition program.

## 2020-11-30 ENCOUNTER — OFFICE VISIT (OUTPATIENT)
Dept: SURGERY | Facility: CLINIC | Age: 85
End: 2020-11-30

## 2020-11-30 VITALS
HEIGHT: 66 IN | DIASTOLIC BLOOD PRESSURE: 80 MMHG | WEIGHT: 135.6 LBS | BODY MASS INDEX: 21.79 KG/M2 | SYSTOLIC BLOOD PRESSURE: 176 MMHG | HEART RATE: 64 BPM

## 2020-11-30 DIAGNOSIS — Z09 POSTOP CHECK: ICD-10-CM

## 2020-11-30 DIAGNOSIS — Z17.1 MALIGNANT NEOPLASM OF LOWER-INNER QUADRANT OF RIGHT BREAST OF FEMALE, ESTROGEN RECEPTOR NEGATIVE (HCC): Primary | ICD-10-CM

## 2020-11-30 DIAGNOSIS — C50.311 MALIGNANT NEOPLASM OF LOWER-INNER QUADRANT OF RIGHT BREAST OF FEMALE, ESTROGEN RECEPTOR NEGATIVE (HCC): Primary | ICD-10-CM

## 2020-11-30 PROCEDURE — 99024 POSTOP FOLLOW-UP VISIT: CPT | Performed by: SURGERY

## 2020-12-07 ENCOUNTER — OFFICE VISIT (OUTPATIENT)
Dept: SURGERY | Facility: CLINIC | Age: 85
End: 2020-12-07

## 2020-12-07 VITALS
DIASTOLIC BLOOD PRESSURE: 96 MMHG | BODY MASS INDEX: 21.79 KG/M2 | WEIGHT: 135.6 LBS | SYSTOLIC BLOOD PRESSURE: 164 MMHG | HEIGHT: 66 IN | HEART RATE: 97 BPM

## 2020-12-07 DIAGNOSIS — Z17.0 MALIGNANT NEOPLASM OF LEFT BREAST IN FEMALE, ESTROGEN RECEPTOR POSITIVE, UNSPECIFIED SITE OF BREAST (HCC): Primary | ICD-10-CM

## 2020-12-07 DIAGNOSIS — Z09 POSTOP CHECK: ICD-10-CM

## 2020-12-07 DIAGNOSIS — C50.912 MALIGNANT NEOPLASM OF LEFT BREAST IN FEMALE, ESTROGEN RECEPTOR POSITIVE, UNSPECIFIED SITE OF BREAST (HCC): Primary | ICD-10-CM

## 2020-12-07 PROCEDURE — 99024 POSTOP FOLLOW-UP VISIT: CPT | Performed by: SURGERY

## 2020-12-07 NOTE — PROGRESS NOTES
"Subjective   Lashell Case is a 85 y.o. female for follow up post op.    History of Present Illness  Ms. Case was seen in the office today for her  postoperative visit following a right mastectomy with sentinel node biopsy and completion axillary node dissection.  Final pathology demonstrated total of 10 lymph nodes negative for malignancy.  Areas of fat necrosis were identified.  Final tumor size was 4 cm.   Patient voices no complaints    Allergies   Allergen Reactions   • Bactrim [Sulfamethoxazole-Trimethoprim] GI Intolerance   • Ketorolac Nausea And Vomiting   • Phenergan [Promethazine Hcl] Other (See Comments)     Restless legs and trouble swallowing, seizures   • Codeine Nausea And Vomiting   • Penicillins Rash     tolerates Keflex per patient         Current Outpatient Medications   Medication Sig Dispense Refill   • amLODIPine (NORVASC) 10 MG tablet Take 1 tablet by mouth Daily. Hold for BP <110/60 30 tablet 6   • levothyroxine (SYNTHROID, LEVOTHROID) 75 MCG tablet Take 1 tablet by mouth Daily. 30 tablet 6   • lisinopril-hydrochlorothiazide (PRINZIDE,ZESTORETIC) 20-25 MG per tablet Take 1 tablet by mouth Daily. 30 tablet 6   • metoprolol succinate XL (TOPROL-XL) 100 MG 24 hr tablet Take 1 tablet by mouth Daily. for blood pressure 30 tablet 6     No current facility-administered medications for this visit.      Objective   Ht 167.6 cm (66\")   Wt 61.5 kg (135 lb 9.6 oz)   BMI 21.89 kg/m²    Physical Exam  On examination of the right chest there is a healing mastectomy scar.  No seroma is appreciated.    Results/Data      Procedures     Assessment/Plan   T2 N0 triple negative grade 3 ductal carcinoma of the right breast, status post right modified radical mastectomy    Plan: Patient again declined consideration for chemotherapy.  She is not a candidate for hormonal therapy.  The patient is right at the cutoff for radiation and given her negative aylin status and widely clear margins I do not see a " large benefit to radiation.    Follow-up in 3 months           Discussion/Summary  Patient's Body mass index is 21.89 kg/m². BMI is within normal parameters. No follow-up required..    Future Appointments   Date Time Provider Department Center   12/7/2020  2:10 PM Cynthia Ward MD [facility] None   4/27/2021  1:00 PM Alli Wolf MD [facility]         Please note that portions of this note were completed with a voice recognition program.

## 2021-03-08 ENCOUNTER — OFFICE VISIT (OUTPATIENT)
Dept: SURGERY | Facility: CLINIC | Age: 86
End: 2021-03-08

## 2021-03-08 VITALS
HEART RATE: 80 BPM | BODY MASS INDEX: 21.44 KG/M2 | HEIGHT: 66 IN | SYSTOLIC BLOOD PRESSURE: 164 MMHG | WEIGHT: 133.4 LBS | DIASTOLIC BLOOD PRESSURE: 82 MMHG

## 2021-03-08 DIAGNOSIS — C50.912 MALIGNANT NEOPLASM OF LEFT BREAST IN FEMALE, ESTROGEN RECEPTOR POSITIVE, UNSPECIFIED SITE OF BREAST (HCC): Primary | ICD-10-CM

## 2021-03-08 DIAGNOSIS — Z17.0 MALIGNANT NEOPLASM OF LEFT BREAST IN FEMALE, ESTROGEN RECEPTOR POSITIVE, UNSPECIFIED SITE OF BREAST (HCC): Primary | ICD-10-CM

## 2021-03-08 DIAGNOSIS — Z17.1 MALIGNANT NEOPLASM OF RIGHT BREAST IN FEMALE, ESTROGEN RECEPTOR NEGATIVE, UNSPECIFIED SITE OF BREAST (HCC): ICD-10-CM

## 2021-03-08 DIAGNOSIS — C50.911 MALIGNANT NEOPLASM OF RIGHT BREAST IN FEMALE, ESTROGEN RECEPTOR NEGATIVE, UNSPECIFIED SITE OF BREAST (HCC): ICD-10-CM

## 2021-03-08 PROCEDURE — 99213 OFFICE O/P EST LOW 20 MIN: CPT | Performed by: SURGERY

## 2021-03-08 NOTE — PROGRESS NOTES
Subjective   Lashell Case is a 85 y.o. female here today for 3 month follow up.    History of Present Illness  Ms. Case was seen in the office today for breast cancer follow-up.  She is status post a left modified radical mastectomy on 11/24/2015 for a T2N1 left breast carcinoma.  Patient did complete adjuvant hormonal therapy.  In November, 2020 the patient was diagnosed with triple negative poorly differentiated right breast cancer.  She underwent a right modified radical mastectomy which demonstrated a T2N0 carcinoma.  Post that procedure patient was not willing to consider chemotherapy.  Of note is that the patient did have grade 2 invasive ductal carcinoma of the right breast for which she underwent a right partial mastectomy in 2004 followed by whole breast radiation and treatment with Arimidex for 1 year.  She then developed left breast cancer in August 2007 when she presented with bloody left nipple discharge.  Patient states she feels well.  She denies any palpable abnormalities in any chest wall or axilla.  She denies any arm edema.  She denies any symptoms of metastatic disease.  The patient's last bone density was in 2016.  She has declined repeat bone density scan she states she will not undergo any treatment for osteopenia or osteoporosis.  Allergies   Allergen Reactions   • Bactrim [Sulfamethoxazole-Trimethoprim] GI Intolerance   • Ketorolac Nausea And Vomiting   • Phenergan [Promethazine Hcl] Other (See Comments)     Restless legs and trouble swallowing, seizures   • Codeine Nausea And Vomiting   • Penicillins Rash     tolerates Keflex per patient       Current Outpatient Medications   Medication Sig Dispense Refill   • amLODIPine (NORVASC) 10 MG tablet Take 1 tablet by mouth Daily. Hold for BP <110/60 30 tablet 6   • levothyroxine (SYNTHROID, LEVOTHROID) 75 MCG tablet Take 1 tablet by mouth Daily. 30 tablet 6   • lisinopril-hydrochlorothiazide (PRINZIDE,ZESTORETIC) 20-25 MG per tablet Take 1  "tablet by mouth Daily. 30 tablet 6   • metoprolol succinate XL (TOPROL-XL) 100 MG 24 hr tablet Take 1 tablet by mouth Daily. for blood pressure 30 tablet 6     No current facility-administered medications for this visit.     Past Medical History:   Diagnosis Date   • Arthritis    • Breast cancer (CMS/HCC) 2015    lt br ca   • Breast cancer (CMS/HCC) 2006    rt br ca   • [place]    • Hyperlipidemia    • Hypertension    • Hypothyroidism    • Scabies exposure      Past Surgical History:   Procedure Laterality Date   • BREAST BIOPSY     • BREAST LUMPECTOMY Right 2006   • BREAST SURGERY     • MASTECTOMY Left 2015    [facility]   • MASTECTOMY WITH SENTINEL NODE BIOPSY AND AXILLARY NODE DISSECTION Right 11/17/2020    Procedure: BREAST MASTECTOMY WITH SENTINEL NODE BIOPSY AND AXILLARY NODE DISSECTION;  Surgeon: Cynthia Ward MD;  Location: [facility];  Service: General;  Laterality: Right;       Pertinent Review of Systems      Objective   Ht 167.6 cm (66\")   Wt 60.5 kg (133 lb 6.4 oz)   BMI 21.53 kg/m²    Physical Exam  General:  This is a WD WN female in no acute distress  Lungs:  Respiratory effort normal. Auscultation: Clear, without wheezes, rhonchi, rales  Heart:  Regular rate and rhythm, without murmur, gallop, rub.  No pedal edema  Breasts: Right and left breast surgically absent.  Examination of the right and left chest wall demonstrate no palpable mass or adenopathy    Procedures     Results/Data:  Imaging:   Notes:   Lab:   Other:     Assessment/Plan   Right breast carcinoma in 2004, treated with partial mastectomy, radiation and hormonal therapy  Left breast carcinoma in 2007, treated with partial mastectomy  Left breast cancer in 2017, T2N1, treated with left modified radical mastectomy and 5 years of adjuvant hormonal therapy  Right breast cancer in 2020, triple negative, treated with right modified radical mastectomy, chemotherapy refused    Plan: 6-month follow-up with no studies   "     Discussion/Summary:    Time spent:     Patient's Body mass index is 21.53 kg/m². BMI is within normal parameters. No follow-up required..         Future Appointments   Date Time Provider Department Center   4/27/2021  1:00 PM Alli Wolf MD [facility]         Please note that portions of this note were completed with a voice recognition program.

## 2021-03-24 DIAGNOSIS — E03.4 HYPOTHYROIDISM DUE TO ACQUIRED ATROPHY OF THYROID: ICD-10-CM

## 2021-03-24 RX ORDER — LEVOTHYROXINE SODIUM 0.07 MG/1
75 TABLET ORAL DAILY
Qty: 30 TABLET | Refills: 6 | Status: CANCELLED | OUTPATIENT
Start: 2021-03-24

## 2021-03-24 NOTE — TELEPHONE ENCOUNTER
PT CALLED STATED THAT SHE MISSED PLACED BOTTLE FOR RX  levothyroxine (SYNTHROID, LEVOTHROID) 75 MCG tablet, AND REQUEST REFILL FOR IT.    PLEASE ADVISE.  CALL BACK:[phone]    [facility] - [place] - [address]

## 2021-04-13 ENCOUNTER — IMMUNIZATION (OUTPATIENT)
Dept: VACCINE CLINIC | Facility: HOSPITAL | Age: 86
End: 2021-04-13

## 2021-04-13 PROCEDURE — 0001A: CPT | Performed by: INTERNAL MEDICINE

## 2021-04-13 PROCEDURE — 91300 HC SARSCOV02 VAC 30MCG/0.3ML IM: CPT | Performed by: INTERNAL MEDICINE

## 2021-05-04 ENCOUNTER — IMMUNIZATION (OUTPATIENT)
Dept: VACCINE CLINIC | Facility: HOSPITAL | Age: 86
End: 2021-05-04

## 2021-05-04 PROCEDURE — 0002A: CPT | Performed by: INTERNAL MEDICINE

## 2021-05-04 PROCEDURE — 91300 HC SARSCOV02 VAC 30MCG/0.3ML IM: CPT | Performed by: INTERNAL MEDICINE

## 2021-06-09 ENCOUNTER — TELEPHONE (OUTPATIENT)
Dept: FAMILY MEDICINE CLINIC | Facility: CLINIC | Age: 86
End: 2021-06-09

## 2021-07-19 DIAGNOSIS — E03.4 HYPOTHYROIDISM DUE TO ACQUIRED ATROPHY OF THYROID: ICD-10-CM

## 2021-07-20 RX ORDER — LEVOTHYROXINE SODIUM 0.07 MG/1
TABLET ORAL
Qty: 30 TABLET | Refills: 6 | Status: SHIPPED | OUTPATIENT
Start: 2021-07-20 | End: 2021-11-02 | Stop reason: SDUPTHER

## 2021-07-26 ENCOUNTER — HOSPITAL ENCOUNTER (EMERGENCY)
Facility: HOSPITAL | Age: 86
Discharge: HOME | End: 2021-07-26
Attending: EMERGENCY MEDICINE | Admitting: EMERGENCY MEDICINE

## 2021-07-26 ENCOUNTER — APPOINTMENT (OUTPATIENT)
Dept: ULTRASOUND IMAGING | Facility: HOSPITAL | Age: 86
End: 2021-07-26

## 2021-07-26 VITALS
WEIGHT: 114 LBS | RESPIRATION RATE: 20 BRPM | HEIGHT: 66 IN | OXYGEN SATURATION: 97 % | TEMPERATURE: 97.9 F | SYSTOLIC BLOOD PRESSURE: 168 MMHG | HEART RATE: 92 BPM | BODY MASS INDEX: 18.32 KG/M2 | DIASTOLIC BLOOD PRESSURE: 73 MMHG

## 2021-07-26 DIAGNOSIS — L03.113 CELLULITIS OF RIGHT ARM: Primary | ICD-10-CM

## 2021-07-26 LAB
ALBUMIN SERPL-MCNC: 4.15 G/DL (ref 3.5–5.2)
ALBUMIN/GLOB SERPL: 1.3 G/DL
ALP SERPL-CCNC: 78 U/L (ref 39–117)
ALT SERPL W P-5'-P-CCNC: 11 U/L (ref 1–33)
ANION GAP SERPL CALCULATED.3IONS-SCNC: 10.1 MMOL/L (ref 5–15)
AST SERPL-CCNC: 15 U/L (ref 1–32)
BASOPHILS # BLD AUTO: 0.03 10*3/MM3 (ref 0–0.2)
BASOPHILS NFR BLD AUTO: 0.6 % (ref 0–1.5)
BILIRUB SERPL-MCNC: 0.3 MG/DL (ref 0–1.2)
BUN SERPL-MCNC: 16 MG/DL (ref 8–23)
BUN/CREAT SERPL: 16 (ref 7–25)
CALCIUM SPEC-SCNC: 9.9 MG/DL (ref 8.6–10.5)
CHLORIDE SERPL-SCNC: 105 MMOL/L (ref 98–107)
CO2 SERPL-SCNC: 24.9 MMOL/L (ref 22–29)
CREAT SERPL-MCNC: 1 MG/DL (ref 0.57–1)
CRP SERPL-MCNC: <0.3 MG/DL (ref 0–0.5)
D-LACTATE SERPL-SCNC: 0.7 MMOL/L (ref 0.5–2)
DEPRECATED RDW RBC AUTO: 44.8 FL (ref 37–54)
EOSINOPHIL # BLD AUTO: 0.03 10*3/MM3 (ref 0–0.4)
EOSINOPHIL NFR BLD AUTO: 0.6 % (ref 0.3–6.2)
ERYTHROCYTE [DISTWIDTH] IN BLOOD BY AUTOMATED COUNT: 12.7 % (ref 12.3–15.4)
ERYTHROCYTE [SEDIMENTATION RATE] IN BLOOD: 27 MM/HR (ref 0–30)
GFR SERPL CREATININE-BSD FRML MDRD: 53 ML/MIN/1.73
GLOBULIN UR ELPH-MCNC: 3.2 GM/DL
GLUCOSE SERPL-MCNC: 106 MG/DL (ref 65–99)
HCT VFR BLD AUTO: 34.1 % (ref 34–46.6)
HGB BLD-MCNC: 11.2 G/DL (ref 12–15.9)
HOLD SPECIMEN: NORMAL
HOLD SPECIMEN: NORMAL
IMM GRANULOCYTES # BLD AUTO: 0.01 10*3/MM3 (ref 0–0.05)
IMM GRANULOCYTES NFR BLD AUTO: 0.2 % (ref 0–0.5)
LYMPHOCYTES # BLD AUTO: 1.47 10*3/MM3 (ref 0.7–3.1)
LYMPHOCYTES NFR BLD AUTO: 31.4 % (ref 19.6–45.3)
MCH RBC QN AUTO: 31.4 PG (ref 26.6–33)
MCHC RBC AUTO-ENTMCNC: 32.8 G/DL (ref 31.5–35.7)
MCV RBC AUTO: 95.5 FL (ref 79–97)
MONOCYTES # BLD AUTO: 0.59 10*3/MM3 (ref 0.1–0.9)
MONOCYTES NFR BLD AUTO: 12.6 % (ref 5–12)
NEUTROPHILS NFR BLD AUTO: 2.55 10*3/MM3 (ref 1.7–7)
NEUTROPHILS NFR BLD AUTO: 54.6 % (ref 42.7–76)
NRBC BLD AUTO-RTO: 0 /100 WBC (ref 0–0.2)
PLATELET # BLD AUTO: 170 10*3/MM3 (ref 140–450)
PMV BLD AUTO: 10 FL (ref 6–12)
POTASSIUM SERPL-SCNC: 4.4 MMOL/L (ref 3.5–5.2)
PROT SERPL-MCNC: 7.3 G/DL (ref 6–8.5)
RBC # BLD AUTO: 3.57 10*6/MM3 (ref 3.77–5.28)
SODIUM SERPL-SCNC: 140 MMOL/L (ref 136–145)
WBC # BLD AUTO: 4.68 10*3/MM3 (ref 3.4–10.8)
WHOLE BLOOD HOLD SPECIMEN: NORMAL

## 2021-07-26 PROCEDURE — 93971 EXTREMITY STUDY: CPT | Performed by: RADIOLOGY

## 2021-07-26 PROCEDURE — 83605 ASSAY OF LACTIC ACID: CPT | Performed by: PHYSICIAN ASSISTANT

## 2021-07-26 PROCEDURE — 93971 EXTREMITY STUDY: CPT

## 2021-07-26 PROCEDURE — 85652 RBC SED RATE AUTOMATED: CPT | Performed by: PHYSICIAN ASSISTANT

## 2021-07-26 PROCEDURE — 80053 COMPREHEN METABOLIC PANEL: CPT | Performed by: PHYSICIAN ASSISTANT

## 2021-07-26 PROCEDURE — 99283 EMERGENCY DEPT VISIT LOW MDM: CPT

## 2021-07-26 PROCEDURE — 85025 COMPLETE CBC W/AUTO DIFF WBC: CPT | Performed by: PHYSICIAN ASSISTANT

## 2021-07-26 PROCEDURE — 86140 C-REACTIVE PROTEIN: CPT | Performed by: PHYSICIAN ASSISTANT

## 2021-07-26 RX ORDER — CLINDAMYCIN HYDROCHLORIDE 300 MG/1
300 CAPSULE ORAL 3 TIMES DAILY
Qty: 30 CAPSULE | Refills: 0 | Status: SHIPPED | OUTPATIENT
Start: 2021-07-26 | End: 2021-08-05

## 2021-07-26 RX ORDER — CLINDAMYCIN HYDROCHLORIDE 150 MG/1
300 CAPSULE ORAL ONCE
Status: DISCONTINUED | OUTPATIENT
Start: 2021-07-26 | End: 2021-07-26

## 2021-07-26 NOTE — ED PROVIDER NOTES
MEDICAL SCREENING     Patient initially seen in triage.  The patient was advised further evaluation and diagnostic testing will be needed, some of the treatment and testing will be initiated in the lobby in order to begin the process.  The patient will be returned to the waiting area for the time being and possibly be re-assessed by a subsequent ED provider.  The patient will be brought back to the treatment area in as timely manner as possible.     Dani Acevedo PA-C  07/27/21 0815

## 2021-07-26 NOTE — ED PROVIDER NOTES
Subjective   Patient is an 86-year-old female with arthritis, hypothyroidism, hypertension, hyperlipidemia, and history of breast cancer that presents to the ED with complaints of swelling in her right arm.  She states that this is been going on for several weeks but today she noticed that it began weeping.  She states is not really tender to the touch.  She denies any recent injury or trauma.  She denies chest pain, shortness of breath, fever, chills, nausea, vomiting, headache, dizziness, abdominal pain, or dysuria.      History provided by:  Patient   used: No        Review of Systems   Constitutional: Negative.  Negative for chills, diaphoresis, fatigue and fever.   HENT: Negative.  Negative for congestion, drooling, ear discharge, ear pain, nosebleeds, postnasal drip, rhinorrhea, sinus pressure, sinus pain, sneezing, sore throat, tinnitus and trouble swallowing.    Eyes: Negative.  Negative for photophobia, pain, discharge, redness and itching.   Respiratory: Negative.  Negative for cough, shortness of breath and wheezing.    Cardiovascular: Negative.  Negative for chest pain, palpitations and leg swelling.   Gastrointestinal: Negative.  Negative for abdominal distention, abdominal pain, constipation, diarrhea, nausea and vomiting.   Endocrine: Negative.    Genitourinary: Negative.  Negative for difficulty urinating, dysuria, flank pain, frequency and urgency.   Musculoskeletal: Negative.  Negative for arthralgias, back pain, gait problem, joint swelling, myalgias, neck pain and neck stiffness.   Skin: Negative.    Neurological: Negative.  Negative for dizziness, seizures, syncope, facial asymmetry, weakness, light-headedness, numbness and headaches.   Psychiatric/Behavioral: Negative.  Negative for confusion.   All other systems reviewed and are negative.      Past Medical History:   Diagnosis Date   • Arthritis    • Breast cancer (CMS/HCC) 2015    lt br ca   • Breast cancer (CMS/HCC) 2006     rt br ca   • [place] (hard of hearing)    • Hyperlipidemia    • Hypertension    • Hypothyroidism    • Scabies exposure        Allergies   Allergen Reactions   • Bactrim [Sulfamethoxazole-Trimethoprim] GI Intolerance   • Ketorolac Nausea And Vomiting   • Phenergan [Promethazine Hcl] Other (See Comments)     Restless legs and trouble swallowing, seizures   • Codeine Nausea And Vomiting   • Penicillins Rash     tolerates Keflex per patient       Past Surgical History:   Procedure Laterality Date   • BREAST BIOPSY     • BREAST LUMPECTOMY Right 2006   • BREAST SURGERY     • MASTECTOMY Left 2015    ca   • MASTECTOMY WITH SENTINEL NODE BIOPSY AND AXILLARY NODE DISSECTION Right 11/17/2020    Procedure: BREAST MASTECTOMY WITH SENTINEL NODE BIOPSY AND AXILLARY NODE DISSECTION;  Surgeon: Cynthia Ward MD;  Location: [facility];  Service: General;  Laterality: Right;       Family History   Problem Relation Age of Onset   • Hypertension Mother    • Uterine cancer Mother    • Diabetes Mother    • Diabetes Daughter    • Prostate cancer Son    • Diabetes Son    • Throat cancer Son    • Hypertension Child    • Breast cancer Neg Hx        Social History     Socioeconomic History   • Marital status:      Spouse name: Not on file   • Number of children: Not on file   • Years of education: Not on file   • Highest education level: Not on file   Tobacco Use   • Smoking status: Never Smoker   • Smokeless tobacco: Never Used   Vaping Use   • Vaping Use: Never used   Substance and Sexual Activity   • Alcohol use: No   • Drug use: No   • Sexual activity: Defer     Birth control/protection: None           Objective   Physical Exam  Vitals and nursing note reviewed.   Constitutional:       General: She is not in acute distress.     Appearance: She is well-developed. She is not diaphoretic.   HENT:      Head: Normocephalic and atraumatic.      Right Ear: External ear normal.      Left Ear: External ear normal.      Nose: Nose normal.       Mouth/Throat:      Mouth: Mucous membranes are moist.      Pharynx: Oropharynx is clear.   Eyes:      Extraocular Movements: Extraocular movements intact.      Conjunctiva/sclera: Conjunctivae normal.      Pupils: Pupils are equal, round, and reactive to light.   Neck:      Vascular: No JVD.      Trachea: No tracheal deviation.   Cardiovascular:      Rate and Rhythm: Normal rate and regular rhythm.      Pulses: Normal pulses.      Heart sounds: Normal heart sounds. No murmur heard.     Pulmonary:      Effort: Pulmonary effort is normal. No respiratory distress.      Breath sounds: Normal breath sounds. No wheezing.   Abdominal:      General: Bowel sounds are normal. There is no distension.      Palpations: Abdomen is soft.      Tenderness: There is no abdominal tenderness. There is no guarding or rebound.   Musculoskeletal:         General: No deformity. Normal range of motion.      Right upper arm: Swelling and edema present. No deformity, lacerations, tenderness or bony tenderness.      Left upper arm: Normal.      Cervical back: Normal range of motion and neck supple.   Skin:     General: Skin is warm and dry.      Coloration: Skin is not pale.      Findings: No erythema or rash.   Neurological:      General: No focal deficit present.      Mental Status: She is alert and oriented to person, place, and time.      Cranial Nerves: No cranial nerve deficit.   Psychiatric:         Behavior: Behavior normal.         Thought Content: Thought content normal.         Procedures           ED Course  ED Course as of Jul 26 2121 Mon Jul 26, 2021 1926 IMPRESSION:  No DVT in the right upper extremity      This report was finalized on 7/26/2021 7:07 PM by Dr. Erich Jenkins MD.   US Venous Doppler Upper Extremity Right (duplex) []      ED Course User Index  [MH] Valerie Acevedo PA-C                                           [facility]    Final diagnoses:   Cellulitis of right arm       ED Disposition  ED Disposition      ED Disposition Condition Comment    Discharge Alli Farris MD  [address] [facility] [address]  [phone]    Schedule an appointment as soon as possible for a visit in 1 day           Medication List      New Prescriptions    clindamycin 300 MG capsule  Commonly known as: CLEOCIN  Take 1 capsule by mouth 3 (Three) Times a Day for 10 days.           Where to Get Your Medications      You can get these medications from any pharmacy    Bring a paper prescription for each of these medications  · clindamycin 300 MG capsule          Valerie Acevedo PA-C  07/26/21 4723

## 2021-07-27 ENCOUNTER — PATIENT OUTREACH (OUTPATIENT)
Dept: CASE MANAGEMENT | Facility: OTHER | Age: 86
End: 2021-07-27

## 2021-07-27 NOTE — OUTREACH NOTE
Ambulatory Case Management Note    Patient Outreach    RN-ACM outreach with patient.  Patient had and ED visit at [facility] 07/27/21.  Clinical impression noted as cellulitis of right arm.  Patient was treated and discharged to home to follow with PCP.  Discharge medications include the addition of clindamycin.      Care Evaluation    Questions/Answers      Most Recent Value   Suggested Appointments  -- [Make an appointment with PCP as recommended.]   Annual Wellness Visit:   Patient Has Completed   Care Gaps Addressed  Colon Cancer Screening, Mammogram, Flu Shot   Colon Cancer Screening Type  Exempt   Mammogram Status  Up to Date   Other Patient Education/Resources   [facility] Nurse Call Line [AVS, education, discharge medications, and recommended f/u reviewed. ]   Healthy Lifestyle (Self-Efficacy)  self-reports important symptoms to medical professional, recognizes when to stop activity, recognizes when to contact medical assistance          Lakia Rivera RN  Ambulatory Case Management    7/27/2021, 15:45 EDT

## 2021-07-29 ENCOUNTER — OFFICE VISIT (OUTPATIENT)
Dept: FAMILY MEDICINE CLINIC | Facility: CLINIC | Age: 86
End: 2021-07-29

## 2021-07-29 VITALS
HEART RATE: 51 BPM | DIASTOLIC BLOOD PRESSURE: 60 MMHG | SYSTOLIC BLOOD PRESSURE: 120 MMHG | OXYGEN SATURATION: 98 % | WEIGHT: 113 LBS | BODY MASS INDEX: 18.16 KG/M2 | HEIGHT: 66 IN | RESPIRATION RATE: 16 BRPM | TEMPERATURE: 98 F

## 2021-07-29 DIAGNOSIS — L03.113 CELLULITIS OF RIGHT UPPER EXTREMITY: ICD-10-CM

## 2021-07-29 DIAGNOSIS — I10 ESSENTIAL HYPERTENSION: Chronic | ICD-10-CM

## 2021-07-29 DIAGNOSIS — E03.4 HYPOTHYROIDISM DUE TO ACQUIRED ATROPHY OF THYROID: Primary | Chronic | ICD-10-CM

## 2021-07-29 PROBLEM — E78.2 MIXED HYPERLIPIDEMIA: Chronic | Status: ACTIVE | Noted: 2017-01-03

## 2021-07-29 PROCEDURE — 36415 COLL VENOUS BLD VENIPUNCTURE: CPT | Performed by: FAMILY MEDICINE

## 2021-07-29 PROCEDURE — 84439 ASSAY OF FREE THYROXINE: CPT | Performed by: FAMILY MEDICINE

## 2021-07-29 PROCEDURE — 84443 ASSAY THYROID STIM HORMONE: CPT | Performed by: FAMILY MEDICINE

## 2021-07-29 PROCEDURE — 99214 OFFICE O/P EST MOD 30 MIN: CPT | Performed by: FAMILY MEDICINE

## 2021-07-29 RX ORDER — METOPROLOL SUCCINATE 50 MG/1
50 TABLET, EXTENDED RELEASE ORAL DAILY
Qty: 30 TABLET | Refills: 6 | Status: ON HOLD | OUTPATIENT
Start: 2021-07-29 | End: 2021-11-02

## 2021-07-29 NOTE — PROGRESS NOTES
Subjective   Lashell Case is a 86 y.o. female.     History of Present Illness follow-up from ER.  See the notes.  Had preceding right upper extremity swelling and never had before.  Some discomfort.  Evaluated there with negative lab work negative ultrasound scan deemed to be probable cellulitis placed on Cleocin and has seen a response.  Maintains follow-up with general surgery.  Utilizing medications as reconciled but admittedly takes the metoprolol less on occasion.    The following portions of the patient's history were reviewed and updated as appropriate: allergies, current medications, past medical history, past social history, past surgical history and problem list.    Review of Systems  See history of Present Illness     Objective     Physical Exam  Vitals reviewed.   Constitutional:       Appearance: Normal appearance.   HENT:      Head: Normocephalic.   Eyes:      Conjunctiva/sclera: Conjunctivae normal.   Cardiovascular:      Rate and Rhythm: Regular rhythm. Bradycardia present.      Heart sounds: Normal heart sounds.   Pulmonary:      Effort: Pulmonary effort is normal.      Breath sounds: Normal breath sounds.   Musculoskeletal:      Cervical back: Normal range of motion and neck supple.      Comments: Minimal residual swelling right upper extremity good range of motion.   Skin:     General: Skin is warm and dry.   Neurological:      Mental Status: She is alert and oriented to person, place, and time.   Psychiatric:         Mood and Affect: Mood normal.         PHQ-9 Total Score:      There is no height or weight on file to calculate BMI.       Assessment/Plan     Diagnoses and all orders for this visit:    1. Hypothyroidism due to acquired atrophy of thyroid (Primary)  -     T4, Free  -     TSH    2. Essential hypertension  -     metoprolol succinate XL (TOPROL-XL) 50 MG 24 hr tablet; Take 1 tablet by mouth Daily. for blood pressure  Dispense: 30 tablet; Refill: 6    3. Cellulitis of right upper  extremity    Finished antibiotic.  Episodically elevated.  Will decrease metoprolol from 100 down to 50 mg daily.  Check thyroid.  Continue all else same.  Stressed compliance.  Stress pandemic response.  Recheck in about 1 month or as needed.                     This document has been electronically signed by Alli Wolf MD   July 29, 2021 11:24 EDT    Part of this note may be an electronic transcription/translation of spoken language to printed text using the Dragon Dictation System.

## 2021-07-30 LAB
T4 FREE SERPL-MCNC: 1.75 NG/DL (ref 0.93–1.7)
TSH SERPL DL<=0.05 MIU/L-ACNC: 0.01 UIU/ML (ref 0.27–4.2)

## 2021-08-09 PROCEDURE — 96375 TX/PRO/DX INJ NEW DRUG ADDON: CPT

## 2021-08-09 PROCEDURE — 99284 EMERGENCY DEPT VISIT MOD MDM: CPT

## 2021-08-09 PROCEDURE — 99283 EMERGENCY DEPT VISIT LOW MDM: CPT

## 2021-08-09 PROCEDURE — 96376 TX/PRO/DX INJ SAME DRUG ADON: CPT

## 2021-08-09 PROCEDURE — 96374 THER/PROPH/DIAG INJ IV PUSH: CPT

## 2021-08-10 ENCOUNTER — HOSPITAL ENCOUNTER (EMERGENCY)
Facility: HOSPITAL | Age: 86
Discharge: HOME | End: 2021-08-10
Attending: EMERGENCY MEDICINE | Admitting: EMERGENCY MEDICINE

## 2021-08-10 ENCOUNTER — APPOINTMENT (OUTPATIENT)
Dept: GENERAL RADIOLOGY | Facility: HOSPITAL | Age: 86
End: 2021-08-10

## 2021-08-10 ENCOUNTER — APPOINTMENT (OUTPATIENT)
Dept: CT IMAGING | Facility: HOSPITAL | Age: 86
End: 2021-08-10

## 2021-08-10 VITALS
OXYGEN SATURATION: 100 % | TEMPERATURE: 97.9 F | RESPIRATION RATE: 20 BRPM | SYSTOLIC BLOOD PRESSURE: 161 MMHG | HEIGHT: 66 IN | HEART RATE: 90 BPM | DIASTOLIC BLOOD PRESSURE: 67 MMHG | WEIGHT: 113 LBS | BODY MASS INDEX: 18.16 KG/M2

## 2021-08-10 DIAGNOSIS — M25.461 EFFUSION OF RIGHT KNEE JOINT: Primary | ICD-10-CM

## 2021-08-10 LAB
ALBUMIN SERPL-MCNC: 4.2 G/DL (ref 3.5–5.2)
ALBUMIN/GLOB SERPL: 1.5 G/DL
ALP SERPL-CCNC: 73 U/L (ref 39–117)
ALT SERPL W P-5'-P-CCNC: 11 U/L (ref 1–33)
ANION GAP SERPL CALCULATED.3IONS-SCNC: 9.8 MMOL/L (ref 5–15)
APPEARANCE FLD: ABNORMAL
APTT PPP: 24.1 SECONDS (ref 25.5–35.4)
AST SERPL-CCNC: 17 U/L (ref 1–32)
BACTERIA UR QL AUTO: ABNORMAL /HPF
BASOPHILS # BLD AUTO: 0.03 10*3/MM3 (ref 0–0.2)
BASOPHILS NFR BLD AUTO: 0.4 % (ref 0–1.5)
BILIRUB SERPL-MCNC: 0.5 MG/DL (ref 0–1.2)
BILIRUB UR QL STRIP: NEGATIVE
BUN SERPL-MCNC: 18 MG/DL (ref 8–23)
BUN/CREAT SERPL: 22 (ref 7–25)
CALCIUM SPEC-SCNC: 10.2 MG/DL (ref 8.6–10.5)
CHLORIDE SERPL-SCNC: 103 MMOL/L (ref 98–107)
CLARITY UR: CLEAR
CO2 SERPL-SCNC: 24.2 MMOL/L (ref 22–29)
COLOR FLD: ABNORMAL
COLOR UR: YELLOW
CREAT SERPL-MCNC: 0.82 MG/DL (ref 0.57–1)
CRP SERPL-MCNC: <0.3 MG/DL (ref 0–0.5)
D-LACTATE SERPL-SCNC: 1.1 MMOL/L (ref 0.5–2)
DEPRECATED RDW RBC AUTO: 44.2 FL (ref 37–54)
EOSINOPHIL # BLD AUTO: 0.27 10*3/MM3 (ref 0–0.4)
EOSINOPHIL NFR BLD AUTO: 3.2 % (ref 0.3–6.2)
EOSINOPHIL NFR FLD MANUAL: 2 %
ERYTHROCYTE [DISTWIDTH] IN BLOOD BY AUTOMATED COUNT: 12.7 % (ref 12.3–15.4)
ERYTHROCYTE [SEDIMENTATION RATE] IN BLOOD: 22 MM/HR (ref 0–30)
FLUAV RNA RESP QL NAA+PROBE: NOT DETECTED
FLUBV RNA RESP QL NAA+PROBE: NOT DETECTED
GFR SERPL CREATININE-BSD FRML MDRD: 66 ML/MIN/1.73
GLOBULIN UR ELPH-MCNC: 2.8 GM/DL
GLUCOSE SERPL-MCNC: 147 MG/DL (ref 65–99)
GLUCOSE UR STRIP-MCNC: NEGATIVE MG/DL
HCT VFR BLD AUTO: 33.1 % (ref 34–46.6)
HGB BLD-MCNC: 10.9 G/DL (ref 12–15.9)
HGB UR QL STRIP.AUTO: NEGATIVE
HYALINE CASTS UR QL AUTO: ABNORMAL /LPF
IMM GRANULOCYTES # BLD AUTO: 0.06 10*3/MM3 (ref 0–0.05)
IMM GRANULOCYTES NFR BLD AUTO: 0.7 % (ref 0–0.5)
INR PPP: 0.97 (ref 0.9–1.1)
KETONES UR QL STRIP: NEGATIVE
LEUKOCYTE ESTERASE UR QL STRIP.AUTO: ABNORMAL
LYMPHOCYTES # BLD AUTO: 0.69 10*3/MM3 (ref 0.7–3.1)
LYMPHOCYTES NFR BLD AUTO: 8.2 % (ref 19.6–45.3)
LYMPHOCYTES NFR FLD MANUAL: 4 %
MCH RBC QN AUTO: 31.4 PG (ref 26.6–33)
MCHC RBC AUTO-ENTMCNC: 32.9 G/DL (ref 31.5–35.7)
MCV RBC AUTO: 95.4 FL (ref 79–97)
METHOD: ABNORMAL
MONOCYTES # BLD AUTO: 0.79 10*3/MM3 (ref 0.1–0.9)
MONOCYTES NFR BLD AUTO: 9.4 % (ref 5–12)
MONOS+MACROS NFR FLD: 19 %
NEUTROPHILS NFR BLD AUTO: 6.59 10*3/MM3 (ref 1.7–7)
NEUTROPHILS NFR BLD AUTO: 78.1 % (ref 42.7–76)
NEUTROPHILS NFR FLD MANUAL: 75 %
NITRITE UR QL STRIP: NEGATIVE
NRBC BLD AUTO-RTO: 0 /100 WBC (ref 0–0.2)
NUC CELL # FLD: ABNORMAL /MM3
PH UR STRIP.AUTO: 6.5 [PH] (ref 5–8)
PLATELET # BLD AUTO: 144 10*3/MM3 (ref 140–450)
PMV BLD AUTO: 9.7 FL (ref 6–12)
POTASSIUM SERPL-SCNC: 4.1 MMOL/L (ref 3.5–5.2)
PROT SERPL-MCNC: 7 G/DL (ref 6–8.5)
PROT UR QL STRIP: NEGATIVE
PROTHROMBIN TIME: 13.3 SECONDS (ref 12.8–14.5)
QT INTERVAL: 354 MS
QTC INTERVAL: 461 MS
RBC # BLD AUTO: 3.47 10*6/MM3 (ref 3.77–5.28)
RBC # FLD AUTO: ABNORMAL 10*3/UL
RBC # UR: ABNORMAL /HPF
REF LAB TEST METHOD: ABNORMAL
SARS-COV-2 RNA RESP QL NAA+PROBE: NOT DETECTED
SODIUM SERPL-SCNC: 137 MMOL/L (ref 136–145)
SP GR UR STRIP: 1.02 (ref 1–1.03)
SQUAMOUS #/AREA URNS HPF: ABNORMAL /HPF
TROPONIN T SERPL-MCNC: 0.02 NG/ML (ref 0–0.03)
URATE SERPL-MCNC: 5.6 MG/DL (ref 2.4–5.7)
UROBILINOGEN UR QL STRIP: ABNORMAL
WBC # BLD AUTO: 8.43 10*3/MM3 (ref 3.4–10.8)
WBC UR QL AUTO: ABNORMAL /HPF

## 2021-08-10 PROCEDURE — 85730 THROMBOPLASTIN TIME PARTIAL: CPT | Performed by: EMERGENCY MEDICINE

## 2021-08-10 PROCEDURE — 85025 COMPLETE CBC W/AUTO DIFF WBC: CPT | Performed by: EMERGENCY MEDICINE

## 2021-08-10 PROCEDURE — 87070 CULTURE OTHR SPECIMN AEROBIC: CPT | Performed by: EMERGENCY MEDICINE

## 2021-08-10 PROCEDURE — 83605 ASSAY OF LACTIC ACID: CPT | Performed by: EMERGENCY MEDICINE

## 2021-08-10 PROCEDURE — 85652 RBC SED RATE AUTOMATED: CPT | Performed by: EMERGENCY MEDICINE

## 2021-08-10 PROCEDURE — 25010000002 DIPHENHYDRAMINE PER 50 MG: Performed by: EMERGENCY MEDICINE

## 2021-08-10 PROCEDURE — 73562 X-RAY EXAM OF KNEE 3: CPT

## 2021-08-10 PROCEDURE — C9803 HOPD COVID-19 SPEC COLLECT: HCPCS

## 2021-08-10 PROCEDURE — 85610 PROTHROMBIN TIME: CPT | Performed by: EMERGENCY MEDICINE

## 2021-08-10 PROCEDURE — 86140 C-REACTIVE PROTEIN: CPT | Performed by: EMERGENCY MEDICINE

## 2021-08-10 PROCEDURE — 84550 ASSAY OF BLOOD/URIC ACID: CPT | Performed by: EMERGENCY MEDICINE

## 2021-08-10 PROCEDURE — 87205 SMEAR GRAM STAIN: CPT | Performed by: EMERGENCY MEDICINE

## 2021-08-10 PROCEDURE — 96376 TX/PRO/DX INJ SAME DRUG ADON: CPT

## 2021-08-10 PROCEDURE — 89051 BODY FLUID CELL COUNT: CPT | Performed by: EMERGENCY MEDICINE

## 2021-08-10 PROCEDURE — 93010 ELECTROCARDIOGRAM REPORT: CPT | Performed by: INTERNAL MEDICINE

## 2021-08-10 PROCEDURE — 93005 ELECTROCARDIOGRAM TRACING: CPT | Performed by: EMERGENCY MEDICINE

## 2021-08-10 PROCEDURE — 84484 ASSAY OF TROPONIN QUANT: CPT | Performed by: EMERGENCY MEDICINE

## 2021-08-10 PROCEDURE — 96374 THER/PROPH/DIAG INJ IV PUSH: CPT

## 2021-08-10 PROCEDURE — 81001 URINALYSIS AUTO W/SCOPE: CPT | Performed by: EMERGENCY MEDICINE

## 2021-08-10 PROCEDURE — 80053 COMPREHEN METABOLIC PANEL: CPT | Performed by: EMERGENCY MEDICINE

## 2021-08-10 PROCEDURE — 73700 CT LOWER EXTREMITY W/O DYE: CPT | Performed by: RADIOLOGY

## 2021-08-10 PROCEDURE — 73700 CT LOWER EXTREMITY W/O DYE: CPT

## 2021-08-10 PROCEDURE — 25010000002 ONDANSETRON PER 1 MG: Performed by: EMERGENCY MEDICINE

## 2021-08-10 PROCEDURE — 87636 SARSCOV2 & INF A&B AMP PRB: CPT | Performed by: EMERGENCY MEDICINE

## 2021-08-10 PROCEDURE — 25010000002 METOCLOPRAMIDE PER 10 MG: Performed by: EMERGENCY MEDICINE

## 2021-08-10 PROCEDURE — 96375 TX/PRO/DX INJ NEW DRUG ADDON: CPT

## 2021-08-10 PROCEDURE — 25010000002 MORPHINE PER 10 MG: Performed by: EMERGENCY MEDICINE

## 2021-08-10 RX ORDER — HYDROCODONE BITARTRATE AND ACETAMINOPHEN 5; 325 MG/1; MG/1
1 TABLET ORAL ONCE
Status: COMPLETED | OUTPATIENT
Start: 2021-08-10 | End: 2021-08-10

## 2021-08-10 RX ORDER — METOPROLOL TARTRATE 5 MG/5ML
5 INJECTION INTRAVENOUS ONCE
Status: COMPLETED | OUTPATIENT
Start: 2021-08-10 | End: 2021-08-10

## 2021-08-10 RX ORDER — ONDANSETRON 2 MG/ML
4 INJECTION INTRAMUSCULAR; INTRAVENOUS ONCE
Status: COMPLETED | OUTPATIENT
Start: 2021-08-10 | End: 2021-08-10

## 2021-08-10 RX ORDER — METOCLOPRAMIDE HYDROCHLORIDE 5 MG/ML
10 INJECTION INTRAMUSCULAR; INTRAVENOUS ONCE
Status: COMPLETED | OUTPATIENT
Start: 2021-08-10 | End: 2021-08-10

## 2021-08-10 RX ORDER — AMLODIPINE BESYLATE 5 MG/1
10 TABLET ORAL ONCE
Status: COMPLETED | OUTPATIENT
Start: 2021-08-10 | End: 2021-08-10

## 2021-08-10 RX ORDER — DIPHENHYDRAMINE HYDROCHLORIDE 50 MG/ML
25 INJECTION INTRAMUSCULAR; INTRAVENOUS ONCE
Status: COMPLETED | OUTPATIENT
Start: 2021-08-10 | End: 2021-08-10

## 2021-08-10 RX ORDER — OXYCODONE HYDROCHLORIDE AND ACETAMINOPHEN 5; 325 MG/1; MG/1
1 TABLET ORAL ONCE
Status: DISCONTINUED | OUTPATIENT
Start: 2021-08-10 | End: 2021-08-10

## 2021-08-10 RX ORDER — HYDROCODONE BITARTRATE AND ACETAMINOPHEN 5; 325 MG/1; MG/1
1 TABLET ORAL EVERY 8 HOURS PRN
Qty: 9 TABLET | Refills: 0 | Status: SHIPPED | OUTPATIENT
Start: 2021-08-10 | End: 2021-11-01

## 2021-08-10 RX ORDER — LISINOPRIL 10 MG/1
20 TABLET ORAL ONCE
Status: COMPLETED | OUTPATIENT
Start: 2021-08-10 | End: 2021-08-10

## 2021-08-10 RX ORDER — LIDOCAINE HYDROCHLORIDE AND EPINEPHRINE 10; 10 MG/ML; UG/ML
10 INJECTION, SOLUTION INFILTRATION; PERINEURAL ONCE
Status: COMPLETED | OUTPATIENT
Start: 2021-08-10 | End: 2021-08-10

## 2021-08-10 RX ADMIN — ONDANSETRON 4 MG: 2 INJECTION INTRAMUSCULAR; INTRAVENOUS at 05:09

## 2021-08-10 RX ADMIN — HYDROCODONE BITARTRATE AND ACETAMINOPHEN 1 TABLET: 5; 325 TABLET ORAL at 08:49

## 2021-08-10 RX ADMIN — ONDANSETRON 4 MG: 2 INJECTION INTRAMUSCULAR; INTRAVENOUS at 03:46

## 2021-08-10 RX ADMIN — AMLODIPINE BESYLATE 10 MG: 10 TABLET ORAL at 05:08

## 2021-08-10 RX ADMIN — DIPHENHYDRAMINE HYDROCHLORIDE 25 MG: 50 INJECTION INTRAMUSCULAR; INTRAVENOUS at 06:38

## 2021-08-10 RX ADMIN — LISINOPRIL 20 MG: 10 TABLET ORAL at 04:57

## 2021-08-10 RX ADMIN — METOCLOPRAMIDE 10 MG: 5 INJECTION, SOLUTION INTRAMUSCULAR; INTRAVENOUS at 06:38

## 2021-08-10 RX ADMIN — LIDOCAINE HYDROCHLORIDE,EPINEPHRINE BITARTRATE 10 ML: 10; .01 INJECTION, SOLUTION INFILTRATION; PERINEURAL at 05:20

## 2021-08-10 RX ADMIN — METOPROLOL TARTRATE 5 MG: 1 INJECTION, SOLUTION INTRAVENOUS at 04:57

## 2021-08-10 RX ADMIN — MORPHINE SULFATE 4 MG: 4 INJECTION, SOLUTION INTRAMUSCULAR; INTRAVENOUS at 06:35

## 2021-08-10 RX ADMIN — MORPHINE SULFATE 4 MG: 4 INJECTION, SOLUTION INTRAMUSCULAR; INTRAVENOUS at 03:46

## 2021-08-10 RX ADMIN — METOPROLOL TARTRATE 5 MG: 1 INJECTION, SOLUTION INTRAVENOUS at 04:09

## 2021-08-10 NOTE — ED NOTES
Pt reassessed at this time, pt has no new complaints. Apologized to pt for wait times. Pt has NADN at this time. Will continue to monitor pt.        Rica Soto RN  08/10/21 0138

## 2021-08-10 NOTE — ED NOTES
50 ML of bloody fluid aspirated from patient right knee by provider. Bleeding controlled after needle removed. Sandip Ferrara RN  08/10/21 0540

## 2021-08-10 NOTE — ED NOTES
Attempted to ambulate patient, Patient tolerated extremely poorly, yelled out in pain. Unable to bear weight on right leg. Dr. Tobar notified.      Ruth Olguin RN  08/10/21 1111

## 2021-08-10 NOTE — ED PROVIDER NOTES
Subjective   Patient presents to the emergency department complaining of 1 day of right knee pain and swelling without any known injury.  She notes that this is happened once previously but it was the left knee that was affected.  She notes inability to ambulate secondary to pain in the knee.      History provided by:  Patient      Review of Systems   Musculoskeletal: Positive for joint swelling.        Right knee pain and swelling   All other systems reviewed and are negative.      Past Medical History:   Diagnosis Date   • Arthritis    • Breast cancer (CMS/HCC) 2015    lt br ca   • Breast cancer (CMS/HCC) 2006    rt br ca   • [place] (hard of hearing)    • Hyperlipidemia    • Hypertension    • Hypothyroidism    • Scabies exposure        Allergies   Allergen Reactions   • Bactrim [Sulfamethoxazole-Trimethoprim] GI Intolerance   • Ketorolac Nausea And Vomiting   • Phenergan [Promethazine Hcl] Other (See Comments)     Restless legs and trouble swallowing, seizures   • Codeine Nausea And Vomiting   • Penicillins Rash     tolerates Keflex per patient       Past Surgical History:   Procedure Laterality Date   • BREAST BIOPSY     • BREAST LUMPECTOMY Right 2006   • BREAST SURGERY     • MASTECTOMY Left 2015    ca   • MASTECTOMY WITH SENTINEL NODE BIOPSY AND AXILLARY NODE DISSECTION Right 11/17/2020    Procedure: BREAST MASTECTOMY WITH SENTINEL NODE BIOPSY AND AXILLARY NODE DISSECTION;  Surgeon: Cynthia Ward MD;  Location: [facility];  Service: General;  Laterality: Right;       Family History   Problem Relation Age of Onset   • Hypertension Mother    • Uterine cancer Mother    • Diabetes Mother    • Diabetes Daughter    • Prostate cancer Son    • Diabetes Son    • Throat cancer Son    • Hypertension Child    • Breast cancer Neg Hx        Social History     Socioeconomic History   • Marital status:      Spouse name: Not on file   • Number of children: Not on file   • Years of education: Not on file   • Highest  education level: Not on file   Tobacco Use   • Smoking status: Never Smoker   • Smokeless tobacco: Never Used   Vaping Use   • Vaping Use: Never used   Substance and Sexual Activity   • Alcohol use: No   • Drug use: No   • Sexual activity: Defer     Birth control/protection: None           Objective   Physical Exam  Vitals and nursing note reviewed.   Constitutional:       General: She is in acute distress (Uncomfortable appearing).      Appearance: She is well-developed. She is not toxic-appearing or diaphoretic.   HENT:      Head: Normocephalic and atraumatic.      Nose: Nose normal. No congestion.      Mouth/Throat:      Mouth: Mucous membranes are moist.      Pharynx: Oropharynx is clear.   Eyes:      Extraocular Movements: Extraocular movements intact.      Pupils: Pupils are equal, round, and reactive to light.   Cardiovascular:      Rate and Rhythm: Normal rate and regular rhythm.   Pulmonary:      Effort: Pulmonary effort is normal.      Breath sounds: Normal breath sounds. No wheezing.   Abdominal:      General: Abdomen is flat.      Palpations: Abdomen is soft.      Tenderness: There is no abdominal tenderness.   Musculoskeletal:         General: Swelling and tenderness present. No signs of injury.      Comments: Right knee significant swelling, tenderness, and decreased range of motion   Skin:     General: Skin is warm and dry.      Capillary Refill: Capillary refill takes less than 2 seconds.   Neurological:      General: No focal deficit present.      Mental Status: She is alert and oriented to person, place, and time.      Cranial Nerves: No cranial nerve deficit.      Sensory: No sensory deficit.      Motor: No weakness.   Psychiatric:         Mood and Affect: Mood normal.         Behavior: Behavior normal.         Thought Content: Thought content normal.         Joint Aspiration/Injection    Date/Time: 8/10/2021 9:42 AM  Performed by: Ismael Tobar DO  Authorized by: Ismael Tobar DO      Consent:     Consent obtained:  Verbal    Consent given by:  Patient    Risks discussed:  Bleeding, incomplete drainage, nerve damage, infection, pain and poor cosmetic result    Alternatives discussed:  Alternative treatment, delayed treatment and no treatment  Location:     Location:  Knee    Knee:  R knee  Anesthesia (see MAR for exact dosages):     Anesthesia method:  Local infiltration    Local anesthetic:  Lidocaine 1% WITH epi  Procedure details:     Preparation: Patient was prepped and draped in usual sterile fashion      Needle gauge:  18 G    Ultrasound guidance: no      Approach:  Superior    Aspirate amount:  50    Aspirate characteristics:  Bloody    Steroid injected: no      Specimen collected: yes    Post-procedure details:     Dressing:  Gauze roll and adhesive bandage    Patient tolerance of procedure:  Tolerated well, no immediate complications               ED Course  ED Course as of Aug 14 1857   Tue Aug 10, 2021   0533 EKG at 0353 shows sinus tachycardia with a rate of 102 bpm, left axis deviation, normal intervals, nonspecific ST segments and T waves, PACs    [JG]   0753 IMPRESSION:  Large joint effusion with degenerative change as above. No acute fracture.   XR Knee 3 View Right [JG]   0844 IMPRESSION:    Moderate size knee joint effusion with internal density that may  represent hemorrhage.    [JG]   0934 CT Lower Extremity Right Without Contrast [JG]   0934 I have spoken to orthopedist Dr. Tesfaye who notes that there does not appear to be a sign of an infection given the patient's inflammatory markers being unremarkable.  He recommends pain medicine, prescription for a walker, and discharged home but he notes that if the patient were to be admitted to the hospital he would see her as a consultant.  Despite multiple rounds of pain medication the patient is unable to bear weight on her left lower extremity.  A CT scan of the right lower extremity was performed and shows an effusion with  possible hemorrhage but no obvious fracture.  Patient still unable to bear weight so she will require admission to the hospital.  She has had improvement in her blood pressure here today but is still hypertensive.  She is stable at time of signout to Dr. Townsend.    [JG]   4512 I assumed patient's care from Dr. Tobar at the end of his shift.  We have been hopeful that we might be able to admit the patient for physical therapy, but our bed situation is not going to allow for that.  We have no beds, we have exceeded our limit of boarding patients in the ED, there are no beds anywhere throughout our region as far as any transfers.  Patient's family is here to pick her up.  She is to follow-up closely with Dr. Tesfaye as an outpatient.  She is discharged home in care of family.    [CM]      ED Course User Index  [CM] Jaguar Townsend MD  [JG] Ismael Tobar DO                                           MDM    Final diagnoses:   Effusion of right knee joint       ED Disposition  ED Disposition     ED Disposition Condition Comment    Discharge Stable           Pablo Tesfaye,   [address] [id number] [phone]    Go to   Call the office today to schedule first available appointment in the next 1 to 2 days    [facility]  [address] [place] [id number] [phone]  Go to   If symptoms worsen         Medication List      New Prescriptions    HYDROcodone-acetaminophen 5-325 MG per tablet  Commonly known as: NORCO  Take 1 tablet by mouth Every 8 (Eight) Hours As Needed for Severe Pain .           Where to Get Your Medications      These medications were sent to [facility] [address] - [phone] [place] [phone] [address] [address]    Phone: [phone]   · HYDROcodone-acetaminophen 5-325 MG per tablet          Ismael Tobar DO  08/10/21 0944       Jaguar Townsend MD  08/14/21  1857

## 2021-08-10 NOTE — DISCHARGE INSTRUCTIONS
Home in care of family.  Limit weightbearing on the leg, use the walker.  Pain medication as directed.  See Dr. Tesfaye in the office in 1 to 2 days.  Call his office today to schedule this appointment.  Return to the emergency department right away if symptoms worsen/any problems.

## 2021-08-10 NOTE — ED NOTES
Aspiration of knee supplies at bedside for provider to perform procedure      Sandip Coello, RN  08/10/21 0546

## 2021-08-10 NOTE — ED PROVIDER NOTES
Subjective   I assumed care of this patient from Dr. Tobar at the end of his shift.  Please see his chart for further details.  His is the primary chart.          Review of Systems    Past Medical History:   Diagnosis Date   • Arthritis    • Breast cancer (CMS/HCC) 2015    lt br ca   • Breast cancer (CMS/HCC) 2006    rt br ca   • [place] (hard of hearing)    • Hyperlipidemia    • Hypertension    • Hypothyroidism    • Scabies exposure        Allergies   Allergen Reactions   • Bactrim [Sulfamethoxazole-Trimethoprim] GI Intolerance   • Ketorolac Nausea And Vomiting   • Phenergan [Promethazine Hcl] Other (See Comments)     Restless legs and trouble swallowing, seizures   • Codeine Nausea And Vomiting   • Penicillins Rash     tolerates Keflex per patient       Past Surgical History:   Procedure Laterality Date   • BREAST BIOPSY     • BREAST LUMPECTOMY Right 2006   • BREAST SURGERY     • MASTECTOMY Left 2015    ca   • MASTECTOMY WITH SENTINEL NODE BIOPSY AND AXILLARY NODE DISSECTION Right 11/17/2020    Procedure: BREAST MASTECTOMY WITH SENTINEL NODE BIOPSY AND AXILLARY NODE DISSECTION;  Surgeon: Cynthia Ward MD;  Location: [facility];  Service: General;  Laterality: Right;       Family History   Problem Relation Age of Onset   • Hypertension Mother    • Uterine cancer Mother    • Diabetes Mother    • Diabetes Daughter    • Prostate cancer Son    • Diabetes Son    • Throat cancer Son    • Hypertension Child    • Breast cancer Neg Hx        Social History     Socioeconomic History   • Marital status:      Spouse name: Not on file   • Number of children: Not on file   • Years of education: Not on file   • Highest education level: Not on file   Tobacco Use   • Smoking status: Never Smoker   • Smokeless tobacco: Never Used   Vaping Use   • Vaping Use: Never used   Substance and Sexual Activity   • Alcohol use: No   • Drug use: No   • Sexual activity: Defer     Birth control/protection: None           Objective    Physical Exam    Procedures           ED Course  ED Course as of Aug 10 1209   Tue Aug 10, 2021   0533 EKG at 0353 shows sinus tachycardia with a rate of 102 bpm, left axis deviation, normal intervals, nonspecific ST segments and T waves, PACs    [JG]   0753 IMPRESSION:  Large joint effusion with degenerative change as above. No acute fracture.   XR Knee 3 View Right [JG]   0844 IMPRESSION:    Moderate size knee joint effusion with internal density that may  represent hemorrhage.    [JG]   0934 CT Lower Extremity Right Without Contrast [JG]   0934 I have spoken to orthopedist Dr. Tesfaye who notes that there does not appear to be a sign of an infection given the patient's inflammatory markers being unremarkable.  He recommends pain medicine, prescription for a walker, and discharged home but he notes that if the patient were to be admitted to the hospital he would see her as a consultant.  Despite multiple rounds of pain medication the patient is unable to bear weight on her left lower extremity.  A CT scan of the right lower extremity was performed and shows an effusion with possible hemorrhage but no obvious fracture.  Patient still unable to bear weight so she will require admission to the hospital.  She has had improvement in her blood pressure here today but is still hypertensive.  She is stable at time of signout to Dr. Townsend.    [JG]   1125 I assumed patient's care from Dr. Tobar at the end of his shift.  We have been hopeful that we might be able to admit the patient for physical therapy, but our bed situation is not going to allow for that.  We have no beds, we have exceeded our limit of boarding patients in the ED, there are no beds anywhere throughout our region as far as any transfers.  Patient's family is here to pick her up.  She is to follow-up closely with Dr. Tesfaye as an outpatient.  She is discharged home in care of family.    [CM]      ED Course User Index  [CM] Jaguar Townsend MD  [JG]  Ismael Tobar DO                                           [facility]    Final diagnoses:   Effusion of right knee joint       ED Disposition  ED Disposition     ED Disposition Condition Comment    Discharge Stable           Pablo Tesfaye,   [address] [id number] [phone]    Go to   Call the office today to schedule first available appointment in the next 1 to 2 days    [facility] Emergency Department  [address] [facility] [address] [phone]  Go to   If symptoms worsen         Medication List      New Prescriptions    HYDROcodone-acetaminophen 5-325 MG per tablet  Commonly known as: NORCO  Take 1 tablet by mouth Every 8 (Eight) Hours As Needed for Severe Pain .           Where to Get Your Medications      These medications were sent to [address] [phone] [facility] [phone] [address] [address]    Phone: [phone]   · HYDROcodone-acetaminophen 5-325 MG per tablet         Please note that portions of this note were completed with a voice recognition program.               Jaguar Townsend MD  08/10/21 9265

## 2021-08-15 LAB
BACTERIA FLD CULT: NORMAL
GRAM STN SPEC: NORMAL
GRAM STN SPEC: NORMAL

## 2021-08-20 ENCOUNTER — TRANSCRIBE ORDERS (OUTPATIENT)
Dept: ADMINISTRATIVE | Facility: HOSPITAL | Age: 86
End: 2021-08-20

## 2021-08-20 DIAGNOSIS — R22.41 LOWER LEG MASS, RIGHT: Primary | ICD-10-CM

## 2021-09-10 ENCOUNTER — TELEPHONE (OUTPATIENT)
Dept: SURGERY | Facility: CLINIC | Age: 86
End: 2021-09-10

## 2021-09-10 NOTE — TELEPHONE ENCOUNTER
Called patient per  to give her the option of skipping her next appointment. Of course putting her back in recall when her next mammo is due.     Phone kept ringing, and unable to leave message. Will try again later.      DL 9/10/2021 10:16am

## 2021-09-13 ENCOUNTER — TELEPHONE (OUTPATIENT)
Dept: SURGERY | Facility: CLINIC | Age: 86
End: 2021-09-13

## 2021-09-13 NOTE — TELEPHONE ENCOUNTER
Lashell returned my call. She stated that she wasn't having any problems at the moment. She is aware that I have put her back in recall for November.        DL 9/13/2021 10:48am

## 2021-09-23 ENCOUNTER — APPOINTMENT (OUTPATIENT)
Dept: GENERAL RADIOLOGY | Facility: HOSPITAL | Age: 86
End: 2021-09-23

## 2021-09-23 ENCOUNTER — HOSPITAL ENCOUNTER (EMERGENCY)
Facility: HOSPITAL | Age: 86
Discharge: HOME | End: 2021-09-23
Attending: STUDENT IN AN ORGANIZED HEALTH CARE EDUCATION/TRAINING PROGRAM | Admitting: STUDENT IN AN ORGANIZED HEALTH CARE EDUCATION/TRAINING PROGRAM

## 2021-09-23 VITALS
WEIGHT: 113 LBS | HEART RATE: 88 BPM | RESPIRATION RATE: 18 BRPM | BODY MASS INDEX: 18.16 KG/M2 | TEMPERATURE: 98.2 F | DIASTOLIC BLOOD PRESSURE: 87 MMHG | HEIGHT: 66 IN | OXYGEN SATURATION: 95 % | SYSTOLIC BLOOD PRESSURE: 158 MMHG

## 2021-09-23 DIAGNOSIS — M25.561 ACUTE PAIN OF RIGHT KNEE: ICD-10-CM

## 2021-09-23 DIAGNOSIS — M25.461 KNEE EFFUSION, RIGHT: Primary | ICD-10-CM

## 2021-09-23 LAB
APPEARANCE FLD: ABNORMAL
COLOR FLD: YELLOW
CRYSTALS FLD MICRO: NORMAL
METHOD: ABNORMAL
MONOS+MACROS NFR FLD: 11 %
NEUTROPHILS NFR FLD MANUAL: 89 %
NUC CELL # FLD: ABNORMAL /MM3
RBC # FLD AUTO: ABNORMAL 10*3/UL

## 2021-09-23 PROCEDURE — 89051 BODY FLUID CELL COUNT: CPT | Performed by: PHYSICIAN ASSISTANT

## 2021-09-23 PROCEDURE — 82945 GLUCOSE OTHER FLUID: CPT | Performed by: PHYSICIAN ASSISTANT

## 2021-09-23 PROCEDURE — 73562 X-RAY EXAM OF KNEE 3: CPT

## 2021-09-23 PROCEDURE — 10160 PNXR ASPIR ABSC HMTMA BULLA: CPT

## 2021-09-23 PROCEDURE — 73562 X-RAY EXAM OF KNEE 3: CPT | Performed by: RADIOLOGY

## 2021-09-23 PROCEDURE — 87070 CULTURE OTHR SPECIMN AEROBIC: CPT | Performed by: PHYSICIAN ASSISTANT

## 2021-09-23 PROCEDURE — 87205 SMEAR GRAM STAIN: CPT | Performed by: PHYSICIAN ASSISTANT

## 2021-09-23 PROCEDURE — 99283 EMERGENCY DEPT VISIT LOW MDM: CPT

## 2021-09-23 PROCEDURE — 89060 EXAM SYNOVIAL FLUID CRYSTALS: CPT | Performed by: PHYSICIAN ASSISTANT

## 2021-09-23 RX ORDER — LIDOCAINE HYDROCHLORIDE 10 MG/ML
10 INJECTION, SOLUTION EPIDURAL; INFILTRATION; INTRACAUDAL; PERINEURAL ONCE
Status: COMPLETED | OUTPATIENT
Start: 2021-09-23 | End: 2021-09-23

## 2021-09-23 RX ADMIN — LIDOCAINE HYDROCHLORIDE 10 ML: 10 INJECTION, SOLUTION EPIDURAL; INFILTRATION; INTRACAUDAL; PERINEURAL at 14:38

## 2021-09-23 NOTE — ED PROVIDER NOTES
Subjective   Patient is an 86-year-old female with hypertension, hyperlipidemia, osteoarthritis, and history of breast cancer that presents the ED with complaints of right knee pain and swelling.  She states that she has had no recent fall or trauma.  She states it does this occasionally.  She states her arthritis will flare up and her knee will swell and she will have to have the fluid removed.  She states she has an appointment with her orthopedic on Monday but she states the pain can be so severe today that she could not wait any longer.  She denies chest pain, shortness of breath, fever, chills, nausea, vomiting, headache, dizziness, abdominal pain, or dysuria.      History provided by:  Patient   used: No        Review of Systems   Constitutional: Negative.  Negative for chills, diaphoresis, fatigue and fever.   HENT: Negative for congestion, drooling, ear discharge, ear pain, facial swelling, postnasal drip, rhinorrhea, sinus pressure, sinus pain, sneezing, sore throat, tinnitus and trouble swallowing.    Eyes: Negative.  Negative for photophobia, pain, discharge, redness and itching.   Respiratory: Negative.  Negative for cough, shortness of breath and wheezing.    Cardiovascular: Negative.  Negative for chest pain.   Gastrointestinal: Negative.  Negative for abdominal distention, abdominal pain, constipation, diarrhea, nausea and vomiting.   Endocrine: Negative.    Genitourinary: Negative.  Negative for difficulty urinating, dysuria, flank pain, frequency and urgency.   Musculoskeletal: Positive for arthralgias and joint swelling. Negative for back pain, gait problem, myalgias, neck pain and neck stiffness.   Skin: Negative.  Negative for rash.   Neurological: Negative.  Negative for dizziness, seizures, syncope, facial asymmetry, weakness, light-headedness, numbness and headaches.   Psychiatric/Behavioral: Negative.  Negative for confusion.   All other systems reviewed and are  negative.      Past Medical History:   Diagnosis Date   • Arthritis    • Breast cancer (CMS/HCC) 2015    lt br ca   • Breast cancer (CMS/HCC) 2006    rt br ca   • [place] (hard of hearing)    • Hyperlipidemia    • Hypertension    • Hypothyroidism    • Scabies exposure        Allergies   Allergen Reactions   • Bactrim [Sulfamethoxazole-Trimethoprim] GI Intolerance   • Ketorolac Nausea And Vomiting   • Phenergan [Promethazine Hcl] Other (See Comments)     Restless legs and trouble swallowing, seizures   • Codeine Nausea And Vomiting   • Penicillins Rash     tolerates Keflex per patient       Past Surgical History:   Procedure Laterality Date   • BREAST BIOPSY     • BREAST LUMPECTOMY Right 2006   • BREAST SURGERY     • MASTECTOMY Left 2015    ca   • MASTECTOMY WITH SENTINEL NODE BIOPSY AND AXILLARY NODE DISSECTION Right 11/17/2020    Procedure: BREAST MASTECTOMY WITH SENTINEL NODE BIOPSY AND AXILLARY NODE DISSECTION;  Surgeon: Cynthia Ward MD;  Location: [facility];  Service: General;  Laterality: Right;       Family History   Problem Relation Age of Onset   • Hypertension Mother    • Uterine cancer Mother    • Diabetes Mother    • Diabetes Daughter    • Prostate cancer Son    • Diabetes Son    • Throat cancer Son    • Hypertension Child    • Breast cancer Neg Hx        Social History     Socioeconomic History   • Marital status:      Spouse name: Not on file   • Number of children: Not on file   • Years of education: Not on file   • Highest education level: Not on file   Tobacco Use   • Smoking status: Never Smoker   • Smokeless tobacco: Never Used   Vaping Use   • Vaping Use: Never used   Substance and Sexual Activity   • Alcohol use: No   • Drug use: No   • Sexual activity: Defer     Birth control/protection: None           Objective   Physical Exam  Vitals and nursing note reviewed.   Constitutional:       General: She is not in acute distress.     Appearance: She is well-developed. She is not diaphoretic.    HENT:      Head: Normocephalic and atraumatic.      Right Ear: External ear normal.      Left Ear: External ear normal.      Nose: Nose normal.      Mouth/Throat:      Mouth: Mucous membranes are moist.      Pharynx: Oropharynx is clear.   Eyes:      Extraocular Movements: Extraocular movements intact.      Conjunctiva/sclera: Conjunctivae normal.      Pupils: Pupils are equal, round, and reactive to light.   Neck:      Vascular: No JVD.      Trachea: No tracheal deviation.   Cardiovascular:      Rate and Rhythm: Normal rate and regular rhythm.      Pulses: Normal pulses.      Heart sounds: Normal heart sounds. No murmur heard.     Pulmonary:      Effort: Pulmonary effort is normal. No respiratory distress.      Breath sounds: Normal breath sounds. No wheezing.   Abdominal:      General: Bowel sounds are normal. There is no distension.      Palpations: Abdomen is soft.      Tenderness: There is no abdominal tenderness. There is no guarding or rebound.   Musculoskeletal:         General: No deformity.      Cervical back: Normal range of motion and neck supple.      Right knee: Effusion present. Decreased range of motion. Tenderness present over the medial joint line. Normal pulse.      Right lower leg: No edema.      Left lower leg: No edema.   Skin:     General: Skin is warm and dry.      Coloration: Skin is not pale.      Findings: No erythema or rash.   Neurological:      Mental Status: She is alert and oriented to person, place, and time.      Cranial Nerves: No cranial nerve deficit.   Psychiatric:         Behavior: Behavior normal.         Thought Content: Thought content normal.         Joint Aspiration/Injection    Date/Time: 9/23/2021 2:58 PM  Performed by: Valerie Acevedo PA-C  Authorized by: Ananda Ga DO     Consent:     Consent obtained:  Verbal    Consent given by:  Patient    Risks discussed:  Bleeding, incomplete drainage, infection, nerve damage, pain and poor cosmetic result     Alternatives discussed:  No treatment  Location:     Location:  Knee    Knee:  R knee  Anesthesia (see MAR for exact dosages):     Anesthesia method:  Local infiltration    Local anesthetic:  Lidocaine 1% w/o epi  Procedure details:     Preparation: Patient was prepped and draped in usual sterile fashion      Needle gauge:  18 G    Ultrasound guidance: no      Approach:  Superior    Aspirate amount:  75    Aspirate characteristics:  Serous    Steroid injected: no      Specimen collected: yes    Post-procedure details:     Dressing:  Gauze roll    Patient tolerance of procedure:  Tolerated well, no immediate complications               ED Course  ED Course as of Sep 23 1501   Thu Sep 23, 2021   1458 IMPRESSION:    Moderate knee joint effusion.     This report was finalized on 9/23/2021 2:35 PM by Dr. Von Gonzales MD.   XR Knee 3 View Right [MH]      ED Course User Index  [MH] Valerie Acevedo PA-C                                           [facility]    Final diagnoses:   Knee effusion, right   Acute pain of right knee       ED Disposition  ED Disposition     ED Disposition Condition Comment    Discharge Stable           Dubin, Ronald S, MD  [address] [place] [address]  [phone]    Schedule an appointment as soon as possible for a visit in 1 day           Medication List      No changes were made to your prescriptions during this visit.          Valerie Acevedo PA-C  09/23/21 2016

## 2021-09-23 NOTE — ED NOTES
75 mL of fluid removed by JOHN Padilla at this time. ACE applied to area at this time by PA.      Igor Gonzales, RN  09/23/21 5801

## 2021-09-24 LAB — GLUCOSE FLD-MCNC: 68 MG/DL

## 2021-09-27 ENCOUNTER — TRANSCRIBE ORDERS (OUTPATIENT)
Dept: ADMINISTRATIVE | Facility: HOSPITAL | Age: 86
End: 2021-09-27

## 2021-09-27 DIAGNOSIS — R60.0 LOWER EXTREMITY EDEMA: Primary | ICD-10-CM

## 2021-09-28 LAB
BACTERIA FLD CULT: NORMAL
GRAM STN SPEC: NORMAL
GRAM STN SPEC: NORMAL

## 2021-10-04 ENCOUNTER — HOSPITAL ENCOUNTER (OUTPATIENT)
Dept: CARDIOLOGY | Facility: HOSPITAL | Age: 86
Discharge: HOME | End: 2021-10-04
Admitting: ORTHOPAEDIC SURGERY

## 2021-10-04 DIAGNOSIS — R60.0 LOWER EXTREMITY EDEMA: ICD-10-CM

## 2021-10-04 PROCEDURE — 93971 EXTREMITY STUDY: CPT | Performed by: RADIOLOGY

## 2021-10-04 PROCEDURE — 93971 EXTREMITY STUDY: CPT

## 2021-11-01 ENCOUNTER — OFFICE VISIT (OUTPATIENT)
Dept: FAMILY MEDICINE CLINIC | Facility: CLINIC | Age: 86
End: 2021-11-01

## 2021-11-01 VITALS
HEIGHT: 66 IN | BODY MASS INDEX: 18.58 KG/M2 | SYSTOLIC BLOOD PRESSURE: 131 MMHG | DIASTOLIC BLOOD PRESSURE: 101 MMHG | OXYGEN SATURATION: 98 % | HEART RATE: 121 BPM | WEIGHT: 115.6 LBS | TEMPERATURE: 97.8 F

## 2021-11-01 DIAGNOSIS — Z00.00 MEDICARE ANNUAL WELLNESS VISIT, SUBSEQUENT: ICD-10-CM

## 2021-11-01 DIAGNOSIS — E03.4 HYPOTHYROIDISM DUE TO ACQUIRED ATROPHY OF THYROID: Chronic | ICD-10-CM

## 2021-11-01 DIAGNOSIS — I10 ESSENTIAL HYPERTENSION: Chronic | ICD-10-CM

## 2021-11-01 DIAGNOSIS — I48.91 ATRIAL FIBRILLATION, UNSPECIFIED TYPE (HCC): Primary | ICD-10-CM

## 2021-11-01 PROCEDURE — 99214 OFFICE O/P EST MOD 30 MIN: CPT | Performed by: FAMILY MEDICINE

## 2021-11-01 PROCEDURE — G0439 PPPS, SUBSEQ VISIT: HCPCS | Performed by: FAMILY MEDICINE

## 2021-11-01 PROCEDURE — 36415 COLL VENOUS BLD VENIPUNCTURE: CPT | Performed by: FAMILY MEDICINE

## 2021-11-01 PROCEDURE — 93005 ELECTROCARDIOGRAM TRACING: CPT | Performed by: FAMILY MEDICINE

## 2021-11-01 PROCEDURE — 84439 ASSAY OF FREE THYROXINE: CPT | Performed by: FAMILY MEDICINE

## 2021-11-01 PROCEDURE — 85025 COMPLETE CBC W/AUTO DIFF WBC: CPT | Performed by: FAMILY MEDICINE

## 2021-11-01 PROCEDURE — 1170F FXNL STATUS ASSESSED: CPT | Performed by: FAMILY MEDICINE

## 2021-11-01 PROCEDURE — 84443 ASSAY THYROID STIM HORMONE: CPT | Performed by: FAMILY MEDICINE

## 2021-11-01 PROCEDURE — 80053 COMPREHEN METABOLIC PANEL: CPT | Performed by: FAMILY MEDICINE

## 2021-11-01 PROCEDURE — 1160F RVW MEDS BY RX/DR IN RCRD: CPT | Performed by: FAMILY MEDICINE

## 2021-11-01 NOTE — PROGRESS NOTES
The ABCs of the Annual Wellness Visit  Subsequent Medicare Wellness Visit    Chief Complaint   Patient presents with   • essential hypertension   • Medicare Wellness-subsequent   • Hypothyroidism      Subjective    History of Present Illness:  Lashell Case is a 86 y.o. female who presents for a Subsequent Medicare Wellness Visit.  Follow-up hypertension hypothyroid state.  Has maintained follow-up with breast surgeon.  Still major caregiver for her grandchild.  Denies respiratory GI .  About 2 months ago had an episode of precipitated ER visit.  Those records are reviewed.  Since then has noted episodes of palpitations.  Has had no other syncope or dizzy episodes.  Denies respiratory GI  skin concerns.  You report having had flu vaccine this season.    The following portions of the patient's history were reviewed and   updated as appropriate: allergies, current medications, past family history, past social history, past surgical history and problem list.    Compared to one year ago, the patient feels her physical   health is the same.    Compared to one year ago, the patient feels her mental   health is the same.    Recent Hospitalizations:  She was not admitted to the hospital during the last year.       Current Medical Providers:  Patient Care Team:  Alli Wolf MD as PCP - General (Family Medicine)    Outpatient Medications Prior to Visit   Medication Sig Dispense Refill   • amLODIPine (NORVASC) 10 MG tablet Take 1 tablet by mouth Daily. Hold for BP <110/60 30 tablet 6   • levothyroxine (SYNTHROID, LEVOTHROID) 75 MCG tablet TAKE ONE TABLET BY MOUTH EVERY DAY 30 tablet 6   • lisinopril-hydrochlorothiazide (PRINZIDE,ZESTORETIC) 20-25 MG per tablet Take 1 tablet by mouth Daily. 30 tablet 6   • metoprolol succinate XL (TOPROL-XL) 50 MG 24 hr tablet Take 1 tablet by mouth Daily. for blood pressure 30 tablet 6   • HYDROcodone-acetaminophen (NORCO) 5-325 MG per tablet Take 1 tablet by mouth Every 8  "(Eight) Hours As Needed for Severe Pain . 9 tablet 0     No facility-administered medications prior to visit.       No opioid medication identified on active medication list. I have reviewed chart for other potential  high risk medication/s and harmful drug interactions in the elderly.          Aspirin is not on active medication list.  Aspirin use is not indicated based on review of current medical condition/s. Risk of harm outweighs potential benefits.  .    Patient Active Problem List   Diagnosis   • Arthritis   • Abnormal ambulatory electrocardiogram   • Essential hypertension   • Hypothyroidism due to acquired atrophy of thyroid   • Malignant neoplasm of left female breast (HCC)   • Mixed hyperlipidemia   • Aromatase inhibitor use   • Osteopenia   • Hepatic cyst   • Splenic cyst   • Breast mass     Advance Care Planning  Advance Directive is not on file.  ACP discussion was held with the patient during this visit. Patient does not have an advance directive, declines further assistance.          Objective    Vitals:    11/01/21 1403   BP: (!) 131/101   BP Location: Right arm   Patient Position: Sitting   Cuff Size: Adult   Pulse: (!) 121   Temp: 97.8 °F (36.6 °C)   TempSrc: Temporal   SpO2: 98%   Weight: 52.4 kg (115 lb 9.6 oz)   Height: 167.6 cm (65.98\")     BMI Readings from Last 1 Encounters:   11/01/21 18.67 kg/m²   BMI is within normal parameters. No follow-up required.    Does the patient have evidence of cognitive impairment? No    Physical Exam  Vitals reviewed.   Constitutional:       Appearance: Normal appearance.   HENT:      Head: Normocephalic.   Eyes:      Conjunctiva/sclera: Conjunctivae normal.   Cardiovascular:      Rate and Rhythm: Tachycardia present.      Heart sounds: Normal heart sounds.      Comments: Frequent ectopy  Pulmonary:      Effort: Pulmonary effort is normal.      Breath sounds: Normal breath sounds.   Musculoskeletal:      Cervical back: Normal range of motion and neck supple. "   Skin:     General: Skin is warm and dry.      Comments: Has painless right upper extremity minimal edema no redness.   Neurological:      Mental Status: She is alert and oriented to person, place, and time.   Psychiatric:         Mood and Affect: Mood normal.                 HEALTH RISK ASSESSMENT    Smoking Status:  Social History     Tobacco Use   Smoking Status Never Smoker   Smokeless Tobacco Never Used     Alcohol Consumption:  Social History     Substance and Sexual Activity   Alcohol Use No     Fall Risk Screen:    CATA Fall Risk Assessment was completed, and patient is at LOW risk for falls.Assessment completed on:11/1/2021    Depression Screening:  PHQ-2/PHQ-9 Depression Screening 11/1/2021   Little interest or pleasure in doing things 0   Feeling down, depressed, or hopeless 0   Trouble falling or staying asleep, or sleeping too much -   Feeling tired or having little energy -   Poor appetite or overeating -   Feeling bad about yourself - or that you are a failure or have let yourself or your family down -   Trouble concentrating on things, such as reading the newspaper or watching television -   Moving or speaking so slowly that other people could have noticed. Or the opposite - being so fidgety or restless that you have been moving around a lot more than usual -   Thoughts that you would be better off dead, or of hurting yourself in some way -   Total Score 0       Health Habits and Functional and Cognitive Screening:  Functional & Cognitive Status 11/1/2021   Do you have difficulty preparing food and eating? No   Do you have difficulty bathing yourself, getting dressed or grooming yourself? No   Do you have difficulty using the toilet? No   Do you have difficulty moving around from place to place? No   Do you have trouble with steps or getting out of a bed or a chair? No   Current Diet Well Balanced Diet   Dental Exam Not up to date   Eye Exam Not up to date   Exercise (times per week) 5 times per  week   Current Exercises Include Walking   Current Exercise Activities Include -   Do you need help using the phone?  No   Are you deaf or do you have serious difficulty hearing?  No   Do you need help with transportation? No   Do you need help shopping? No   Do you need help preparing meals?  No   Do you need help with housework?  No   Do you need help with laundry? No   Do you need help taking your medications? No   Do you need help managing money? No   Do you ever drive or ride in a car without wearing a seat belt? No   Have you felt unusual stress, anger or loneliness in the last month? No   Who do you live with? Other   If you need help, do you have trouble finding someone available to you? No   Have you been bothered in the last four weeks by sexual problems? No   Do you have difficulty concentrating, remembering or making decisions? No       Age-appropriate Screening Schedule:  Refer to the list below for future screening recommendations based on patient's age, sex and/or medical conditions. Orders for these recommended tests are listed in the plan section. The patient has been provided with a written plan.    Health Maintenance   Topic Date Due   • ZOSTER VACCINE (1 of 2) Never done   • LIPID PANEL  01/03/2018   • DXA SCAN  01/14/2018   • INFLUENZA VACCINE  08/01/2021   • TDAP/TD VACCINES (4 - Td or Tdap) 04/27/2024   • MAMMOGRAM  Discontinued              Assessment/Plan   CMS Preventative Services Quick Reference  Risk Factors Identified During Encounter  Cardiovascular Disease  Immunizations Discussed/Encouraged (specific Immunizations; Shingrix and COVID19  The above risks/problems have been discussed with the patient.  Follow up actions/plans if indicated are seen below in the Assessment/Plan Section.  Pertinent information has been shared with the patient in the After Visit Summary.    Diagnoses and all orders for this visit:    1. Atrial fibrillation, unspecified type (HCC) (Primary)  -     ECG 12  Lead  -     Ambulatory Referral to Cardiology    2. Essential hypertension  -     CBC & Differential  -     Comprehensive Metabolic Panel    3. Hypothyroidism due to acquired atrophy of thyroid  -     T4, Free  -     TSH    4. Medicare annual wellness visit, subsequent    EKG obtained shows atrial fibrillation with increased ventricular response.  No acute changes seen.  Persistent mild ST depression in lateral leads.  Prior tracing showed PACs with sinus tachycardia    You are totally asymptomatic from this finding.  We will ask for and have made arrangements for you to see cardiology in a.m. for further evaluation and treatment recommendations.  Please take extra 50 mg metoprolol today.  Will notify you and of course labs will be available in record for cardiology.  Continue all other medications same at this time.  Encourage you to keep recommended follow-up with breast surgeon.    Follow Up:   No follow-ups on file.     An After Visit Summary and PPPS were made available to the patient.

## 2021-11-02 ENCOUNTER — HOSPITAL ENCOUNTER (INPATIENT)
Facility: HOSPITAL | Age: 86
LOS: 3 days | Discharge: HOME | End: 2021-11-05
Attending: EMERGENCY MEDICINE | Admitting: INTERNAL MEDICINE

## 2021-11-02 ENCOUNTER — OFFICE VISIT (OUTPATIENT)
Dept: CARDIOLOGY | Facility: CLINIC | Age: 86
End: 2021-11-02

## 2021-11-02 ENCOUNTER — APPOINTMENT (OUTPATIENT)
Dept: GENERAL RADIOLOGY | Facility: HOSPITAL | Age: 86
End: 2021-11-02

## 2021-11-02 VITALS
OXYGEN SATURATION: 98 % | BODY MASS INDEX: 18.29 KG/M2 | DIASTOLIC BLOOD PRESSURE: 92 MMHG | RESPIRATION RATE: 18 BRPM | WEIGHT: 113.8 LBS | HEART RATE: 121 BPM | TEMPERATURE: 97.3 F | SYSTOLIC BLOOD PRESSURE: 132 MMHG | HEIGHT: 66 IN

## 2021-11-02 DIAGNOSIS — I10 ESSENTIAL HYPERTENSION: Chronic | ICD-10-CM

## 2021-11-02 DIAGNOSIS — E05.90 HYPERTHYROIDISM: ICD-10-CM

## 2021-11-02 DIAGNOSIS — R07.9 CHEST PAIN, UNSPECIFIED TYPE: ICD-10-CM

## 2021-11-02 DIAGNOSIS — I35.0 NONRHEUMATIC AORTIC (VALVE) STENOSIS: ICD-10-CM

## 2021-11-02 DIAGNOSIS — I48.91 ATRIAL FIBRILLATION WITH RVR (HCC): Primary | ICD-10-CM

## 2021-11-02 DIAGNOSIS — E78.2 MIXED HYPERLIPIDEMIA: Chronic | ICD-10-CM

## 2021-11-02 DIAGNOSIS — E03.4 HYPOTHYROIDISM DUE TO ACQUIRED ATROPHY OF THYROID: ICD-10-CM

## 2021-11-02 DIAGNOSIS — R07.2 PRECORDIAL CHEST PAIN: ICD-10-CM

## 2021-11-02 DIAGNOSIS — I48.19 ATRIAL FIBRILLATION, PERSISTENT (HCC): Primary | ICD-10-CM

## 2021-11-02 LAB
ALBUMIN SERPL-MCNC: 3.8 G/DL (ref 3.5–5.2)
ALBUMIN SERPL-MCNC: 4.3 G/DL (ref 3.5–5.2)
ALBUMIN/GLOB SERPL: 1.3 G/DL
ALBUMIN/GLOB SERPL: 1.4 G/DL
ALP SERPL-CCNC: 82 U/L (ref 39–117)
ALP SERPL-CCNC: 94 U/L (ref 39–117)
ALT SERPL W P-5'-P-CCNC: 35 U/L (ref 1–33)
ALT SERPL W P-5'-P-CCNC: 40 U/L (ref 1–33)
ANION GAP SERPL CALCULATED.3IONS-SCNC: 11.7 MMOL/L (ref 5–15)
ANION GAP SERPL CALCULATED.3IONS-SCNC: 7.7 MMOL/L (ref 5–15)
AST SERPL-CCNC: 30 U/L (ref 1–32)
AST SERPL-CCNC: 34 U/L (ref 1–32)
BASOPHILS # BLD AUTO: 0.04 10*3/MM3 (ref 0–0.2)
BASOPHILS # BLD AUTO: 0.04 10*3/MM3 (ref 0–0.2)
BASOPHILS NFR BLD AUTO: 0.6 % (ref 0–1.5)
BASOPHILS NFR BLD AUTO: 0.8 % (ref 0–1.5)
BILIRUB SERPL-MCNC: 0.5 MG/DL (ref 0–1.2)
BILIRUB SERPL-MCNC: 1.3 MG/DL (ref 0–1.2)
BUN SERPL-MCNC: 13 MG/DL (ref 8–23)
BUN SERPL-MCNC: 17 MG/DL (ref 8–23)
BUN/CREAT SERPL: 20 (ref 7–25)
BUN/CREAT SERPL: 21.3 (ref 7–25)
CALCIUM SPEC-SCNC: 10.8 MG/DL (ref 8.6–10.5)
CALCIUM SPEC-SCNC: 9.6 MG/DL (ref 8.6–10.5)
CHLORIDE SERPL-SCNC: 104 MMOL/L (ref 98–107)
CHLORIDE SERPL-SCNC: 108 MMOL/L (ref 98–107)
CO2 SERPL-SCNC: 23.3 MMOL/L (ref 22–29)
CO2 SERPL-SCNC: 23.3 MMOL/L (ref 22–29)
CREAT SERPL-MCNC: 0.61 MG/DL (ref 0.57–1)
CREAT SERPL-MCNC: 0.85 MG/DL (ref 0.57–1)
DEPRECATED RDW RBC AUTO: 45.9 FL (ref 37–54)
DEPRECATED RDW RBC AUTO: 54.4 FL (ref 37–54)
EOSINOPHIL # BLD AUTO: 0.01 10*3/MM3 (ref 0–0.4)
EOSINOPHIL # BLD AUTO: 0.03 10*3/MM3 (ref 0–0.4)
EOSINOPHIL NFR BLD AUTO: 0.2 % (ref 0.3–6.2)
EOSINOPHIL NFR BLD AUTO: 0.6 % (ref 0.3–6.2)
ERYTHROCYTE [DISTWIDTH] IN BLOOD BY AUTOMATED COUNT: 13.1 % (ref 12.3–15.4)
ERYTHROCYTE [DISTWIDTH] IN BLOOD BY AUTOMATED COUNT: 14.2 % (ref 12.3–15.4)
FLUAV SUBTYP SPEC NAA+PROBE: NOT DETECTED
FLUBV RNA ISLT QL NAA+PROBE: NOT DETECTED
GFR SERPL CREATININE-BSD FRML MDRD: 64 ML/MIN/1.73
GFR SERPL CREATININE-BSD FRML MDRD: 93 ML/MIN/1.73
GLOBULIN UR ELPH-MCNC: 2.7 GM/DL
GLOBULIN UR ELPH-MCNC: 3.3 GM/DL
GLUCOSE SERPL-MCNC: 102 MG/DL (ref 65–99)
GLUCOSE SERPL-MCNC: 97 MG/DL (ref 65–99)
HCT VFR BLD AUTO: 31.7 % (ref 34–46.6)
HCT VFR BLD AUTO: 38.7 % (ref 34–46.6)
HGB BLD-MCNC: 10.7 G/DL (ref 12–15.9)
HGB BLD-MCNC: 12.3 G/DL (ref 12–15.9)
HOLD SPECIMEN: NORMAL
HOLD SPECIMEN: NORMAL
IMM GRANULOCYTES # BLD AUTO: 0.02 10*3/MM3 (ref 0–0.05)
IMM GRANULOCYTES # BLD AUTO: 0.02 10*3/MM3 (ref 0–0.05)
IMM GRANULOCYTES NFR BLD AUTO: 0.3 % (ref 0–0.5)
IMM GRANULOCYTES NFR BLD AUTO: 0.4 % (ref 0–0.5)
LYMPHOCYTES # BLD AUTO: 1.23 10*3/MM3 (ref 0.7–3.1)
LYMPHOCYTES # BLD AUTO: 1.39 10*3/MM3 (ref 0.7–3.1)
LYMPHOCYTES NFR BLD AUTO: 18.9 % (ref 19.6–45.3)
LYMPHOCYTES NFR BLD AUTO: 26.6 % (ref 19.6–45.3)
MCH RBC QN AUTO: 32.5 PG (ref 26.6–33)
MCH RBC QN AUTO: 32.5 PG (ref 26.6–33)
MCHC RBC AUTO-ENTMCNC: 31.8 G/DL (ref 31.5–35.7)
MCHC RBC AUTO-ENTMCNC: 33.8 G/DL (ref 31.5–35.7)
MCV RBC AUTO: 102.1 FL (ref 79–97)
MCV RBC AUTO: 96.4 FL (ref 79–97)
MONOCYTES # BLD AUTO: 0.55 10*3/MM3 (ref 0.1–0.9)
MONOCYTES # BLD AUTO: 0.63 10*3/MM3 (ref 0.1–0.9)
MONOCYTES NFR BLD AUTO: 10.5 % (ref 5–12)
MONOCYTES NFR BLD AUTO: 9.7 % (ref 5–12)
NEUTROPHILS NFR BLD AUTO: 3.19 10*3/MM3 (ref 1.7–7)
NEUTROPHILS NFR BLD AUTO: 4.57 10*3/MM3 (ref 1.7–7)
NEUTROPHILS NFR BLD AUTO: 61.1 % (ref 42.7–76)
NEUTROPHILS NFR BLD AUTO: 70.3 % (ref 42.7–76)
NRBC BLD AUTO-RTO: 0 /100 WBC (ref 0–0.2)
NRBC BLD AUTO-RTO: 0 /100 WBC (ref 0–0.2)
PLATELET # BLD AUTO: 190 10*3/MM3 (ref 140–450)
PLATELET # BLD AUTO: 220 10*3/MM3 (ref 140–450)
PMV BLD AUTO: 10 FL (ref 6–12)
PMV BLD AUTO: 9.2 FL (ref 6–12)
POTASSIUM SERPL-SCNC: 4.2 MMOL/L (ref 3.5–5.2)
POTASSIUM SERPL-SCNC: 4.3 MMOL/L (ref 3.5–5.2)
PROT SERPL-MCNC: 6.5 G/DL (ref 6–8.5)
PROT SERPL-MCNC: 7.6 G/DL (ref 6–8.5)
QT INTERVAL: 320 MS
QTC INTERVAL: 474 MS
RBC # BLD AUTO: 3.29 10*6/MM3 (ref 3.77–5.28)
RBC # BLD AUTO: 3.79 10*6/MM3 (ref 3.77–5.28)
SARS-COV-2 RNA PNL SPEC NAA+PROBE: NOT DETECTED
SODIUM SERPL-SCNC: 139 MMOL/L (ref 136–145)
SODIUM SERPL-SCNC: 139 MMOL/L (ref 136–145)
T4 FREE SERPL-MCNC: 1.85 NG/DL (ref 0.93–1.7)
TROPONIN T SERPL-MCNC: <0.01 NG/ML (ref 0–0.03)
TROPONIN T SERPL-MCNC: <0.01 NG/ML (ref 0–0.03)
TSH SERPL DL<=0.05 MIU/L-ACNC: 0.01 UIU/ML (ref 0.27–4.2)
TSH SERPL DL<=0.05 MIU/L-ACNC: <0.005 UIU/ML (ref 0.27–4.2)
WBC # BLD AUTO: 5.22 10*3/MM3 (ref 3.4–10.8)
WBC # BLD AUTO: 6.5 10*3/MM3 (ref 3.4–10.8)
WHOLE BLOOD HOLD SPECIMEN: NORMAL
WHOLE BLOOD HOLD SPECIMEN: NORMAL

## 2021-11-02 PROCEDURE — 80053 COMPREHEN METABOLIC PANEL: CPT | Performed by: PHYSICIAN ASSISTANT

## 2021-11-02 PROCEDURE — 84443 ASSAY THYROID STIM HORMONE: CPT | Performed by: PHYSICIAN ASSISTANT

## 2021-11-02 PROCEDURE — 71045 X-RAY EXAM CHEST 1 VIEW: CPT | Performed by: RADIOLOGY

## 2021-11-02 PROCEDURE — 82607 VITAMIN B-12: CPT | Performed by: NURSE PRACTITIONER

## 2021-11-02 PROCEDURE — 71045 X-RAY EXAM CHEST 1 VIEW: CPT

## 2021-11-02 PROCEDURE — 99222 1ST HOSP IP/OBS MODERATE 55: CPT | Performed by: NURSE PRACTITIONER

## 2021-11-02 PROCEDURE — 99284 EMERGENCY DEPT VISIT MOD MDM: CPT

## 2021-11-02 PROCEDURE — 85025 COMPLETE CBC W/AUTO DIFF WBC: CPT | Performed by: PHYSICIAN ASSISTANT

## 2021-11-02 PROCEDURE — 25010000002 ENOXAPARIN PER 10 MG: Performed by: PHYSICIAN ASSISTANT

## 2021-11-02 PROCEDURE — 36415 COLL VENOUS BLD VENIPUNCTURE: CPT

## 2021-11-02 PROCEDURE — 93005 ELECTROCARDIOGRAM TRACING: CPT | Performed by: PHYSICIAN ASSISTANT

## 2021-11-02 PROCEDURE — 93010 ELECTROCARDIOGRAM REPORT: CPT | Performed by: INTERNAL MEDICINE

## 2021-11-02 PROCEDURE — 82746 ASSAY OF FOLIC ACID SERUM: CPT | Performed by: NURSE PRACTITIONER

## 2021-11-02 PROCEDURE — 99204 OFFICE O/P NEW MOD 45 MIN: CPT | Performed by: SPECIALIST

## 2021-11-02 PROCEDURE — 87636 SARSCOV2 & INF A&B AMP PRB: CPT | Performed by: PHYSICIAN ASSISTANT

## 2021-11-02 PROCEDURE — 84484 ASSAY OF TROPONIN QUANT: CPT | Performed by: PHYSICIAN ASSISTANT

## 2021-11-02 RX ORDER — SODIUM CHLORIDE 0.9 % (FLUSH) 0.9 %
1-10 SYRINGE (ML) INJECTION AS NEEDED
Status: DISCONTINUED | OUTPATIENT
Start: 2021-11-02 | End: 2021-11-05 | Stop reason: HOSPADM

## 2021-11-02 RX ORDER — ASPIRIN 81 MG/1
324 TABLET, CHEWABLE ORAL ONCE
Status: COMPLETED | OUTPATIENT
Start: 2021-11-02 | End: 2021-11-02

## 2021-11-02 RX ORDER — LEVOTHYROXINE SODIUM 0.05 MG/1
50 TABLET ORAL DAILY
Qty: 30 TABLET | Refills: 6 | Status: ON HOLD | OUTPATIENT
Start: 2021-11-02 | End: 2021-11-02

## 2021-11-02 RX ORDER — SODIUM CHLORIDE 0.9 % (FLUSH) 0.9 %
10 SYRINGE (ML) INJECTION EVERY 12 HOURS SCHEDULED
Status: DISCONTINUED | OUTPATIENT
Start: 2021-11-02 | End: 2021-11-05 | Stop reason: HOSPADM

## 2021-11-02 RX ORDER — SODIUM CHLORIDE 0.9 % (FLUSH) 0.9 %
10 SYRINGE (ML) INJECTION AS NEEDED
Status: DISCONTINUED | OUTPATIENT
Start: 2021-11-02 | End: 2021-11-05 | Stop reason: HOSPADM

## 2021-11-02 RX ORDER — AMLODIPINE BESYLATE 10 MG/1
10 TABLET ORAL DAILY
Status: CANCELLED | OUTPATIENT
Start: 2021-11-03

## 2021-11-02 RX ORDER — NITROGLYCERIN 0.4 MG/1
0.4 TABLET SUBLINGUAL
Status: DISCONTINUED | OUTPATIENT
Start: 2021-11-02 | End: 2021-11-05 | Stop reason: HOSPADM

## 2021-11-02 RX ORDER — LEVOTHYROXINE SODIUM 0.07 MG/1
75 TABLET ORAL DAILY
COMMUNITY
End: 2021-11-05 | Stop reason: HOSPADM

## 2021-11-02 RX ORDER — SODIUM CHLORIDE 9 MG/ML
75 INJECTION, SOLUTION INTRAVENOUS CONTINUOUS
Status: ACTIVE | OUTPATIENT
Start: 2021-11-02 | End: 2021-11-03

## 2021-11-02 RX ORDER — METOPROLOL SUCCINATE 50 MG/1
100 TABLET, EXTENDED RELEASE ORAL
Status: DISCONTINUED | OUTPATIENT
Start: 2021-11-03 | End: 2021-11-03

## 2021-11-02 RX ORDER — METOPROLOL SUCCINATE 100 MG/1
100 TABLET, EXTENDED RELEASE ORAL DAILY
COMMUNITY
End: 2021-11-29 | Stop reason: SDUPTHER

## 2021-11-02 RX ORDER — METOPROLOL SUCCINATE 50 MG/1
100 TABLET, EXTENDED RELEASE ORAL DAILY
Status: CANCELLED | OUTPATIENT
Start: 2021-11-03

## 2021-11-02 RX ADMIN — ASPIRIN 324 MG: 81 TABLET, CHEWABLE ORAL at 10:25

## 2021-11-02 RX ADMIN — SODIUM CHLORIDE 75 ML/HR: 9 INJECTION, SOLUTION INTRAVENOUS at 16:55

## 2021-11-02 RX ADMIN — ENOXAPARIN SODIUM 50 MG: 60 INJECTION SUBCUTANEOUS at 11:42

## 2021-11-02 RX ADMIN — SODIUM CHLORIDE 5 MG/HR: 900 INJECTION, SOLUTION INTRAVENOUS at 10:27

## 2021-11-02 NOTE — PLAN OF CARE
Goal Outcome Evaluation:   Patient is resting in bed. No complaints. Stated she has lost approximately 71 pounds since February. No acute distress noted. Cardizem infusing at 5 mL/hr. Heart rate currently 99. Vital signs stable. Will continue to monitor and follow plan of care.

## 2021-11-02 NOTE — ED PROVIDER NOTES
Subjective   86-year-old white female presents secondary to chest discomfort along with rapid A. fib.  Patient was sent by cardiology office.  Patient states that she has been having palpitations over the past 2 months.  She states that last evening she had an episode of chest discomfort.  She did get sick at her stomach and throat.  She states that she was short of breath with this.  She states that she typically does not get any chest pain pressure tightness or squeezing.  She denies any fever.  No exposure to Covid or Covid symptoms.  No other complaints this time.          Review of Systems   Constitutional: Negative.  Negative for fever.   HENT: Negative.    Respiratory: Positive for chest tightness.    Cardiovascular: Positive for palpitations. Negative for chest pain.   Gastrointestinal: Negative.  Negative for abdominal pain.   Endocrine: Negative.    Genitourinary: Negative.  Negative for dysuria.   Skin: Negative.    Neurological: Negative.    Psychiatric/Behavioral: Negative.    All other systems reviewed and are negative.      Past Medical History:   Diagnosis Date   • Arthritis    • Breast cancer (HCC) 2015    lt br ca   • Breast cancer (HCC) 2006    rt br ca   • Dizziness 7/8/2016   • [place] (hard of hearing)    • Hyperlipidemia    • Hypertension    • Hypothyroidism    • Scabies exposure        Allergies   Allergen Reactions   • Bactrim [Sulfamethoxazole-Trimethoprim] GI Intolerance   • Ketorolac Nausea And Vomiting   • Phenergan [Promethazine Hcl] Other (See Comments)     Restless legs and trouble swallowing, seizures   • Codeine Nausea And Vomiting   • Penicillins Rash     tolerates Keflex per patient       Past Surgical History:   Procedure Laterality Date   • BREAST BIOPSY     • BREAST LUMPECTOMY Right 2006   • BREAST SURGERY     • MASTECTOMY Left 2015    ca   • MASTECTOMY WITH SENTINEL NODE BIOPSY AND AXILLARY NODE DISSECTION Right 11/17/2020    Procedure: BREAST MASTECTOMY WITH SENTINEL NODE BIOPSY  AND AXILLARY NODE DISSECTION;  Surgeon: Cynthia Ward MD;  Location: [facility];  Service: General;  Laterality: Right;       Family History   Problem Relation Age of Onset   • Hypertension Mother    • Uterine cancer Mother    • Diabetes Mother    • Diabetes Daughter    • Prostate cancer Son    • Diabetes Son    • Throat cancer Son    • Hypertension Child    • Breast cancer Neg Hx        Social History     Socioeconomic History   • Marital status:    Tobacco Use   • Smoking status: Never Smoker   • Smokeless tobacco: Never Used   Vaping Use   • Vaping Use: Never used   Substance and Sexual Activity   • Alcohol use: No   • Drug use: No   • Sexual activity: Defer     Birth control/protection: None           Objective   Physical Exam  Vitals and nursing note reviewed.   Constitutional:       General: She is not in acute distress.     Appearance: She is well-developed. She is not diaphoretic.   HENT:      Head: Normocephalic and atraumatic.      Right Ear: External ear normal.      Left Ear: External ear normal.      Nose: Nose normal.   Eyes:      Conjunctiva/sclera: Conjunctivae normal.      Pupils: Pupils are equal, round, and reactive to light.   Neck:      Vascular: No JVD.      Trachea: No tracheal deviation.   Cardiovascular:      Rate and Rhythm: Normal rate and regular rhythm.      Heart sounds: Normal heart sounds. No murmur heard.      Pulmonary:      Effort: Pulmonary effort is normal. No respiratory distress.      Breath sounds: Normal breath sounds. No wheezing.   Abdominal:      General: Bowel sounds are normal.      Palpations: Abdomen is soft.      Tenderness: There is no abdominal tenderness.   Musculoskeletal:         General: No deformity. Normal range of motion.      Cervical back: Normal range of motion and neck supple.   Skin:     General: Skin is warm and dry.      Coloration: Skin is not pale.      Findings: No erythema or rash.   Neurological:      Mental Status: She is alert and  oriented to person, place, and time.      Cranial Nerves: No cranial nerve deficit.   Psychiatric:         Behavior: Behavior normal.         Thought Content: Thought content normal.         Procedures           ED Course  ED Course as of 11/02/21 1342   Tue Nov 02, 2021   1037 ECG 12 Lead  Atrial fibrillation with RVR.  Rate 132.  Left axis deviation.  Q waves in lead V1.   No ST elevation or depression.  Abnormal EKG.  Interpreted by me. [BC]   1126 D/w Dr Luis Godwin, admit to the hospitalist.  Paged the hospitalist [JI]   1155 Patient's heart rate sustained around 80 still in A. fib with Cardizem.  Discussed with Dr. Cifuentes.  He accepts her to his service.  Her ER course has been uneventful.  Heart score is 5. [JI]      ED Course User Index  [BC] Bradly Uribe MD  [JI] Alfred Gao PA                                           MDM  Number of Diagnoses or Management Options  Atrial fibrillation with RVR (HCC): new and requires workup  Chest pain, unspecified type: new and requires workup  Hyperthyroidism: new and requires workup     Amount and/or Complexity of Data Reviewed  Clinical lab tests: reviewed and ordered  Tests in the radiology section of CPT®: reviewed and ordered        Final diagnoses:   None       ED Disposition  ED Disposition     ED Disposition Condition Comment    Decision to Admit            No follow-up provider specified.       Medication List      No changes were made to your prescriptions during this visit.          Alfred Gao PA  11/02/21 0019

## 2021-11-02 NOTE — PROGRESS NOTES
Lab testing shows mild persistent anemia.  Thyroid level still elevated.  New dose has been sent to pharmacy.

## 2021-11-02 NOTE — PHARMACY PATIENT ASSISTANCE
Pharmacy was consulted for the cost of eliquis.  According to her insurance, she will have a $9.20 copay.    Thank You;  Lacy Thomas, PharmD  11/02/21  17:07 EDT

## 2021-11-02 NOTE — H&P
[facility] Medicine Services  History & Physical          Patient Identification:  Name:  Lashell Case  Age:  86 y.o.  Sex:  female  :  1935  MRN:  [id number]   Admit Date: 2021   Visit Number:  [id number]  Primary Care Physician:  Alli Wolf MD    I have seen the patient in conjunction with SOFI Anguiano and I agree with the following statements:     Subjective     Chief complaint: palpitations, was in A-Fib with RVR at Dr. Bolaños office    History of presenting illness:    Mrs. Case is a 86 year old female patient who presented to [facility] ED on 21.  Yesterday she was seen by her PCP for a usual wellness visit.  She was noted to be in A. fib which was new.  She does report palpitations for around 2 months, she denies any chest pain or dyspnea, no nausea vomiting.  She denies any lightheadedness or dizziness or weakness.  She was sent to see Dr. Santana today for her atrial fibrillation.  On her appointment there she was found to be in A. fib with RVR and was directed to the ED.  On my exam she is resting comfortably in bed on Cardizem drip 5 mg/h with blood pressure 134/94 heart rate 85 on room air.  She states she has been on Lopressor for around 34 years she does not member why she was started on that.  She does state that she has lost 74 pounds since February despite eating and drinking well, she denies any night sweats.  He does have a history of breast cancer and follows with Dr. Ward and reports active breast cancer for which she is not undergoing treatment.     Her treatment in the ER included aspirin 324 mg p.o. x1, Cardizem 10 mg IV x1 bolus, Lovenox 50 mg subcu x1, she was also started on a Cardizem drip.Her v/s in the ED were temp 97.3, HR , RR 18, /85.    Her past medical history is significant for hypothyroidism, hypertension hyperlipidemia, breast cancer.  She denies any history of stroke seizures blood clots no history of  cardiac or vascular stenting, no history of heart failure.  She denies any current or past use of tobacco drugs or alcohol.  Her next of kin is her daughter Hattie Friend who could make decisions for her if needed.  She does have 4  sons who all  of cancer, to them past of metastatic prostate cancer 1 of stomach cancer and 1 of lung cancer.  At baseline she is completely independent and is the primary caregiver for a 3-year-old grandchild who weighs 50 pounds.    CODE STATUS was discussed with her and she does wish to be a full code.      ---------------------------------------------------------------------------------------------------------------------   Review of Systems   Constitutional: Positive for unexpected weight change. Negative for fatigue.   Respiratory: Negative for cough, shortness of breath and wheezing.    Cardiovascular: Positive for palpitations. Negative for chest pain and leg swelling.   Gastrointestinal: Negative for abdominal distention.   Musculoskeletal: Negative for arthralgias and myalgias.   Skin: Negative for color change.   Neurological: Negative for dizziness, weakness and light-headedness.   Psychiatric/Behavioral: Negative for agitation, behavioral problems and confusion.      ---------------------------------------------------------------------------------------------------------------------   Past Medical History:   Diagnosis Date   • Arthritis    • Breast cancer (HCC)     lt br ca   • Breast cancer (HCC) 2006    rt br ca   • Dizziness 2016   • [place] (hard of hearing)    • Hyperlipidemia    • Hypertension    • Hypothyroidism    • Scabies exposure      Past Surgical History:   Procedure Laterality Date   • BREAST BIOPSY     • BREAST LUMPECTOMY Right 2006   • BREAST SURGERY     • MASTECTOMY Left     ca   • MASTECTOMY WITH SENTINEL NODE BIOPSY AND AXILLARY NODE DISSECTION Right 2020    Procedure: BREAST MASTECTOMY WITH SENTINEL NODE BIOPSY AND AXILLARY NODE  DISSECTION;  Surgeon: Cynthia Ward MD;  Location: [facility];  Service: General;  Laterality: Right;     Family History   Problem Relation Age of Onset   • Hypertension Mother    • Uterine cancer Mother    • Diabetes Mother    • Diabetes Daughter    • Prostate cancer Son    • Diabetes Son    • Throat cancer Son    • Hypertension Child    • Breast cancer Neg Hx      Social History     Socioeconomic History   • Marital status:    Tobacco Use   • Smoking status: Never Smoker   • Smokeless tobacco: Never Used   Vaping Use   • Vaping Use: Never used   Substance and Sexual Activity   • Alcohol use: No   • Drug use: No   • Sexual activity: Defer     Birth control/protection: None     ---------------------------------------------------------------------------------------------------------------------   Allergies:  Bactrim [sulfamethoxazole-trimethoprim], Ketorolac, Phenergan [promethazine hcl], Codeine, and Penicillins  ---------------------------------------------------------------------------------------------------------------------     Medications below are reported home medications pulling from within the system; at this time, these medications have not been reconciled unless otherwise specified and are in the verification process for further verifcation as current home medications.    Prior to Admission Medications     Prescriptions Last Dose Informant Patient Reported? Taking?    amLODIPine (NORVASC) 10 MG tablet   No No    Take 1 tablet by mouth Daily. Hold for BP <110/60    levothyroxine (SYNTHROID, LEVOTHROID) 50 MCG tablet   No No    Take 1 tablet by mouth Daily.    lisinopril-hydrochlorothiazide (PRINZIDE,ZESTORETIC) 20-25 MG per tablet   No No    Take 1 tablet by mouth Daily.    metoprolol succinate XL (TOPROL-XL) 50 MG 24 hr tablet   No No    Take 1 tablet by mouth Daily. for blood pressure        Hospital Scheduled Meds:     dilTIAZem, 5-15 mg/hr, Last Rate: 5 mg/hr (11/02/21  1027)      ---------------------------------------------------------------------------------------------------------------------   Objective       Vital Signs:  Temp:  [97.3 °F (36.3 °C)-98.2 °F (36.8 °C)] 98.2 °F (36.8 °C)  Heart Rate:  [] 93  Resp:  [18] 18  BP: (120-154)/() 120/85      11/02/21  1008   Weight: 51.3 kg (113 lb)     Body mass index is 18.24 kg/m².  ---------------------------------------------------------------------------------------------------------------------       Physical Exam    Physical Exam:  Constitutional:  Elderly female, thin, pleasant, [place] in no distress  HENT:  Head: Normocephalic and atraumatic.  Mouth:  Moist mucous membranes.    Neck:  Neck supple.  No JVD present.    Cardiovascular:  Normal rate, regular rhythm.  with no murmur.  Pulmonary/Chest:  No respiratory distress, no wheezes, no crackles, with normal breath sounds and good air movement.  Abdominal:  Soft.  Bowel sounds are present.  No distension and no tenderness.   Musculoskeletal:  No edema, no tenderness, and no deformity.   Neurological:  Alert and oriented to person, place, and time. No tongue deviation.  No facial droop.  No slurred speech.   Skin:  Skin is warm and dry.  No rash noted.  No pallor.   Psychiatric:  Normal mood and affect.  Behavior is normal.  Judgment and thought content normal.   Peripheral vascular:  No edema and strong pulses on all 4 extremities.  ---------------------------------------------------------------------------------------------------------------------  EKG:    Done in the ER not scanned in    ---------------------------------------------------------------------------------------------------------------------   Results from last 7 days   Lab Units 11/02/21  1019 11/01/21  1441   WBC 10*3/mm3 6.50 5.22   HEMOGLOBIN g/dL 12.3 10.7*   HEMATOCRIT % 38.7 31.7*   MCV fL 102.1* 96.4   MCHC g/dL 31.8 33.8   PLATELETS 10*3/mm3 220 190         Results from last 7 days   Lab Units  11/02/21  1019 11/01/21  1441   SODIUM mmol/L 139 139   POTASSIUM mmol/L 4.3 4.2   CHLORIDE mmol/L 104 108*   CO2 mmol/L 23.3 23.3   BUN mg/dL 17 13   CREATININE mg/dL 0.85 0.61   EGFR IF NONAFRICN AM mL/min/1.73 64 93   CALCIUM mg/dL 10.8* 9.6   GLUCOSE mg/dL 102* 97   ALBUMIN g/dL 4.30 3.80   BILIRUBIN mg/dL 1.3* 0.5   ALK PHOS U/L 94 82   AST (SGOT) U/L 34* 30   ALT (SGPT) U/L 40* 35*   Estimated Creatinine Clearance: 39.9 mL/min (by C-G formula based on SCr of 0.85 mg/dL).  No results found for: AMMONIA  Results from last 7 days   Lab Units 11/02/21  1019   TROPONIN T ng/mL <0.010         No results found for: HGBA1C  Lab Results   Component Value Date    TSH 0.010 (L) 11/02/2021    FREET4 1.85 (H) 11/01/2021     No results found for: PREGTESTUR, PREGSERUM, HCG, HCGQUANT  Pain Management Panel    There is no flowsheet data to display.       No results found for: BLOODCX  No results found for: URINECX  No results found for: WOUNDCX  No results found for: STOOLCX      ---------------------------------------------------------------------------------------------------------------------  Imaging Results (Last 7 Days)     Procedure Component Value Units Date/Time    XR Chest 1 View [[id number]] Collected: 11/02/21 1121     Updated: 11/02/21 1123    Narrative:      EXAM:    XR Chest, 1 View     EXAM DATE:    11/2/2021 10:51 AM     CLINICAL HISTORY:    Chest pain protocol     TECHNIQUE:    Frontal view of the chest.     COMPARISON:    08/21/2018     FINDINGS:    LUNGS:  Coarsened interstitial markings again noted throughout the  lungs.    PLEURAL SPACE:  Unremarkable.  No pneumothorax.    HEART:  Cardiomegaly.    MEDIASTINUM:  Unremarkable.    BONES/JOINTS:  Unremarkable.       Impression:        Cardiomegaly.     This report was finalized on 11/2/2021 11:21 AM by Dr. Von Gonzales MD.              ---------------------------------------------------------------------------------------------------------------------  Assessment / Plan       Assessment and Plan:    Atrial Fibrillation with RVR, New  -[id number] score is 4 (age, sex, hx of htn)  -Chest xray on admission shows cardiomegaly   -continuous cardiac monitoring  -Cardizem gtt infusing per protocol, rate currently improved after 10mg IV bolus and Cardizem gtt at 5mg/hr.  -will increase home lopressor XL to 100mg PO Daily wit holding parameters   -cardiology consulted  -Troponin T x1 <0.010, repeat pending   -echo ordered  -pharmacy consulted to start eliquis PO and also for cost to patient  -repeat labs in the am     HTN  HLD  -/94, HR 85  -per med-rec is aon Lisinopril/HCTZ, norvasc and lopressor XL 50mg daily   -hold HCTZ   -increasing dose of lopressor.   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 Cifuentes    Right breast carcinoma in 2004, treated with partial mastectomy, radiation and hormonal therapy  Left breast carcinoma in 2007, treated with partial mastectomy  Left breast cancer in 2017, T2N1, treated with left modified radical mastectomy and 5 years of adjuvant hormonal therapy  Right breast cancer in 2020, triple negative, treated with right modified radical mastectomy, chemotherapy refused (per Dr. Batista office notes)  -patient reports 75 pound weight loss since February, possible due to above and/or above thyroid issue.  -will discuss with Dr. Cifuentes    Macrocytosis  -H/H stable today but has  previously been noted as anemic  -Vitamin b12 and folate ordered  -repeat CBC in the am    DVT prophylaxis: eliquis PO started    Code status: Full     Patient is high risk for the following reasons: new onset A-Fib with RVR     Disposition: Home once clinically stable      Angelika Campbell, APRN  11/02/21  12:57 EDT  ---------------------------------------------------------------------------------------------------------------------

## 2021-11-02 NOTE — PROGRESS NOTES
Subjective   Initial consultation, palpitations, chest pain, atrial fibrillation with RVR  Lashell Case is a 86 y.o. female who presents to day for Establish Care.    CHIEF COMPLIANT  Chief Complaint   Patient presents with   • Establish Care       Active Problems:  Problem List Items Addressed This Visit        Cardiac and Vasculature    Essential hypertension (Chronic)    Mixed hyperlipidemia (Chronic)    Atrial fibrillation, persistent (HCC) - Primary    Precordial chest pain    Nonrheumatic aortic (valve) stenosis          HPI  HPI  Patient for 2 months has been feeling her heart racing very fast continuously she occasionally feels short of breath and intermittently having chest pains last night she had a prolonged episode of chest pain together with nausea in the bed but she did not leak seek medical attention, yesterday she is also her primary care physician and she was found her to be and fast heartbeat EKG was done and she is referred for the cardiology here she never had history of atrial fibrillation she gets dizziness sometimes no edema no syncope she has history of hypertension as well as history of mild aortic stenosis in the past no diabetes unknown lipid status  PRIOR MEDS  Current Outpatient Medications on File Prior to Visit   Medication Sig Dispense Refill   • amLODIPine (NORVASC) 10 MG tablet Take 1 tablet by mouth Daily. Hold for BP <110/60 30 tablet 6   • lisinopril-hydrochlorothiazide (PRINZIDE,ZESTORETIC) 20-25 MG per tablet Take 1 tablet by mouth Daily. 30 tablet 6   • metoprolol succinate XL (TOPROL-XL) 50 MG 24 hr tablet Take 1 tablet by mouth Daily. for blood pressure 30 tablet 6   • [DISCONTINUED] levothyroxine (SYNTHROID, LEVOTHROID) 75 MCG tablet TAKE ONE TABLET BY MOUTH EVERY DAY 30 tablet 6     No current facility-administered medications on file prior to visit.       ALLERGIES  Bactrim [sulfamethoxazole-trimethoprim], Ketorolac, Phenergan [promethazine hcl], Codeine, and  "Penicillins    HISTORY  Past Medical History:   Diagnosis Date   • Arthritis    • Breast cancer (HCC) 2015    lt br ca   • Breast cancer (HCC) 2006    rt br ca   • Dizziness 7/8/2016   • [place] ([place] of hearing)    • Hyperlipidemia    • Hypertension    • Hypothyroidism    • Scabies exposure        Social History     Socioeconomic History   • Marital status:    Tobacco Use   • Smoking status: Never Smoker   • Smokeless tobacco: Never Used   Vaping Use   • Vaping Use: Never used   Substance and Sexual Activity   • Alcohol use: No   • Drug use: No   • Sexual activity: Defer     Birth control/protection: None       Family History   Problem Relation Age of Onset   • Hypertension Mother    • Uterine cancer Mother    • Diabetes Mother    • Diabetes Daughter    • Prostate cancer Son    • Diabetes Son    • Throat cancer Son    • Hypertension Child    • Breast cancer Neg Hx        Review of Systems   Constitutional: Negative.    HENT: Negative.    Eyes: Negative.    Respiratory: Negative.    Cardiovascular: Positive for chest pain, palpitations and leg swelling.   Gastrointestinal: Negative.    Endocrine:        Thyroid diease    Genitourinary: Negative.    Musculoskeletal: Negative.    Skin: Negative.    Allergic/Immunologic:        Drug allergies    Neurological: Negative.    Hematological: Bruises/bleeds easily.   Psychiatric/Behavioral: Negative.        Objective     VITALS: /92 (BP Location: Right arm, Patient Position: Sitting, Cuff Size: Adult)   Pulse (!) 121   Temp 97.3 °F (36.3 °C)   Resp 18   Ht 167.6 cm (65.98\")   Wt 51.6 kg (113 lb 12.8 oz)   SpO2 98%   BMI 18.38 kg/m²     LABS:   Lab Results (most recent)     None          IMAGING:   XR Knee 3 View Right    Result Date: 9/23/2021    Moderate knee joint effusion.  This report was finalized on 9/23/2021 2:35 PM by Dr. Von Gonzales MD.      XR Knee 3 View Right    Result Date: 8/10/2021  Large joint effusion with degenerative change as above. No " acute fracture. Signer Name: Eduar Rizvi MD  Signed: 8/10/2021 5:02 AM  Workstation Name: [facility]  [facility]    US Venous Doppler Lower Extremity Right (duplex)    Result Date: 10/4/2021  No DVT in the right lower extremity on today's exam.   This report was finalized on 10/4/2021 2:37 PM by Dr. Erich Jenkins MD.      US Venous Doppler Upper Extremity Right (duplex)    Result Date: 7/26/2021  No DVT in the right upper extremity  This report was finalized on 7/26/2021 7:07 PM by Dr. Erich Jenkins MD.      CT Lower Extremity Right Without Contrast    Result Date: 8/10/2021    Moderate size knee joint effusion with internal density that may represent hemorrhage.  This report was finalized on 8/10/2021 8:35 AM by Dr. Von Gonzales MD.        EXAM:  Physical Exam  Vitals reviewed.   Constitutional:       Appearance: She is well-developed.   HENT:      Head: Normocephalic and atraumatic.   Eyes:      Pupils: Pupils are equal, round, and reactive to light.   Neck:      Thyroid: No thyromegaly.      Vascular: No JVD.   Cardiovascular:      Rate and Rhythm: Tachycardia present. Rhythm irregular.      Heart sounds: Normal heart sounds. No murmur heard.  No friction rub. No gallop.       Comments: Irregularly irregular  Pulmonary:      Effort: Pulmonary effort is normal. No respiratory distress.      Breath sounds: Normal breath sounds. No stridor. No wheezing or rales.   Chest:      Chest wall: No tenderness.   Musculoskeletal:         General: No tenderness or deformity.      Cervical back: Neck supple.   Skin:     General: Skin is warm and dry.   Neurological:      Mental Status: She is alert and oriented to person, place, and time.      Cranial Nerves: No cranial nerve deficit.      Coordination: Coordination normal.         Procedure   Procedures       Assessment/Plan     Diagnoses and all orders for this visit:    1. Atrial fibrillation, persistent (HCC) (Primary)    2. Precordial chest  pain    3. Essential hypertension    4. Nonrheumatic aortic (valve) stenosis    5. Mixed hyperlipidemia    1.  Patient is in atrial fibrillation with RVR heart rate ranging from 120s to 140 bpm apparently this is new onset so as she also had episodes of chest pain I am going to refer her to the emergency room to try to get rate control and rule out ongoing ischemia  2.  The patient subsequently also will need to be anticoagulated  3.  After initial evaluation consider direct current cardioversion after DONALDO  4.  She has a history of aortic stenosis she will need an echocardiogram for assessment  5.  She will need ischemia work-up  6.  No lipids available we will get lipid profile after initial assessment    Return Refer to ER.      Advance Care Planning   ACP discussion was declined by the patient. Patient does not have an advance directive, declines further assistance.          MEDS ORDERED DURING VISIT:  No orders of the defined types were placed in this encounter.      As always, Alli Wolf MD  I appreciate very much the opportunity to participate in the cardiovascular care of your patients. Please do not hesitate to call me with any questions with regards to Lashell Case evaluation and management.         This document has been electronically signed by Justin Santana MD  November 2, 2021 09:48 EDT

## 2021-11-03 ENCOUNTER — APPOINTMENT (OUTPATIENT)
Dept: CARDIOLOGY | Facility: HOSPITAL | Age: 86
End: 2021-11-03

## 2021-11-03 ENCOUNTER — APPOINTMENT (OUTPATIENT)
Dept: NUCLEAR MEDICINE | Facility: HOSPITAL | Age: 86
End: 2021-11-03

## 2021-11-03 LAB
ALBUMIN SERPL-MCNC: 3.29 G/DL (ref 3.5–5.2)
ALBUMIN/GLOB SERPL: 1.4 G/DL
ALP SERPL-CCNC: 73 U/L (ref 39–117)
ALT SERPL W P-5'-P-CCNC: 36 U/L (ref 1–33)
ANION GAP SERPL CALCULATED.3IONS-SCNC: 11 MMOL/L (ref 5–15)
AST SERPL-CCNC: 35 U/L (ref 1–32)
BASOPHILS # BLD AUTO: 0.02 10*3/MM3 (ref 0–0.2)
BASOPHILS NFR BLD AUTO: 0.5 % (ref 0–1.5)
BH CV ECHO MEAS - % IVS THICK: -16.1 %
BH CV ECHO MEAS - % LVPW THICK: 12.4 %
BH CV ECHO MEAS - ACS: 1.3 CM
BH CV ECHO MEAS - AO MAX PG (FULL): 11.6 MMHG
BH CV ECHO MEAS - AO MAX PG: 14.4 MMHG
BH CV ECHO MEAS - AO MEAN PG (FULL): 6 MMHG
BH CV ECHO MEAS - AO MEAN PG: 7 MMHG
BH CV ECHO MEAS - AO ROOT AREA (BSA CORRECTED): 1.7
BH CV ECHO MEAS - AO ROOT AREA: 6.2 CM^2
BH CV ECHO MEAS - AO ROOT DIAM: 2.8 CM
BH CV ECHO MEAS - AO V2 MAX: 190 CM/SEC
BH CV ECHO MEAS - AO V2 MEAN: 128 CM/SEC
BH CV ECHO MEAS - AO V2 VTI: 33.6 CM
BH CV ECHO MEAS - AVA(I,A): 1.4 CM^2
BH CV ECHO MEAS - AVA(I,D): 1.4 CM^2
BH CV ECHO MEAS - AVA(V,A): 1.4 CM^2
BH CV ECHO MEAS - AVA(V,D): 1.4 CM^2
BH CV ECHO MEAS - BSA(HAYCOCK): 1.6 M^2
BH CV ECHO MEAS - BSA: 1.7 M^2
BH CV ECHO MEAS - BZI_BMI: 20.7 KILOGRAMS/M^2
BH CV ECHO MEAS - BZI_METRIC_HEIGHT: 167.6 CM
BH CV ECHO MEAS - BZI_METRIC_WEIGHT: 58.1 KG
BH CV ECHO MEAS - EDV(CUBED): 60 ML
BH CV ECHO MEAS - EDV(MOD-SP4): 32.3 ML
BH CV ECHO MEAS - EDV(TEICH): 66.5 ML
BH CV ECHO MEAS - EF(CUBED): 65.3 %
BH CV ECHO MEAS - EF(MOD-SP4): 65.3 %
BH CV ECHO MEAS - EF(TEICH): 57.5 %
BH CV ECHO MEAS - ESV(CUBED): 20.8 ML
BH CV ECHO MEAS - ESV(MOD-SP4): 11.2 ML
BH CV ECHO MEAS - ESV(TEICH): 28.3 ML
BH CV ECHO MEAS - FS: 29.8 %
BH CV ECHO MEAS - IVS/LVPW: 1.2
BH CV ECHO MEAS - IVSD: 1.3 CM
BH CV ECHO MEAS - IVSS: 1.1 CM
BH CV ECHO MEAS - LA DIMENSION: 4.4 CM
BH CV ECHO MEAS - LA/AO: 1.6
BH CV ECHO MEAS - LV DIASTOLIC VOL/BSA (35-75): 19.5 ML/M^2
BH CV ECHO MEAS - LV MASS(C)D: 156.2 GRAMS
BH CV ECHO MEAS - LV MASS(C)DI: 94.4 GRAMS/M^2
BH CV ECHO MEAS - LV MASS(C)S: 89.8 GRAMS
BH CV ECHO MEAS - LV MASS(C)SI: 54.3 GRAMS/M^2
BH CV ECHO MEAS - LV MAX PG: 2.8 MMHG
BH CV ECHO MEAS - LV MEAN PG: 1 MMHG
BH CV ECHO MEAS - LV SYSTOLIC VOL/BSA (12-30): 6.8 ML/M^2
BH CV ECHO MEAS - LV V1 MAX: 84.2 CM/SEC
BH CV ECHO MEAS - LV V1 MEAN: 47.7 CM/SEC
BH CV ECHO MEAS - LV V1 VTI: 14.6 CM
BH CV ECHO MEAS - LVIDD: 3.9 CM
BH CV ECHO MEAS - LVIDS: 2.8 CM
BH CV ECHO MEAS - LVLD AP4: 5.4 CM
BH CV ECHO MEAS - LVLS AP4: 4.3 CM
BH CV ECHO MEAS - LVOT AREA (M): 3.1 CM^2
BH CV ECHO MEAS - LVOT AREA: 3.1 CM^2
BH CV ECHO MEAS - LVOT DIAM: 2 CM
BH CV ECHO MEAS - LVPWD: 1.1 CM
BH CV ECHO MEAS - LVPWS: 1.2 CM
BH CV ECHO MEAS - MV A MAX VEL: 36.6 CM/SEC
BH CV ECHO MEAS - MV E MAX VEL: 118 CM/SEC
BH CV ECHO MEAS - MV E/A: 3.2
BH CV ECHO MEAS - PA ACC TIME: 0.16 SEC
BH CV ECHO MEAS - PA PR(ACCEL): 7.9 MMHG
BH CV ECHO MEAS - RVSP: 39 MMHG
BH CV ECHO MEAS - SI(AO): 125 ML/M^2
BH CV ECHO MEAS - SI(CUBED): 23.7 ML/M^2
BH CV ECHO MEAS - SI(LVOT): 27.7 ML/M^2
BH CV ECHO MEAS - SI(MOD-SP4): 12.8 ML/M^2
BH CV ECHO MEAS - SI(TEICH): 23.1 ML/M^2
BH CV ECHO MEAS - SV(AO): 206.9 ML
BH CV ECHO MEAS - SV(CUBED): 39.2 ML
BH CV ECHO MEAS - SV(LVOT): 45.9 ML
BH CV ECHO MEAS - SV(MOD-SP4): 21.1 ML
BH CV ECHO MEAS - SV(TEICH): 38.2 ML
BH CV ECHO MEAS - TR MAX VEL: 314 CM/SEC
BH CV NUCLEAR PRIOR STUDY: 3
BH CV REST NUCLEAR ISOTOPE DOSE: 10.7 MCI
BH CV STRESS BP STAGE 1: NORMAL
BH CV STRESS BP STAGE 2: NORMAL
BH CV STRESS COMMENTS STAGE 1: NORMAL
BH CV STRESS COMMENTS STAGE 2: NORMAL
BH CV STRESS DOSE REGADENOSON STAGE 1: 0.4
BH CV STRESS DURATION MIN STAGE 1: 0
BH CV STRESS DURATION MIN STAGE 2: 4
BH CV STRESS DURATION SEC STAGE 1: 10
BH CV STRESS DURATION SEC STAGE 2: 0
BH CV STRESS HR STAGE 1: 127
BH CV STRESS HR STAGE 2: 115
BH CV STRESS NUCLEAR ISOTOPE DOSE: 30.9 MCI
BH CV STRESS PROTOCOL 1: NORMAL
BH CV STRESS RECOVERY BP: NORMAL MMHG
BH CV STRESS RECOVERY HR: 115 BPM
BH CV STRESS STAGE 1: 1
BH CV STRESS STAGE 2: 2
BILIRUB SERPL-MCNC: 0.5 MG/DL (ref 0–1.2)
BUN SERPL-MCNC: 21 MG/DL (ref 8–23)
BUN/CREAT SERPL: 28.8 (ref 7–25)
CALCIUM SPEC-SCNC: 9.3 MG/DL (ref 8.6–10.5)
CHLORIDE SERPL-SCNC: 107 MMOL/L (ref 98–107)
CO2 SERPL-SCNC: 21 MMOL/L (ref 22–29)
CREAT SERPL-MCNC: 0.73 MG/DL (ref 0.57–1)
DEPRECATED RDW RBC AUTO: 54.5 FL (ref 37–54)
EOSINOPHIL # BLD AUTO: 0.03 10*3/MM3 (ref 0–0.4)
EOSINOPHIL NFR BLD AUTO: 0.8 % (ref 0.3–6.2)
ERYTHROCYTE [DISTWIDTH] IN BLOOD BY AUTOMATED COUNT: 14.2 % (ref 12.3–15.4)
FOLATE SERPL-MCNC: 9.49 NG/ML (ref 4.78–24.2)
GFR SERPL CREATININE-BSD FRML MDRD: 76 ML/MIN/1.73
GLOBULIN UR ELPH-MCNC: 2.4 GM/DL
GLUCOSE BLDC GLUCOMTR-MCNC: 165 MG/DL (ref 70–130)
GLUCOSE SERPL-MCNC: 119 MG/DL (ref 65–99)
HCT VFR BLD AUTO: 30.8 % (ref 34–46.6)
HGB BLD-MCNC: 9.7 G/DL (ref 12–15.9)
IMM GRANULOCYTES # BLD AUTO: 0.02 10*3/MM3 (ref 0–0.05)
IMM GRANULOCYTES NFR BLD AUTO: 0.5 % (ref 0–0.5)
LV EF NUC BP: 59 %
LYMPHOCYTES # BLD AUTO: 1.25 10*3/MM3 (ref 0.7–3.1)
LYMPHOCYTES NFR BLD AUTO: 32.4 % (ref 19.6–45.3)
MAGNESIUM SERPL-MCNC: 1.7 MG/DL (ref 1.6–2.4)
MAXIMAL PREDICTED HEART RATE: 136 BPM
MAXIMAL PREDICTED HEART RATE: 136 BPM
MCH RBC QN AUTO: 32.7 PG (ref 26.6–33)
MCHC RBC AUTO-ENTMCNC: 31.5 G/DL (ref 31.5–35.7)
MCV RBC AUTO: 103.7 FL (ref 79–97)
MONOCYTES # BLD AUTO: 0.6 10*3/MM3 (ref 0.1–0.9)
MONOCYTES NFR BLD AUTO: 15.5 % (ref 5–12)
NEUTROPHILS NFR BLD AUTO: 1.94 10*3/MM3 (ref 1.7–7)
NEUTROPHILS NFR BLD AUTO: 50.3 % (ref 42.7–76)
NRBC BLD AUTO-RTO: 0 /100 WBC (ref 0–0.2)
PERCENT MAX PREDICTED HR: 93.38 %
PLATELET # BLD AUTO: 153 10*3/MM3 (ref 140–450)
PMV BLD AUTO: 9.6 FL (ref 6–12)
POTASSIUM SERPL-SCNC: 3.9 MMOL/L (ref 3.5–5.2)
PROT SERPL-MCNC: 5.7 G/DL (ref 6–8.5)
RBC # BLD AUTO: 2.97 10*6/MM3 (ref 3.77–5.28)
SODIUM SERPL-SCNC: 139 MMOL/L (ref 136–145)
STRESS BASELINE BP: NORMAL MMHG
STRESS BASELINE HR: 105 BPM
STRESS PERCENT HR: 110 %
STRESS POST PEAK BP: NORMAL MMHG
STRESS POST PEAK HR: 127 BPM
STRESS TARGET HR: 116 BPM
STRESS TARGET HR: 116 BPM
T3FREE SERPL-MCNC: 3.2 PG/ML (ref 2–4.4)
VIT B12 BLD-MCNC: 517 PG/ML (ref 211–946)
WBC # BLD AUTO: 3.86 10*3/MM3 (ref 3.4–10.8)

## 2021-11-03 PROCEDURE — 83735 ASSAY OF MAGNESIUM: CPT | Performed by: PHYSICIAN ASSISTANT

## 2021-11-03 PROCEDURE — 99222 1ST HOSP IP/OBS MODERATE 55: CPT | Performed by: SPECIALIST

## 2021-11-03 PROCEDURE — 93018 CV STRESS TEST I&R ONLY: CPT | Performed by: SPECIALIST

## 2021-11-03 PROCEDURE — 93017 CV STRESS TEST TRACING ONLY: CPT

## 2021-11-03 PROCEDURE — 93306 TTE W/DOPPLER COMPLETE: CPT

## 2021-11-03 PROCEDURE — 25010000002 MAGNESIUM SULFATE 2 GM/50ML SOLUTION: Performed by: INTERNAL MEDICINE

## 2021-11-03 PROCEDURE — A9500 TC99M SESTAMIBI: HCPCS | Performed by: INTERNAL MEDICINE

## 2021-11-03 PROCEDURE — 85025 COMPLETE CBC W/AUTO DIFF WBC: CPT | Performed by: NURSE PRACTITIONER

## 2021-11-03 PROCEDURE — 78452 HT MUSCLE IMAGE SPECT MULT: CPT

## 2021-11-03 PROCEDURE — 82962 GLUCOSE BLOOD TEST: CPT

## 2021-11-03 PROCEDURE — 93306 TTE W/DOPPLER COMPLETE: CPT | Performed by: SPECIALIST

## 2021-11-03 PROCEDURE — 25010000002 REGADENOSON 0.4 MG/5ML SOLUTION: Performed by: NURSE PRACTITIONER

## 2021-11-03 PROCEDURE — 78452 HT MUSCLE IMAGE SPECT MULT: CPT | Performed by: SPECIALIST

## 2021-11-03 PROCEDURE — 99232 SBSQ HOSP IP/OBS MODERATE 35: CPT | Performed by: PHYSICIAN ASSISTANT

## 2021-11-03 PROCEDURE — 80053 COMPREHEN METABOLIC PANEL: CPT | Performed by: NURSE PRACTITIONER

## 2021-11-03 PROCEDURE — 84481 FREE ASSAY (FT-3): CPT | Performed by: PHYSICIAN ASSISTANT

## 2021-11-03 PROCEDURE — 0 TECHNETIUM SESTAMIBI: Performed by: INTERNAL MEDICINE

## 2021-11-03 RX ORDER — MAGNESIUM SULFATE HEPTAHYDRATE 40 MG/ML
2 INJECTION, SOLUTION INTRAVENOUS AS NEEDED
Status: DISCONTINUED | OUTPATIENT
Start: 2021-11-03 | End: 2021-11-05 | Stop reason: HOSPADM

## 2021-11-03 RX ORDER — MAGNESIUM SULFATE HEPTAHYDRATE 40 MG/ML
4 INJECTION, SOLUTION INTRAVENOUS AS NEEDED
Status: DISCONTINUED | OUTPATIENT
Start: 2021-11-03 | End: 2021-11-05 | Stop reason: HOSPADM

## 2021-11-03 RX ORDER — MAGNESIUM SULFATE HEPTAHYDRATE 40 MG/ML
2 INJECTION, SOLUTION INTRAVENOUS ONCE
Status: COMPLETED | OUTPATIENT
Start: 2021-11-03 | End: 2021-11-03

## 2021-11-03 RX ORDER — LEVOTHYROXINE SODIUM 0.05 MG/1
50 TABLET ORAL
Status: DISCONTINUED | OUTPATIENT
Start: 2021-11-04 | End: 2021-11-05 | Stop reason: HOSPADM

## 2021-11-03 RX ORDER — METOPROLOL SUCCINATE 50 MG/1
150 TABLET, EXTENDED RELEASE ORAL
Status: DISCONTINUED | OUTPATIENT
Start: 2021-11-03 | End: 2021-11-04

## 2021-11-03 RX ORDER — MAGNESIUM SULFATE 1 G/100ML
1 INJECTION INTRAVENOUS AS NEEDED
Status: DISCONTINUED | OUTPATIENT
Start: 2021-11-03 | End: 2021-11-05 | Stop reason: HOSPADM

## 2021-11-03 RX ORDER — LEVOTHYROXINE SODIUM 0.07 MG/1
75 TABLET ORAL
Status: DISCONTINUED | OUTPATIENT
Start: 2021-11-03 | End: 2021-11-03

## 2021-11-03 RX ADMIN — SODIUM CHLORIDE, PRESERVATIVE FREE 10 ML: 5 INJECTION INTRAVENOUS at 13:19

## 2021-11-03 RX ADMIN — LISINOPRIL: 10 TABLET ORAL at 13:16

## 2021-11-03 RX ADMIN — SODIUM CHLORIDE 5 MG/HR: 900 INJECTION, SOLUTION INTRAVENOUS at 03:26

## 2021-11-03 RX ADMIN — METOPROLOL SUCCINATE 150 MG: 50 TABLET, EXTENDED RELEASE ORAL at 13:17

## 2021-11-03 RX ADMIN — APIXABAN 2.5 MG: 2.5 TABLET, FILM COATED ORAL at 19:57

## 2021-11-03 RX ADMIN — APIXABAN 2.5 MG: 2.5 TABLET, FILM COATED ORAL at 13:17

## 2021-11-03 RX ADMIN — REGADENOSON 0.4 MG: 0.08 INJECTION, SOLUTION INTRAVENOUS at 12:08

## 2021-11-03 RX ADMIN — MAGNESIUM SULFATE HEPTAHYDRATE 2 G: 40 INJECTION, SOLUTION INTRAVENOUS at 13:17

## 2021-11-03 RX ADMIN — TECHNETIUM TC 99M SESTAMIBI 1 DOSE: 1 INJECTION INTRAVENOUS at 09:05

## 2021-11-03 RX ADMIN — TECHNETIUM TC 99M SESTAMIBI 1 DOSE: 1 INJECTION INTRAVENOUS at 12:08

## 2021-11-03 NOTE — PLAN OF CARE
Goal Outcome Evaluation:  Plan of Care Reviewed With: patient           Outcome Summary: Pt resting in bed this shift. Pt's HR has been running afib 80s-90s this shift per telemetry. No complaints noted. VSS. No s/s of acute distress noted. Will cont to follow POC

## 2021-11-03 NOTE — PLAN OF CARE
Goal Outcome Evaluation:              Outcome Summary: Pt resting in bed. VS stable Will be NPO tonight for DONALDO and cardioversion tomorrow.Continue to monitor and follow plan of care.

## 2021-11-03 NOTE — PROGRESS NOTES
[facility]     Patient Identification:  Name:  Lashell Case  Age:  86 y.o.  Sex:  female  :  1935  MRN:  [id number]  Visit Number:  [id number]  Primary Care Provider:  Alli Wolf MD    Date of admission: 2021  Length of stay:  1    ----------------------------------------------------------------------------------------------------------------------  Subjective     Chief Complaint:   Chief Complaint   Patient presents with   • Palpitations     Subjective/Interval History:    86 y.o. female who was admitted on 2021 with new onset atrial fibrillation with rapid ventricular response.  The patient was sent to the ED from the cardiology office.  She had recently been referred for new onset atrial fibrillation.  Due to RVR in the 120s 140s, she was sent to the ED for evaluation.    PMH is significant for hypothyroidism, hypertension hyperlipidemia, breast cancer. For complete admission information, please see history and physical.     The patient was admitted to the telemetry unit.  She was started on an IV diltiazem drip at 5 mg/h after a 10 mg bolus in the ED.  Cardiology was consulted with plans for nuclear stress test and transthoracic echocardiogram today.  She was noted to have a very low TSH and mildly elevated free T4.    Consultations:  1. Cardiology    Procedures/Scans:  1. Nuclear stress test (pending)  2. Transthoracic echocardiogram (pending)    Today, the patient reports that she is doing well.  She denies any complaints of chest pains or shortness of breath.  No dizziness or palpitations.  Discussed with RNRhea, no new events or concerns reported.  The patient remains on an IV diltiazem drip at 5 mg/h with telemetry monitoring showing atrial fibrillation now in the 80s to 90s.    Review of Systems   Constitutional: Negative for chills and fever.   Respiratory: Negative for cough and shortness of breath.    Cardiovascular:  Negative for chest pain.   Gastrointestinal: Negative for abdominal pain, anal bleeding and blood in stool.   Genitourinary: Negative for difficulty urinating, dyspareunia, frequency and hematuria.   Psychiatric/Behavioral: Negative for agitation, behavioral problems and confusion.      ----------------------------------------------------------------------------------------------------------------------  Objective   Current [facility] Meds:  apixaban, 2.5 mg, Oral, Q12H  levothyroxine, 75 mcg, Oral, Q AM  lisinopril-hydroCHLOROthiazide (ZESTORTIC) 20-25 mg combo dose, , Oral, Q24H  magnesium sulfate, 2 g, Intravenous, Once  metoprolol succinate XL, 100 mg, Oral, Q24H  sodium chloride, 10 mL, Intravenous, Q12H      dilTIAZem, 5-15 mg/hr, Last Rate: 5 mg/hr (11/03/21 9996)  Pharmacy Consult - Pharmacy to dose,       ----------------------------------------------------------------------------------------------------------------------  Vital Signs:  Temp:  [97.7 °F (36.5 °C)-98.2 °F (36.8 °C)] 98 °F (36.7 °C)  Heart Rate:  [] 100  Resp:  [16-20] 18  BP: (116-149)/() 120/63  Mean Arterial Pressure (Non-Invasive) for the past 24 hrs (Last 3 readings):   Noninvasive MAP (mmHg)   11/03/21 0600 77   11/03/21 0335 74   11/02/21 1954 85     SpO2 Percentage    11/02/21 1954 11/03/21 0335 11/03/21 0600   SpO2: 95% 98% 96%     SpO2:  [84 %-100 %] 96 %  on   ;   Device (Oxygen Therapy): room air    Body mass index is 20.68 kg/m².  Wt Readings from Last 3 Encounters:   11/03/21 58.1 kg (128 lb 1.6 oz)   11/02/21 51.6 kg (113 lb 12.8 oz)   11/01/21 52.4 kg (115 lb 9.6 oz)        Intake/Output Summary (Last 24 hours) at 11/3/2021 1028  Last data filed at 11/2/2021 2200  Gross per 24 hour   Intake 120 ml   Output 300 ml   Net -180 ml     NPO Diet  ----------------------------------------------------------------------------------------------------------------------  Physical exam:  Constitutional: Vital signs reviewed.  Well-developed, thin. No respiratory distress on room air.      HENT:  Head:  Normocephalic and atraumatic.  Mouth:  Moist mucous membranes.    Eyes:  Conjunctivae and EOM are normal. No scleral icterus. No erythema or drainage.  Neck:  Neck supple.  Cardiovascular:  Normal rate, irregularly irregular rhythm.  Systolic murmur loudest in the right second intercostal space, grade 2 out of 6 to 3 out of 6.  Pulmonary/Chest:  No respiratory distress, no wheezes, no crackles, with normal breath sounds and good air movement.   Abdominal:  Soft.  Bowel sounds are present x4.  No distension and no tenderness.   Musculoskeletal:  No edema, no tenderness, and no deformity.  No red or swollen joints anywhere.    Neurological:  Alert and oriented to person, place, and time. No facial droop.  No slurred speech.   Skin:  Skin is warm and dry. No rash noted. No pallor.   Peripheral vascular: No clubbing, no cyanosis, no edema.   Genitourinary: No Sifuentes catheter in place.    I have reviewed and updated the physical exam as needed to reflect that of 11/03/21  ----------------------------------------------------------------------------------------------------------------------  Tele: Atrial fibrillation in the 80s to 90s.    ----------------------------------------------------------------------------------------------------------------------  Results from last 7 days   Lab Units 11/02/21  1517 11/02/21  1019   TROPONIN T ng/mL <0.010 <0.010           Results from last 7 days   Lab Units 11/03/21  0027 11/02/21  1019 11/01/21  1441   WBC 10*3/mm3 3.86 6.50 5.22   HEMOGLOBIN g/dL 9.7* 12.3 10.7*   HEMATOCRIT % 30.8* 38.7 31.7*   MCV fL 103.7* 102.1* 96.4   MCHC g/dL 31.5 31.8 33.8   PLATELETS 10*3/mm3 153 220 190     Results from last 7 days   Lab Units 11/03/21  0027 11/02/21  1019 11/01/21  1441   SODIUM mmol/L 139 139 139   POTASSIUM mmol/L 3.9 4.3 4.2   MAGNESIUM mg/dL 1.7  --   --    CHLORIDE mmol/L 107 104 108*   CO2 mmol/L 21.0*  23.3 23.3   BUN mg/dL 21 17 13   CREATININE mg/dL 0.73 0.85 0.61   EGFR IF NONAFRICN AM mL/min/1.73 76 64 93   CALCIUM mg/dL 9.3 10.8* 9.6   GLUCOSE mg/dL 119* 102* 97   ALBUMIN g/dL 3.29* 4.30 3.80   BILIRUBIN mg/dL 0.5 1.3* 0.5   ALK PHOS U/L 73 94 82   AST (SGOT) U/L 35* 34* 30   ALT (SGPT) U/L 36* 40* 35*   Estimated Creatinine Clearance: 48 mL/min (by C-G formula based on SCr of 0.73 mg/dL).  No results found for: AMMONIA    No results found for: HGBA1C, POCGLU  No results found for: HGBA1C  Lab Results   Component Value Date    TSH 0.010 (L) 11/02/2021    FREET4 1.85 (H) 11/01/2021     No results found for: BLOODCX  No results found for: URINECX  No results found for: WOUNDCX  No results found for: STOOLCX  No results found for: RESPCX    Pain Management Panel    There is no flowsheet data to display.       I have personally reviewed the above laboratory results for 11/03/21  ----------------------------------------------------------------------------------------------------------------------  Imaging Results (Last 24 Hours)     Procedure Component Value Units Date/Time    XR Chest 1 View [[id number]] Collected: 11/02/21 1121     Updated: 11/02/21 1123    Narrative:      EXAM:    XR Chest, 1 View     EXAM DATE:    11/2/2021 10:51 AM     CLINICAL HISTORY:    Chest pain protocol     TECHNIQUE:    Frontal view of the chest.     COMPARISON:    08/21/2018     FINDINGS:    LUNGS:  Coarsened interstitial markings again noted throughout the  lungs.    PLEURAL SPACE:  Unremarkable.  No pneumothorax.    HEART:  Cardiomegaly.    MEDIASTINUM:  Unremarkable.    BONES/JOINTS:  Unremarkable.       Impression:        Cardiomegaly.     This report was finalized on 11/2/2021 11:21 AM by Dr. Von Gonzales MD.           ----------------------------------------------------------------------------------------------------------------------  Assessment/Plan     #New onset atrial fibrillation with RVR  #Borderline  hypomagnesemia  · Cardiology consulted with plans for stress test, echocardiogram, and possible electrical cardioversion this morning.  · Magnesium 1.7, replace per IV protocol.  · TSH low with mildly elevated free T4, may be contributing.  See below.  · [id number] score is at least 4 (HTN, age, female gender) started on low-dose Eliquis this AM.  $9.20 co-pay per pharmacy.  · Home dose of Toprol-XL increased from 100 mg 150 mg daily.  May be able to transition off of diltiazem drip today, will follow up on cardiology's recommendations.    Hypothyroidism with likely iatrogenic hyperthyroidism   · Patient reports a 75 pound weight loss over the last 9 months.  Currently taking levothyroxine 75 mcg daily.  · Repeat TSH confirms that it is still low at 0.010.  Free T4 elevated at 1.85.  · Check free T3.  · We will go ahead and reduce her levothyroxine dose to 50 mcg daily as this may be contributing to her weight loss and A. fib with RVR.  · Recommend repeating thyroid profile in about 4 weeks as an outpatient with PCP.    Macrocytic anemia  · Drop in H/H noted compared to yesterday, but overall remains fairly stable from admission.  · Vitamin B12 and folate within normal limits.  · Repeat CBC in AM.    Mildly elevated transaminases  Hyperbilirubinemia  · Bilirubin normalized today.  Transaminases remain mildly elevated.  · Check hepatitis panel in a.m.  · Repeat CMP in AM.    #Hypercalcemia   · Normalized on repeat today.    #Aortic stenosis  · Repeat echocardiogram pending.  · Last TTE in 2018 showed mild aortic stenosis at that time.    #Right breast carcinoma in 2004, treated with partial mastectomy, radiation and hormonal therapy  #Left breast carcinoma in 2007, treated with partial mastectomy  #Left breast cancer in 2017, T2N1, treated with left modified radical mastectomy and 5 years of adjuvant hormonal therapy  #Right breast cancer in 2020, triple negative, treated with right modified radical mastectomy,  chemotherapy refused (per Dr. Batista office notes)  · Patient reports 75 pound weight loss since February, possible due to above and/or above thyroid issue.  · Dr. Cifuentes plans to discuss possible palliative chemotherapy again with the patient.  If she would consider this, will consider hematology/oncology follow-up at discharge.    #DVT Prophylaxis  · Eliquis will serve.    The patient is high risk due to the following diagnoses/reasons: A. fib RVR, known breast cancer with significant weight loss.    I have discussed the patient's assessment and plan with the patient, MARIANNA Wilkerson, and attending physician, Dr. Cifuentes.     Disposition: Plans to return home on discharge.  Hopefully home in the next 24 hours.    Discharge needs:  • Cardiology follow-up  • Consider hematology/oncology follow-up.  • Repeat thyroid profile in 4 to 6 weeks with PCP.    JOHN Rosas  11/03/21  10:28 EDT

## 2021-11-03 NOTE — CONSULTS
Date of Admit: 11/2/2021  Date of Consult: 11/03/21  Provider, No Known        Atrial fibrillation with RVR ([facility])      Assessment      1. Persistent atrial fibrillation  2. Precordial chest pain none over the last 24 hours  3. Essential hypertension  4. Nonrheumatic aortic stenosis mild per echo  5. Mixed hyperlipidemia      Recommendations     1. Patient currently is rate controlled we will try to wean off her IV Cardizem and increase the dose of the metoprolol to 150 mg/day  2. Eliquis co-pay is $9 we will start Eliquis at 2.5 mg twice daily  3. Consider DONALDO cardioversion  4. We will get an echocardiogram to assess the aortic valve as she has aortic stenosis on most recent echocardiogram  5. Stress testing today for assessment of ischemia        Reason for consultation: Atrial fibrillation with RVR    Subjective       Subjective     History of Present Illness     Lashell Case is a 86 year old female with a past medical history significant for essential hypertension, hyperlipidemia, hypothyroidism and breast cancer.  Patient presented to the ER by recommendation of cardiology due to atrial fibrillation with RVR.  Patient saw her PCP on 11/1/2021 and was noted to be in atrial fibrillation and was referred to outpatient cardiology office the next day. When she was seen in the cardiology office she was noted to be in atrial fibrillation with RVR and was instructed to come to the ER for further evaluation.  Patient reports he been having palpitations for around 2 months.  She does report that she intermittently has chest pains and shortness of breath.  She did have a prolonged episode of chest pain with nausea yesterday but did not seek medical attention.  No history of atrial fibrillation in the past.  In the ER she was started on Cardizem drip.  She does have active breast cancer and follows with Dr. Ward however she is not undergoing treatment at this time.  Denies any history of coronary artery disease.   Troponin negative.  Denies any bleeding issues.  Echocardiogram from 2018 showed EF of greater than 70%.    Cardiac risk factors:hypercholesterolemia, hypertension and Sedentary life style    Last Echo: 1/15/2018  · Normal left ventricular cavity size and wall thickness noted. All left ventricular wall segments contract normally.  · Estimated EF appears to be in the range of greater than 70%.  · The aortic valve is abnormal in structure. There is mild calcification of the aortic valve mainly affecting the left coronary cusp(s).Trace aortic valve regurgitation is present. Mild aortic valve stenosis is present.  · The mitral valve is normal in structure. Mild mitral valve regurgitation is present. No significant mitral valve stenosis is present.  · The tricuspid valve is normal. No tricuspid valve stenosis is present. Mild tricuspid valve regurgitation is present. Estimated right ventricular systolic pressure from tricuspid regurgitation is mildly elevated (35-45 mmHg).  · There is no evidence of pericardial effusion.    Past Medical History:   Diagnosis Date   • Arthritis    • Breast cancer (HCC) 2015    lt br ca   • Breast cancer (HCC) 2006    rt br ca   • Dizziness 7/8/2016   • [place] (hard of hearing)    • Hyperlipidemia    • Hypertension    • Hypothyroidism    • Scabies exposure      Past Surgical History:   Procedure Laterality Date   • BREAST BIOPSY     • BREAST LUMPECTOMY Right 2006   • BREAST SURGERY     • MASTECTOMY Left 2015    ca   • MASTECTOMY WITH SENTINEL NODE BIOPSY AND AXILLARY NODE DISSECTION Right 11/17/2020    Procedure: BREAST MASTECTOMY WITH SENTINEL NODE BIOPSY AND AXILLARY NODE DISSECTION;  Surgeon: Cynthia Ward MD;  Location: [facility];  Service: General;  Laterality: Right;     Family History   Problem Relation Age of Onset   • Hypertension Mother    • Uterine cancer Mother    • Diabetes Mother    • Diabetes Daughter    • Prostate cancer Son    • Diabetes Son    • Throat cancer Son    •  Hypertension Child    • Breast cancer Neg Hx      Social History     Tobacco Use   • Smoking status: Never Smoker   • Smokeless tobacco: Never Used   Vaping Use   • Vaping Use: Never used   Substance Use Topics   • Alcohol use: No   • Drug use: No     Medications Prior to Admission   Medication Sig Dispense Refill Last Dose   • amLODIPine (NORVASC) 10 MG tablet Take 1 tablet by mouth Daily. Hold for BP <110/60 30 tablet 6 11/2/2021 at Unknown time   • levothyroxine (SYNTHROID, LEVOTHROID) 75 MCG tablet Take 75 mcg by mouth Daily.   11/1/2021 at Unknown time   • lisinopril-hydrochlorothiazide (PRINZIDE,ZESTORETIC) 20-25 MG per tablet Take 1 tablet by mouth Daily. 30 tablet 6 11/2/2021 at Unknown time   • metoprolol succinate XL (TOPROL-XL) 100 MG 24 hr tablet Take 100 mg by mouth Daily.   11/2/2021 at Unknown time     Allergies:  Bactrim [sulfamethoxazole-trimethoprim], Ketorolac, Phenergan [promethazine hcl], Codeine, and Penicillins    Review of Systems   Constitutional: Negative for diaphoresis, fatigue and fever.   HENT: Negative for congestion and trouble swallowing.    Eyes: Negative for photophobia and visual disturbance.   Respiratory: Positive for chest tightness and shortness of breath.    Cardiovascular: Positive for chest pain and palpitations.   Gastrointestinal: Positive for nausea. Negative for vomiting.   Endocrine: Negative for polyphagia and polyuria.   Genitourinary: Negative for dysuria and hematuria.   Musculoskeletal: Negative for back pain and neck stiffness.   Skin: Negative for rash and wound.   Allergic/Immunologic: Positive for immunocompromised state. Negative for food allergies.   Neurological: Negative for dizziness and weakness.   Hematological: Negative for adenopathy. Does not bruise/bleed easily.   Psychiatric/Behavioral: Negative for confusion. The patient is not nervous/anxious.        Objective       Objective      Vital Signs  Temp:  [97.7 °F (36.5 °C)-98.2 °F (36.8 °C)] 98 °F  (36.7 °C)  Heart Rate:  [] 100  Resp:  [16-20] 18  BP: (116-154)/() 120/63     Vital Signs (last 72 hrs)       10/31 0700  11/01 0659 11/01 0700  11/02 0659 11/02 0700  11/03 0659 11/03 0700  11/03 0905   Most Recent      Temp (°F)     97.7 -  98.2       98 (36.7) 11/03 0600    Heart Rate     72 -  158       100 11/03 0600    Resp     16 -  20       18 11/03 0600    BP     116/66 -  154/94       120/63 11/03 0600    SpO2 (%)     84 -  100       96 11/03 0600        Body mass index is 20.68 kg/m².  Documented weights    11/02/21 1008 11/02/21 1500 11/03/21 0500   Weight: 51.3 kg (113 lb) 58 kg (127 lb 12.8 oz) 58.1 kg (128 lb 1.6 oz)            Intake/Output Summary (Last 24 hours) at 11/3/2021 0905  Last data filed at 11/2/2021 2200  Gross per 24 hour   Intake 120 ml   Output 300 ml   Net -180 ml     Physical Exam  Constitutional:       General: She is not in acute distress.     Appearance: Normal appearance. She is well-developed and normal weight.   HENT:      Head: Normocephalic and atraumatic.   Eyes:      General: Lids are normal.      Conjunctiva/sclera: Conjunctivae normal.      Pupils: Pupils are equal, round, and reactive to light.   Neck:      Vascular: No carotid bruit or JVD.   Cardiovascular:      Rate and Rhythm: Normal rate. Rhythm irregular.      Pulses: Normal pulses.      Heart sounds: S1 normal and S2 normal. Murmur heard.    Systolic murmur is present.      Pulmonary:      Effort: Pulmonary effort is normal. No respiratory distress.      Breath sounds: Normal breath sounds. No wheezing.   Abdominal:      General: Bowel sounds are normal. There is no distension.      Palpations: Abdomen is soft. There is no hepatomegaly or splenomegaly.      Tenderness: There is no abdominal tenderness.   Musculoskeletal:         General: No swelling. Normal range of motion.      Cervical back: Normal range of motion and neck supple.      Right lower leg: No edema.      Left lower leg: No edema.    Skin:     General: Skin is warm and dry.      Coloration: Skin is not jaundiced.      Findings: No rash.   Neurological:      General: No focal deficit present.      Mental Status: She is alert and oriented to person, place, and time. Mental status is at baseline.   Psychiatric:         Mood and Affect: Mood normal.         Speech: Speech normal.         Behavior: Behavior normal.         Thought Content: Thought content normal.         Judgment: Judgment normal.       Results review     Results Review:    I reviewed the patient's new clinical results.  Results from last 7 days   Lab Units 11/02/21  1517 11/02/21  1019   TROPONIN T ng/mL <0.010 <0.010     Results from last 7 days   Lab Units 11/03/21  0027 11/02/21  1019 11/01/21  1441   WBC 10*3/mm3 3.86 6.50 5.22   HEMOGLOBIN g/dL 9.7* 12.3 10.7*   PLATELETS 10*3/mm3 153 220 190     Results from last 7 days   Lab Units 11/03/21  0027 11/02/21  1019 11/01/21  1441   SODIUM mmol/L 139 139 139   POTASSIUM mmol/L 3.9 4.3 4.2   CHLORIDE mmol/L 107 104 108*   CO2 mmol/L 21.0* 23.3 23.3   BUN mg/dL 21 17 13   CREATININE mg/dL 0.73 0.85 0.61   CALCIUM mg/dL 9.3 10.8* 9.6   GLUCOSE mg/dL 119* 102* 97   ALT (SGPT) U/L 36* 40* 35*   AST (SGOT) U/L 35* 34* 30     Lab Results   Component Value Date    INR 0.97 08/10/2021     Lab Results   Component Value Date    MG 1.7 11/03/2021    MG 2.0 09/15/2020    MG 2.3 01/14/2018     Lab Results   Component Value Date    TSH 0.010 (L) 11/02/2021    CHLPL 241 (H) 05/06/2014    TRIG 361 (H) 01/03/2017    HDL 57 (L) 01/03/2017     (H) 01/03/2017      ECG           ECG/EMG Results (last 24 hours)     Procedure Component Value Units Date/Time    ECG 12 Lead [[id number]] Collected: 11/02/21 1006     Updated: 11/02/21 1755     QT Interval 320 ms      QTC Interval 474 ms     Narrative:      Test Reason : Chest Pain  Blood Pressure :   */*   mmHG  Vent. Rate : 132 BPM     Atrial Rate : 192 BPM     P-R Int :   * ms          QRS Dur :  88  ms      QT Int : 320 ms       P-R-T Axes :   * -39  63 degrees     QTc Int : 474 ms    Atrial fibrillation with rapid ventricular response  Left axis deviation  Anterior infarct (cited on or before 13-NOV-2020)  Abnormal ECG  When compared with ECG of 02-NOV-2021 10:05, (Unconfirmed)  No significant change was found  Confirmed by Raymon Corona ([id number]) on 11/2/2021 5:55:07 PM    Referred By: CURD           Confirmed By: Raymon Corona          Imaging Results (Last 72 Hours)     Procedure Component Value Units Date/Time    XR Chest 1 View [[id number]] Collected: 11/02/21 1121     Updated: 11/02/21 1123    Narrative:      EXAM:    XR Chest, 1 View     EXAM DATE:    11/2/2021 10:51 AM     CLINICAL HISTORY:    Chest pain protocol     TECHNIQUE:    Frontal view of the chest.     COMPARISON:    08/21/2018     FINDINGS:    LUNGS:  Coarsened interstitial markings again noted throughout the  lungs.    PLEURAL SPACE:  Unremarkable.  No pneumothorax.    HEART:  Cardiomegaly.    MEDIASTINUM:  Unremarkable.    BONES/JOINTS:  Unremarkable.       Impression:        Cardiomegaly.     This report was finalized on 11/2/2021 11:21 AM by Dr. Von Gonzales MD.             I have discussed my impression and recommendations with the patient and family.    Thank you very much for asking us to be involved in this patient's care.  We will follow along with you.    Electronically signed by SOFI Horta, 11/03/21, 9:05 AM EDT.  Electronically signed by Justin Santana MD, 11/03/21, 11:38 AM EDT.    Please note that portions of this note were completed with a voice recognition program.

## 2021-11-03 NOTE — CASE MANAGEMENT/SOCIAL WORK
Discharge Planning Assessment   Central     Patient Name: Lashell Case  MRN: [id number]  Today's Date: 11/3/2021    Admit Date: 11/2/2021     Discharge Needs Assessment     Row Name 11/03/21 1304       Living Environment    Lives With grandchild(carmina)    Name(s) of Who Lives With Patient granddaughter, Lolly and great granddaughter    Current Living Arrangements home/apartment/condo    Primary Care Provided by self    Provides Primary Care For no one    Family Caregiver if Needed grandchild(carmina), adult    Quality of Family Relationships helpful; involved; supportive    Able to Return to Prior Arrangements yes       Resource/Environmental Concerns    Resource/Environmental Concerns none    Transportation Concerns car, none       Transition Planning    Patient/Family Anticipates Transition to home with family    Patient/Family Anticipated Services at Transition none    Transportation Anticipated family or friend will provide       Discharge Needs Assessment    Equipment Currently Used at Home none    Concerns to be Addressed no discharge needs identified    Anticipated Changes Related to Illness none    Equipment Needed After Discharge none               Discharge Plan     Row Name 11/03/21 9586       Plan    Plan Pt lives at home with granddaughter, Lolly and great granddaughter. Pt does not utilize home health services or DME at this time. PCP is Alli Wolf. Pt does not have a POA or living will on file. Pt's family to provide transportation home at discharge. SS to follow and assist.    Patient/Family in Agreement with Plan yes               Demographic Summary     Row Name 11/03/21 1303       General Information    Referral Source nursing    Reason for Consult --  dc planning, adv. age-per prot              EMIGDIO Osei

## 2021-11-04 ENCOUNTER — APPOINTMENT (OUTPATIENT)
Dept: CARDIOLOGY | Facility: HOSPITAL | Age: 86
End: 2021-11-04

## 2021-11-04 ENCOUNTER — ANESTHESIA EVENT (OUTPATIENT)
Dept: CARDIOLOGY | Facility: HOSPITAL | Age: 86
End: 2021-11-04

## 2021-11-04 ENCOUNTER — ANESTHESIA (OUTPATIENT)
Dept: CARDIOLOGY | Facility: HOSPITAL | Age: 86
End: 2021-11-04

## 2021-11-04 VITALS — DIASTOLIC BLOOD PRESSURE: 97 MMHG | TEMPERATURE: 98 F | SYSTOLIC BLOOD PRESSURE: 154 MMHG | OXYGEN SATURATION: 100 %

## 2021-11-04 LAB
ALBUMIN SERPL-MCNC: 3.29 G/DL (ref 3.5–5.2)
ALBUMIN/GLOB SERPL: 1.3 G/DL
ALP SERPL-CCNC: 75 U/L (ref 39–117)
ALT SERPL W P-5'-P-CCNC: 38 U/L (ref 1–33)
ANION GAP SERPL CALCULATED.3IONS-SCNC: 9.2 MMOL/L (ref 5–15)
AST SERPL-CCNC: 31 U/L (ref 1–32)
BASOPHILS # BLD AUTO: 0.03 10*3/MM3 (ref 0–0.2)
BASOPHILS NFR BLD AUTO: 0.7 % (ref 0–1.5)
BH CV ECHO MEAS - BSA(HAYCOCK): 1.6 M^2
BH CV ECHO MEAS - BSA: 1.6 M^2
BH CV ECHO MEAS - BZI_BMI: 20.5 KILOGRAMS/M^2
BH CV ECHO MEAS - BZI_METRIC_HEIGHT: 167.6 CM
BH CV ECHO MEAS - BZI_METRIC_WEIGHT: 57.6 KG
BILIRUB SERPL-MCNC: 0.5 MG/DL (ref 0–1.2)
BUN SERPL-MCNC: 19 MG/DL (ref 8–23)
BUN/CREAT SERPL: 22.6 (ref 7–25)
CALCIUM SPEC-SCNC: 9.1 MG/DL (ref 8.6–10.5)
CHLORIDE SERPL-SCNC: 104 MMOL/L (ref 98–107)
CO2 SERPL-SCNC: 21.8 MMOL/L (ref 22–29)
CREAT SERPL-MCNC: 0.84 MG/DL (ref 0.57–1)
DEPRECATED RDW RBC AUTO: 52.3 FL (ref 37–54)
EOSINOPHIL # BLD AUTO: 0.03 10*3/MM3 (ref 0–0.4)
EOSINOPHIL NFR BLD AUTO: 0.7 % (ref 0.3–6.2)
ERYTHROCYTE [DISTWIDTH] IN BLOOD BY AUTOMATED COUNT: 14.2 % (ref 12.3–15.4)
GFR SERPL CREATININE-BSD FRML MDRD: 65 ML/MIN/1.73
GLOBULIN UR ELPH-MCNC: 2.6 GM/DL
GLUCOSE SERPL-MCNC: 102 MG/DL (ref 65–99)
HAV IGM SERPL QL IA: NORMAL
HBV CORE IGM SERPL QL IA: NORMAL
HBV SURFACE AG SERPL QL IA: NORMAL
HCT VFR BLD AUTO: 31.3 % (ref 34–46.6)
HCV AB SER DONR QL: NORMAL
HGB BLD-MCNC: 10.1 G/DL (ref 12–15.9)
IMM GRANULOCYTES # BLD AUTO: 0.01 10*3/MM3 (ref 0–0.05)
IMM GRANULOCYTES NFR BLD AUTO: 0.2 % (ref 0–0.5)
LYMPHOCYTES # BLD AUTO: 1.03 10*3/MM3 (ref 0.7–3.1)
LYMPHOCYTES NFR BLD AUTO: 24.6 % (ref 19.6–45.3)
MAGNESIUM SERPL-MCNC: 2 MG/DL (ref 1.6–2.4)
MAXIMAL PREDICTED HEART RATE: 136 BPM
MAXIMAL PREDICTED HEART RATE: 136 BPM
MCH RBC QN AUTO: 32.5 PG (ref 26.6–33)
MCHC RBC AUTO-ENTMCNC: 32.3 G/DL (ref 31.5–35.7)
MCV RBC AUTO: 100.6 FL (ref 79–97)
MONOCYTES # BLD AUTO: 0.61 10*3/MM3 (ref 0.1–0.9)
MONOCYTES NFR BLD AUTO: 14.6 % (ref 5–12)
NEUTROPHILS NFR BLD AUTO: 2.48 10*3/MM3 (ref 1.7–7)
NEUTROPHILS NFR BLD AUTO: 59.2 % (ref 42.7–76)
NRBC BLD AUTO-RTO: 0 /100 WBC (ref 0–0.2)
PLATELET # BLD AUTO: 167 10*3/MM3 (ref 140–450)
PMV BLD AUTO: 9.3 FL (ref 6–12)
POTASSIUM SERPL-SCNC: 4 MMOL/L (ref 3.5–5.2)
PROT SERPL-MCNC: 5.9 G/DL (ref 6–8.5)
RBC # BLD AUTO: 3.11 10*6/MM3 (ref 3.77–5.28)
SODIUM SERPL-SCNC: 135 MMOL/L (ref 136–145)
STRESS TARGET HR: 116 BPM
STRESS TARGET HR: 116 BPM
WBC # BLD AUTO: 4.19 10*3/MM3 (ref 3.4–10.8)

## 2021-11-04 PROCEDURE — 80053 COMPREHEN METABOLIC PANEL: CPT | Performed by: PHYSICIAN ASSISTANT

## 2021-11-04 PROCEDURE — 94799 UNLISTED PULMONARY SVC/PX: CPT

## 2021-11-04 PROCEDURE — 93325 DOPPLER ECHO COLOR FLOW MAPG: CPT | Performed by: INTERNAL MEDICINE

## 2021-11-04 PROCEDURE — 92960 CARDIOVERSION ELECTRIC EXT: CPT

## 2021-11-04 PROCEDURE — 99232 SBSQ HOSP IP/OBS MODERATE 35: CPT | Performed by: PHYSICIAN ASSISTANT

## 2021-11-04 PROCEDURE — 93312 ECHO TRANSESOPHAGEAL: CPT

## 2021-11-04 PROCEDURE — 93321 DOPPLER ECHO F-UP/LMTD STD: CPT

## 2021-11-04 PROCEDURE — 99231 SBSQ HOSP IP/OBS SF/LOW 25: CPT | Performed by: INTERNAL MEDICINE

## 2021-11-04 PROCEDURE — 80074 ACUTE HEPATITIS PANEL: CPT | Performed by: PHYSICIAN ASSISTANT

## 2021-11-04 PROCEDURE — 25010000002 PROPOFOL 10 MG/ML EMULSION: Performed by: NURSE ANESTHETIST, CERTIFIED REGISTERED

## 2021-11-04 PROCEDURE — 85025 COMPLETE CBC W/AUTO DIFF WBC: CPT | Performed by: PHYSICIAN ASSISTANT

## 2021-11-04 PROCEDURE — 92960 CARDIOVERSION ELECTRIC EXT: CPT | Performed by: INTERNAL MEDICINE

## 2021-11-04 PROCEDURE — 93005 ELECTROCARDIOGRAM TRACING: CPT | Performed by: INTERNAL MEDICINE

## 2021-11-04 PROCEDURE — 83735 ASSAY OF MAGNESIUM: CPT | Performed by: PHYSICIAN ASSISTANT

## 2021-11-04 PROCEDURE — 93321 DOPPLER ECHO F-UP/LMTD STD: CPT | Performed by: INTERNAL MEDICINE

## 2021-11-04 PROCEDURE — 93325 DOPPLER ECHO COLOR FLOW MAPG: CPT

## 2021-11-04 PROCEDURE — 93312 ECHO TRANSESOPHAGEAL: CPT | Performed by: INTERNAL MEDICINE

## 2021-11-04 RX ORDER — METOPROLOL SUCCINATE 50 MG/1
100 TABLET, EXTENDED RELEASE ORAL
Status: DISCONTINUED | OUTPATIENT
Start: 2021-11-05 | End: 2021-11-05 | Stop reason: HOSPADM

## 2021-11-04 RX ORDER — AMIODARONE HYDROCHLORIDE 200 MG/1
200 TABLET ORAL
Status: DISCONTINUED | OUTPATIENT
Start: 2021-11-11 | End: 2021-11-05 | Stop reason: HOSPADM

## 2021-11-04 RX ORDER — SODIUM CHLORIDE 9 MG/ML
INJECTION, SOLUTION INTRAVENOUS CONTINUOUS PRN
Status: DISCONTINUED | OUTPATIENT
Start: 2021-11-04 | End: 2021-11-04 | Stop reason: SURG

## 2021-11-04 RX ORDER — AMIODARONE HYDROCHLORIDE 200 MG/1
400 TABLET ORAL
Status: DISCONTINUED | OUTPATIENT
Start: 2021-11-04 | End: 2021-11-05 | Stop reason: HOSPADM

## 2021-11-04 RX ORDER — PROPOFOL 10 MG/ML
VIAL (ML) INTRAVENOUS AS NEEDED
Status: DISCONTINUED | OUTPATIENT
Start: 2021-11-04 | End: 2021-11-04 | Stop reason: SURG

## 2021-11-04 RX ADMIN — PROPOFOL 10 MG: 10 INJECTION, EMULSION INTRAVENOUS at 11:08

## 2021-11-04 RX ADMIN — SODIUM CHLORIDE: 0.9 INJECTION, SOLUTION INTRAVENOUS at 11:00

## 2021-11-04 RX ADMIN — AMIODARONE HYDROCHLORIDE 400 MG: 200 TABLET ORAL at 15:09

## 2021-11-04 RX ADMIN — SODIUM CHLORIDE, PRESERVATIVE FREE 10 ML: 5 INJECTION INTRAVENOUS at 08:46

## 2021-11-04 RX ADMIN — LISINOPRIL: 10 TABLET ORAL at 15:08

## 2021-11-04 RX ADMIN — PROPOFOL 40 MG: 10 INJECTION, EMULSION INTRAVENOUS at 11:02

## 2021-11-04 RX ADMIN — SODIUM CHLORIDE, PRESERVATIVE FREE 10 ML: 5 INJECTION INTRAVENOUS at 20:31

## 2021-11-04 RX ADMIN — APIXABAN 2.5 MG: 2.5 TABLET, FILM COATED ORAL at 20:30

## 2021-11-04 RX ADMIN — LEVOTHYROXINE SODIUM 50 MCG: 50 TABLET ORAL at 15:08

## 2021-11-04 NOTE — DISCHARGE INSTRUCTIONS
You have been enrolled into the transition from hospital to home program.  A nurse (Maci) will be contacting you after you discharge to home to set up a time to come out to your home for a follow-up visit.  If you have any questions or concerns you can call this number anytime and a nurse will contact you:  [facility]  [phone].

## 2021-11-04 NOTE — NURSING NOTE
"Transitional Care Note    Enrolled in [facility] Transitional Care Note    Enrolled in [facility] Transitional Care Services under the CTI Model to be followed for 30 days post discharge.  [facility] will assist with support and education at time of transition home from hospital.  Hospital  will follow throughout the stay at [facility].  Home  will visit within 48 hours of discharge and follow with home vitals and telephone contact for 30 days.      Patient admitted to [facility] via Emergency Department, complaints of palpitations.  Admitted for further treatment of a-fib.    Patient contacted via phone.    Explained transition to home program and Patient is agreeable to home visits.  Home  will be Maci Schwartz RN    Clinical Assessment Instrument Scores:  >Short Portable Mental Status 10, normal mental function  >Geriatric Depression Scale 3, normal  >IADL 8, Independent with ADL's  >CHRISTIANSON-ADL 6, Independent with self-care  >Subjective Health Rating \"good\"  >General Anxiety Scale  0    "

## 2021-11-04 NOTE — NURSING NOTE
Received pt report from MARIANNA Carney in cath lab; pt received 2 shocks and is alternating between NSR and A.fib, HR 60s-70s. Pt A&Ox4, sitting up in wheelchair, about to come back up to floor.

## 2021-11-04 NOTE — PLAN OF CARE
Goal Outcome Evaluation:  Plan of Care Reviewed With: patient        Progress: improving  Outcome Summary: Pt currently sitting up on side of bed eating supper; denies any complaints at this time. V/S stable with no signs of respiratory distress present. Pt's HR currently SR 70s; has been alternating between SR and A.fib since she received cardioversion earlier today. Will continue to monitor pt and follow plan of care.

## 2021-11-04 NOTE — ANESTHESIA POSTPROCEDURE EVALUATION
Patient: Lashell Case    Procedure Summary     Date: 11/04/21 Room / Location: [facility] NONINVASIVE LAB    Anesthesia Start: 1100 Anesthesia Stop: 1118    Procedure: CARDIOVERSION EXTERNAL IN CARDIOLOGY DEPARTMENT Diagnosis: (Atrial fibrillation)    Scheduled Providers:  Provider: Landry Workman MD    Anesthesia Type: Not recorded ASA Status: 3          Anesthesia Type: No value filed.    Vitals  Vitals Value Taken Time   /91 11/04/21 1145   Temp 98 °F (36.7 °C) 11/04/21 1116   Pulse 90 11/04/21 1145   Resp 18 11/04/21 1145   SpO2 100 % 11/04/21 1145           Anesthesia Post Evaluation

## 2021-11-04 NOTE — PROGRESS NOTES
[facility]     Patient Identification:  Name:  Lashell Case  Age:  86 y.o.  Sex:  female  :  1935  MRN:  [id number]  Visit Number:  [id number]  Primary Care Provider:  Alli Wolf MD    Date of admission: 2021  Length of stay:  2    ----------------------------------------------------------------------------------------------------------------------  Subjective     Chief Complaint:   Chief Complaint   Patient presents with   • Palpitations     Subjective/Interval History:    86 y.o. female who was admitted on 2021 with new onset atrial fibrillation with rapid ventricular response.  The patient was sent to the ED from the cardiology office.  She had recently been referred for new onset atrial fibrillation.  Due to RVR in the 120s 140s, she was sent to the ED for evaluation.    PMH is significant for hypothyroidism, hypertension hyperlipidemia, breast cancer. For complete admission information, please see history and physical.     The patient was admitted to the telemetry unit.  She was started on an IV diltiazem drip at 5 mg/h after a 10 mg bolus in the ED.  She was noted to have a very low TSH and mildly elevated free T4.  Home dose of levothyroxine was reduced from 75 mcg to 50 mcg daily.  Magnesium was replaced as needed as it was borderline low.    Cardiology was consulted and the patient's home metoprolol succinate was advanced.  The diltiazem drip was discontinued.  She had a nuclear stress test which showed no evidence of ischemia and was consistent with a low risk study.  She had a transthoracic echocardiogram showing EF of 66 to 70% with grade 2 diastolic dysfunction, mild to moderate aortic stenosis, moderate mitral and severe tricuspid regurgitation noted.     Consultations:  1. Cardiology    Procedures/Scans:  1. Nuclear stress test   2. Transthoracic echocardiogram   3. DONALDO guided electrical cardioversion (pending)    Today,  the patient reports that she is doing well.  She denies any complaints this morning.  She is n.p.o. for a DONALDO guided electrical cardioversion this morning.  Discussed with her RN, Melani, no new events or concerns reported.    Review of Systems   Constitutional: Negative for chills and fever.   Respiratory: Negative for cough and shortness of breath.    Cardiovascular: Negative for chest pain.   Gastrointestinal: Negative for abdominal pain, anal bleeding and blood in stool.   Genitourinary: Negative for difficulty urinating, dyspareunia, frequency and hematuria.   Psychiatric/Behavioral: Negative for agitation, behavioral problems and confusion.      ----------------------------------------------------------------------------------------------------------------------  Objective   Current Hospital Meds:  apixaban, 2.5 mg, Oral, Q12H  levothyroxine, 50 mcg, Oral, Q AM  lisinopril-hydroCHLOROthiazide (ZESTORTIC) 20-25 mg combo dose, , Oral, Q24H  metoprolol succinate XL, 150 mg, Oral, Q24H  sodium chloride, 10 mL, Intravenous, Q12H      Pharmacy Consult - Pharmacy to dose,       ----------------------------------------------------------------------------------------------------------------------  Vital Signs:  Temp:  [97.7 °F (36.5 °C)-98.5 °F (36.9 °C)] 98 °F (36.7 °C)  Heart Rate:  [] 131  Resp:  [18-22] 22  BP: (115-158)/() 154/97  Mean Arterial Pressure (Non-Invasive) for the past 24 hrs (Last 3 readings):   Noninvasive MAP (mmHg)   11/04/21 1000 108   11/04/21 0500 97   11/03/21 1400 94     SpO2 Percentage    11/04/21 0500 11/04/21 1000 11/04/21 1052   SpO2: 96% 96% 98%     SpO2:  [93 %-100 %] 100 %  on   ;   Device (Oxygen Therapy): room air    Body mass index is 20.5 kg/m².  Wt Readings from Last 3 Encounters:   11/04/21 57.6 kg (127 lb)   11/02/21 51.6 kg (113 lb 12.8 oz)   11/01/21 52.4 kg (115 lb 9.6 oz)        Intake/Output Summary (Last 24 hours) at 11/4/2021 1122  Last data filed at 11/4/2021  1117  Gross per 24 hour   Intake 580 ml   Output no documentation   Net 580 ml     NPO Diet  ----------------------------------------------------------------------------------------------------------------------  Physical exam:  Constitutional: Vital signs reviewed. Well-developed, thin. No respiratory distress on room air.      HENT:  Head:  Normocephalic and atraumatic.  Mouth:  Moist mucous membranes.    Eyes:  Conjunctivae and EOM are normal. No scleral icterus. No erythema or drainage.  Neck:  Neck supple.  Cardiovascular:  Normal rate, irregularly irregular rhythm.  Systolic murmur loudest in the right second intercostal space, grade 2 out of 6 to 3 out of 6.  Pulmonary/Chest:  No respiratory distress, no wheezes, no crackles, with normal breath sounds and good air movement.   Abdominal:  Soft.  Bowel sounds are present x4.  No distension and no tenderness.   Musculoskeletal:  No edema, no tenderness, and no deformity.  No red or swollen joints anywhere.    Neurological:  Alert and oriented to person, place, and time. No facial droop.  No slurred speech.   Skin:  Skin is warm and dry. No rash noted. No pallor.   Peripheral vascular: No clubbing, no cyanosis, no edema.   Genitourinary: No Sifuentes catheter in place.    I have reviewed and updated the physical exam as needed to reflect that of 11/04/21  ----------------------------------------------------------------------------------------------------------------------  Tele: Atrial fibrillation in the 90s.    ----------------------------------------------------------------------------------------------------------------------  Results from last 7 days   Lab Units 11/02/21  1517 11/02/21  1019   TROPONIN T ng/mL <0.010 <0.010           Results from last 7 days   Lab Units 11/04/21  0207 11/03/21  0027 11/02/21  1019   WBC 10*3/mm3 4.19 3.86 6.50   HEMOGLOBIN g/dL 10.1* 9.7* 12.3   HEMATOCRIT % 31.3* 30.8* 38.7   MCV fL 100.6* 103.7* 102.1*   MCHC g/dL 32.3 31.5  31.8   PLATELETS 10*3/mm3 167 153 220     Results from last 7 days   Lab Units 11/04/21  0207 11/03/21  0027 11/02/21  1019   SODIUM mmol/L 135* 139 139   POTASSIUM mmol/L 4.0 3.9 4.3   MAGNESIUM mg/dL 2.0 1.7  --    CHLORIDE mmol/L 104 107 104   CO2 mmol/L 21.8* 21.0* 23.3   BUN mg/dL 19 21 17   CREATININE mg/dL 0.84 0.73 0.85   EGFR IF NONAFRICN AM mL/min/1.73 65 76 64   CALCIUM mg/dL 9.1 9.3 10.8*   GLUCOSE mg/dL 102* 119* 102*   ALBUMIN g/dL 3.29* 3.29* 4.30   BILIRUBIN mg/dL 0.5 0.5 1.3*   ALK PHOS U/L 75 73 94   AST (SGOT) U/L 31 35* 34*   ALT (SGPT) U/L 38* 36* 40*   Estimated Creatinine Clearance: 45.3 mL/min (by C-G formula based on SCr of 0.84 mg/dL).  No results found for: AMMONIA    Glucose   Date/Time Value Ref Range Status   11/03/2021 1851 165 (H) 70 - 130 mg/dL Final     Comment:     Meter: [id number] : [id number] ANUPAM LAUREN     No results found for: HGBA1C  Lab Results   Component Value Date    TSH 0.010 (L) 11/02/2021    FREET4 1.85 (H) 11/01/2021     No results found for: BLOODCX  No results found for: URINECX  No results found for: WOUNDCX  No results found for: STOOLCX  No results found for: RESPCX    Pain Management Panel    There is no flowsheet data to display.       I have personally reviewed the above laboratory results for 11/04/21  ----------------------------------------------------------------------------------------------------------------------  Imaging Results (Last 24 Hours)     ** No results found for the last 24 hours. **                ----------------------------------------------------------------------------------------------------------------------  Assessment/Plan     #New onset atrial fibrillation with RVR  #Borderline hypomagnesemia  · Cardiology consulted, appreciate their input.  · Stress test low risk, no ischemia.  · TTE with EF 66 to 70%, grade 2 diastolic dysfunction.  · Replace magnesium as needed.  · TSH low with mildly elevated free T4, may be  contributing.  See below.  · [id number] score is at least 4 (HTN, age, female gender) started on low-dose Eliquis. $9.20 co-pay per pharmacy.  · Home dose of Toprol-XL increased from 100 mg 150 mg daily.  · Diltiazem drip discontinued last evening.  · Cardiology plans for DONALDO guided electrical cardioversion this morning.    #Hypothyroidism with likely iatrogenic hyperthyroidism   · Patient reports a 75 pound weight loss over the last 9 months.  Currently taking levothyroxine 75 mcg daily.  · Repeat TSH confirms that it is still low at 0.010.  Free T4 elevated at 1.85. TSH also low and July 2021.  · Dose of levothyroxine reduced from 75 mcg to 50 mcg daily.  · Recommend repeating thyroid profile in about 4 weeks as an outpatient with PCP.    #Macrocytic anemia  · Overall stable.  · Vitamin B12 and folate within normal limits.  · Repeat CBC in AM.    #Mildly elevated transaminases  #Hyperbilirubinemia  · Bilirubin normalized. AST normalized. ALT remains minimally elevated.  · Acute hepatitis panel negative.  · Repeat CMP in AM.    #Hypercalcemia   · Normalized on repeat.    #Aortic stenosis  · Mild to moderate on TTE this admission, DONALDO pending.  · Continue to monitor with cardiology as an outpatient.    #Right breast carcinoma in 2004, treated with partial mastectomy, radiation and hormonal therapy  #Left breast carcinoma in 2007, treated with partial mastectomy  #Left breast cancer in 2017, T2N1, treated with left modified radical mastectomy and 5 years of adjuvant hormonal therapy  #Right breast cancer in 2020, triple negative, treated with right modified radical mastectomy, chemotherapy refused (per Dr. Batista office notes)  · Patient reports 75 pound weight loss since February, possible due to above and/or above thyroid issue.  · I did discuss the option of setting her up with outpatient oncology to discuss options including palliative chemotherapy or radiation. The patient reports that she takes care of her  3-year-old grandson. She has underwent chemotherapy and radiation in the past and states that she would not be able to do this again while caring for her grandson. She is not interested in meeting with oncology at this point. I asked her to please let us know if she decides she would like to and we can arrange this as an outpatient upon discharge. She expresses undertstanding.     #DVT Prophylaxis  · Eliquis will serve.    The patient is high risk due to the following diagnoses/reasons: A. fib RVR, known breast cancer with significant weight loss.    I have discussed the patient's assessment and plan with the patient, MARIANNA Polo, and attending physician, Dr. Cifuentes.     Disposition: Plans to return home on discharge.  Hopefully home in the next 24 hours.    Discharge needs:  • Cardiology follow-up  • Repeat thyroid profile in 4 to 6 weeks with PCP.    JOHN Rosas  11/04/21  11:22 EDT

## 2021-11-04 NOTE — NURSING NOTE
Spoke with Ashleigh NP with cardiology and asked for a diet order for pt; she instructed me to order a reg cardiac diet.

## 2021-11-04 NOTE — ANESTHESIA PREPROCEDURE EVALUATION
Anesthesia Evaluation     Patient summary reviewed and Nursing notes reviewed   no history of anesthetic complications:  NPO Solid Status: > 8 hours  NPO Liquid Status: > 8 hours           Airway   Mallampati: II  TM distance: >3 FB  Neck ROM: limited  No difficulty expected  Dental    (+) edentulous    Pulmonary - negative pulmonary ROS   (+) decreased breath sounds,   Cardiovascular   Exercise tolerance: good (4-7 METS)    NYHA Classification: II  ECG reviewed  Rhythm: irregular  Rate: abnormal    (+) hypertension, valvular problems/murmurs MR, dysrhythmias Atrial Fib, murmur, hyperlipidemia,       Neuro/Psych  (+) dizziness/light headedness,     GI/Hepatic/Renal/Endo    (+)   liver disease,     Musculoskeletal     Abdominal  - normal exam    Bowel sounds: normal.   Substance History - negative use     OB/GYN negative ob/gyn ROS         Other   arthritis,    history of cancer                      Anesthesia Plan    ASA 3       total IV anesthesia  intravenous induction     Anesthetic plan, all risks, benefits, and alternatives have been provided, discussed and informed consent has been obtained with: patient.

## 2021-11-04 NOTE — PLAN OF CARE
Goal Outcome Evaluation:  Plan of Care Reviewed With: patient           Outcome Summary: Pt resting in bed this shift. NPO since midnight for DONALDO and cardioversion in AM. No complaints noted. VSS. No s/s of acute distress noted. Will cont to follow POC.

## 2021-11-04 NOTE — PROGRESS NOTES
LOS: 2 days     Name: Lashell Case  Age/Sex: 86 y.o. female  :  1935        PCP: Alli Wolf MD  REF: No Known Provider    Principal Problem:    Atrial fibrillation with RVR (HCC)      Reason for follow-up: Atrial fibrillation with RVR    Subjective       Subjective     Lashell aCse is a 86 year old female with a past medical history significant for essential hypertension, hyperlipidemia, hypothyroidism and breast cancer.  Patient presented to the ER by recommendation of cardiology due to atrial fibrillation with RVR.      Interval History: Remains in atrial fibrillation  bpm. Denies any palpitations. Cardizem drip has been stopped. Scheduled for DONALDO/cardioverison today.     Vital Signs  Temp:  [97.7 °F (36.5 °C)-98.2 °F (36.8 °C)] 97.8 °F (36.6 °C)  Heart Rate:  [] 91  Resp:  [18-20] 18  BP: (127-137)/(73-80) 133/80     Vital Signs (last 72 hrs)        0700   0659  0700   0659  0700   0659  0700   0833   Most Recent      Temp (°F)   97.7 -  98.2    97.7 -  98.2       97.8 (36.6)  0500    Heart Rate   72 -  158    83 -  106       91  0500    Resp   16 -  20    18 -  20       18  0500    BP   116/66 -  154/94    127/73 -  137/77       133/80  0500    SpO2 (%)   84 -  100    93 -  98       96  0500        Documented weights    21 1008 21 1500 21 0500 21 0500   Weight: 51.3 kg (113 lb) 58 kg (127 lb 12.8 oz) 58.1 kg (128 lb 1.6 oz) 57.6 kg (127 lb)      Body mass index is 20.5 kg/m².    Intake/Output Summary (Last 24 hours) at 2021 0833  Last data filed at 11/3/2021 2247  Gross per 24 hour   Intake 480 ml   Output --   Net 480 ml     Objective    Objective       Physical Exam:     General Appearance:    Alert, cooperative, in no acute distress   Head:    Normocephalic, without obvious abnormality, atraumatic   Eyes:            Conjunctivae and sclerae normal, no   icterus, no  pallor, corneas clear.   Neck:   No adenopathy, supple, trachea midline, no thyromegaly, no   carotid bruit, no JVD   Lungs:     Clear to auscultation,respirations regular, even and                  unlabored    Heart:    irregular rhythm and normal rate, normal S1 and S2, no            murmur, no gallop, no rub, no click   Chest Wall:    No abnormalities observed   Abdomen:     Normal bowel sounds, no masses, no organomegaly, soft        non-tender, non-distended, no guarding, no rebound                tenderness   Extremities:   Moves all extremities well, no edema, no cyanosis, no             redness   Pulses:   Pulses palpable and equal bilaterally   Skin:   No bleeding, bruising or rash       Neurologic:   Alert and oriented      Results review       Results Review:   Results from last 7 days   Lab Units 11/04/21  0207 11/03/21  0027 11/02/21  1019 11/01/21  1441   WBC 10*3/mm3 4.19 3.86 6.50 5.22   HEMOGLOBIN g/dL 10.1* 9.7* 12.3 10.7*   PLATELETS 10*3/mm3 167 153 220 190     Results from last 7 days   Lab Units 11/04/21  0207 11/03/21  0027 11/02/21  1019 11/01/21  1441   SODIUM mmol/L 135* 139 139 139   POTASSIUM mmol/L 4.0 3.9 4.3 4.2   CHLORIDE mmol/L 104 107 104 108*   CO2 mmol/L 21.8* 21.0* 23.3 23.3   BUN mg/dL 19 21 17 13   CREATININE mg/dL 0.84 0.73 0.85 0.61   CALCIUM mg/dL 9.1 9.3 10.8* 9.6   GLUCOSE mg/dL 102* 119* 102* 97   ALT (SGPT) U/L 38* 36* 40* 35*   AST (SGOT) U/L 31 35* 34* 30     Results from last 7 days   Lab Units 11/02/21  1517 11/02/21  1019   TROPONIN T ng/mL <0.010 <0.010     Lab Results   Component Value Date    INR 0.97 08/10/2021     Lab Results   Component Value Date    MG 2.0 11/04/2021    MG 1.7 11/03/2021    MG 2.0 09/15/2020     Lab Results   Component Value Date    TSH 0.010 (L) 11/02/2021    CHLPL 241 (H) 05/06/2014    TRIG 361 (H) 01/03/2017    HDL 57 (L) 01/03/2017     (H) 01/03/2017      Imaging Results (Last 48 Hours)     Procedure Component Value Units  Date/Time    XR Chest 1 View [[id number]] Collected: 11/02/21 1121     Updated: 11/02/21 1123    Narrative:      EXAM:    XR Chest, 1 View     EXAM DATE:    11/2/2021 10:51 AM     CLINICAL HISTORY:    Chest pain protocol     TECHNIQUE:    Frontal view of the chest.     COMPARISON:    08/21/2018     FINDINGS:    LUNGS:  Coarsened interstitial markings again noted throughout the  lungs.    PLEURAL SPACE:  Unremarkable.  No pneumothorax.    HEART:  Cardiomegaly.    MEDIASTINUM:  Unremarkable.    BONES/JOINTS:  Unremarkable.       Impression:        Cardiomegaly.     This report was finalized on 11/2/2021 11:21 AM by Dr. Von Gonzales MD.           Echo   Results for orders placed during the hospital encounter of 11/02/21    Adult Transthoracic Echo Complete W/ Cont if Necessary Per Protocol    Interpretation Summary  · Left ventricular wall thickness is consistent with mild concentric hypertrophy.  · Left ventricular ejection fraction appears to be 66 - 70%. Left ventricular systolic function is normal.  · Left ventricular diastolic function is consistent with (grade II w/high LAP) pseudonormalization.  · The right atrial cavity is moderately dilated.  · Mild to moderate aortic valve stenosis is present.  · Moderate mitral valve regurgitation is present.  · Severe tricuspid valve regurgitation is present.  · Estimated right ventricular systolic pressure from tricuspid regurgitation is mildly elevated (35-45 mmHg). Calculated right ventricular systolic pressure from tricuspid regurgitation is 39 mmHg.     I reviewed the patient's new clinical results.    Telemetry: Atrial fibrillation  bpm      Medication Review:   apixaban, 2.5 mg, Oral, Q12H  levothyroxine, 50 mcg, Oral, Q AM  lisinopril-hydroCHLOROthiazide (ZESTORTIC) 20-25 mg combo dose, , Oral, Q24H  metoprolol succinate XL, 150 mg, Oral, Q24H  sodium chloride, 10 mL, Intravenous, Q12H        Pharmacy Consult - Pharmacy to dose,         Assessment       Assessment:  1. Persistent atrial fibrillation with intermittent rapid ventricular response.  2. Mild aortic stenosis.    Plan     Recommendations:  1. Patient underwent DONALDO guided cardioversion with conversion to sinus rhythm.  2. We will start her on amiodarone 400 mg daily for 1 week and then 200 mg daily after that.  3. Patient can be observed overnight and then possible discharge home in a.m if remains stable.    I discussed the patients findings and my recommendations with patient.      Electronically signed by SOFI Horta, 11/04/21, 8:33 AM EDT.    Electronically signed by Raymon Corona MD, 11/04/21, 6:24 PM EDT.    Please note that portions of this note were completed with a voice recognition program.

## 2021-11-05 ENCOUNTER — READMISSION MANAGEMENT (OUTPATIENT)
Dept: CALL CENTER | Facility: HOSPITAL | Age: 86
End: 2021-11-05

## 2021-11-05 VITALS
HEIGHT: 66 IN | SYSTOLIC BLOOD PRESSURE: 156 MMHG | HEART RATE: 72 BPM | BODY MASS INDEX: 18.4 KG/M2 | RESPIRATION RATE: 18 BRPM | DIASTOLIC BLOOD PRESSURE: 78 MMHG | WEIGHT: 114.5 LBS | TEMPERATURE: 97.9 F | OXYGEN SATURATION: 98 %

## 2021-11-05 LAB
ALBUMIN SERPL-MCNC: 3.44 G/DL (ref 3.5–5.2)
ALBUMIN/GLOB SERPL: 1.2 G/DL
ALP SERPL-CCNC: 70 U/L (ref 39–117)
ALT SERPL W P-5'-P-CCNC: 30 U/L (ref 1–33)
ANION GAP SERPL CALCULATED.3IONS-SCNC: 11.6 MMOL/L (ref 5–15)
AST SERPL-CCNC: 22 U/L (ref 1–32)
BASOPHILS # BLD AUTO: 0.02 10*3/MM3 (ref 0–0.2)
BASOPHILS NFR BLD AUTO: 0.4 % (ref 0–1.5)
BILIRUB SERPL-MCNC: 0.5 MG/DL (ref 0–1.2)
BUN SERPL-MCNC: 17 MG/DL (ref 8–23)
BUN/CREAT SERPL: 22.7 (ref 7–25)
CALCIUM SPEC-SCNC: 9.8 MG/DL (ref 8.6–10.5)
CHLORIDE SERPL-SCNC: 104 MMOL/L (ref 98–107)
CO2 SERPL-SCNC: 22.4 MMOL/L (ref 22–29)
CREAT SERPL-MCNC: 0.75 MG/DL (ref 0.57–1)
DEPRECATED RDW RBC AUTO: 51.5 FL (ref 37–54)
EOSINOPHIL # BLD AUTO: 0.03 10*3/MM3 (ref 0–0.4)
EOSINOPHIL NFR BLD AUTO: 0.6 % (ref 0.3–6.2)
ERYTHROCYTE [DISTWIDTH] IN BLOOD BY AUTOMATED COUNT: 13.7 % (ref 12.3–15.4)
GFR SERPL CREATININE-BSD FRML MDRD: 74 ML/MIN/1.73
GLOBULIN UR ELPH-MCNC: 2.8 GM/DL
GLUCOSE SERPL-MCNC: 102 MG/DL (ref 65–99)
HCT VFR BLD AUTO: 33 % (ref 34–46.6)
HGB BLD-MCNC: 10.4 G/DL (ref 12–15.9)
IMM GRANULOCYTES # BLD AUTO: 0.01 10*3/MM3 (ref 0–0.05)
IMM GRANULOCYTES NFR BLD AUTO: 0.2 % (ref 0–0.5)
LYMPHOCYTES # BLD AUTO: 1.32 10*3/MM3 (ref 0.7–3.1)
LYMPHOCYTES NFR BLD AUTO: 26.8 % (ref 19.6–45.3)
MAGNESIUM SERPL-MCNC: 1.8 MG/DL (ref 1.6–2.4)
MCH RBC QN AUTO: 32 PG (ref 26.6–33)
MCHC RBC AUTO-ENTMCNC: 31.5 G/DL (ref 31.5–35.7)
MCV RBC AUTO: 101.5 FL (ref 79–97)
MONOCYTES # BLD AUTO: 0.79 10*3/MM3 (ref 0.1–0.9)
MONOCYTES NFR BLD AUTO: 16.1 % (ref 5–12)
NEUTROPHILS NFR BLD AUTO: 2.75 10*3/MM3 (ref 1.7–7)
NEUTROPHILS NFR BLD AUTO: 55.9 % (ref 42.7–76)
NRBC BLD AUTO-RTO: 0 /100 WBC (ref 0–0.2)
PLATELET # BLD AUTO: 175 10*3/MM3 (ref 140–450)
PMV BLD AUTO: 9.3 FL (ref 6–12)
POTASSIUM SERPL-SCNC: 4.2 MMOL/L (ref 3.5–5.2)
PROT SERPL-MCNC: 6.2 G/DL (ref 6–8.5)
QT INTERVAL: 386 MS
QTC INTERVAL: 461 MS
RBC # BLD AUTO: 3.25 10*6/MM3 (ref 3.77–5.28)
SODIUM SERPL-SCNC: 138 MMOL/L (ref 136–145)
WBC # BLD AUTO: 4.92 10*3/MM3 (ref 3.4–10.8)

## 2021-11-05 PROCEDURE — 99232 SBSQ HOSP IP/OBS MODERATE 35: CPT | Performed by: SPECIALIST

## 2021-11-05 PROCEDURE — 93005 ELECTROCARDIOGRAM TRACING: CPT | Performed by: INTERNAL MEDICINE

## 2021-11-05 PROCEDURE — 85025 COMPLETE CBC W/AUTO DIFF WBC: CPT | Performed by: PHYSICIAN ASSISTANT

## 2021-11-05 PROCEDURE — 83735 ASSAY OF MAGNESIUM: CPT | Performed by: PHYSICIAN ASSISTANT

## 2021-11-05 PROCEDURE — 25010000002 MAGNESIUM SULFATE 2 GM/50ML SOLUTION: Performed by: PHYSICIAN ASSISTANT

## 2021-11-05 PROCEDURE — 99239 HOSP IP/OBS DSCHRG MGMT >30: CPT | Performed by: PHYSICIAN ASSISTANT

## 2021-11-05 PROCEDURE — 80053 COMPREHEN METABOLIC PANEL: CPT | Performed by: PHYSICIAN ASSISTANT

## 2021-11-05 RX ORDER — LISINOPRIL 40 MG/1
40 TABLET ORAL
Qty: 30 TABLET | Refills: 0 | Status: SHIPPED | OUTPATIENT
Start: 2021-11-06 | End: 2021-12-09 | Stop reason: SDUPTHER

## 2021-11-05 RX ORDER — LEVOTHYROXINE SODIUM 0.05 MG/1
50 TABLET ORAL
Qty: 30 TABLET | Refills: 0 | Status: SHIPPED | OUTPATIENT
Start: 2021-11-06 | End: 2022-08-01 | Stop reason: SDUPTHER

## 2021-11-05 RX ORDER — MAGNESIUM SULFATE HEPTAHYDRATE 40 MG/ML
2 INJECTION, SOLUTION INTRAVENOUS ONCE
Status: COMPLETED | OUTPATIENT
Start: 2021-11-05 | End: 2021-11-05

## 2021-11-05 RX ORDER — AMIODARONE HYDROCHLORIDE 400 MG/1
400 TABLET ORAL
Qty: 5 TABLET | Refills: 0 | Status: SHIPPED | OUTPATIENT
Start: 2021-11-06 | End: 2021-11-05

## 2021-11-05 RX ORDER — LISINOPRIL 10 MG/1
20 TABLET ORAL ONCE
Status: COMPLETED | OUTPATIENT
Start: 2021-11-05 | End: 2021-11-05

## 2021-11-05 RX ORDER — AMIODARONE HYDROCHLORIDE 200 MG/1
200 TABLET ORAL
Qty: 40 TABLET | Refills: 0 | Status: SHIPPED | OUTPATIENT
Start: 2021-11-11 | End: 2021-12-09 | Stop reason: SDUPTHER

## 2021-11-05 RX ORDER — LISINOPRIL 10 MG/1
40 TABLET ORAL
Status: DISCONTINUED | OUTPATIENT
Start: 2021-11-06 | End: 2021-11-05 | Stop reason: HOSPADM

## 2021-11-05 RX ADMIN — AMIODARONE HYDROCHLORIDE 400 MG: 200 TABLET ORAL at 08:25

## 2021-11-05 RX ADMIN — LISINOPRIL 20 MG: 10 TABLET ORAL at 11:41

## 2021-11-05 RX ADMIN — LISINOPRIL: 10 TABLET ORAL at 08:23

## 2021-11-05 RX ADMIN — LEVOTHYROXINE SODIUM 50 MCG: 50 TABLET ORAL at 05:37

## 2021-11-05 RX ADMIN — APIXABAN 2.5 MG: 2.5 TABLET, FILM COATED ORAL at 08:25

## 2021-11-05 RX ADMIN — METOPROLOL SUCCINATE 100 MG: 50 TABLET, EXTENDED RELEASE ORAL at 08:24

## 2021-11-05 RX ADMIN — SODIUM CHLORIDE, PRESERVATIVE FREE 10 ML: 5 INJECTION INTRAVENOUS at 08:25

## 2021-11-05 RX ADMIN — MAGNESIUM SULFATE HEPTAHYDRATE 2 G: 40 INJECTION, SOLUTION INTRAVENOUS at 12:17

## 2021-11-05 NOTE — DISCHARGE SUMMARY
[facility] HOSPITALIST DISCHARGE SUMMARY    Patient Identification:  Name:  Lashell Case  Age:  86 y.o.  Sex:  female  :  1935  MRN:  [id number]  Visit Number:  [id number]    Date of Admission: 2021  Date of Discharge: 2021    PCP: Alli Wolf MD    Discharging Provider: Megha Rodríguez PA-C / Dr. Austyn Cifuentes, DO    Discharge Diagnoses     Discharge Diagnoses:  1. New onset atrial fibrillation with RVR  2. Borderline hypomagnesemia  3. Hypothyroidism with likely iatrogenic hyperthyroidism secondary to significant weight loss  4. Macrocytic anemia  5. Mildly elevated transaminases  6. Hyperbilirubinemia, resolved  7. Hypercalcemia, resolved  8. Mild aortic stenosis  9. Weight loss   10. History of recurrent bilateral breast cancer status post multiple surgeries, radiation, hormonal therapy.  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     Needs on follow up:  · Follow-up cardiology 2 weeks, recommend repeat BMP and magnesium level.  · Follow-up with PCP in 1 week.  · Recommend  repeating a thyroid profile in 4 to 6 weeks.    Consults/Procedures     Consults:   1. Cardiology     Procedures/Scans Performed:  1. Nuclear stress test   2. Transthoracic echocardiogram   3. DONALDO guided electrical cardioversion     History of Presenting Illness     Chief Complaint   Patient presents with   • Palpitations     Ms. Case is a 86 y.o. female presented to [facility] complaining of palpitations. The patient was sent to the ED from the cardiology office.  She had recently been referred for new onset atrial fibrillation.  Due to RVR in the 120s-140s, she was sent to the ED for evaluation.  Please see the admitting history and physical for further details.     Hospital Course     The patient was admitted to the telemetry unit.  She was started on an IV diltiazem drip at  5 mg/h after a 10 mg bolus in the ED.  She was noted to have a very low TSH and mildly elevated free T4.  Home dose of levothyroxine was reduced from 75 mcg to 50 mcg daily.  Magnesium was replaced as needed as it was borderline low.     Cardiology was consulted and the patient's home metoprolol succinate was advanced.  The diltiazem drip was discontinued.  She had a nuclear stress test which showed no evidence of ischemia and was consistent with a low risk study.  She had a transthoracic echocardiogram showing EF of 66 to 70% with grade 2 diastolic dysfunction, mild to moderate aortic stenosis, moderate mitral and severe tricuspid regurgitation noted.  She was started on Eliquis for stroke prevention.    She underwent a DONALDO guided electrical cardioversion which was successful after two shocks.  She was started on oral amiodarone 400 mg daily x1 week, then decrease to 200 mg daily.  She was monitored overnight, she did have some sinus bradycardia into the high 40s low 50s which was asymptomatic.  She also had a couple runs of A. fib RVR which quickly resolved on their own without treatment.  BP was noted be elevated in the 160s over 90s this morning.  Home hydrochlorothiazide was discontinued secondary to intermittent low magnesium which may be contributing to her atrial fibrillation.  Lisinopril was increased from 20 mg to 40 mg daily.  BP had shown improvement prior to discharge.  She also did not have any further significant bradycardia or tachycardia.  Cardiology saw her on the day of discharge and felt she was stable for discharge home.  It was also felt she was stable for discharge from the medical standpoint.  Follow-up with cardiology in 2 weeks.  Recommend repeat BMP and magnesium for monitoring.  Follow-up with PCP in 1 week.  Recommend repeat thyroid profile in 4 to 6 weeks secondary to medication adjustment.  It was felt that she had reached maximum inpatient benefit and was stable for discharge home on  this date.     Please note that the option of setting her up with hematology/oncology to discuss any potential options for treatment of her triple negative breast cancer was discussed with the patient at bedside.  She reports that she had previously underwent chemotherapy and radiation and did not wish to do this again as she is currently caring for a 3-year-old granddaughter.  She was asked to notify her PCP if she decides she would like a consultation.  She expressed understanding.    Discharge Vitals/Physical Examination     Vital Signs:  Temp:  [97.2 °F (36.2 °C)-98.6 °F (37 °C)] 98.3 °F (36.8 °C)  Heart Rate:  [] 73  Resp:  [16-22] 16  BP: (115-176)/() 167/97  Mean Arterial Pressure (Non-Invasive) for the past 24 hrs (Last 3 readings):   Noninvasive MAP (mmHg)   11/05/21 0341 125   11/04/21 1824 102     SpO2 Percentage    11/04/21 2126 11/05/21 0341 11/05/21 0700   SpO2: 97% 98% 96%     SpO2:  [1 %-100 %] 96 %  on   ;   Device (Oxygen Therapy): room air    Body mass index is 18.48 kg/m².  Wt Readings from Last 3 Encounters:   11/05/21 51.9 kg (114 lb 8 oz)   11/02/21 51.6 kg (113 lb 12.8 oz)   11/01/21 52.4 kg (115 lb 9.6 oz)       Physical Exam:  Physical Exam  Constitutional:       Appearance: Normal appearance.      Comments: Thin.    Cardiovascular:      Rate and Rhythm: Normal rate and regular rhythm.   Pulmonary:      Effort: Pulmonary effort is normal. No respiratory distress.      Breath sounds: Normal breath sounds. No wheezing or rales.   Abdominal:      General: Bowel sounds are normal. There is no distension.      Palpations: Abdomen is soft.      Tenderness: There is no abdominal tenderness.   Musculoskeletal:      Right lower leg: No edema.      Left lower leg: No edema.   Skin:     General: Skin is warm and dry.   Neurological:      General: No focal deficit present.      Mental Status: She is alert and oriented to person, place, and time.   Psychiatric:         Mood and Affect: Mood  normal.         Behavior: Behavior normal.         Thought Content: Thought content normal.         Judgment: Judgment normal.         Pertinent Laboratory/Radiology Results     Pertinent Laboratory Results:  Results from last 7 days   Lab Units 11/02/21  1517 11/02/21  1019   TROPONIN T ng/mL <0.010 <0.010           Results from last 7 days   Lab Units 11/05/21  0212 11/04/21  0207 11/03/21  0027   WBC 10*3/mm3 4.92 4.19 3.86   HEMOGLOBIN g/dL 10.4* 10.1* 9.7*   HEMATOCRIT % 33.0* 31.3* 30.8*   MCV fL 101.5* 100.6* 103.7*   MCHC g/dL 31.5 32.3 31.5   PLATELETS 10*3/mm3 175 167 153     Results from last 7 days   Lab Units 11/05/21  0212 11/04/21  0207 11/03/21  0027   SODIUM mmol/L 138 135* 139   POTASSIUM mmol/L 4.2 4.0 3.9   MAGNESIUM mg/dL 1.8 2.0 1.7   CHLORIDE mmol/L 104 104 107   CO2 mmol/L 22.4 21.8* 21.0*   BUN mg/dL 17 19 21   CREATININE mg/dL 0.75 0.84 0.73   EGFR IF NONAFRICN AM mL/min/1.73 74 65 76   CALCIUM mg/dL 9.8 9.1 9.3   GLUCOSE mg/dL 102* 102* 119*   ALBUMIN g/dL 3.44* 3.29* 3.29*   BILIRUBIN mg/dL 0.5 0.5 0.5   ALK PHOS U/L 70 75 73   AST (SGOT) U/L 22 31 35*   ALT (SGPT) U/L 30 38* 36*   Estimated Creatinine Clearance: 42.9 mL/min (by C-G formula based on SCr of 0.75 mg/dL).  No results found for: AMMONIA    Glucose   Date/Time Value Ref Range Status   11/03/2021 1851 165 (H) 70 - 130 mg/dL Final     Comment:     Meter: [id number] : [id number] ANUPAM LAUREN     No results found for: HGBA1C  Lab Results   Component Value Date    TSH 0.010 (L) 11/02/2021    FREET4 1.85 (H) 11/01/2021       No results found for: BLOODCX  No results found for: URINECX  No results found for: WOUNDCX  No results found for: STOOLCX  No results found for: RESPCX  Pain Management Panel    There is no flowsheet data to display.         Pertinent Radiology Results:  Imaging Results (All)     Procedure Component Value Units Date/Time    XR Chest 1 View [[id number]] Collected: 11/02/21 1121     Updated: 11/02/21 1123     Narrative:      EXAM:    XR Chest, 1 View     EXAM DATE:    11/2/2021 10:51 AM     CLINICAL HISTORY:    Chest pain protocol     TECHNIQUE:    Frontal view of the chest.     COMPARISON:    08/21/2018     FINDINGS:    LUNGS:  Coarsened interstitial markings again noted throughout the  lungs.    PLEURAL SPACE:  Unremarkable.  No pneumothorax.    HEART:  Cardiomegaly.    MEDIASTINUM:  Unremarkable.    BONES/JOINTS:  Unremarkable.       Impression:        Cardiomegaly.     This report was finalized on 11/2/2021 11:21 AM by Dr. Von Gonzales MD.                       Test Results Pending at Discharge:  None.     Discharge Disposition/Discharge Medications/Discharge Appointments     Discharge Disposition:   Home or Self Care    Condition at Discharge:  Stable.    Discharge Diet:  Diet Instructions     Diet: Regular, Cardiac      Discharge Diet:  Regular  Cardiac             Discharge Activity:  Activity Instructions     Gradually Increase Activity Until at Pre-Hospitalization Level            Code Status While Inpatient:  Code Status and Medical Interventions:   Ordered at: 11/03/21 1045     Code Status (Patient has no pulse and is not breathing):    CPR (Attempt to Resuscitate)     Medical Interventions (Patient has pulse or is breathing):    Full Support       Discharge Medications:     Discharge Medications      New Medications      Instructions Start Date   amiodarone 400 MG tablet  Commonly known as: PACERONE   400 mg, Oral, Every 24 Hours Scheduled   Start Date: November 6, 2021     amiodarone 200 MG tablet  Commonly known as: PACERONE   200 mg, Oral, Every 24 Hours Scheduled   Start Date: November 11, 2021     apixaban 2.5 MG tablet tablet  Commonly known as: ELIQUIS   2.5 mg, Oral, Every 12 Hours Scheduled      lisinopril 40 MG tablet  Commonly known as: PRINIVIL,ZESTRIL   40 mg, Oral, Every 24 Hours Scheduled   Start Date: November 6, 2021        Changes to Medications      Instructions Start Date   levothyroxine  50 MCG tablet  Commonly known as: SYNTHROID, LEVOTHROID  What changed:   · medication strength  · how much to take  · when to take this   50 mcg, Oral, Every Early Morning   Start Date: November 6, 2021        Continue These Medications      Instructions Start Date   metoprolol succinate  MG 24 hr tablet  Commonly known as: TOPROL-XL   100 mg, Oral, Daily         Stop These Medications    amLODIPine 10 MG tablet  Commonly known as: NORVASC     lisinopril-hydrochlorothiazide 20-25 MG per tablet  Commonly known as: PRINZIDE,ZESTORETIC            Discharge Appointments:  Your Scheduled Appointments    Mar 01, 2022  3:00 PM  Office Visit with Alli Wolf MD  [facility] [address]  [facility] [address] [phone]   Arrive 15 minutes prior to appointment.  If you are in need of a language or hearing  please call the Department.              Additional Instructions for the Follow-ups that You Need to Schedule     Call MD With Problems / Concerns   As directed      Instructions: Contact PCP or return to the ED with recurrent symptoms or concerns.    Order Comments: Instructions: Contact PCP or return to the ED with recurrent symptoms or concerns.          Discharge Follow-up with PCP   As directed       Currently Documented PCP:    Alli Wolf MD    PCP Phone Number:    [phone]     Follow Up Details: 1 week. Follow up HTN, hypothyroidism. Recommend repeat thyroid profile in 4-6 weeks.         Discharge Follow-up with Specialty: Cardiology, Afib RVR. Recommend repeat BMP/Mag level on follow up.; 2 Weeks   As directed      Specialty: Cardiology, Afib RVR. Recommend repeat BMP/Mag level on follow up.    Follow Up: 2 Weeks            Follow-up Information     Alli Wolf MD .    Specialty: Family Medicine  Why: 1 week. Follow up HTN, hypothyroidism. Recommend repeat thyroid profile in 4-6 weeks.  Contact  information:  [address] [facility] [address]  [phone]                         Additional Instructions for the Follow-ups that You Need to Schedule     Call MD With Problems / Concerns   As directed      Instructions: Contact PCP or return to the ED with recurrent symptoms or concerns.    Order Comments: Instructions: Contact PCP or return to the ED with recurrent symptoms or concerns.          Discharge Follow-up with PCP   As directed       Currently Documented PCP:    Alli Wolf MD    PCP Phone Number:    [phone]     Follow Up Details: 1 week. Follow up HTN, hypothyroidism. Recommend repeat thyroid profile in 4-6 weeks.         Discharge Follow-up with Specialty: Cardiology, Afib RVR. Recommend repeat BMP/Mag level on follow up.; 2 Weeks   As directed      Specialty: Cardiology, Afib RVR. Recommend repeat BMP/Mag level on follow up.    Follow Up: 2 Weeks               Attestation: I personally discussed the patient's hospital course, disposition, discharge planning, and discharge medications with attending physician, Dr. Cifuentes, Austyn Page,*, prior to time of discharge. I have also discussed with RN, Melani, prior to discharge.       Megha Rodríguez PA-C  11/05/21  10:52 EDT    Time to complete discharge: >30 minutes.     Please send a copy of this dictation to the following providers:  Alli Wolf MD

## 2021-11-05 NOTE — NURSING NOTE
Spoke with JOHN Cadena, in person on unit and alerted her to pt's HR 48. She asked me to keep pt on telemetry while receiving IV mag and let her know if HR drops any lower prior to D/C.

## 2021-11-05 NOTE — OUTREACH NOTE
Prep Survey      Responses   [facility] facility patient discharged from? [facility]   Is LACE score < 7 ? No   Emergency Room discharge w/ pulse ox? No   Eligibility [facility]   Date of Admission 11/02/21   Date of Discharge 11/05/21   Discharge Disposition Home or Self Care   Discharge diagnosis A-fib RVR new onset, Hypothyroid, anemia,    Does the patient have one of the following disease processes/diagnoses(primary or secondary)? Other   Does the patient have Home health ordered? No   Is there a DME ordered? No   Prep survey completed? Yes          Siomara Vera RN

## 2021-11-05 NOTE — PLAN OF CARE
Goal Outcome Evaluation:              Outcome Summary: Pt A&O x4.  Pt is resting well in bed with no complaints.  HR currently SR 60's-80's.  Bed low, locked, and alarmed. Call light in reach. Will continue to monitor 7p-7a.

## 2021-11-05 NOTE — PROGRESS NOTES
LOS: 3 days     Name: Lashell Case  Age/Sex: 86 y.o. female  :  1935        PCP: Alli Wolf MD  REF: No Known Provider    Principal Problem:    Atrial fibrillation with RVR (HCC)      Reason for follow-up: Atrial fibrillation with RVR    Subjective       Subjective     Lashell Case is a 86 year old female with a past medical history significant for essential hypertension, hyperlipidemia, hypothyroidism and breast cancer.  Patient presented to the ER by recommendation of cardiology due to atrial fibrillation with RVR.      Interval History: Patient underwent nydia guided cardioversion yesterday that was successful. She remains in normal sinus rhythm.  Reports she is feeling good today. Denies any palpations.     Vital Signs  Temp:  [97.2 °F (36.2 °C)-98.6 °F (37 °C)] 98.3 °F (36.8 °C)  Heart Rate:  [] 73  Resp:  [-] 16  BP: (115-176)/() 167/97     Vital Signs (last 72 hrs)       59 59 56   Most Recent      Temp (°F) 97.7 -  98.2    97.7 -  98.2    97.2 -  98.6      98.3     98.3 (36.8) 700    Heart Rate 72 -  158    83 -  106    54 -  131    67 -  73     73  0824    Resp 16 -  20    18 -  20    16 -  22      16     16 700    /66 -  154/94    127/73 -  137/77    146/74 -  176/88    166/76 -  167/97     167/97 824    SpO2 (%) 84 -  100    93 -  98    1 -  100      96     96 700        Documented weights    21 1008 21 1500 21 0500 21 0500   Weight: 51.3 kg (113 lb) 58 kg (127 lb 12.8 oz) 58.1 kg (128 lb 1.6 oz) 57.6 kg (127 lb)    21 0500   Weight: 51.9 kg (114 lb 8 oz)      Body mass index is 18.48 kg/m².    Intake/Output Summary (Last 24 hours) at 2021 0856  Last data filed at 2021 0341  Gross per 24 hour   Intake 340 ml   Output 350 ml   Net -10 ml     Objective    Objective       Physical Exam:     General  Appearance:    Alert, cooperative, in no acute distress   Head:    Normocephalic, without obvious abnormality, atraumatic   Eyes:            Conjunctivae and sclerae normal, no   icterus, no pallor, corneas clear.   Neck:   No adenopathy, supple, trachea midline, no thyromegaly, no   carotid bruit, no JVD   Lungs:     Clear to auscultation,respirations regular, even and                  unlabored    Heart:    Regular rhythm and normal rate, normal S1 and S2, + systolic murmur, no gallop, no rub, no click   Chest Wall:    No abnormalities observed   Abdomen:     Normal bowel sounds, no masses, no organomegaly, soft        non-tender, non-distended, no guarding, no rebound                tenderness   Extremities:   Moves all extremities well, no edema, no cyanosis, no             redness   Pulses:   Pulses palpable and equal bilaterally   Skin:   No bleeding, bruising or rash       Neurologic:   Alert and oriented      Results review       Results Review:   Results from last 7 days   Lab Units 11/05/21  0212 11/04/21  0207 11/03/21  0027 11/02/21  1019 11/01/21  1441   WBC 10*3/mm3 4.92 4.19 3.86 6.50 5.22   HEMOGLOBIN g/dL 10.4* 10.1* 9.7* 12.3 10.7*   PLATELETS 10*3/mm3 175 167 153 220 190     Results from last 7 days   Lab Units 11/05/21  0212 11/04/21  0207 11/03/21  0027 11/02/21  1019 11/01/21  1441   SODIUM mmol/L 138 135* 139 139 139   POTASSIUM mmol/L 4.2 4.0 3.9 4.3 4.2   CHLORIDE mmol/L 104 104 107 104 108*   CO2 mmol/L 22.4 21.8* 21.0* 23.3 23.3   BUN mg/dL 17 19 21 17 13   CREATININE mg/dL 0.75 0.84 0.73 0.85 0.61   CALCIUM mg/dL 9.8 9.1 9.3 10.8* 9.6   GLUCOSE mg/dL 102* 102* 119* 102* 97   ALT (SGPT) U/L 30 38* 36* 40* 35*   AST (SGOT) U/L 22 31 35* 34* 30     Results from last 7 days   Lab Units 11/02/21  1517 11/02/21  1019   TROPONIN T ng/mL <0.010 <0.010     Lab Results   Component Value Date    INR 0.97 08/10/2021     Lab Results   Component Value Date    MG 1.8 11/05/2021    MG 2.0 11/04/2021     MG 1.7 11/03/2021     Lab Results   Component Value Date    TSH 0.010 (L) 11/02/2021    CHLPL 241 (H) 05/06/2014    TRIG 361 (H) 01/03/2017    HDL 57 (L) 01/03/2017     (H) 01/03/2017      Imaging Results (Last 48 Hours)     ** No results found for the last 48 hours. **          Echo   Results for orders placed during the hospital encounter of 11/02/21    Adult Transesophageal Echo (DONALDO) W/ Cont if Necessary Per Protocol    Interpretation Summary  · Normal left ventricular cavity size and wall thickness noted. All left ventricular wall segments contract normally.  · Left ventricular ejection fraction appears to be 61 - 65%.  · The aortic valve is abnormal in structure. The aortic valve exhibits sclerosis. There is calcification of the aortic valve. The aortic valve appears trileaflet. Mild aortic valve regurgitation is present. Mild aortic valve stenosis is present.  · There are myxomatous changes of the mitral valve apparatus present. Moderate mitral valve regurgitation is present. No significant mitral valve stenosis is present  · No evidence of a left atrial appendage thrombus was present.  · Moderate mitral valve regurgitation is present. No significant mitral valve stenosis is present.  · Moderate to severe tricuspid valve regurgitation is present.  · There is no evidence of pericardial effusion. .  · Comments: Proceed with electrical cardioversion.     I reviewed the patient's new clinical results.    Telemetry: NSR 80-90 bpm      Medication Review:   [START ON 11/11/2021] amiodarone, 200 mg, Oral, Q24H  amiodarone, 400 mg, Oral, Q24H  apixaban, 2.5 mg, Oral, Q12H  levothyroxine, 50 mcg, Oral, Q AM  lisinopril-hydroCHLOROthiazide (ZESTORTIC) 20-25 mg combo dose, , Oral, Q24H  metoprolol succinate XL, 100 mg, Oral, Q24H  sodium chloride, 10 mL, Intravenous, Q12H        Pharmacy Consult - Pharmacy to dose,         Assessment      Assessment:  1. Atrial fibrillation s/p DONALDO and direct-current cardioversion  currently in sinus rhythm  2. Mild aortic valve stenosis  3. Essential hypertension        Plan     Recommendations:  1. She is maintaining normal sinus rhythm will continue with the current medications include loading dose of amiodarone and metoprolol  2. Continue with Eliquis  3. Started on lisinopril blood pressure still a little bit elevated will monitor for now this can be readjusted as an outpatient depending on her blood pressure considering adding HCTZ as an outpatient  4. Patient can be discharged home today if is okay with medicine service    I discussed the patients findings and my recommendations with patient and family    Electronically signed by SOFI Horta, 11/05/21, 8:57 AM EDT.  Electronically signed by Justin Santana MD, 11/05/21, 11:17 AM EDT.    Please note that portions of this note were completed with a voice recognition program.

## 2021-11-05 NOTE — PLAN OF CARE
Goal Outcome Evaluation:  Plan of Care Reviewed With: patient        Progress: improving  Outcome Summary: Pt currently sitting up in bed; denies any complaints at this time. Plans to D/C pt today depending upon HR stability. No signs of respiratory distress present. Will continue to monitor pt and follow plan of care.

## 2021-11-05 NOTE — CASE MANAGEMENT/SOCIAL WORK
Discharge Planning Assessment   [facility]     Patient Name: Lashell Case  MRN: [id number]  Today's Date: 11/5/2021    Admit Date: 11/2/2021       Discharge Plan     Row Name 11/05/21 1436       Plan    Final Discharge Disposition Code 01 - home or self-care    Final Note Pt to be discharged home.                WM MonaeW

## 2021-11-05 NOTE — DISCHARGE INSTR - APPOINTMENTS
Pt  has an apt  wirosalva solano   for  November 11  at 9:30  and  thomas  with  dr jerome hernandezor  November 30  a t9  am  will need  a  bmp  and  mag  and  also  kajal joe

## 2021-11-08 ENCOUNTER — TRANSITIONAL CARE MANAGEMENT TELEPHONE ENCOUNTER (OUTPATIENT)
Dept: CALL CENTER | Facility: HOSPITAL | Age: 86
End: 2021-11-08

## 2021-11-08 LAB
QT INTERVAL: 450 MS
QTC INTERVAL: 438 MS

## 2021-11-08 NOTE — OUTREACH NOTE
Call Center TCM Note      Responses   [facility] patient discharged from? [facility]   Does the patient have one of the following disease processes/diagnoses(primary or secondary)? Other   TCM attempt successful? No  [Hattie and Jayleen]   Unsuccessful attempts Attempt 1          Casi Aguayo RN    11/8/2021, 12:39 EST

## 2021-11-08 NOTE — OUTREACH NOTE
Call Center TCM Note      Responses   [facility] patient discharged from? [facility]   Does the patient have one of the following disease processes/diagnoses(primary or secondary)? Other   TCM attempt successful? Yes   Call start time 1601   Call end time 1603   Discharge diagnosis A-fib RVR new onset, Hypothyroid, anemia,    Person spoke with today (if not patient) and relationship Hattie    Meds reviewed with patient/caregiver? Yes   Is the patient having any side effects they believe may be caused by any medication additions or changes? No   Does the patient have all medications ordered at discharge? Yes   Is the patient taking all medications as directed (includes completed medication regime)? Yes   Does the patient have a primary care provider?  Yes   Does the patient have an appointment with their PCP within 7 days of discharge? Yes   Comments regarding PCP 11/11/21 at 9:30am    Has the patient kept scheduled appointments due by today? N/A   Has home health visited the patient within 72 hours of discharge? N/A   Psychosocial issues? No   Did the patient receive a copy of their discharge instructions? Yes   Nursing interventions Reviewed instructions with patient   What is the patient's perception of their health status since discharge? Same   Is the patient/caregiver able to teach back signs and symptoms related to disease process for when to call PCP? Yes   Is the patient/caregiver able to teach back signs and symptoms related to disease process for when to call 911? Yes   Is the patient/caregiver able to teach back the hierarchy of who to call/visit for symptoms/problems? PCP, Specialist, Home health nurse, Urgent Care, ED, 911 Yes   If the patient is a current smoker, are they able to teach back resources for cessation? Not a smoker   TCM call completed? Yes          Casi Aguayo RN    11/8/2021, 16:04 EST

## 2021-11-09 DIAGNOSIS — Z17.1 MALIGNANT NEOPLASM OF RIGHT BREAST IN FEMALE, ESTROGEN RECEPTOR NEGATIVE, UNSPECIFIED SITE OF BREAST (HCC): ICD-10-CM

## 2021-11-09 DIAGNOSIS — R92.8 ABNORMAL MAMMOGRAM: Primary | ICD-10-CM

## 2021-11-09 DIAGNOSIS — C50.911 MALIGNANT NEOPLASM OF RIGHT BREAST IN FEMALE, ESTROGEN RECEPTOR NEGATIVE, UNSPECIFIED SITE OF BREAST (HCC): ICD-10-CM

## 2021-11-09 DIAGNOSIS — Z17.0 MALIGNANT NEOPLASM OF LEFT BREAST IN FEMALE, ESTROGEN RECEPTOR POSITIVE, UNSPECIFIED SITE OF BREAST (HCC): ICD-10-CM

## 2021-11-09 DIAGNOSIS — C50.912 MALIGNANT NEOPLASM OF LEFT BREAST IN FEMALE, ESTROGEN RECEPTOR POSITIVE, UNSPECIFIED SITE OF BREAST (HCC): ICD-10-CM

## 2021-11-11 ENCOUNTER — OFFICE VISIT (OUTPATIENT)
Dept: FAMILY MEDICINE CLINIC | Facility: CLINIC | Age: 86
End: 2021-11-11

## 2021-11-11 VITALS
HEART RATE: 52 BPM | BODY MASS INDEX: 17.81 KG/M2 | TEMPERATURE: 98 F | OXYGEN SATURATION: 99 % | HEIGHT: 66 IN | WEIGHT: 110.8 LBS | DIASTOLIC BLOOD PRESSURE: 62 MMHG | SYSTOLIC BLOOD PRESSURE: 171 MMHG

## 2021-11-11 DIAGNOSIS — I48.19 ATRIAL FIBRILLATION, PERSISTENT (HCC): Chronic | ICD-10-CM

## 2021-11-11 DIAGNOSIS — I10 ESSENTIAL HYPERTENSION: Primary | Chronic | ICD-10-CM

## 2021-11-11 PROCEDURE — 83735 ASSAY OF MAGNESIUM: CPT | Performed by: FAMILY MEDICINE

## 2021-11-11 PROCEDURE — 99495 TRANSJ CARE MGMT MOD F2F 14D: CPT | Performed by: FAMILY MEDICINE

## 2021-11-11 PROCEDURE — 36415 COLL VENOUS BLD VENIPUNCTURE: CPT | Performed by: FAMILY MEDICINE

## 2021-11-11 PROCEDURE — 80048 BASIC METABOLIC PNL TOTAL CA: CPT | Performed by: FAMILY MEDICINE

## 2021-11-11 RX ORDER — HYDROCHLOROTHIAZIDE 12.5 MG/1
12.5 TABLET ORAL DAILY
Qty: 30 TABLET | Refills: 6 | Status: SHIPPED | OUTPATIENT
Start: 2021-11-11 | End: 2021-12-09

## 2021-11-11 NOTE — PROGRESS NOTES
Subjective   Lashell Case is a 86 y.o. female.     History of Present Illness hospital follow-up.  Brief admission secondary to the atrial fibrillation.  Rapid ventricular response.  Records were reviewed.  She was admitted and had blood pressure control and also underwent elective cardioversion which was successful.  Monitored.  Electrolytes repleted.  Is now on medications as reconciled including the Eliquis.  Feels like the edema is less.  Energy is unchanged denies shortness of breath chest pain palpitations GI .  Essentially has remained asymptomatic the entire time.  Blood pressure still quite labile.  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    The following portions of the patient's history were reviewed and updated as appropriate: current medications, past family history, past medical history, past social history, past surgical history and problem list.    Review of Systems  See history of Present Illness     Objective     Physical Exam  Vitals reviewed.   Constitutional:       Appearance: Normal appearance.   HENT:      Head: Normocephalic.   Cardiovascular:      Rate and Rhythm: Normal rate.      Heart sounds: Normal heart sounds.      Comments: Mostly regular though occasional ectopy.  Nontacky.  Pulmonary:      Effort: Pulmonary effort is normal.      Breath sounds: Normal breath sounds.   Musculoskeletal:      Cervical back: Normal range of motion and neck supple.      Right lower leg: No edema.      Left lower leg: No edema.   Skin:     General: Skin is warm and dry.   Neurological:      Mental Status: She is alert and oriented to person, place, and time.   Psychiatric:         Mood and Affect: Mood normal.         PHQ-9 Total Score:      Body mass index is 17.89 kg/m².       Assessment/Plan     Diagnoses and all orders for this visit:    1. Essential hypertension (Primary)  -     Basic Metabolic Panel  -     Magnesium  -     hydroCHLOROthiazide (HYDRODIURIL) 12.5 MG tablet; Take 1 tablet by mouth  Daily. For blood pressure  Dispense: 30 tablet; Refill: 6    2. Atrial fibrillation, persistent (HCC)  -     Basic Metabolic Panel  -     Magnesium    BP not controlled and will resume low-dose diuretic.  Check labs as noted.  Continue other medications same.  Stay safely active maintaining pandemic response.  Keep follow-up with cardiology as scheduled in a few weeks.  Would ask you to follow-up here in about 5 weeks or as needed.  All records from [facility] were reviewed.                     This document has been electronically signed by Alli Wolf MD   November 11, 2021 09:24 EST    Part of this note may be an electronic transcription/translation of spoken language to printed text using the Dragon Dictation System.

## 2021-11-12 LAB
ANION GAP SERPL CALCULATED.3IONS-SCNC: 10.9 MMOL/L (ref 5–15)
BUN SERPL-MCNC: 11 MG/DL (ref 8–23)
BUN/CREAT SERPL: 17.2 (ref 7–25)
CALCIUM SPEC-SCNC: 9.9 MG/DL (ref 8.6–10.5)
CHLORIDE SERPL-SCNC: 105 MMOL/L (ref 98–107)
CO2 SERPL-SCNC: 25.1 MMOL/L (ref 22–29)
CREAT SERPL-MCNC: 0.64 MG/DL (ref 0.57–1)
GFR SERPL CREATININE-BSD FRML MDRD: 88 ML/MIN/1.73
GLUCOSE SERPL-MCNC: 75 MG/DL (ref 65–99)
MAGNESIUM SERPL-MCNC: 1.9 MG/DL (ref 1.6–2.4)
POTASSIUM SERPL-SCNC: 4.3 MMOL/L (ref 3.5–5.2)
SODIUM SERPL-SCNC: 141 MMOL/L (ref 136–145)

## 2021-11-15 ENCOUNTER — READMISSION MANAGEMENT (OUTPATIENT)
Dept: CALL CENTER | Facility: HOSPITAL | Age: 86
End: 2021-11-15

## 2021-11-15 NOTE — OUTREACH NOTE
Medical Week 2 Survey      Responses   [facility] patient discharged from? [facility]   Does the patient have one of the following disease processes/diagnoses(primary or secondary)? Other   Week 2 attempt successful? No   Unsuccessful attempts Attempt 1   Discharge diagnosis A-fib RVR new onset, Hypothyroid, anemia,           Marysol Goldstein RN

## 2021-11-16 ENCOUNTER — READMISSION MANAGEMENT (OUTPATIENT)
Dept: CALL CENTER | Facility: HOSPITAL | Age: 86
End: 2021-11-16

## 2021-11-16 NOTE — OUTREACH NOTE
Medical Week 2 Survey      Responses   [facility] patient discharged from? [facility]   Does the patient have one of the following disease processes/diagnoses(primary or secondary)? Other   Week 2 attempt successful? No   Unsuccessful attempts Attempt 2          Carolina Page RN

## 2021-11-22 ENCOUNTER — READMISSION MANAGEMENT (OUTPATIENT)
Dept: CALL CENTER | Facility: HOSPITAL | Age: 86
End: 2021-11-22

## 2021-11-22 NOTE — PROGRESS NOTES
Venipuncture Blood Specimen Collection  Venipuncture performed in Left Arm by Jennifer Perez MA with good hemostasis. Patient tolerated the procedure well without complications.   11/22/21   Jennifer Perez MA

## 2021-11-22 NOTE — OUTREACH NOTE
Medical Week 3 Survey      Responses   [facility] patient discharged from? [facility]   Does the patient have one of the following disease processes/diagnoses(primary or secondary)? Other   Week 3 attempt successful? No   Unsuccessful attempts Attempt 1          Ancelmo Polanco RN

## 2021-11-23 ENCOUNTER — READMISSION MANAGEMENT (OUTPATIENT)
Dept: CALL CENTER | Facility: HOSPITAL | Age: 86
End: 2021-11-23

## 2021-11-23 NOTE — OUTREACH NOTE
Medical Week 3 Survey      Responses   [facility] patient discharged from? [facility]   Does the patient have one of the following disease processes/diagnoses(primary or secondary)? Other   Week 3 attempt successful? No   Unsuccessful attempts Attempt 2   Rescheduled Rescheduled-pt requested          Lynne Jose RN

## 2021-12-01 NOTE — TELEPHONE ENCOUNTER
Called [facility] was told that patient had filled metoprolol 50 mg one tab daily  from an old script on 21. Patient had a script sent for metoprolol 100 mg one tab daily and it was last filled on 21 but  on 10/27/21 so they filled the 50 when they shouldn't have.

## 2021-12-02 RX ORDER — METOPROLOL SUCCINATE 100 MG/1
100 TABLET, EXTENDED RELEASE ORAL DAILY
Qty: 30 TABLET | Refills: 6 | Status: SHIPPED | OUTPATIENT
Start: 2021-12-02 | End: 2021-12-09 | Stop reason: SDUPTHER

## 2021-12-03 NOTE — TELEPHONE ENCOUNTER
Called patient [phone] spoke with patient notified of medication change she verbalized understanding to stop taking the 50 mg and go get the new script for 100 mg.

## 2021-12-09 ENCOUNTER — HOSPITAL ENCOUNTER (OUTPATIENT)
Dept: MAMMOGRAPHY | Facility: HOSPITAL | Age: 86
Discharge: HOME | End: 2021-12-09

## 2021-12-09 ENCOUNTER — OFFICE VISIT (OUTPATIENT)
Dept: CARDIOLOGY | Facility: CLINIC | Age: 86
End: 2021-12-09

## 2021-12-09 VITALS
WEIGHT: 117.2 LBS | HEART RATE: 77 BPM | TEMPERATURE: 94.1 F | HEIGHT: 66 IN | OXYGEN SATURATION: 98 % | SYSTOLIC BLOOD PRESSURE: 144 MMHG | DIASTOLIC BLOOD PRESSURE: 64 MMHG | BODY MASS INDEX: 18.84 KG/M2

## 2021-12-09 DIAGNOSIS — E78.2 MIXED HYPERLIPIDEMIA: Chronic | ICD-10-CM

## 2021-12-09 DIAGNOSIS — I35.0 NONRHEUMATIC AORTIC (VALVE) STENOSIS: ICD-10-CM

## 2021-12-09 DIAGNOSIS — Z17.1 MALIGNANT NEOPLASM OF RIGHT BREAST IN FEMALE, ESTROGEN RECEPTOR NEGATIVE, UNSPECIFIED SITE OF BREAST (HCC): ICD-10-CM

## 2021-12-09 DIAGNOSIS — C50.911 MALIGNANT NEOPLASM OF RIGHT BREAST IN FEMALE, ESTROGEN RECEPTOR NEGATIVE, UNSPECIFIED SITE OF BREAST (HCC): ICD-10-CM

## 2021-12-09 DIAGNOSIS — C50.912 MALIGNANT NEOPLASM OF LEFT BREAST IN FEMALE, ESTROGEN RECEPTOR POSITIVE, UNSPECIFIED SITE OF BREAST (HCC): ICD-10-CM

## 2021-12-09 DIAGNOSIS — I10 ESSENTIAL HYPERTENSION: Chronic | ICD-10-CM

## 2021-12-09 DIAGNOSIS — Z17.0 MALIGNANT NEOPLASM OF LEFT BREAST IN FEMALE, ESTROGEN RECEPTOR POSITIVE, UNSPECIFIED SITE OF BREAST (HCC): ICD-10-CM

## 2021-12-09 DIAGNOSIS — I48.0 PAROXYSMAL ATRIAL FIBRILLATION (HCC): Primary | ICD-10-CM

## 2021-12-09 DIAGNOSIS — I34.0 NONRHEUMATIC MITRAL VALVE REGURGITATION: ICD-10-CM

## 2021-12-09 DIAGNOSIS — R92.8 ABNORMAL MAMMOGRAM: ICD-10-CM

## 2021-12-09 PROCEDURE — 99214 OFFICE O/P EST MOD 30 MIN: CPT | Performed by: SPECIALIST

## 2021-12-09 RX ORDER — LISINOPRIL 40 MG/1
40 TABLET ORAL
Qty: 30 TABLET | Refills: 0 | Status: SHIPPED | OUTPATIENT
Start: 2021-12-09 | End: 2022-01-10 | Stop reason: SDUPTHER

## 2021-12-09 RX ORDER — METOPROLOL SUCCINATE 100 MG/1
100 TABLET, EXTENDED RELEASE ORAL DAILY
Qty: 30 TABLET | Refills: 6 | Status: SHIPPED | OUTPATIENT
Start: 2021-12-09 | End: 2023-02-24 | Stop reason: SDUPTHER

## 2021-12-09 RX ORDER — HYDROCHLOROTHIAZIDE 25 MG/1
25 TABLET ORAL DAILY
Qty: 90 TABLET | Refills: 1 | Status: SHIPPED | OUTPATIENT
Start: 2021-12-09 | End: 2023-02-24 | Stop reason: SDUPTHER

## 2021-12-09 RX ORDER — HYDROCHLOROTHIAZIDE 25 MG/1
25 TABLET ORAL DAILY
Qty: 90 TABLET | Refills: 1 | Status: SHIPPED | OUTPATIENT
Start: 2021-12-09 | End: 2021-12-09 | Stop reason: SDUPTHER

## 2021-12-09 RX ORDER — AMIODARONE HYDROCHLORIDE 200 MG/1
200 TABLET ORAL
Qty: 40 TABLET | Refills: 0 | Status: SHIPPED | OUTPATIENT
Start: 2021-12-09 | End: 2023-02-24 | Stop reason: SDUPTHER

## 2021-12-09 NOTE — PROGRESS NOTES
Subjective    Follow up, PAF, hypertension  Lashell Case is a 86 y.o. female who presents to day for Follow-up (4 week follow up ) and Med Management (verbal).    CHIEF COMPLIANT  Chief Complaint   Patient presents with   • Follow-up     4 week follow up    • Med Management     verbal       Active Problems:  Problem List Items Addressed This Visit        Cardiac and Vasculature    Essential hypertension (Chronic)    Relevant Medications    lisinopril (PRINIVIL,ZESTRIL) 40 MG tablet    hydroCHLOROthiazide (HYDRODIURIL) 25 MG tablet    metoprolol succinate XL (TOPROL-XL) 100 MG 24 hr tablet    Mixed hyperlipidemia (Chronic)    Relevant Orders    Comprehensive Metabolic Panel    Lipid Panel    Nonrheumatic aortic (valve) stenosis    Relevant Medications    metoprolol succinate XL (TOPROL-XL) 100 MG 24 hr tablet    Paroxysmal atrial fibrillation (HCC) - Primary    Relevant Medications    apixaban (ELIQUIS) 2.5 MG tablet tablet    amiodarone (PACERONE) 200 MG tablet    metoprolol succinate XL (TOPROL-XL) 100 MG 24 hr tablet    Other Relevant Orders    TSH    Nonrheumatic mitral valve regurgitation    Relevant Medications    metoprolol succinate XL (TOPROL-XL) 100 MG 24 hr tablet          HPI  HPI  Doing very well she says she has no palpitations she is very active no shortness of breath no edema no chest pain no dizziness  PRIOR MEDS  Current Outpatient Medications on File Prior to Visit   Medication Sig Dispense Refill   • levothyroxine (SYNTHROID, LEVOTHROID) 50 MCG tablet Take 1 tablet by mouth Every Morning. 30 tablet 0   • [DISCONTINUED] amiodarone (PACERONE) 200 MG tablet Take 2 tablets by mouth daily for 5 doses, then take 1 tablet by mouth Daily. 40 tablet 0   • [DISCONTINUED] apixaban (ELIQUIS) 2.5 MG tablet tablet Take 1 tablet by mouth Every 12 (Twelve) Hours. Indications: Atrial Fibrillation 60 tablet 0   • [DISCONTINUED] hydroCHLOROthiazide (HYDRODIURIL) 12.5 MG tablet Take 1 tablet by mouth Daily. For  "blood pressure 30 tablet 6   • [DISCONTINUED] lisinopril (PRINIVIL,ZESTRIL) 40 MG tablet Take 1 tablet by mouth Daily. 30 tablet 0   • [DISCONTINUED] metoprolol succinate XL (TOPROL-XL) 100 MG 24 hr tablet Take 1 tablet by mouth Daily. 30 tablet 6     No current facility-administered medications on file prior to visit.       ALLERGIES  Bactrim [sulfamethoxazole-trimethoprim], Ketorolac, Phenergan [promethazine hcl], Codeine, and Penicillins    HISTORY  Past Medical History:   Diagnosis Date   • Arthritis    • Breast cancer (HCC) 2015    lt br ca   • Breast cancer (HCC) 2006    rt br ca   • Dizziness 7/8/2016   • [place] (hard of hearing)    • Hyperlipidemia    • Hypertension    • Hypothyroidism    • Scabies exposure        Social History     Socioeconomic History   • Marital status:    Tobacco Use   • Smoking status: Never Smoker   • Smokeless tobacco: Never Used   Vaping Use   • Vaping Use: Never used   Substance and Sexual Activity   • Alcohol use: No   • Drug use: No   • Sexual activity: Defer     Birth control/protection: None       Family History   Problem Relation Age of Onset   • Hypertension Mother    • Uterine cancer Mother    • Diabetes Mother    • Diabetes Daughter    • Prostate cancer Son    • Diabetes Son    • Throat cancer Son    • Hypertension Child    • Breast cancer Neg Hx        Review of Systems   Respiratory: Negative for apnea, cough, choking, chest tightness, shortness of breath, wheezing and stridor.    Cardiovascular: Negative for chest pain, palpitations and leg swelling.       Objective     VITALS: /64   Pulse 77   Temp 94.1 °F (34.5 °C)   Ht 167.6 cm (65.98\")   Wt 53.2 kg (117 lb 3.2 oz)   SpO2 98%   BMI 18.93 kg/m²     LABS:   Lab Results (most recent)     None          IMAGING:   XR Knee 3 View Right    Result Date: 9/23/2021    Moderate knee joint effusion.  This report was finalized on 9/23/2021 2:35 PM by Dr. Von Gonzales MD.      XR Chest 1 View    Result Date: " 11/2/2021    Cardiomegaly.  This report was finalized on 11/2/2021 11:21 AM by Dr. Von Gonzales MD.      US Venous Doppler Lower Extremity Right (duplex)    Result Date: 10/4/2021  No DVT in the right lower extremity on today's exam.   This report was finalized on 10/4/2021 2:37 PM by Dr. Erich Jenkins MD.        EXAM:  Physical Exam  Vitals reviewed.   Constitutional:       Appearance: She is well-developed.   HENT:      Head: Normocephalic and atraumatic.   Eyes:      Pupils: Pupils are equal, round, and reactive to light.   Neck:      Thyroid: No thyromegaly.      Vascular: No JVD.   Cardiovascular:      Rate and Rhythm: Normal rate and regular rhythm.      Heart sounds: Normal heart sounds. No murmur heard.  No friction rub. No gallop.       Comments: 3/6 ejection systolic murmur heard best aortic area radiating across the sternal border and to the carotids  Pulmonary:      Effort: Pulmonary effort is normal. No respiratory distress.      Breath sounds: Normal breath sounds. No stridor. No wheezing or rales.   Chest:      Chest wall: No tenderness.   Musculoskeletal:         General: No tenderness or deformity.      Cervical back: Neck supple.   Skin:     General: Skin is warm and dry.   Neurological:      Mental Status: She is alert and oriented to person, place, and time.      Cranial Nerves: No cranial nerve deficit.      Coordination: Coordination normal.         Procedure   Procedures       Assessment/Plan     Diagnoses and all orders for this visit:    1. Paroxysmal atrial fibrillation (HCC) (Primary)  -     apixaban (ELIQUIS) 2.5 MG tablet tablet; Take 1 tablet by mouth Every 12 (Twelve) Hours. Indications: Atrial Fibrillation  Dispense: 60 tablet; Refill: 0  -     amiodarone (PACERONE) 200 MG tablet; Take 2 tablets by mouth daily for 5 doses, then take 1 tablet by mouth Daily.  Dispense: 40 tablet; Refill: 0  -     TSH; Future    2. Nonrheumatic aortic (valve) stenosis    3. Nonrheumatic mitral valve  regurgitation    4. Essential hypertension  -     Discontinue: hydroCHLOROthiazide (HYDRODIURIL) 25 MG tablet; Take 1 tablet by mouth Daily. For blood pressure  Dispense: 90 tablet; Refill: 1  -     lisinopril (PRINIVIL,ZESTRIL) 40 MG tablet; Take 1 tablet by mouth Daily.  Dispense: 30 tablet; Refill: 0  -     hydroCHLOROthiazide (HYDRODIURIL) 25 MG tablet; Take 1 tablet by mouth Daily. For blood pressure  Dispense: 90 tablet; Refill: 1  -     metoprolol succinate XL (TOPROL-XL) 100 MG 24 hr tablet; Take 1 tablet by mouth Daily.  Dispense: 30 tablet; Refill: 6    5. Mixed hyperlipidemia  -     Comprehensive Metabolic Panel; Future  -     Lipid Panel; Future    1.  She is maintaining normal sinus rhythm will continue the current dose of amiodarone for now  2.  We will continue with anticoagulation for thromboembolic prophylaxis  3.  No significant change in clinical assessment for her valvular regurgitation and stenosis she has mild aortic stenosis on the echo  4.  Her blood pressure is elevated I am going to increase hydrochlorothiazide to 25 mg/day  5.  I am going to get labs include lipid profile CMP and TSH    Return in about 3 months (around 3/9/2022).      Advance Care Planning   ACP discussion was declined by the patient. Patient does not have an advance directive, declines further assistance.          MEDS ORDERED DURING VISIT:  New Medications Ordered This Visit   Medications   • apixaban (ELIQUIS) 2.5 MG tablet tablet     Sig: Take 1 tablet by mouth Every 12 (Twelve) Hours. Indications: Atrial Fibrillation     Dispense:  60 tablet     Refill:  0     Needs free 30 day card.   • amiodarone (PACERONE) 200 MG tablet     Sig: Take 2 tablets by mouth daily for 5 doses, then take 1 tablet by mouth Daily.     Dispense:  40 tablet     Refill:  0   • lisinopril (PRINIVIL,ZESTRIL) 40 MG tablet     Sig: Take 1 tablet by mouth Daily.     Dispense:  30 tablet     Refill:  0   • hydroCHLOROthiazide (HYDRODIURIL) 25 MG  tablet     Sig: Take 1 tablet by mouth Daily. For blood pressure     Dispense:  90 tablet     Refill:  1   • metoprolol succinate XL (TOPROL-XL) 100 MG 24 hr tablet     Sig: Take 1 tablet by mouth Daily.     Dispense:  30 tablet     Refill:  6       As always, Alli Wolf MD  I appreciate very much the opportunity to participate in the cardiovascular care of your patients. Please do not hesitate to call me with any questions with regards to Lashell Case evaluation and management.         This document has been electronically signed by Justin Santana MD  December 9, 2021 12:24 EST

## 2021-12-10 ENCOUNTER — TELEPHONE (OUTPATIENT)
Dept: SURGERY | Facility: CLINIC | Age: 86
End: 2021-12-10

## 2021-12-10 NOTE — TELEPHONE ENCOUNTER
Tried calling Lashell to move her appointment with  on   December 13,2021 due to the fact that she did not keep her mammo appointment. We will need to reschedule her mammo and her follow-up appointment with . I left her a detailed message to return my call.       DL 12/10/2021 3:58pm

## 2022-01-10 DIAGNOSIS — I10 ESSENTIAL HYPERTENSION: Chronic | ICD-10-CM

## 2022-01-10 NOTE — TELEPHONE ENCOUNTER
Caller: Lashell Case    Relationship: Self    Best call back number: [phone]    Requested Prescriptions:   Requested Prescriptions     Pending Prescriptions Disp Refills   • lisinopril (PRINIVIL,ZESTRIL) 40 MG tablet 30 tablet 0     Sig: Take 1 tablet by mouth Daily.        Pharmacy where request should be sent: [address] [phone] [facility] [phone] FX     Additional details provided by patient: HAS A COUPLE OF DAYS    Does the patient have less than a 3 day supply:  [x] Yes  [] No    Jacquelyn Joe MA   01/10/22 11:08 EST

## 2022-01-11 RX ORDER — LISINOPRIL 40 MG/1
40 TABLET ORAL
Qty: 30 TABLET | Refills: 0 | Status: SHIPPED | OUTPATIENT
Start: 2022-01-11 | End: 2022-03-14 | Stop reason: SDUPTHER

## 2022-01-13 ENCOUNTER — APPOINTMENT (OUTPATIENT)
Dept: MAMMOGRAPHY | Facility: HOSPITAL | Age: 87
End: 2022-01-13

## 2022-02-17 ENCOUNTER — OFFICE VISIT (OUTPATIENT)
Dept: SURGERY | Facility: CLINIC | Age: 87
End: 2022-02-17

## 2022-02-17 VITALS
HEIGHT: 65 IN | DIASTOLIC BLOOD PRESSURE: 78 MMHG | SYSTOLIC BLOOD PRESSURE: 148 MMHG | WEIGHT: 123 LBS | HEART RATE: 63 BPM | BODY MASS INDEX: 20.49 KG/M2

## 2022-02-17 DIAGNOSIS — C50.912 MALIGNANT NEOPLASM OF LEFT BREAST IN FEMALE, ESTROGEN RECEPTOR POSITIVE, UNSPECIFIED SITE OF BREAST: Primary | ICD-10-CM

## 2022-02-17 DIAGNOSIS — Z17.0 MALIGNANT NEOPLASM OF LEFT BREAST IN FEMALE, ESTROGEN RECEPTOR POSITIVE, UNSPECIFIED SITE OF BREAST: Primary | ICD-10-CM

## 2022-02-17 PROCEDURE — 99213 OFFICE O/P EST LOW 20 MIN: CPT | Performed by: SURGERY

## 2022-02-17 NOTE — PROGRESS NOTES
Subjective   Lashell Case is a 86 y.o. female here today for breast care follow up.    History of Present Illness  Ms. Case was seen in the office today for breast cancer follow-up.    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  2007 -left breast cancer  On 11/24/2015  -left modified radical mastectomy for a T2N1 left breast carcinoma.  Patient did complete adjuvant hormonal therapy.  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    Post that procedure patient was not willing to consider chemotherapy.    She presents today for follow-up. Lashell states she feels well.  She denies any palpable abnormalities in any chest wall or axilla.  She denies any arm edema.  She denies any symptoms of metastatic disease.    The patient's last bone density was in 2016.  She has declined repeat bone density scan she states she will not undergo any treatment for osteopenia or osteoporosis.  Allergies   Allergen Reactions   • Bactrim [Sulfamethoxazole-Trimethoprim] GI Intolerance   • Ketorolac Nausea And Vomiting   • Phenergan [Promethazine Hcl] Other (See Comments)     Restless legs and trouble swallowing, seizures   • Codeine Nausea And Vomiting   • Penicillins Rash     tolerates Keflex per patient       Current Outpatient Medications   Medication Sig Dispense Refill   • amiodarone (PACERONE) 200 MG tablet Take 2 tablets by mouth daily for 5 doses, then take 1 tablet by mouth Daily. 40 tablet 0   • apixaban (ELIQUIS) 2.5 MG tablet tablet Take 1 tablet by mouth Every 12 (Twelve) Hours. Indications: Atrial Fibrillation 60 tablet 0   • hydroCHLOROthiazide (HYDRODIURIL) 25 MG tablet Take 1 tablet by mouth Daily. For blood pressure 90 tablet 1   • levothyroxine (SYNTHROID, LEVOTHROID) 50 MCG tablet Take 1 tablet by mouth Every Morning. 30 tablet 0   • lisinopril (PRINIVIL,ZESTRIL) 40 MG  "tablet Take 1 tablet by mouth Daily. 30 tablet 0   • metoprolol succinate XL (TOPROL-XL) 100 MG 24 hr tablet Take 1 tablet by mouth Daily. 30 tablet 6     No current facility-administered medications for this visit.     Past Medical History:   Diagnosis Date   • Arthritis    • Breast cancer (HCC) 2015    lt br ca   • Breast cancer (HCC) 2006    rt br ca   • Dizziness 7/8/2016   • [place] (hard of hearing)    • Hyperlipidemia    • Hypertension    • Hypothyroidism    • Scabies exposure      Past Surgical History:   Procedure Laterality Date   • BREAST BIOPSY     • BREAST LUMPECTOMY Right 2006   • BREAST SURGERY     • MASTECTOMY Left 2015    ca   • MASTECTOMY Right 2020   • MASTECTOMY WITH SENTINEL NODE BIOPSY AND AXILLARY NODE DISSECTION Right 11/17/2020    Procedure: BREAST MASTECTOMY WITH SENTINEL NODE BIOPSY AND AXILLARY NODE DISSECTION;  Surgeon: Cynthia Ward MD;  Location: [facility];  Service: General;  Laterality: Right;       Pertinent Review of Systems  Thin hair  Patient was admitted November, 2021 with new onset A. fib    Objective   /78 (BP Location: Left arm)   Pulse 63   Ht 165.1 cm (65\")   Wt 55.8 kg (123 lb)   BMI 20.47 kg/m²    Physical Exam  General:  This is a WD WN female in no acute distress  Lungs:  Respiratory effort normal. Auscultation: Clear, without wheezes, rhonchi, rales  Heart:  Regular rate and rhythm, without murmur, gallop, rub.  No pedal edema  Breasts: Right and left breast surgically absent.  Examination of the right and left chest wall demonstrate no palpable mass or adenopathy  Upper extremity: Without edema    Procedures     Results/Data:    Assessment/Plan     Right breast carcinoma in 2004, treated with partial mastectomy, radiation and hormonal therapy  Left breast carcinoma in 2007, treated with partial mastectomy  Left breast cancer in 2017, T2N1, treated with left modified radical mastectomy and 5 years of adjuvant hormonal therapy  Right breast cancer in 2020, " triple negative, treated with right modified radical mastectomy, chemotherapy refused     Plan: 6-month follow-up with no studies       Discussion/Summary:    Time spent:     Patient's Body mass index is 20.47 kg/m². indicating that she is within normal range (BMI 18.5-24.9). No BMI management plan needed..         Future Appointments   Date Time Provider Department Center   2/17/2022 10:45 AM Cynthia Ward MD [facility]   3/1/2022  3:00 PM Alli Wolf MD [facility]   3/9/2022  3:00 PM Ashleigh Haynes APRN [facility]         Please note that portions of this note were completed with a voice recognition program.

## 2022-03-14 DIAGNOSIS — I10 ESSENTIAL HYPERTENSION: Chronic | ICD-10-CM

## 2022-03-14 RX ORDER — LISINOPRIL 40 MG/1
40 TABLET ORAL
Qty: 30 TABLET | Refills: 6 | Status: SHIPPED | OUTPATIENT
Start: 2022-03-14 | End: 2022-03-17 | Stop reason: SDUPTHER

## 2022-03-14 NOTE — TELEPHONE ENCOUNTER
Caller: Lashell Case    Relationship: Self    Best call back number: [phone]  Requested Prescriptions:   Requested Prescriptions     Pending Prescriptions Disp Refills   • lisinopril (PRINIVIL,ZESTRIL) 40 MG tablet 30 tablet 0     Sig: Take 1 tablet by mouth Daily.        Pharmacy where request should be sent:    [address] [phone] [facility] [phone] FX        Additional details provided by patient:   Does the patient have less than a 3 day supply:  [x] Yes  [] No    Maulik Banks Rep   03/14/22 10:41 EDT

## 2022-03-16 DIAGNOSIS — I10 ESSENTIAL HYPERTENSION: Chronic | ICD-10-CM

## 2022-03-17 RX ORDER — LISINOPRIL 40 MG/1
TABLET ORAL
Qty: 30 TABLET | Refills: 0 | Status: SHIPPED | OUTPATIENT
Start: 2022-03-17 | End: 2022-06-15 | Stop reason: SDUPTHER

## 2022-04-04 DIAGNOSIS — I10 ESSENTIAL HYPERTENSION: Chronic | ICD-10-CM

## 2022-04-04 RX ORDER — LISINOPRIL 40 MG/1
40 TABLET ORAL DAILY
Qty: 30 TABLET | Refills: 0 | OUTPATIENT
Start: 2022-04-04

## 2022-04-04 NOTE — TELEPHONE ENCOUNTER
Caller: Lashell Case    Relationship: Self    Best call back number: [phone]    Requested Prescriptions:   Requested Prescriptions     Pending Prescriptions Disp Refills   • lisinopril (PRINIVIL,ZESTRIL) 40 MG tablet 30 tablet 0     Sig: Take 1 tablet by mouth Daily. for blood pressure        Pharmacy where request should be sent: [address] [phone] [facility] [phone] FX     Additional details provided by patient: PATIENT STATES THAT SHE HAS BEEN OUT OF THIS FOR A MONTH.    Does the patient have less than a 3 day supply:  [x] Yes  [] No    Maulik Juárez Rep   04/04/22 13:20 EDT

## 2022-06-15 DIAGNOSIS — I10 ESSENTIAL HYPERTENSION: Chronic | ICD-10-CM

## 2022-06-15 DIAGNOSIS — I48.0 PAROXYSMAL ATRIAL FIBRILLATION: ICD-10-CM

## 2022-06-15 RX ORDER — LISINOPRIL 40 MG/1
40 TABLET ORAL DAILY
Qty: 30 TABLET | Refills: 0 | Status: SHIPPED | OUTPATIENT
Start: 2022-06-15 | End: 2022-08-01 | Stop reason: SDUPTHER

## 2022-06-15 NOTE — TELEPHONE ENCOUNTER
Caller: Lashell Case    Relationship: Self    Best call back number: [phone]    Requested Prescriptions:   Requested Prescriptions     Pending Prescriptions Disp Refills   • apixaban (ELIQUIS) 2.5 MG tablet tablet 60 tablet 0     Sig: Take 1 tablet by mouth Every 12 (Twelve) Hours. Indications: Atrial Fibrillation   • lisinopril (PRINIVIL,ZESTRIL) 40 MG tablet 30 tablet 0     Sig: Take 1 tablet by mouth Daily. for blood pressure        Pharmacy where request should be sent: [address] [phone] [facility] [phone]      Additional details provided by patient: PATIENT IS OUT OF THESE MEDICATIONS. NOT SURE IF SHE IS SUPPOSE TO KEEP TAKING THE ELIQUIS.    Does the patient have less than a 3 day supply:  [x] Yes  [] No    Maulik Mcallister Rep   06/15/22 13:41 EDT

## 2022-08-01 DIAGNOSIS — I10 ESSENTIAL HYPERTENSION: Chronic | ICD-10-CM

## 2022-08-01 RX ORDER — LEVOTHYROXINE SODIUM 0.05 MG/1
50 TABLET ORAL
Qty: 30 TABLET | Refills: 5 | Status: SHIPPED | OUTPATIENT
Start: 2022-08-01 | End: 2023-04-04 | Stop reason: SDUPTHER

## 2022-08-01 RX ORDER — LISINOPRIL 40 MG/1
40 TABLET ORAL DAILY
Qty: 30 TABLET | Refills: 5 | Status: SHIPPED | OUTPATIENT
Start: 2022-08-01 | End: 2023-02-24 | Stop reason: SDUPTHER

## 2022-08-01 NOTE — TELEPHONE ENCOUNTER
Caller: [facility]    Relationship: Self    Best call back number: [phone]    Requested Prescriptions:   Requested Prescriptions     Pending Prescriptions Disp Refills   • levothyroxine (SYNTHROID, LEVOTHROID) 50 MCG tablet 30 tablet 0     Sig: Take 1 tablet by mouth Every Morning.   • lisinopril (PRINIVIL,ZESTRIL) 40 MG tablet 30 tablet 0     Sig: Take 1 tablet by mouth Daily. for blood pressure        Pharmacy where request should be sent: [address] [phone] [facility] [phone] FX     Additional details provided by patient: PLEASE REFILL.    Does the patient have less than a 3 day supply:  [x] Yes  [] No COMPLETELY OUT.    Maulik Ball Rep   08/01/22 15:31 EDT     THANK YOU.

## 2022-10-13 ENCOUNTER — HOSPITAL ENCOUNTER (EMERGENCY)
Facility: HOSPITAL | Age: 87
Discharge: HOME | End: 2022-10-13
Attending: EMERGENCY MEDICINE | Admitting: EMERGENCY MEDICINE

## 2022-10-13 ENCOUNTER — APPOINTMENT (OUTPATIENT)
Dept: CT IMAGING | Facility: HOSPITAL | Age: 87
End: 2022-10-13

## 2022-10-13 VITALS
WEIGHT: 114 LBS | RESPIRATION RATE: 20 BRPM | OXYGEN SATURATION: 98 % | HEART RATE: 59 BPM | SYSTOLIC BLOOD PRESSURE: 218 MMHG | HEIGHT: 66 IN | TEMPERATURE: 98.2 F | DIASTOLIC BLOOD PRESSURE: 72 MMHG | BODY MASS INDEX: 18.32 KG/M2
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
## 2022-10-13 DIAGNOSIS — S01.01XA LACERATION OF SCALP, INITIAL ENCOUNTER: Primary | ICD-10-CM

## 2022-10-13 PROCEDURE — 70450 CT HEAD/BRAIN W/O DYE: CPT

## 2022-10-13 PROCEDURE — 70450 CT HEAD/BRAIN W/O DYE: CPT | Performed by: RADIOLOGY

## 2022-10-13 PROCEDURE — 99282 EMERGENCY DEPT VISIT SF MDM: CPT
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
## 2023-01-12 DIAGNOSIS — Z78.0 POST-MENOPAUSE: Primary | ICD-10-CM

## 2023-02-23 DIAGNOSIS — I10 ESSENTIAL HYPERTENSION: Chronic | ICD-10-CM

## 2023-02-23 RX ORDER — METOPROLOL SUCCINATE 100 MG/1
100 TABLET, EXTENDED RELEASE ORAL DAILY
Qty: 30 TABLET | Refills: 6 | Status: CANCELLED | OUTPATIENT
Start: 2023-02-23

## 2023-02-23 NOTE — TELEPHONE ENCOUNTER
Incoming Refill Request      Medication requested (name and dose): metoprolol succinate XL (TOPROL-XL) 100 MG 24 hr tablet    Pharmacy where request should be sent: [facility]    Additional details provided by patient: PATIENT IS OUT OF THE MEDICATION    Best call back number: [phone]    Does the patient have less than a 3 day supply:  [x] Yes  [] No    Maulik Looney Rep  02/23/23, 14:40 EST

## 2023-02-24 DIAGNOSIS — I10 ESSENTIAL HYPERTENSION: Chronic | ICD-10-CM

## 2023-02-24 DIAGNOSIS — I48.0 PAROXYSMAL ATRIAL FIBRILLATION: ICD-10-CM

## 2023-02-24 RX ORDER — HYDROCHLOROTHIAZIDE 25 MG/1
25 TABLET ORAL DAILY
Qty: 30 TABLET | Refills: 0 | Status: SHIPPED | OUTPATIENT
Start: 2023-02-24 | End: 2023-03-24 | Stop reason: SDUPTHER

## 2023-02-24 RX ORDER — AMIODARONE HYDROCHLORIDE 200 MG/1
200 TABLET ORAL DAILY
Qty: 30 TABLET | Refills: 0 | Status: SHIPPED | OUTPATIENT
Start: 2023-02-24 | End: 2023-03-24 | Stop reason: SDUPTHER

## 2023-02-24 RX ORDER — METOPROLOL SUCCINATE 100 MG/1
100 TABLET, EXTENDED RELEASE ORAL DAILY
Qty: 30 TABLET | Refills: 0 | OUTPATIENT
Start: 2023-02-24 | End: 2023-03-01

## 2023-02-24 RX ORDER — LISINOPRIL 40 MG/1
40 TABLET ORAL DAILY
Qty: 30 TABLET | Refills: 0 | Status: SHIPPED | OUTPATIENT
Start: 2023-02-24 | End: 2023-03-01 | Stop reason: SDUPTHER

## 2023-02-24 NOTE — TELEPHONE ENCOUNTER
Caller: Lashell Case    Relationship: Self    Best call back number: [phone]    What was the call regarding: PATIENT STATES THAT SHE IS COMPLETELY OUT OF METOPROLOL AND WOULD LIKE IT TO BE SENT TODAY. PLEASE ADVISE    Do you require a callback: YES

## 2023-02-24 NOTE — TELEPHONE ENCOUNTER
Called patient to notify the request for medication refill was sent to her cardiologist Dr Santana due to him being the one who refilled her script last. Explained to the patient we haven't seen her in over a year and Dr Wolf retired so she would need to see someone in the office to continue to get medication from us. Patient stated that she would find a new doctor and hung up.

## 2023-02-27 NOTE — TELEPHONE ENCOUNTER
Called pt and advised her. She expressed understanding made her an apt for 3-1 at 11:30 am. She expressed understanding.

## 2023-03-01 ENCOUNTER — APPOINTMENT (OUTPATIENT)
Dept: GENERAL RADIOLOGY | Facility: HOSPITAL | Age: 88
End: 2023-03-01
Payer: MEDICARE

## 2023-03-01 ENCOUNTER — OFFICE VISIT (OUTPATIENT)
Dept: CARDIOLOGY | Facility: CLINIC | Age: 88
End: 2023-03-01
Payer: MEDICARE

## 2023-03-01 ENCOUNTER — HOSPITAL ENCOUNTER (EMERGENCY)
Facility: HOSPITAL | Age: 88
Discharge: HOME | End: 2023-03-01
Attending: EMERGENCY MEDICINE | Admitting: EMERGENCY MEDICINE
Payer: MEDICARE

## 2023-03-01 VITALS
TEMPERATURE: 98.5 F | SYSTOLIC BLOOD PRESSURE: 173 MMHG | BODY MASS INDEX: 22.02 KG/M2 | RESPIRATION RATE: 16 BRPM | DIASTOLIC BLOOD PRESSURE: 77 MMHG | HEIGHT: 66 IN | HEART RATE: 47 BPM | WEIGHT: 137 LBS | OXYGEN SATURATION: 99 %

## 2023-03-01 VITALS
HEART RATE: 60 BPM | WEIGHT: 129.4 LBS | OXYGEN SATURATION: 97 % | DIASTOLIC BLOOD PRESSURE: 105 MMHG | BODY MASS INDEX: 20.79 KG/M2 | SYSTOLIC BLOOD PRESSURE: 248 MMHG | HEIGHT: 66 IN

## 2023-03-01 DIAGNOSIS — I10 ESSENTIAL HYPERTENSION: Chronic | ICD-10-CM

## 2023-03-01 DIAGNOSIS — I16.1 HYPERTENSIVE EMERGENCY: Primary | ICD-10-CM

## 2023-03-01 DIAGNOSIS — I10 HYPERTENSION, UNSPECIFIED TYPE: Primary | ICD-10-CM

## 2023-03-01 LAB
ALBUMIN SERPL-MCNC: 4.3 G/DL (ref 3.5–5.2)
ALBUMIN/GLOB SERPL: 1.1 G/DL
ALP SERPL-CCNC: 77 U/L (ref 39–117)
ALT SERPL W P-5'-P-CCNC: 11 U/L (ref 1–33)
ANION GAP SERPL CALCULATED.3IONS-SCNC: 10.9 MMOL/L (ref 5–15)
APTT PPP: 31.3 SECONDS (ref 26.5–34.5)
AST SERPL-CCNC: 22 U/L (ref 1–32)
BASOPHILS # BLD AUTO: 0.07 10*3/MM3 (ref 0–0.2)
BASOPHILS NFR BLD AUTO: 1.3 % (ref 0–1.5)
BILIRUB SERPL-MCNC: 0.5 MG/DL (ref 0–1.2)
BUN SERPL-MCNC: 17 MG/DL (ref 8–23)
BUN/CREAT SERPL: 16.7 (ref 7–25)
CALCIUM SPEC-SCNC: 10.2 MG/DL (ref 8.6–10.5)
CHLORIDE SERPL-SCNC: 103 MMOL/L (ref 98–107)
CO2 SERPL-SCNC: 27.1 MMOL/L (ref 22–29)
CREAT SERPL-MCNC: 1.02 MG/DL (ref 0.57–1)
DEPRECATED RDW RBC AUTO: 51.1 FL (ref 37–54)
EGFRCR SERPLBLD CKD-EPI 2021: 54 ML/MIN/1.73
EOSINOPHIL # BLD AUTO: 0.1 10*3/MM3 (ref 0–0.4)
EOSINOPHIL NFR BLD AUTO: 1.9 % (ref 0.3–6.2)
ERYTHROCYTE [DISTWIDTH] IN BLOOD BY AUTOMATED COUNT: 13.2 % (ref 12.3–15.4)
GEN 5 2HR TROPONIN T REFLEX: 15 NG/L
GLOBULIN UR ELPH-MCNC: 3.8 GM/DL
GLUCOSE SERPL-MCNC: 99 MG/DL (ref 65–99)
HCT VFR BLD AUTO: 34.8 % (ref 34–46.6)
HGB BLD-MCNC: 11.5 G/DL (ref 12–15.9)
IMM GRANULOCYTES # BLD AUTO: 0.01 10*3/MM3 (ref 0–0.05)
IMM GRANULOCYTES NFR BLD AUTO: 0.2 % (ref 0–0.5)
INR PPP: 1.02 (ref 0.9–1.1)
LYMPHOCYTES # BLD AUTO: 1.12 10*3/MM3 (ref 0.7–3.1)
LYMPHOCYTES NFR BLD AUTO: 20.9 % (ref 19.6–45.3)
MCH RBC QN AUTO: 34.7 PG (ref 26.6–33)
MCHC RBC AUTO-ENTMCNC: 33 G/DL (ref 31.5–35.7)
MCV RBC AUTO: 105.1 FL (ref 79–97)
MONOCYTES # BLD AUTO: 0.55 10*3/MM3 (ref 0.1–0.9)
MONOCYTES NFR BLD AUTO: 10.2 % (ref 5–12)
NEUTROPHILS NFR BLD AUTO: 3.52 10*3/MM3 (ref 1.7–7)
NEUTROPHILS NFR BLD AUTO: 65.5 % (ref 42.7–76)
NRBC BLD AUTO-RTO: 0 /100 WBC (ref 0–0.2)
NT-PROBNP SERPL-MCNC: 402.5 PG/ML (ref 0–1800)
PLATELET # BLD AUTO: 184 10*3/MM3 (ref 140–450)
PMV BLD AUTO: 9.2 FL (ref 6–12)
POTASSIUM SERPL-SCNC: 4.1 MMOL/L (ref 3.5–5.2)
PROT SERPL-MCNC: 8.1 G/DL (ref 6–8.5)
PROTHROMBIN TIME: 13.6 SECONDS (ref 12.1–14.7)
QT INTERVAL: 412 MS
QTC INTERVAL: 428 MS
RBC # BLD AUTO: 3.31 10*6/MM3 (ref 3.77–5.28)
SODIUM SERPL-SCNC: 141 MMOL/L (ref 136–145)
TROPONIN T DELTA: 0 NG/L
TROPONIN T SERPL HS-MCNC: 15 NG/L
WBC NRBC COR # BLD: 5.37 10*3/MM3 (ref 3.4–10.8)

## 2023-03-01 PROCEDURE — 85610 PROTHROMBIN TIME: CPT | Performed by: NURSE PRACTITIONER

## 2023-03-01 PROCEDURE — 99284 EMERGENCY DEPT VISIT MOD MDM: CPT

## 2023-03-01 PROCEDURE — 85025 COMPLETE CBC W/AUTO DIFF WBC: CPT | Performed by: NURSE PRACTITIONER

## 2023-03-01 PROCEDURE — 83880 ASSAY OF NATRIURETIC PEPTIDE: CPT | Performed by: NURSE PRACTITIONER

## 2023-03-01 PROCEDURE — 71045 X-RAY EXAM CHEST 1 VIEW: CPT

## 2023-03-01 PROCEDURE — 71045 X-RAY EXAM CHEST 1 VIEW: CPT | Performed by: RADIOLOGY

## 2023-03-01 PROCEDURE — 93010 ELECTROCARDIOGRAM REPORT: CPT | Performed by: SPECIALIST

## 2023-03-01 PROCEDURE — 93005 ELECTROCARDIOGRAM TRACING: CPT | Performed by: NURSE PRACTITIONER

## 2023-03-01 PROCEDURE — 85730 THROMBOPLASTIN TIME PARTIAL: CPT | Performed by: NURSE PRACTITIONER

## 2023-03-01 PROCEDURE — 80053 COMPREHEN METABOLIC PANEL: CPT | Performed by: NURSE PRACTITIONER

## 2023-03-01 PROCEDURE — 84484 ASSAY OF TROPONIN QUANT: CPT | Performed by: NURSE PRACTITIONER

## 2023-03-01 PROCEDURE — 36415 COLL VENOUS BLD VENIPUNCTURE: CPT

## 2023-03-01 RX ORDER — LISINOPRIL 40 MG/1
40 TABLET ORAL DAILY
Qty: 30 TABLET | Refills: 0 | Status: SHIPPED | OUTPATIENT
Start: 2023-03-01 | End: 2023-03-24 | Stop reason: SDUPTHER

## 2023-03-01 RX ORDER — CLONIDINE HYDROCHLORIDE 0.1 MG/1
0.1 TABLET ORAL ONCE
Status: DISCONTINUED | OUTPATIENT
Start: 2023-03-01 | End: 2023-03-01

## 2023-03-01 RX ORDER — SODIUM CHLORIDE 0.9 % (FLUSH) 0.9 %
10 SYRINGE (ML) INJECTION AS NEEDED
Status: DISCONTINUED | OUTPATIENT
Start: 2023-03-01 | End: 2023-03-01 | Stop reason: HOSPADM

## 2023-03-01 RX ORDER — METOPROLOL SUCCINATE 25 MG/1
50 TABLET, EXTENDED RELEASE ORAL ONCE
Status: DISCONTINUED | OUTPATIENT
Start: 2023-03-01 | End: 2023-03-01

## 2023-03-01 RX ORDER — CLONIDINE HYDROCHLORIDE 0.1 MG/1
0.1 TABLET ORAL ONCE
Status: COMPLETED | OUTPATIENT
Start: 2023-03-01 | End: 2023-03-01

## 2023-03-01 RX ADMIN — CLONIDINE HYDROCHLORIDE 0.1 MG: 0.1 TABLET ORAL at 12:42

## 2023-03-01 NOTE — ED PROVIDER NOTES
Subjective   History of Present Illness  Patient is a 87-year-old white female presents emerged today with complaint of elevated blood pressure for the last 2 days.  Patient reports that her systolic has been in the 200s and her diastolic has been  several times.  Patient reports she has had some intermittent chest pain but denies chest pain currently.  Patient denies any shortness of breath.  Patient reports that chest pain has been mild and not worse at night.  Patient reports she has had some mild facial swelling under her eyes.  Patient denies fever.  She denies any abdominal pain.  Patient denies dizziness.  She denies headache.  Patient reports she has taken her normal medications at [facility].  Patient denies any worsening factors.  Patient denies any alleviating factors.    Hypertension  Associated symptoms: chest pain    Facial Swelling  Associated symptoms: chest pain        Review of Systems   Constitutional: Negative.    HENT: Positive for facial swelling.    Eyes: Negative.    Respiratory: Negative.    Cardiovascular: Positive for chest pain.   Gastrointestinal: Negative.    Endocrine: Negative.    Genitourinary: Negative.    Musculoskeletal: Negative.    Skin: Negative.    Allergic/Immunologic: Negative.    Neurological: Negative.    Hematological: Negative.    Psychiatric/Behavioral: Negative.        Past Medical History:   Diagnosis Date   • Arthritis    • Breast cancer (HCC) 2015    lt br ca   • Breast cancer (HCC) 2006    rt br ca   • Dizziness 7/8/2016   • [place] (hard of hearing)    • Hyperlipidemia    • Hypertension    • Hypothyroidism    • Scabies exposure        Allergies   Allergen Reactions   • Bactrim [Sulfamethoxazole-Trimethoprim] GI Intolerance   • Ketorolac Nausea And Vomiting   • Phenergan [Promethazine Hcl] Other (See Comments)     Restless legs and trouble swallowing, seizures   • Codeine Nausea And Vomiting   • Penicillins Rash     tolerates Keflex per patient       Past  Surgical History:   Procedure Laterality Date   • BREAST BIOPSY     • BREAST LUMPECTOMY Right 2006   • BREAST SURGERY     • MASTECTOMY Left 2015    ca   • MASTECTOMY Right 2020   • MASTECTOMY WITH SENTINEL NODE BIOPSY AND AXILLARY NODE DISSECTION Right 11/17/2020    Procedure: BREAST MASTECTOMY WITH SENTINEL NODE BIOPSY AND AXILLARY NODE DISSECTION;  Surgeon: Cynthia Ward MD;  Location: [facility];  Service: General;  Laterality: Right;       Family History   Problem Relation Age of Onset   • Hypertension Mother    • Uterine cancer Mother    • Diabetes Mother    • Diabetes Daughter    • Prostate cancer Son    • Diabetes Son    • Throat cancer Son    • Hypertension Child    • Breast cancer Neg Hx        Social History     Socioeconomic History   • Marital status:    Tobacco Use   • Smoking status: Never   • Smokeless tobacco: Never   Vaping Use   • Vaping Use: Never used   Substance and Sexual Activity   • Alcohol use: No   • Drug use: No   • Sexual activity: Defer     Birth control/protection: None           Objective   Physical Exam  Vitals and nursing note reviewed.   Constitutional:       Appearance: She is well-developed.   HENT:      Head: Normocephalic.      Right Ear: External ear normal.      Left Ear: External ear normal.   Eyes:      Conjunctiva/sclera: Conjunctivae normal.      Pupils: Pupils are equal, round, and reactive to light.   Cardiovascular:      Rate and Rhythm: Normal rate and regular rhythm.      Heart sounds: Normal heart sounds.   Pulmonary:      Effort: Pulmonary effort is normal.      Breath sounds: Normal breath sounds.   Abdominal:      General: Bowel sounds are normal.      Palpations: Abdomen is soft.   Musculoskeletal:         General: Normal range of motion.      Cervical back: Normal range of motion and neck supple.   Skin:     General: Skin is warm and dry.      Capillary Refill: Capillary refill takes less than 2 seconds.   Neurological:      Mental Status: She is  alert and oriented to person, place, and time.   Psychiatric:         Behavior: Behavior normal.         Thought Content: Thought content normal.         Procedures  Results for orders placed or performed during the hospital encounter of 03/01/23   Comprehensive Metabolic Panel    Specimen: Arm, Left; Blood   Result Value Ref Range    Glucose 99 65 - 99 mg/dL    BUN 17 8 - 23 mg/dL    Creatinine 1.02 (H) 0.57 - 1.00 mg/dL    Sodium 141 136 - 145 mmol/L    Potassium 4.1 3.5 - 5.2 mmol/L    Chloride 103 98 - 107 mmol/L    CO2 27.1 22.0 - 29.0 mmol/L    Calcium 10.2 8.6 - 10.5 mg/dL    Total Protein 8.1 6.0 - 8.5 g/dL    Albumin 4.3 3.5 - 5.2 g/dL    ALT (SGPT) 11 1 - 33 U/L    AST (SGOT) 22 1 - 32 U/L    Alkaline Phosphatase 77 39 - 117 U/L    Total Bilirubin 0.5 0.0 - 1.2 mg/dL    Globulin 3.8 gm/dL    A/G Ratio 1.1 g/dL    BUN/Creatinine Ratio 16.7 7.0 - 25.0    Anion Gap 10.9 5.0 - 15.0 mmol/L    eGFR 54.0 (L) >60.0 mL/min/1.73   aPTT    Specimen: Blood   Result Value Ref Range    PTT 31.3 26.5 - 34.5 seconds   Protime-INR    Specimen: Blood   Result Value Ref Range    Protime 13.6 12.1 - 14.7 Seconds    INR 1.02 0.90 - 1.10   High Sensitivity Troponin T    Specimen: Arm, Left; Blood   Result Value Ref Range    HS Troponin T 15 (H) <10 ng/L   BNP    Specimen: Arm, Left; Blood   Result Value Ref Range    proBNP 402.5 0.0 - 1,800.0 pg/mL   CBC Auto Differential    Specimen: Blood   Result Value Ref Range    WBC 5.37 3.40 - 10.80 10*3/mm3    RBC 3.31 (L) 3.77 - 5.28 10*6/mm3    Hemoglobin 11.5 (L) 12.0 - 15.9 g/dL    Hematocrit 34.8 34.0 - 46.6 %    .1 (H) 79.0 - 97.0 fL    MCH 34.7 (H) 26.6 - 33.0 pg    MCHC 33.0 31.5 - 35.7 g/dL    RDW 13.2 12.3 - 15.4 %    RDW-SD 51.1 37.0 - 54.0 fl    MPV 9.2 6.0 - 12.0 fL    Platelets 184 140 - 450 10*3/mm3    Neutrophil % 65.5 42.7 - 76.0 %    Lymphocyte % 20.9 19.6 - 45.3 %    Monocyte % 10.2 5.0 - 12.0 %    Eosinophil % 1.9 0.3 - 6.2 %    Basophil % 1.3 0.0 - 1.5 %     Immature Grans % 0.2 0.0 - 0.5 %    Neutrophils, Absolute 3.52 1.70 - 7.00 10*3/mm3    Lymphocytes, Absolute 1.12 0.70 - 3.10 10*3/mm3    Monocytes, Absolute 0.55 0.10 - 0.90 10*3/mm3    Eosinophils, Absolute 0.10 0.00 - 0.40 10*3/mm3    Basophils, Absolute 0.07 0.00 - 0.20 10*3/mm3    Immature Grans, Absolute 0.01 0.00 - 0.05 10*3/mm3    nRBC 0.0 0.0 - 0.2 /100 WBC   High Sensitivity Troponin T 2Hr    Specimen: Arm, Left; Blood   Result Value Ref Range    HS Troponin T 15 (H) <10 ng/L    Troponin T Delta 0 >=-4 - <+4 ng/L   ECG 12 Lead Other; HTN   Result Value Ref Range    QT Interval 412 ms    QTC Interval 428 ms     XR Chest AP   Final Result     Heart size is stable and enlarged.       This report was finalized on 3/1/2023 1:39 PM by Dr. Von Gonzales MD.                     ED Course  ED Course as of 03/10/23 1143   Wed Mar 01, 2023   1247 ECG 12 Lead Other; HTN  Vent. Rate :  65 BPM     Atrial Rate :  65 BPM     P-R Int : 206 ms          QRS Dur : 100 ms      QT Int : 412 ms       P-R-T Axes :  82 -36  81 degrees     QTc Int : 428 ms     Normal sinus rhythm  Left axis deviation  Voltage criteria for left ventricular hypertrophy  Cannot rule out Septal infarct , age undetermined  Abnormal ECG  When compared with ECG of 05-NOV-2021 10:22,  Minimal criteria for Septal infarct are now present  T wave inversion no longer evident in Inferior leads  T wave inversion now evident in Lateral leads [ES]   7766 Patient is instructed to hold Metoprolol and we will start her back on Lisinopril until she follows up with PCp.  [TONIE]      ED Course User Index  [ES] Rajeev Michaels MD  [TONIE] Osman Sevilla, SOFI                                           Medical Decision Making  Patient is a 87-year-old white female presents emerged today with complaint of elevated blood pressure for the last 2 days.  Patient reports that her systolic has been in the 200s and her diastolic has been  several times.  Patient  reports she has had some intermittent chest pain but denies chest pain currently.  Patient denies any shortness of breath.  Patient reports that chest pain has been mild and not worse at night.  Patient reports she has had some mild facial swelling under her eyes.  Patient denies fever.  She denies any abdominal pain.  Patient denies dizziness.  She denies headache.  Patient reports she has taken her normal medications at [facility].  Patient denies any worsening factors.  Patient denies any alleviating factors.    Patient blood pressure improved. Patient declined admission. Patient discharged with instructions to follow up with PCP and to return for worsening.    Hypertension, unspecified type: acute illness or injury  Amount and/or Complexity of Data Reviewed  Labs: ordered. Decision-making details documented in ED Course.  Radiology: ordered. Decision-making details documented in ED Course.  ECG/medicine tests: ordered. Decision-making details documented in ED Course.      Risk  Prescription drug management.          Final diagnoses:   Hypertension, unspecified type       ED Disposition  ED Disposition     ED Disposition   Discharge    Condition   Stable    Comment   --             Jaguar Townsend MD  [address] [place], [place] [id number]  [phone]    Schedule an appointment as soon as possible for a visit   For further evaluation         Medication List      Stop    metoprolol succinate  MG 24 hr tablet  Commonly known as: TOPROL-XL           Where to Get Your Medications      You can get these medications from any pharmacy    Bring a paper prescription for each of these medications  · lisinopril 40 MG tablet          Osman Sevilla, APRN  03/10/23 1143

## 2023-03-01 NOTE — PROGRESS NOTES
Patient came in for scheduled office visit today and was found to have hypertensive emergency with blood pressure of 248/105 (multiple attempts).  Patient noted to have facial swelling and rash therefore given her blood pressure and other symptoms we advised her to go to the ED to seek emergent care.  I discussed with patient and her daughter and they are agreeable to go to the ED for further evaluation.

## 2023-03-01 NOTE — DISCHARGE INSTRUCTIONS
Follow up with your primary care provider in 1-2 days.    Return to the emergency room for worsening symptoms.

## 2023-03-24 ENCOUNTER — OFFICE VISIT (OUTPATIENT)
Dept: FAMILY MEDICINE CLINIC | Facility: CLINIC | Age: 88
End: 2023-03-24
Payer: MEDICARE

## 2023-03-24 VITALS
DIASTOLIC BLOOD PRESSURE: 88 MMHG | SYSTOLIC BLOOD PRESSURE: 196 MMHG | OXYGEN SATURATION: 99 % | WEIGHT: 132.8 LBS | HEART RATE: 53 BPM | HEIGHT: 66 IN | TEMPERATURE: 97.8 F | BODY MASS INDEX: 21.34 KG/M2

## 2023-03-24 DIAGNOSIS — R17 SCLERAL ICTERUS: ICD-10-CM

## 2023-03-24 DIAGNOSIS — I10 ESSENTIAL HYPERTENSION: Chronic | ICD-10-CM

## 2023-03-24 DIAGNOSIS — H04.203 EXCESSIVE TEAR PRODUCTION OF BOTH LACRIMAL GLANDS: ICD-10-CM

## 2023-03-24 DIAGNOSIS — I10 UNCONTROLLED HYPERTENSION: ICD-10-CM

## 2023-03-24 DIAGNOSIS — I48.0 PAROXYSMAL ATRIAL FIBRILLATION: ICD-10-CM

## 2023-03-24 DIAGNOSIS — E03.4 HYPOTHYROIDISM DUE TO ACQUIRED ATROPHY OF THYROID: ICD-10-CM

## 2023-03-24 DIAGNOSIS — I35.0 AORTIC VALVE STENOSIS, ETIOLOGY OF CARDIAC VALVE DISEASE UNSPECIFIED: Primary | ICD-10-CM

## 2023-03-24 PROCEDURE — 84443 ASSAY THYROID STIM HORMONE: CPT | Performed by: FAMILY MEDICINE

## 2023-03-24 PROCEDURE — 80053 COMPREHEN METABOLIC PANEL: CPT | Performed by: FAMILY MEDICINE

## 2023-03-24 PROCEDURE — 82248 BILIRUBIN DIRECT: CPT | Performed by: FAMILY MEDICINE

## 2023-03-24 PROCEDURE — 36415 COLL VENOUS BLD VENIPUNCTURE: CPT | Performed by: FAMILY MEDICINE

## 2023-03-24 PROCEDURE — 80061 LIPID PANEL: CPT | Performed by: FAMILY MEDICINE

## 2023-03-24 RX ORDER — METOPROLOL TARTRATE 100 MG/1
100 TABLET ORAL 2 TIMES DAILY
COMMUNITY
End: 2023-03-24 | Stop reason: SDUPTHER

## 2023-03-24 RX ORDER — HYDROCHLOROTHIAZIDE 25 MG/1
25 TABLET ORAL DAILY
Qty: 30 TABLET | Refills: 0 | Status: SHIPPED | OUTPATIENT
Start: 2023-03-24

## 2023-03-24 RX ORDER — AMIODARONE HYDROCHLORIDE 200 MG/1
200 TABLET ORAL DAILY
Qty: 30 TABLET | Refills: 0 | Status: SHIPPED | OUTPATIENT
Start: 2023-03-24

## 2023-03-24 RX ORDER — METOPROLOL TARTRATE 100 MG/1
100 TABLET ORAL 2 TIMES DAILY
Qty: 60 TABLET | Refills: 0 | Status: SHIPPED | OUTPATIENT
Start: 2023-03-24

## 2023-03-24 RX ORDER — LISINOPRIL 40 MG/1
40 TABLET ORAL DAILY
Qty: 30 TABLET | Refills: 0 | Status: SHIPPED | OUTPATIENT
Start: 2023-03-24

## 2023-03-24 RX ORDER — CLONIDINE HYDROCHLORIDE 0.1 MG/1
0.1 TABLET ORAL 2 TIMES DAILY
Qty: 60 TABLET | Refills: 0 | Status: SHIPPED | OUTPATIENT
Start: 2023-03-24

## 2023-03-24 NOTE — PROGRESS NOTES
Subjective   Lashell Case is a 87 y.o. female.   Pt presents today with CC of Hypertension      History of Present Illness   History of Present Illness  1.  Patient is an 87-year-old female here to establish care.  #2 she recently was seen in the emergency room for hypertensive emergency.  She had symptomatic blood pressure of 250/105.  She currently is prescribed several medications, though she has been hit and miss on taking them.  Her current prescription list for blood pressure includes metoprolol 100 mg twice daily(which was held in the emergency room, but she has been back taking it without problems), hydrochlorothiazide which she has not been taking, lisinopril she has been taking.  Blood pressure today is 196/88.  She is asymptomatic.  She has associated aortic valve stenosis.  She has never tried clonidine before.  Historically she has had low heart rate,  #3 she has hypothyroidism.  She has not had blood work since last year.  #4 she generally gets around well for an 87-year-old.  She is helping raise a 4-year-old child.  Generally she feels well.  She is a breast cancer survivor, generally she has no complaints other than blood pressure, and her eyes have been tearing excessively for 2 weeks.  She states that she saw her eye doctor and they put her on some drops, otherwise it has not been helpful         The following portions of the patient's history were reviewed and updated as appropriate: allergies, current medications, past family history, past medical history, past social history, past surgical history and problem list.    Review of Systems   Constitutional: Negative for chills, fever and unexpected weight loss.   HENT: Negative for congestion and sore throat.    Eyes: Negative for blurred vision and visual disturbance.   Respiratory: Negative for cough and wheezing.    Cardiovascular: Negative for chest pain and palpitations.   Gastrointestinal: Negative for abdominal pain and diarrhea.    Endocrine: Negative for cold intolerance and heat intolerance.   Genitourinary: Negative for dysuria.   Musculoskeletal: Negative for arthralgias and neck stiffness.   Neurological: Negative for dizziness, seizures and syncope.   Psychiatric/Behavioral: Negative for self-injury, suicidal ideas and depressed mood.       Objective   Physical Exam  Vitals and nursing note reviewed.   Constitutional:       Appearance: She is well-developed.   HENT:      Head: Normocephalic and atraumatic.      Right Ear: External ear normal.      Left Ear: External ear normal.      Nose: Nose normal.   Eyes:      Pupils: Pupils are equal, round, and reactive to light.      Comments: Conjunctive a have a tan hue.  Unclear if this is something in her drops, or if this represents a scleral icterus.  Her vision is grossly normal, mild cataracts noted.  Her eyes are very moist, she has excessive tears.  Throughout our visit she had wipe her eyes 3-4 times.  There is no sign of infection, however   Cardiovascular:      Rate and Rhythm: Normal rate and regular rhythm.      Heart sounds: Normal heart sounds.   Pulmonary:      Effort: Pulmonary effort is normal.      Breath sounds: Normal breath sounds.   Abdominal:      General: Bowel sounds are normal.      Palpations: Abdomen is soft.   Musculoskeletal:      Cervical back: Normal range of motion and neck supple.   Skin:     General: Skin is warm and dry.   Neurological:      Mental Status: She is alert and oriented to person, place, and time.   Psychiatric:         Behavior: Behavior normal.           Assessment & Plan   Diagnoses and all orders for this visit:    1. Aortic valve stenosis, etiology of cardiac valve disease unspecified (Primary)    2. Paroxysmal atrial fibrillation (HCC)  -     amiodarone (PACERONE) 200 MG tablet; Take 1 tablet by mouth Daily.  Dispense: 30 tablet; Refill: 0  -     metoprolol tartrate (LOPRESSOR) 100 MG tablet; Take 1 tablet by mouth 2 (Two) Times a Day.   Dispense: 60 tablet; Refill: 0    3. Essential hypertension  -     hydroCHLOROthiazide (HYDRODIURIL) 25 MG tablet; Take 1 tablet by mouth Daily for blood pressure.  Dispense: 30 tablet; Refill: 0  -     lisinopril (PRINIVIL,ZESTRIL) 40 MG tablet; Take 1 tablet by mouth daily for blood pressure.  Dispense: 30 tablet; Refill: 0  -     metoprolol tartrate (LOPRESSOR) 100 MG tablet; Take 1 tablet by mouth 2 (Two) Times a Day.  Dispense: 60 tablet; Refill: 0  -     cloNIDine (Catapres) 0.1 MG tablet; Take 1 tablet by mouth 2 (Two) Times a Day.  Dispense: 60 tablet; Refill: 0  -     Lipid panel; Future  -     Comprehensive metabolic panel; Future  -     Bilirubin, Direct; Future  -     Lipid panel  -     Comprehensive metabolic panel  -     Bilirubin, Direct  We discussed options.  First of all, she needs to take the medications she is prescribed currently.  No forgetting.    Options such as hydralazine are contraindicated due to aortic valve disease, at least from my standpoint.  I am concerned about clonidine may be worsening her bradycardia, though we are limited on options.  Recommend low-dose clonidine 0.1 mg twice a day.  We will recheck at follow-up.  4. Uncontrolled hypertension    5. Hypothyroidism due to acquired atrophy of thyroid  -     TSH; Future  -     TSH    6. Scleral icterus  -     Comprehensive metabolic panel; Future  -     Bilirubin, Direct; Future  -     Comprehensive metabolic panel  -     Bilirubin, Direct  No leg swelling, no weight gain, I do not suspect ascites or significant liver disease,   7. Excessive tear production of both lacrimal glands    It is unclear if this is overproduction of tears, or insufficient drainage.  It is bilateral.  I recommend follow back up with eye doctor.  Questionable scleral icterus, though it is mostly her conjunctive a that has a tan hue.                  BMI is within normal parameters. No other follow-up for BMI required.          This document has been  electronically signed by Evelio Guerin DO  March 24, 2023 13:17 EDT    Dictated Utilizing Dragon Dictation: Part of this note may be an electronic transcription/translation of spoken language to printed text using the Dragon Dictation System.

## 2023-03-25 LAB
ALBUMIN SERPL-MCNC: 4.2 G/DL (ref 3.5–5.2)
ALBUMIN/GLOB SERPL: 1.2 G/DL
ALP SERPL-CCNC: 75 U/L (ref 39–117)
ALT SERPL W P-5'-P-CCNC: 11 U/L (ref 1–33)
ANION GAP SERPL CALCULATED.3IONS-SCNC: 7 MMOL/L (ref 5–15)
AST SERPL-CCNC: 22 U/L (ref 1–32)
BILIRUB CONJ SERPL-MCNC: <0.2 MG/DL (ref 0–0.3)
BILIRUB SERPL-MCNC: 0.3 MG/DL (ref 0–1.2)
BUN SERPL-MCNC: 12 MG/DL (ref 8–23)
BUN/CREAT SERPL: 12 (ref 7–25)
CALCIUM SPEC-SCNC: 9.7 MG/DL (ref 8.6–10.5)
CHLORIDE SERPL-SCNC: 101 MMOL/L (ref 98–107)
CHOLEST SERPL-MCNC: 226 MG/DL (ref 0–200)
CO2 SERPL-SCNC: 30 MMOL/L (ref 22–29)
CREAT SERPL-MCNC: 1 MG/DL (ref 0.57–1)
EGFRCR SERPLBLD CKD-EPI 2021: 55.3 ML/MIN/1.73
GLOBULIN UR ELPH-MCNC: 3.4 GM/DL
GLUCOSE SERPL-MCNC: 97 MG/DL (ref 65–99)
HDLC SERPL-MCNC: 48 MG/DL (ref 40–60)
LDLC SERPL CALC-MCNC: 144 MG/DL (ref 0–100)
LDLC/HDLC SERPL: 2.91 {RATIO}
POTASSIUM SERPL-SCNC: 4.3 MMOL/L (ref 3.5–5.2)
PROT SERPL-MCNC: 7.6 G/DL (ref 6–8.5)
SODIUM SERPL-SCNC: 138 MMOL/L (ref 136–145)
TRIGL SERPL-MCNC: 191 MG/DL (ref 0–150)
TSH SERPL DL<=0.05 MIU/L-ACNC: 72.7 UIU/ML (ref 0.27–4.2)
VLDLC SERPL-MCNC: 34 MG/DL (ref 5–40)

## 2023-04-03 ENCOUNTER — APPOINTMENT (OUTPATIENT)
Dept: GENERAL RADIOLOGY | Facility: HOSPITAL | Age: 88
End: 2023-04-03
Payer: MEDICARE

## 2023-04-03 ENCOUNTER — APPOINTMENT (OUTPATIENT)
Dept: CT IMAGING | Facility: HOSPITAL | Age: 88
End: 2023-04-03
Payer: MEDICARE

## 2023-04-03 ENCOUNTER — HOSPITAL ENCOUNTER (EMERGENCY)
Facility: HOSPITAL | Age: 88
Discharge: HOME | End: 2023-04-03
Attending: STUDENT IN AN ORGANIZED HEALTH CARE EDUCATION/TRAINING PROGRAM | Admitting: STUDENT IN AN ORGANIZED HEALTH CARE EDUCATION/TRAINING PROGRAM
Payer: MEDICARE

## 2023-04-03 VITALS
TEMPERATURE: 97 F | WEIGHT: 132 LBS | OXYGEN SATURATION: 100 % | RESPIRATION RATE: 16 BRPM | DIASTOLIC BLOOD PRESSURE: 81 MMHG | SYSTOLIC BLOOD PRESSURE: 162 MMHG | HEART RATE: 53 BPM | BODY MASS INDEX: 21.99 KG/M2 | HEIGHT: 65 IN

## 2023-04-03 DIAGNOSIS — N39.0 ACUTE UTI: Primary | ICD-10-CM

## 2023-04-03 DIAGNOSIS — R53.1 WEAKNESS: ICD-10-CM

## 2023-04-03 LAB
ALBUMIN SERPL-MCNC: 3.2 G/DL (ref 3.5–5.2)
ALBUMIN/GLOB SERPL: 0.7 G/DL
ALP SERPL-CCNC: 110 U/L (ref 39–117)
ALT SERPL W P-5'-P-CCNC: 10 U/L (ref 1–33)
AMYLASE SERPL-CCNC: 31 U/L (ref 28–100)
ANION GAP SERPL CALCULATED.3IONS-SCNC: 15.3 MMOL/L (ref 5–15)
APTT PPP: 38.2 SECONDS (ref 26.5–34.5)
AST SERPL-CCNC: 15 U/L (ref 1–32)
BACTERIA UR QL AUTO: ABNORMAL /HPF
BASOPHILS # BLD AUTO: 0.04 10*3/MM3 (ref 0–0.2)
BASOPHILS NFR BLD AUTO: 0.5 % (ref 0–1.5)
BILIRUB SERPL-MCNC: 1.1 MG/DL (ref 0–1.2)
BILIRUB UR QL STRIP: ABNORMAL
BUN SERPL-MCNC: 23 MG/DL (ref 8–23)
BUN/CREAT SERPL: 17.4 (ref 7–25)
CALCIUM SPEC-SCNC: 9.7 MG/DL (ref 8.6–10.5)
CHLORIDE SERPL-SCNC: 96 MMOL/L (ref 98–107)
CLARITY UR: ABNORMAL
CO2 SERPL-SCNC: 22.7 MMOL/L (ref 22–29)
COLOR UR: ABNORMAL
CREAT SERPL-MCNC: 1.32 MG/DL (ref 0.57–1)
D-LACTATE SERPL-SCNC: 1.3 MMOL/L (ref 0.5–2)
DEPRECATED RDW RBC AUTO: 50.3 FL (ref 37–54)
EGFRCR SERPLBLD CKD-EPI 2021: 39.6 ML/MIN/1.73
EOSINOPHIL # BLD AUTO: 0 10*3/MM3 (ref 0–0.4)
EOSINOPHIL NFR BLD AUTO: 0 % (ref 0.3–6.2)
ERYTHROCYTE [DISTWIDTH] IN BLOOD BY AUTOMATED COUNT: 12.6 % (ref 12.3–15.4)
FLUAV RNA RESP QL NAA+PROBE: NOT DETECTED
FLUBV RNA ISLT QL NAA+PROBE: NOT DETECTED
GEN 5 2HR TROPONIN T REFLEX: 14 NG/L
GLOBULIN UR ELPH-MCNC: 4.6 GM/DL
GLUCOSE SERPL-MCNC: 143 MG/DL (ref 65–99)
GLUCOSE UR STRIP-MCNC: NEGATIVE MG/DL
HCT VFR BLD AUTO: 32.9 % (ref 34–46.6)
HGB BLD-MCNC: 10.5 G/DL (ref 12–15.9)
HGB UR QL STRIP.AUTO: NEGATIVE
HYALINE CASTS UR QL AUTO: ABNORMAL /LPF
IMM GRANULOCYTES # BLD AUTO: 0.07 10*3/MM3 (ref 0–0.05)
IMM GRANULOCYTES NFR BLD AUTO: 0.9 % (ref 0–0.5)
INR PPP: 1.16 (ref 0.9–1.1)
KETONES UR QL STRIP: NEGATIVE
LEUKOCYTE ESTERASE UR QL STRIP.AUTO: ABNORMAL
LIPASE SERPL-CCNC: 27 U/L (ref 13–60)
LYMPHOCYTES # BLD AUTO: 0.56 10*3/MM3 (ref 0.7–3.1)
LYMPHOCYTES NFR BLD AUTO: 6.8 % (ref 19.6–45.3)
MCH RBC QN AUTO: 34.5 PG (ref 26.6–33)
MCHC RBC AUTO-ENTMCNC: 31.9 G/DL (ref 31.5–35.7)
MCV RBC AUTO: 108.2 FL (ref 79–97)
MONOCYTES # BLD AUTO: 1.52 10*3/MM3 (ref 0.1–0.9)
MONOCYTES NFR BLD AUTO: 18.5 % (ref 5–12)
NEUTROPHILS NFR BLD AUTO: 6.01 10*3/MM3 (ref 1.7–7)
NEUTROPHILS NFR BLD AUTO: 73.3 % (ref 42.7–76)
NITRITE UR QL STRIP: POSITIVE
NRBC BLD AUTO-RTO: 0 /100 WBC (ref 0–0.2)
NT-PROBNP SERPL-MCNC: 2203 PG/ML (ref 0–1800)
PH UR STRIP.AUTO: 5.5 [PH] (ref 5–8)
PLATELET # BLD AUTO: 233 10*3/MM3 (ref 140–450)
PMV BLD AUTO: 9.2 FL (ref 6–12)
POTASSIUM SERPL-SCNC: 3.6 MMOL/L (ref 3.5–5.2)
PROT SERPL-MCNC: 7.8 G/DL (ref 6–8.5)
PROT UR QL STRIP: ABNORMAL
PROTHROMBIN TIME: 15.1 SECONDS (ref 12.1–14.7)
RBC # BLD AUTO: 3.04 10*6/MM3 (ref 3.77–5.28)
RBC # UR STRIP: ABNORMAL /HPF
REF LAB TEST METHOD: ABNORMAL
SARS-COV-2 RNA RESP QL NAA+PROBE: NOT DETECTED
SODIUM SERPL-SCNC: 134 MMOL/L (ref 136–145)
SP GR UR STRIP: 1.02 (ref 1–1.03)
SQUAMOUS #/AREA URNS HPF: ABNORMAL /HPF
TROPONIN T DELTA: -3 NG/L
TROPONIN T SERPL HS-MCNC: 17 NG/L
UROBILINOGEN UR QL STRIP: ABNORMAL
WBC # UR STRIP: ABNORMAL /HPF
WBC NRBC COR # BLD: 8.2 10*3/MM3 (ref 3.4–10.8)

## 2023-04-03 PROCEDURE — 85730 THROMBOPLASTIN TIME PARTIAL: CPT | Performed by: NURSE PRACTITIONER

## 2023-04-03 PROCEDURE — 71045 X-RAY EXAM CHEST 1 VIEW: CPT

## 2023-04-03 PROCEDURE — 25010000002 CEFTRIAXONE PER 250 MG: Performed by: NURSE PRACTITIONER

## 2023-04-03 PROCEDURE — 83880 ASSAY OF NATRIURETIC PEPTIDE: CPT | Performed by: NURSE PRACTITIONER

## 2023-04-03 PROCEDURE — 83605 ASSAY OF LACTIC ACID: CPT | Performed by: NURSE PRACTITIONER

## 2023-04-03 PROCEDURE — 87040 BLOOD CULTURE FOR BACTERIA: CPT | Performed by: NURSE PRACTITIONER

## 2023-04-03 PROCEDURE — 70450 CT HEAD/BRAIN W/O DYE: CPT

## 2023-04-03 PROCEDURE — 85025 COMPLETE CBC W/AUTO DIFF WBC: CPT | Performed by: NURSE PRACTITIONER

## 2023-04-03 PROCEDURE — 81001 URINALYSIS AUTO W/SCOPE: CPT | Performed by: NURSE PRACTITIONER

## 2023-04-03 PROCEDURE — 87636 SARSCOV2 & INF A&B AMP PRB: CPT | Performed by: NURSE PRACTITIONER

## 2023-04-03 PROCEDURE — 84484 ASSAY OF TROPONIN QUANT: CPT | Performed by: NURSE PRACTITIONER

## 2023-04-03 PROCEDURE — 99283 EMERGENCY DEPT VISIT LOW MDM: CPT

## 2023-04-03 PROCEDURE — 80053 COMPREHEN METABOLIC PANEL: CPT | Performed by: NURSE PRACTITIONER

## 2023-04-03 PROCEDURE — 85610 PROTHROMBIN TIME: CPT | Performed by: NURSE PRACTITIONER

## 2023-04-03 PROCEDURE — 36415 COLL VENOUS BLD VENIPUNCTURE: CPT

## 2023-04-03 PROCEDURE — 96365 THER/PROPH/DIAG IV INF INIT: CPT

## 2023-04-03 PROCEDURE — 93010 ELECTROCARDIOGRAM REPORT: CPT | Performed by: SPECIALIST

## 2023-04-03 PROCEDURE — 82150 ASSAY OF AMYLASE: CPT | Performed by: NURSE PRACTITIONER

## 2023-04-03 PROCEDURE — 71045 X-RAY EXAM CHEST 1 VIEW: CPT | Performed by: RADIOLOGY

## 2023-04-03 PROCEDURE — 70450 CT HEAD/BRAIN W/O DYE: CPT | Performed by: RADIOLOGY

## 2023-04-03 PROCEDURE — 96361 HYDRATE IV INFUSION ADD-ON: CPT

## 2023-04-03 PROCEDURE — 83690 ASSAY OF LIPASE: CPT | Performed by: NURSE PRACTITIONER

## 2023-04-03 PROCEDURE — 93005 ELECTROCARDIOGRAM TRACING: CPT | Performed by: STUDENT IN AN ORGANIZED HEALTH CARE EDUCATION/TRAINING PROGRAM

## 2023-04-03 RX ORDER — CEFDINIR 300 MG/1
300 CAPSULE ORAL 2 TIMES DAILY
Qty: 14 CAPSULE | Refills: 0 | Status: SHIPPED | OUTPATIENT
Start: 2023-04-03 | End: 2023-04-10

## 2023-04-03 RX ORDER — SODIUM CHLORIDE 0.9 % (FLUSH) 0.9 %
10 SYRINGE (ML) INJECTION AS NEEDED
Status: DISCONTINUED | OUTPATIENT
Start: 2023-04-03 | End: 2023-04-03 | Stop reason: HOSPADM

## 2023-04-03 RX ORDER — CEFDINIR 300 MG/1
300 CAPSULE ORAL 2 TIMES DAILY
Qty: 14 CAPSULE | Refills: 0 | Status: SHIPPED | OUTPATIENT
Start: 2023-04-03 | End: 2023-04-03 | Stop reason: SDUPTHER

## 2023-04-03 RX ADMIN — SODIUM CHLORIDE 500 ML: 9 INJECTION, SOLUTION INTRAVENOUS at 11:38

## 2023-04-03 RX ADMIN — SODIUM CHLORIDE 500 ML: 9 INJECTION, SOLUTION INTRAVENOUS at 12:11

## 2023-04-03 RX ADMIN — CEFTRIAXONE 1 G: 1 INJECTION, POWDER, FOR SOLUTION INTRAMUSCULAR; INTRAVENOUS at 16:16

## 2023-04-03 NOTE — ED PROVIDER NOTES
Subjective   History of Present Illness  89-year-old female presents emergency room today with weakness and abdominal pain  Patient's daughter is present with patient and reports the patient has not been eating or drinking for several days.  She reports the patient has been weaker than usual as a complaint of right-sided abdominal pain.  She reports the patient has had difficulty walking.  Patient daughter feels that she is dehydrated due to not eating or drinking.  Patient reports that she does have right-sided pain is worse with walking.  Patient does report that she does not feel like she is she can remember the last time she did not eat or drink anything.  Patient probably reports pain is moderate in nature..    Weakness - Generalized      Review of Systems   Constitutional: Negative.    HENT: Negative.    Eyes: Negative.    Respiratory: Negative.    Cardiovascular: Negative.    Gastrointestinal: Negative.    Endocrine: Negative.    Genitourinary: Negative.    Musculoskeletal: Negative.    Skin: Negative.    Allergic/Immunologic: Negative.    Hematological: Negative.    Psychiatric/Behavioral: Negative.        Past Medical History:   Diagnosis Date   • Arthritis    • Breast cancer 2015    lt br ca   • Breast cancer 2006    rt br ca   • Dizziness 7/8/2016   • [place] (hard of hearing)    • Hyperlipidemia    • Hypertension    • Hypothyroidism    • Scabies exposure        Allergies   Allergen Reactions   • Bactrim [Sulfamethoxazole-Trimethoprim] GI Intolerance   • Ketorolac Nausea And Vomiting   • Phenergan [Promethazine Hcl] Other (See Comments)     Restless legs and trouble swallowing, seizures   • Codeine Nausea And Vomiting   • Penicillins Rash     tolerates Keflex per patient       Past Surgical History:   Procedure Laterality Date   • BREAST BIOPSY     • BREAST LUMPECTOMY Right 2006   • BREAST SURGERY     • MASTECTOMY Left 2015    ca   • MASTECTOMY Right 2020   • MASTECTOMY WITH SENTINEL NODE BIOPSY AND AXILLARY  NODE DISSECTION Right 11/17/2020    Procedure: BREAST MASTECTOMY WITH SENTINEL NODE BIOPSY AND AXILLARY NODE DISSECTION;  Surgeon: Cynthia Ward MD;  Location: [facility];  Service: General;  Laterality: Right;       Family History   Problem Relation Age of Onset   • Hypertension Mother    • Uterine cancer Mother    • Diabetes Mother    • Diabetes Daughter    • Prostate cancer Son    • Diabetes Son    • Throat cancer Son    • Hypertension Child    • Breast cancer Neg Hx        Social History     Socioeconomic History   • Marital status:    Tobacco Use   • Smoking status: Never   • Smokeless tobacco: Never   Vaping Use   • Vaping Use: Never used   Substance and Sexual Activity   • Alcohol use: No   • Drug use: No   • Sexual activity: Defer     Birth control/protection: None           Objective   Physical Exam  Vitals and nursing note reviewed.   Constitutional:       Appearance: She is well-developed.   HENT:      Head: Normocephalic.      Right Ear: External ear normal.      Left Ear: External ear normal.   Eyes:      Conjunctiva/sclera: Conjunctivae normal.      Pupils: Pupils are equal, round, and reactive to light.   Cardiovascular:      Rate and Rhythm: Normal rate and regular rhythm.      Heart sounds: Normal heart sounds.   Pulmonary:      Effort: Pulmonary effort is normal.      Breath sounds: Normal breath sounds.   Abdominal:      General: Bowel sounds are normal.      Palpations: Abdomen is soft.   Musculoskeletal:         General: Normal range of motion.      Cervical back: Normal range of motion and neck supple.   Skin:     General: Skin is warm and dry.      Capillary Refill: Capillary refill takes less than 2 seconds.   Neurological:      Mental Status: She is alert and oriented to person, place, and time.   Psychiatric:         Behavior: Behavior normal.         Thought Content: Thought content normal.         Procedures        Results for orders placed or performed during the hospital  encounter of 04/03/23   COVID-19 and FLU A/B PCR - Swab, Nasopharynx    Specimen: Nasopharynx; Swab   Result Value Ref Range    COVID19 Not Detected Not Detected - Ref. Range    Influenza A PCR Not Detected Not Detected    Influenza B PCR Not Detected Not Detected   Blood Culture - Blood, Arm, Left    Specimen: Arm, Left; Blood   Result Value Ref Range    Blood Culture No growth at 5 days    Blood Culture - Blood, Hand, Left    Specimen: Hand, Left; Blood   Result Value Ref Range    Blood Culture No growth at 5 days    aPTT    Specimen: Arm, Left; Blood   Result Value Ref Range    PTT 38.2 (H) 26.5 - 34.5 seconds   Protime-INR    Specimen: Arm, Left; Blood   Result Value Ref Range    Protime 15.1 (H) 12.1 - 14.7 Seconds    INR 1.16 (H) 0.90 - 1.10   Comprehensive Metabolic Panel    Specimen: Arm, Left; Blood   Result Value Ref Range    Glucose 143 (H) 65 - 99 mg/dL    BUN 23 8 - 23 mg/dL    Creatinine 1.32 (H) 0.57 - 1.00 mg/dL    Sodium 134 (L) 136 - 145 mmol/L    Potassium 3.6 3.5 - 5.2 mmol/L    Chloride 96 (L) 98 - 107 mmol/L    CO2 22.7 22.0 - 29.0 mmol/L    Calcium 9.7 8.6 - 10.5 mg/dL    Total Protein 7.8 6.0 - 8.5 g/dL    Albumin 3.2 (L) 3.5 - 5.2 g/dL    ALT (SGPT) 10 1 - 33 U/L    AST (SGOT) 15 1 - 32 U/L    Alkaline Phosphatase 110 39 - 117 U/L    Total Bilirubin 1.1 0.0 - 1.2 mg/dL    Globulin 4.6 gm/dL    A/G Ratio 0.7 g/dL    BUN/Creatinine Ratio 17.4 7.0 - 25.0    Anion Gap 15.3 (H) 5.0 - 15.0 mmol/L    eGFR 39.6 (L) >60.0 mL/min/1.73   Lipase    Specimen: Arm, Left; Blood   Result Value Ref Range    Lipase 27 13 - 60 U/L   Amylase    Specimen: Arm, Left; Blood   Result Value Ref Range    Amylase 31 28 - 100 U/L   Urinalysis With Culture If Indicated - Urine, Clean Catch    Specimen: Urine, Clean Catch   Result Value Ref Range    Color, UA Dark Yellow (A) Yellow, Straw    Appearance, UA Cloudy (A) Clear    pH, UA 5.5 5.0 - 8.0    Specific Gravity, UA 1.021 1.005 - 1.030    Glucose, UA Negative  Negative    Ketones, UA Negative Negative    Bilirubin, UA Small (1+) (A) Negative    Blood, UA Negative Negative    Protein,  mg/dL (2+) (A) Negative    Leuk Esterase, UA Trace (A) Negative    Nitrite, UA Positive (A) Negative    Urobilinogen, UA >=8.0 E.U./dL (A) 0.2 - 1.0 E.U./dL   High Sensitivity Troponin T    Specimen: Arm, Left; Blood   Result Value Ref Range    HS Troponin T 17 (H) <10 ng/L   CBC Auto Differential    Specimen: Arm, Left; Blood   Result Value Ref Range    WBC 8.20 3.40 - 10.80 10*3/mm3    RBC 3.04 (L) 3.77 - 5.28 10*6/mm3    Hemoglobin 10.5 (L) 12.0 - 15.9 g/dL    Hematocrit 32.9 (L) 34.0 - 46.6 %    .2 (H) 79.0 - 97.0 fL    MCH 34.5 (H) 26.6 - 33.0 pg    MCHC 31.9 31.5 - 35.7 g/dL    RDW 12.6 12.3 - 15.4 %    RDW-SD 50.3 37.0 - 54.0 fl    MPV 9.2 6.0 - 12.0 fL    Platelets 233 140 - 450 10*3/mm3    Neutrophil % 73.3 42.7 - 76.0 %    Lymphocyte % 6.8 (L) 19.6 - 45.3 %    Monocyte % 18.5 (H) 5.0 - 12.0 %    Eosinophil % 0.0 (L) 0.3 - 6.2 %    Basophil % 0.5 0.0 - 1.5 %    Immature Grans % 0.9 (H) 0.0 - 0.5 %    Neutrophils, Absolute 6.01 1.70 - 7.00 10*3/mm3    Lymphocytes, Absolute 0.56 (L) 0.70 - 3.10 10*3/mm3    Monocytes, Absolute 1.52 (H) 0.10 - 0.90 10*3/mm3    Eosinophils, Absolute 0.00 0.00 - 0.40 10*3/mm3    Basophils, Absolute 0.04 0.00 - 0.20 10*3/mm3    Immature Grans, Absolute 0.07 (H) 0.00 - 0.05 10*3/mm3    nRBC 0.0 0.0 - 0.2 /100 WBC   BNP    Specimen: Arm, Left; Blood   Result Value Ref Range    proBNP 2,203.0 (H) 0.0 - 1,800.0 pg/mL   High Sensitivity Troponin T 2Hr    Specimen: Arm, Left; Blood   Result Value Ref Range    HS Troponin T 14 (H) <10 ng/L    Troponin T Delta -3 >=-4 - <+4 ng/L   Urinalysis, Microscopic Only - Urine, Clean Catch    Specimen: Urine, Clean Catch   Result Value Ref Range    RBC, UA None Seen None Seen, 0-2 /HPF    WBC, UA 0-2 None Seen, 0-2 /HPF    Bacteria, UA Trace (A) None Seen /HPF    Squamous Epithelial Cells, UA 0-2 None Seen, 0-2  /HPF    Hyaline Casts, UA None Seen None Seen /LPF    Methodology Manual Light Microscopy    Lactic Acid, Plasma    Specimen: Arm, Left; Blood   Result Value Ref Range    Lactate 1.3 0.5 - 2.0 mmol/L   ECG 12 Lead Other; weakness   Result Value Ref Range    QT Interval 416 ms    QTC Interval 458 ms     XR Chest AP   Final Result     Cardiomegaly. No acute cardiopulmonary findings.       This report was finalized on 4/3/2023 1:39 PM by Dr. Julio Perez MD.          CT Head Without Contrast   Final Result     Stable exam. No CT evidence of acute intracranial abnormality.       This report was finalized on 4/3/2023 11:55 AM by Dr. Julio Perez MD.                  ED Course  ED Course as of 04/17/23 1029   Mon Apr 03, 2023   1132 EKG notes sinus rhythm.  73 bpm.  Occasional PVC.  No acute ST elevation.    Electronically signed by Ananda Ga DO, 04/03/23, 11:33 AM EDT.   [SF]   1838 Patient declines admission Patient to be discharged into care of family on oral antibiotics. Emphasized proper hydration and follow up.  [TONIE]      ED Course User Index  [TONIE] Osman Sevilla, APRN  [SF] Ananda Ga DO            Results for orders placed or performed during the hospital encounter of 04/03/23   COVID-19 and FLU A/B PCR - Swab, Nasopharynx    Specimen: Nasopharynx; Swab   Result Value Ref Range    COVID19 Not Detected Not Detected - Ref. Range    Influenza A PCR Not Detected Not Detected    Influenza B PCR Not Detected Not Detected   Blood Culture - Blood, Arm, Left    Specimen: Arm, Left; Blood   Result Value Ref Range    Blood Culture No growth at 5 days    Blood Culture - Blood, Hand, Left    Specimen: Hand, Left; Blood   Result Value Ref Range    Blood Culture No growth at 5 days    aPTT    Specimen: Arm, Left; Blood   Result Value Ref Range    PTT 38.2 (H) 26.5 - 34.5 seconds   Protime-INR    Specimen: Arm, Left; Blood   Result Value Ref Range    Protime 15.1 (H) 12.1 - 14.7 Seconds    INR 1.16 (H) 0.90 -  1.10   Comprehensive Metabolic Panel    Specimen: Arm, Left; Blood   Result Value Ref Range    Glucose 143 (H) 65 - 99 mg/dL    BUN 23 8 - 23 mg/dL    Creatinine 1.32 (H) 0.57 - 1.00 mg/dL    Sodium 134 (L) 136 - 145 mmol/L    Potassium 3.6 3.5 - 5.2 mmol/L    Chloride 96 (L) 98 - 107 mmol/L    CO2 22.7 22.0 - 29.0 mmol/L    Calcium 9.7 8.6 - 10.5 mg/dL    Total Protein 7.8 6.0 - 8.5 g/dL    Albumin 3.2 (L) 3.5 - 5.2 g/dL    ALT (SGPT) 10 1 - 33 U/L    AST (SGOT) 15 1 - 32 U/L    Alkaline Phosphatase 110 39 - 117 U/L    Total Bilirubin 1.1 0.0 - 1.2 mg/dL    Globulin 4.6 gm/dL    A/G Ratio 0.7 g/dL    BUN/Creatinine Ratio 17.4 7.0 - 25.0    Anion Gap 15.3 (H) 5.0 - 15.0 mmol/L    eGFR 39.6 (L) >60.0 mL/min/1.73   Lipase    Specimen: Arm, Left; Blood   Result Value Ref Range    Lipase 27 13 - 60 U/L   Amylase    Specimen: Arm, Left; Blood   Result Value Ref Range    Amylase 31 28 - 100 U/L   Urinalysis With Culture If Indicated - Urine, Clean Catch    Specimen: Urine, Clean Catch   Result Value Ref Range    Color, UA Dark Yellow (A) Yellow, Straw    Appearance, UA Cloudy (A) Clear    pH, UA 5.5 5.0 - 8.0    Specific Gravity, UA 1.021 1.005 - 1.030    Glucose, UA Negative Negative    Ketones, UA Negative Negative    Bilirubin, UA Small (1+) (A) Negative    Blood, UA Negative Negative    Protein,  mg/dL (2+) (A) Negative    Leuk Esterase, UA Trace (A) Negative    Nitrite, UA Positive (A) Negative    Urobilinogen, UA >=8.0 E.U./dL (A) 0.2 - 1.0 E.U./dL   High Sensitivity Troponin T    Specimen: Arm, Left; Blood   Result Value Ref Range    HS Troponin T 17 (H) <10 ng/L   CBC Auto Differential    Specimen: Arm, Left; Blood   Result Value Ref Range    WBC 8.20 3.40 - 10.80 10*3/mm3    RBC 3.04 (L) 3.77 - 5.28 10*6/mm3    Hemoglobin 10.5 (L) 12.0 - 15.9 g/dL    Hematocrit 32.9 (L) 34.0 - 46.6 %    .2 (H) 79.0 - 97.0 fL    MCH 34.5 (H) 26.6 - 33.0 pg    MCHC 31.9 31.5 - 35.7 g/dL    RDW 12.6 12.3 - 15.4 %     RDW-SD 50.3 37.0 - 54.0 fl    MPV 9.2 6.0 - 12.0 fL    Platelets 233 140 - 450 10*3/mm3    Neutrophil % 73.3 42.7 - 76.0 %    Lymphocyte % 6.8 (L) 19.6 - 45.3 %    Monocyte % 18.5 (H) 5.0 - 12.0 %    Eosinophil % 0.0 (L) 0.3 - 6.2 %    Basophil % 0.5 0.0 - 1.5 %    Immature Grans % 0.9 (H) 0.0 - 0.5 %    Neutrophils, Absolute 6.01 1.70 - 7.00 10*3/mm3    Lymphocytes, Absolute 0.56 (L) 0.70 - 3.10 10*3/mm3    Monocytes, Absolute 1.52 (H) 0.10 - 0.90 10*3/mm3    Eosinophils, Absolute 0.00 0.00 - 0.40 10*3/mm3    Basophils, Absolute 0.04 0.00 - 0.20 10*3/mm3    Immature Grans, Absolute 0.07 (H) 0.00 - 0.05 10*3/mm3    nRBC 0.0 0.0 - 0.2 /100 WBC   BNP    Specimen: Arm, Left; Blood   Result Value Ref Range    proBNP 2,203.0 (H) 0.0 - 1,800.0 pg/mL   High Sensitivity Troponin T 2Hr    Specimen: Arm, Left; Blood   Result Value Ref Range    HS Troponin T 14 (H) <10 ng/L    Troponin T Delta -3 >=-4 - <+4 ng/L   Urinalysis, Microscopic Only - Urine, Clean Catch    Specimen: Urine, Clean Catch   Result Value Ref Range    RBC, UA None Seen None Seen, 0-2 /HPF    WBC, UA 0-2 None Seen, 0-2 /HPF    Bacteria, UA Trace (A) None Seen /HPF    Squamous Epithelial Cells, UA 0-2 None Seen, 0-2 /HPF    Hyaline Casts, UA None Seen None Seen /LPF    Methodology Manual Light Microscopy    Lactic Acid, Plasma    Specimen: Arm, Left; Blood   Result Value Ref Range    Lactate 1.3 0.5 - 2.0 mmol/L   ECG 12 Lead Other; weakness   Result Value Ref Range    QT Interval 416 ms    QTC Interval 458 ms     XR Chest AP   Final Result     Cardiomegaly. No acute cardiopulmonary findings.       This report was finalized on 4/3/2023 1:39 PM by Dr. Julio Perez MD.          CT Head Without Contrast   Final Result     Stable exam. No CT evidence of acute intracranial abnormality.       This report was finalized on 4/3/2023 11:55 AM by Dr. Julio Perez MD.                                              Medical Decision Making  89-year-old female presents  emergency room today with weakness and abdominal pain  Patient's daughter is present with patient and reports the patient has not been eating or drinking for several days.  She reports the patient has been weaker than usual as a complaint of right-sided abdominal pain.  She reports the patient has had difficulty walking.  Patient daughter feels that she is dehydrated due to not eating or drinking.  Patient reports that she does have right-sided pain is worse with walking.  Patient does report that she does not feel like she is she can remember the last time she did not eat or drink anything.  Patient probably reports pain is moderate in nature..    Acute UTI: acute illness or injury  Weakness: acute illness or injury  Amount and/or Complexity of Data Reviewed  Labs: ordered. Decision-making details documented in ED Course.  Radiology: ordered. Decision-making details documented in ED Course.  ECG/medicine tests: ordered. Decision-making details documented in ED Course.      Risk  Prescription drug management.          Final diagnoses:   Acute UTI   Weakness       ED Disposition  ED Disposition     ED Disposition   Discharge    Condition   Stable    Comment   --             Evelio Guerin, DO  [address] [place] [id number] [phone]    Schedule an appointment as soon as possible for a visit   For further evaluation         Medication List      ASK your doctor about these medications    cefdinir 300 MG capsule  Commonly known as: OMNICEF  Take 1 capsule by mouth 2 (Two) Times a Day for 7 days.  Ask about: Should I take this medication?           Where to Get Your Medications      You can get these medications from any pharmacy    Bring a paper prescription for each of these medications  · cefdinir 300 MG capsule          Osman Sevilla, APRN  04/17/23 [id number]

## 2023-04-04 ENCOUNTER — TELEPHONE (OUTPATIENT)
Dept: FAMILY MEDICINE CLINIC | Facility: CLINIC | Age: 88
End: 2023-04-04
Payer: MEDICARE

## 2023-04-04 DIAGNOSIS — E03.9 ACQUIRED HYPOTHYROIDISM: Primary | ICD-10-CM

## 2023-04-04 RX ORDER — LEVOTHYROXINE SODIUM 0.07 MG/1
75 TABLET ORAL
Qty: 60 TABLET | Refills: 0 | Status: SHIPPED | OUTPATIENT
Start: 2023-04-04 | End: 2023-04-14 | Stop reason: SDUPTHER

## 2023-04-04 NOTE — TELEPHONE ENCOUNTER
Are you sure?  You were given a 6-month supply approximately 9 months ago.  My records indicate that you would have been out.  The question is, how much to increase your levothyroxine.  Too much could be harmful, not taking enough could be harmful.  She needs follow-up with me.  For now, increased her levothyroxine up from 50 mcg daily, up to 75 mcg daily.  Further increases may be needed, though care should be taken in this situation.  Follow-up as already scheduled for couple weeks from now

## 2023-04-04 NOTE — TELEPHONE ENCOUNTER
Are you sure?  You were given a 6-month supply approximately 9 months ago.  My records indicate that you would have been out.  The question is, how much to increase your levothyroxine.  Too much could be harmful, not taking enough could be harmful.  She needs follow-up with me.  For now, increased her levothyroxine up from 50 mcg daily, up to 75 mcg daily.  Further increases may be needed, though care should be taken in this situation.  Follow-up as already scheduled for couple weeks from now      Spoke with her again she is adamant that she takes it everyday,verbalized understanding.

## 2023-04-04 NOTE — TELEPHONE ENCOUNTER
----- Message from Evelio Guerin, DO sent at 4/3/2023  1:42 PM EDT -----  Of note, she is in the emergency room currently.    I reviewed your blood work.  Your thyroid is underfunctioning.  Previously, you are taking levothyroxine.  Recommend discussion of this at your follow-up in a couple weeks.  We are going to adjust your medications.  Otherwise, no major concerns with your blood work.      Mail box is not set up at this time.    Spoke with patient she reports she takes her Levothyroxine everyday & just ran out a day or two ago?   normal...

## 2023-04-05 LAB
QT INTERVAL: 416 MS
QTC INTERVAL: 458 MS

## 2023-04-05 NOTE — TELEPHONE ENCOUNTER
Start taking 75 mcg daily.  We will recheck in a month or so    Notified & verbalized understanding.

## 2023-04-08 LAB
BACTERIA SPEC AEROBE CULT: NORMAL
BACTERIA SPEC AEROBE CULT: NORMAL

## 2023-04-14 ENCOUNTER — OFFICE VISIT (OUTPATIENT)
Dept: FAMILY MEDICINE CLINIC | Facility: CLINIC | Age: 88
End: 2023-04-14
Payer: MEDICARE

## 2023-04-14 VITALS
TEMPERATURE: 97.6 F | HEIGHT: 66 IN | SYSTOLIC BLOOD PRESSURE: 150 MMHG | OXYGEN SATURATION: 95 % | DIASTOLIC BLOOD PRESSURE: 84 MMHG | WEIGHT: 121.6 LBS | HEART RATE: 62 BPM | BODY MASS INDEX: 19.54 KG/M2

## 2023-04-14 DIAGNOSIS — I10 ESSENTIAL HYPERTENSION: Chronic | ICD-10-CM

## 2023-04-14 DIAGNOSIS — N30.00 ACUTE CYSTITIS WITHOUT HEMATURIA: Primary | ICD-10-CM

## 2023-04-14 DIAGNOSIS — E03.9 ACQUIRED HYPOTHYROIDISM: ICD-10-CM

## 2023-04-14 PROCEDURE — 99214 OFFICE O/P EST MOD 30 MIN: CPT | Performed by: FAMILY MEDICINE

## 2023-04-14 PROCEDURE — 3079F DIAST BP 80-89 MM HG: CPT | Performed by: FAMILY MEDICINE

## 2023-04-14 PROCEDURE — 3077F SYST BP >= 140 MM HG: CPT | Performed by: FAMILY MEDICINE

## 2023-04-14 RX ORDER — LEVOTHYROXINE SODIUM 0.07 MG/1
75 TABLET ORAL
Qty: 90 TABLET | Refills: 0 | Status: SHIPPED | OUTPATIENT
Start: 2023-04-14

## 2023-04-14 RX ORDER — CLONIDINE HYDROCHLORIDE 0.1 MG/1
0.1 TABLET ORAL 2 TIMES DAILY
Qty: 180 TABLET | Refills: 0 | Status: SHIPPED | OUTPATIENT
Start: 2023-04-14

## 2023-04-14 NOTE — PROGRESS NOTES
Subjective   Lashell Case is a 87 y.o. female.   Pt presents today with CC of Hypertension      History of Present Illness   History of Present Illness  1.  Patient is an 87-year-old female here to follow-up on hypertension.  Previously, she has been missing some of the doses of her medications.  She has been taking all of her medications as directed.  Also, I had started clonidine 0.1 twice a day.  She has been taking it as directed, and her blood pressure has been remaining normal at home.  She has hypothyroidism.  She needs refill of her levothyroxine.  She has not been taking it.  Her TSH was very abnormal.  #3 she was recently seen in the emergency room and diagnosed with a UTI.  She completed cefdinir.  Feels better.       The following portions of the patient's history were reviewed and updated as appropriate: allergies, current medications, past family history, past medical history, past social history, past surgical history and problem list.    Review of Systems   Constitutional: Negative for chills, fever and unexpected weight loss.   HENT: Negative for congestion and sore throat.    Eyes: Negative for blurred vision and visual disturbance.   Respiratory: Negative for cough and wheezing.    Cardiovascular: Negative for chest pain and palpitations.   Gastrointestinal: Negative for abdominal pain and diarrhea.   Endocrine: Negative for cold intolerance and heat intolerance.   Genitourinary: Negative for dysuria.   Musculoskeletal: Negative for arthralgias and neck stiffness.   Neurological: Negative for dizziness, seizures and syncope.   Psychiatric/Behavioral: Negative for self-injury, suicidal ideas and depressed mood.       Objective   Physical Exam  Vitals and nursing note reviewed.   Constitutional:       Appearance: She is well-developed.   HENT:      Head: Normocephalic and atraumatic.      Right Ear: External ear normal.      Left Ear: External ear normal.      Nose: Nose normal.   Eyes:       Conjunctiva/sclera: Conjunctivae normal.      Pupils: Pupils are equal, round, and reactive to light.   Cardiovascular:      Rate and Rhythm: Normal rate and regular rhythm.      Heart sounds: Normal heart sounds.   Pulmonary:      Effort: Pulmonary effort is normal.      Breath sounds: Normal breath sounds.   Abdominal:      General: Bowel sounds are normal.      Palpations: Abdomen is soft.   Musculoskeletal:      Cervical back: Normal range of motion and neck supple.   Skin:     General: Skin is warm and dry.   Neurological:      Mental Status: She is alert and oriented to person, place, and time.   Psychiatric:         Behavior: Behavior normal.           Assessment & Plan   Diagnoses and all orders for this visit:    1. Acute cystitis without hematuria (Primary)  Following up from ER.  Took cefdinir.  Feels better.  Discussed rechecking urinalysis, decided against as she feels better.  2. Essential hypertension  -     cloNIDine (Catapres) 0.1 MG tablet; Take 1 tablet by mouth 2 (Two) Times a Day.  Dispense: 180 tablet; Refill: 0  Continue current blood pressure regimen as above, as well as her new clonidine 0.1.  She complains of a little fatigued when she takes it, but otherwise feels well.  She has surgery coming up, it will be imperative that she continue taking her medications as directed as her surgery will likely be canceled if she has grossly elevated blood pressure  3. Acquired hypothyroidism  -     levothyroxine (SYNTHROID, LEVOTHROID) 75 MCG tablet; Take 1 tablet by mouth Every Morning.  Dispense: 90 tablet; Refill: 0                    BMI is within normal parameters. No other follow-up for BMI required.          This document has been electronically signed by Evelyne Gutierrez  April 14, 2023 11:36 EDT    Dictated Utilizing Dragon Dictation: Part of this note may be an electronic transcription/translation of spoken language to printed text using the Dragon Dictation System.

## 2023-08-06 ENCOUNTER — APPOINTMENT (OUTPATIENT)
Dept: CT IMAGING | Facility: HOSPITAL | Age: 88
DRG: 640 | End: 2023-08-06
Payer: MEDICARE

## 2023-08-06 ENCOUNTER — APPOINTMENT (OUTPATIENT)
Dept: GENERAL RADIOLOGY | Facility: HOSPITAL | Age: 88
DRG: 640 | End: 2023-08-06
Payer: MEDICARE

## 2023-08-06 ENCOUNTER — HOSPITAL ENCOUNTER (INPATIENT)
Facility: HOSPITAL | Age: 88
LOS: 15 days | Discharge: NURSING, REHAB OR LONG-TERM CARE | DRG: 640 | End: 2023-08-21
Attending: EMERGENCY MEDICINE | Admitting: HOSPITALIST
Payer: MEDICARE

## 2023-08-06 DIAGNOSIS — E03.9 ACQUIRED HYPOTHYROIDISM: ICD-10-CM

## 2023-08-06 DIAGNOSIS — D64.9 CHRONIC ANEMIA: ICD-10-CM

## 2023-08-06 DIAGNOSIS — R41.0 CONFUSION: ICD-10-CM

## 2023-08-06 DIAGNOSIS — K92.2 LGI BLEED: Primary | ICD-10-CM

## 2023-08-06 PROBLEM — G93.40 ACUTE ENCEPHALOPATHY: Status: ACTIVE | Noted: 2023-08-06

## 2023-08-06 LAB
ALBUMIN SERPL-MCNC: 2.5 G/DL (ref 3.5–5.2)
ALBUMIN/GLOB SERPL: 0.6 G/DL
ALP SERPL-CCNC: 106 U/L (ref 39–117)
ALT SERPL W P-5'-P-CCNC: 10 U/L (ref 1–33)
ANION GAP SERPL CALCULATED.3IONS-SCNC: 19 MMOL/L (ref 5–15)
ANISOCYTOSIS BLD QL: NORMAL
AST SERPL-CCNC: 21 U/L (ref 1–32)
BACTERIA UR QL AUTO: ABNORMAL /HPF
BASOPHILS # BLD AUTO: 0.01 10*3/MM3 (ref 0–0.2)
BASOPHILS NFR BLD AUTO: 0.1 % (ref 0–1.5)
BILIRUB SERPL-MCNC: 1.2 MG/DL (ref 0–1.2)
BILIRUB UR QL STRIP: ABNORMAL
BUN SERPL-MCNC: 29 MG/DL (ref 8–23)
BUN/CREAT SERPL: 28.2 (ref 7–25)
CALCIUM SPEC-SCNC: 8.5 MG/DL (ref 8.6–10.5)
CHLORIDE SERPL-SCNC: 98 MMOL/L (ref 98–107)
CK SERPL-CCNC: 42 U/L (ref 20–180)
CLARITY UR: CLEAR
CO2 SERPL-SCNC: 18 MMOL/L (ref 22–29)
COLOR UR: ABNORMAL
CREAT SERPL-MCNC: 1.03 MG/DL (ref 0.57–1)
CRP SERPL-MCNC: 3.19 MG/DL (ref 0–0.5)
D-LACTATE SERPL-SCNC: 1.2 MMOL/L (ref 0.5–2)
DEPRECATED RDW RBC AUTO: 76.7 FL (ref 37–54)
EGFRCR SERPLBLD CKD-EPI 2021: 53.1 ML/MIN/1.73
EOSINOPHIL # BLD AUTO: 0.01 10*3/MM3 (ref 0–0.4)
EOSINOPHIL NFR BLD AUTO: 0.1 % (ref 0.3–6.2)
ERYTHROCYTE [DISTWIDTH] IN BLOOD BY AUTOMATED COUNT: 19.3 % (ref 12.3–15.4)
FLUAV RNA RESP QL NAA+PROBE: NOT DETECTED
FLUBV RNA ISLT QL NAA+PROBE: NOT DETECTED
GLOBULIN UR ELPH-MCNC: 4.1 GM/DL
GLUCOSE BLDC GLUCOMTR-MCNC: 69 MG/DL (ref 70–130)
GLUCOSE SERPL-MCNC: 93 MG/DL (ref 65–99)
GLUCOSE UR STRIP-MCNC: NEGATIVE MG/DL
HCT VFR BLD AUTO: 27.2 % (ref 34–46.6)
HCT VFR BLD AUTO: 29.7 % (ref 34–46.6)
HGB BLD-MCNC: 8.6 G/DL (ref 12–15.9)
HGB BLD-MCNC: 9.4 G/DL (ref 12–15.9)
HGB UR QL STRIP.AUTO: NEGATIVE
HOLD SPECIMEN: NORMAL
HOLD SPECIMEN: NORMAL
HYALINE CASTS UR QL AUTO: ABNORMAL /LPF
IMM GRANULOCYTES # BLD AUTO: 0.07 10*3/MM3 (ref 0–0.05)
IMM GRANULOCYTES NFR BLD AUTO: 1 % (ref 0–0.5)
KETONES UR QL STRIP: ABNORMAL
LEUKOCYTE ESTERASE UR QL STRIP.AUTO: ABNORMAL
LYMPHOCYTES # BLD AUTO: 0.79 10*3/MM3 (ref 0.7–3.1)
LYMPHOCYTES NFR BLD AUTO: 11.8 % (ref 19.6–45.3)
MACROCYTES BLD QL SMEAR: NORMAL
MCH RBC QN AUTO: 34.1 PG (ref 26.6–33)
MCHC RBC AUTO-ENTMCNC: 31.6 G/DL (ref 31.5–35.7)
MCV RBC AUTO: 107.6 FL (ref 79–97)
MONOCYTES # BLD AUTO: 0.44 10*3/MM3 (ref 0.1–0.9)
MONOCYTES NFR BLD AUTO: 6.6 % (ref 5–12)
NEUTROPHILS NFR BLD AUTO: 5.39 10*3/MM3 (ref 1.7–7)
NEUTROPHILS NFR BLD AUTO: 80.4 % (ref 42.7–76)
NITRITE UR QL STRIP: NEGATIVE
NRBC BLD AUTO-RTO: 0 /100 WBC (ref 0–0.2)
PH UR STRIP.AUTO: 6 [PH] (ref 5–8)
PLAT MORPH BLD: NORMAL
PLATELET # BLD AUTO: 252 10*3/MM3 (ref 140–450)
PMV BLD AUTO: 8.8 FL (ref 6–12)
POTASSIUM SERPL-SCNC: 3.5 MMOL/L (ref 3.5–5.2)
PROCALCITONIN SERPL-MCNC: 0.11 NG/ML (ref 0–0.25)
PROT SERPL-MCNC: 6.6 G/DL (ref 6–8.5)
PROT UR QL STRIP: ABNORMAL
QT INTERVAL: 394 MS
QTC INTERVAL: 460 MS
RBC # BLD AUTO: 2.76 10*6/MM3 (ref 3.77–5.28)
RBC # UR STRIP: ABNORMAL /HPF
REF LAB TEST METHOD: ABNORMAL
SARS-COV-2 RNA RESP QL NAA+PROBE: NOT DETECTED
SODIUM SERPL-SCNC: 135 MMOL/L (ref 136–145)
SP GR UR STRIP: 1.02 (ref 1–1.03)
SQUAMOUS #/AREA URNS HPF: ABNORMAL /HPF
UROBILINOGEN UR QL STRIP: ABNORMAL
WBC # UR STRIP: ABNORMAL /HPF
WBC NRBC COR # BLD: 6.71 10*3/MM3 (ref 3.4–10.8)
WHOLE BLOOD HOLD COAG: NORMAL
WHOLE BLOOD HOLD SPECIMEN: NORMAL

## 2023-08-06 PROCEDURE — 71045 X-RAY EXAM CHEST 1 VIEW: CPT | Performed by: RADIOLOGY

## 2023-08-06 PROCEDURE — 80053 COMPREHEN METABOLIC PANEL: CPT | Performed by: EMERGENCY MEDICINE

## 2023-08-06 PROCEDURE — 82550 ASSAY OF CK (CPK): CPT

## 2023-08-06 PROCEDURE — 93005 ELECTROCARDIOGRAM TRACING: CPT | Performed by: EMERGENCY MEDICINE

## 2023-08-06 PROCEDURE — 85025 COMPLETE CBC W/AUTO DIFF WBC: CPT | Performed by: EMERGENCY MEDICINE

## 2023-08-06 PROCEDURE — 83605 ASSAY OF LACTIC ACID: CPT | Performed by: EMERGENCY MEDICINE

## 2023-08-06 PROCEDURE — 99223 1ST HOSP IP/OBS HIGH 75: CPT | Performed by: HOSPITALIST

## 2023-08-06 PROCEDURE — 85007 BL SMEAR W/DIFF WBC COUNT: CPT | Performed by: EMERGENCY MEDICINE

## 2023-08-06 PROCEDURE — 81001 URINALYSIS AUTO W/SCOPE: CPT | Performed by: EMERGENCY MEDICINE

## 2023-08-06 PROCEDURE — 25010000002 VANCOMYCIN 1 G RECONSTITUTED SOLUTION 1 EACH VIAL: Performed by: EMERGENCY MEDICINE

## 2023-08-06 PROCEDURE — 82948 REAGENT STRIP/BLOOD GLUCOSE: CPT

## 2023-08-06 PROCEDURE — 71045 X-RAY EXAM CHEST 1 VIEW: CPT

## 2023-08-06 PROCEDURE — 25010000002 AZTREONAM PER 500 MG: Performed by: EMERGENCY MEDICINE

## 2023-08-06 PROCEDURE — 85018 HEMOGLOBIN: CPT | Performed by: HOSPITALIST

## 2023-08-06 PROCEDURE — P9612 CATHETERIZE FOR URINE SPEC: HCPCS

## 2023-08-06 PROCEDURE — 99285 EMERGENCY DEPT VISIT HI MDM: CPT

## 2023-08-06 PROCEDURE — 87636 SARSCOV2 & INF A&B AMP PRB: CPT | Performed by: EMERGENCY MEDICINE

## 2023-08-06 PROCEDURE — 36415 COLL VENOUS BLD VENIPUNCTURE: CPT

## 2023-08-06 PROCEDURE — 70450 CT HEAD/BRAIN W/O DYE: CPT

## 2023-08-06 PROCEDURE — 84145 PROCALCITONIN (PCT): CPT | Performed by: HOSPITALIST

## 2023-08-06 PROCEDURE — 86140 C-REACTIVE PROTEIN: CPT | Performed by: HOSPITALIST

## 2023-08-06 PROCEDURE — 87040 BLOOD CULTURE FOR BACTERIA: CPT | Performed by: EMERGENCY MEDICINE

## 2023-08-06 PROCEDURE — 85014 HEMATOCRIT: CPT | Performed by: HOSPITALIST

## 2023-08-06 PROCEDURE — C1751 CATH, INF, PER/CENT/MIDLINE: HCPCS

## 2023-08-06 PROCEDURE — 02HV33Z INSERTION OF INFUSION DEVICE INTO SUPERIOR VENA CAVA, PERCUTANEOUS APPROACH: ICD-10-PCS | Performed by: EMERGENCY MEDICINE

## 2023-08-06 RX ORDER — SODIUM CHLORIDE 0.9 % (FLUSH) 0.9 %
10 SYRINGE (ML) INJECTION AS NEEDED
Status: DISCONTINUED | OUTPATIENT
Start: 2023-08-06 | End: 2023-08-21 | Stop reason: HOSPADM

## 2023-08-06 RX ORDER — DEXTROSE AND SODIUM CHLORIDE 5; .45 G/100ML; G/100ML
50 INJECTION, SOLUTION INTRAVENOUS CONTINUOUS
Status: DISCONTINUED | OUTPATIENT
Start: 2023-08-06 | End: 2023-08-14

## 2023-08-06 RX ORDER — NITROGLYCERIN 0.4 MG/1
0.4 TABLET SUBLINGUAL
Status: DISCONTINUED | OUTPATIENT
Start: 2023-08-06 | End: 2023-08-21 | Stop reason: HOSPADM

## 2023-08-06 RX ORDER — BISACODYL 5 MG/1
5 TABLET, DELAYED RELEASE ORAL DAILY PRN
Status: DISCONTINUED | OUTPATIENT
Start: 2023-08-06 | End: 2023-08-21 | Stop reason: HOSPADM

## 2023-08-06 RX ORDER — POLYETHYLENE GLYCOL 3350 17 G/17G
17 POWDER, FOR SOLUTION ORAL DAILY PRN
Status: DISCONTINUED | OUTPATIENT
Start: 2023-08-06 | End: 2023-08-21 | Stop reason: HOSPADM

## 2023-08-06 RX ORDER — AMOXICILLIN 250 MG
2 CAPSULE ORAL 2 TIMES DAILY
Status: DISCONTINUED | OUTPATIENT
Start: 2023-08-06 | End: 2023-08-21 | Stop reason: HOSPADM

## 2023-08-06 RX ORDER — SODIUM CHLORIDE 9 MG/ML
40 INJECTION, SOLUTION INTRAVENOUS AS NEEDED
Status: DISCONTINUED | OUTPATIENT
Start: 2023-08-06 | End: 2023-08-21 | Stop reason: HOSPADM

## 2023-08-06 RX ORDER — SODIUM CHLORIDE 0.9 % (FLUSH) 0.9 %
10 SYRINGE (ML) INJECTION EVERY 12 HOURS SCHEDULED
Status: DISCONTINUED | OUTPATIENT
Start: 2023-08-06 | End: 2023-08-21 | Stop reason: HOSPADM

## 2023-08-06 RX ORDER — LIDOCAINE HYDROCHLORIDE 10 MG/ML
INJECTION, SOLUTION EPIDURAL; INFILTRATION; INTRACAUDAL; PERINEURAL
Status: COMPLETED
Start: 2023-08-06 | End: 2023-08-06

## 2023-08-06 RX ORDER — BISACODYL 10 MG
10 SUPPOSITORY, RECTAL RECTAL DAILY PRN
Status: DISCONTINUED | OUTPATIENT
Start: 2023-08-06 | End: 2023-08-21 | Stop reason: HOSPADM

## 2023-08-06 RX ORDER — SODIUM CHLORIDE 9 MG/ML
75 INJECTION, SOLUTION INTRAVENOUS CONTINUOUS
Status: DISCONTINUED | OUTPATIENT
Start: 2023-08-06 | End: 2023-08-06

## 2023-08-06 RX ADMIN — LIDOCAINE HYDROCHLORIDE: 10 INJECTION, SOLUTION EPIDURAL; INFILTRATION; INTRACAUDAL; PERINEURAL at 11:20

## 2023-08-06 RX ADMIN — Medication 10 ML: at 22:47

## 2023-08-06 RX ADMIN — DEXTROSE AND SODIUM CHLORIDE 100 ML/HR: 5; 450 INJECTION, SOLUTION INTRAVENOUS at 22:48

## 2023-08-06 RX ADMIN — DOCUSATE SODIUM 50 MG AND SENNOSIDES 8.6 MG 2 TABLET: 8.6; 5 TABLET, FILM COATED ORAL at 22:47

## 2023-08-06 RX ADMIN — SODIUM CHLORIDE 2 G: 9 INJECTION, SOLUTION INTRAVENOUS at 12:50

## 2023-08-06 RX ADMIN — VANCOMYCIN HYDROCHLORIDE 1000 MG: 1 INJECTION, POWDER, LYOPHILIZED, FOR SOLUTION INTRAVENOUS at 13:08

## 2023-08-06 NOTE — ED PROVIDER NOTES
Subjective   History of Present Illness  88-year-old white female here today for altered mental status.  Family states they tried to get to the patient to come to the hospital over the past 4 to 5 days.  They actually had the ambulance there twice, and the patient refused.  Today she was more weak, having periods of confusion, and agreed to come to the ER.  She has been laying in bed for the past 4 days, with decreased oral intake.  She has had multiple episodes of incontinence.  She may have had some shortness of breath, but denies any at this time.  She does answer questions appropriately, but is hard of hearing.    Review of Systems   All other systems reviewed and are negative.    Past Medical History:   Diagnosis Date    Arthritis     Breast cancer 2015    lt br ca    Breast cancer 2006    rt br ca    Dizziness 7/8/2016    [place] (hard of hearing)     Hyperlipidemia     Hypertension     Hypothyroidism     Scabies exposure        Allergies   Allergen Reactions    Bactrim [Sulfamethoxazole-Trimethoprim] GI Intolerance    Ketorolac Nausea And Vomiting    Phenergan [Promethazine Hcl] Other (See Comments)     Restless legs and trouble swallowing, seizures    Codeine Nausea And Vomiting    Penicillins Rash     tolerates Keflex per patient       Past Surgical History:   Procedure Laterality Date    BREAST BIOPSY      BREAST LUMPECTOMY Right 2006    BREAST SURGERY      COLONOSCOPY N/A 8/9/2023    Procedure: COLONOSCOPY;  Surgeon: Romeo Valdez MD;  Location: [facility] OR;  Service: Gastroenterology;  Laterality: N/A;    ENDOSCOPY N/A 8/9/2023    Procedure: ESOPHAGOGASTRODUODENOSCOPY;  Surgeon: Romeo Valdez MD;  Location: [facility] OR;  Service: Gastroenterology;  Laterality: N/A;    ENDOSCOPY  8/8/2023    Procedure: ESOPHAGOGASTRODUODENOSCOPY;  Surgeon: Romeo Valdez MD;  Location: [facility] OR;  Service: Gastroenterology;;    MASTECTOMY Left 2015    ca    MASTECTOMY Right 2020    MASTECTOMY WITH SENTINEL  NODE BIOPSY AND AXILLARY NODE DISSECTION Right 11/17/2020    Procedure: BREAST MASTECTOMY WITH SENTINEL NODE BIOPSY AND AXILLARY NODE DISSECTION;  Surgeon: Cynthia Ward MD;  Location: [facility];  Service: General;  Laterality: Right;       Family History   Problem Relation Age of Onset    Hypertension Mother     Uterine cancer Mother     Diabetes Mother     Diabetes Daughter     Prostate cancer Son     Diabetes Son     Throat cancer Son     Hypertension Child     Breast cancer Neg Hx        Social History     Socioeconomic History    Marital status:    Tobacco Use    Smoking status: Never    Smokeless tobacco: Never   Vaping Use    Vaping Use: Never used   Substance and Sexual Activity    Alcohol use: No    Drug use: No    Sexual activity: Defer     Birth control/protection: None           Objective   Physical Exam  Vitals and nursing note reviewed.   Constitutional:       Appearance: She is well-developed and underweight.   HENT:      Head: Normocephalic.      Mouth/Throat:      Mouth: Mucous membranes are dry.      Pharynx: Oropharynx is clear.   Eyes:      Conjunctiva/sclera: Conjunctivae normal.   Cardiovascular:      Rate and Rhythm: Normal rate and regular rhythm.      Heart sounds: Normal heart sounds. No murmur heard.    No friction rub. No gallop.   Pulmonary:      Effort: Pulmonary effort is normal. No respiratory distress.      Breath sounds: Normal breath sounds. No wheezing, rhonchi or rales.   Chest:      Chest wall: No tenderness.   Abdominal:      General: Abdomen is flat. Bowel sounds are normal. There is no distension.      Palpations: Abdomen is soft.      Tenderness: There is abdominal tenderness (Diffuse, mild).   Genitourinary:     Comments: Grossly bloody stool noted in brief.  Musculoskeletal:         General: Normal range of motion.      Right lower leg: No edema.      Left lower leg: No edema.   Skin:     General: Skin is warm and dry.   Neurological:      General: No focal  deficit present.      Mental Status: She is alert.      Comments: Oriented x2.  Follows commands.  Moves all extremities.   Psychiatric:         Mood and Affect: Mood normal.         Behavior: Behavior normal.       Central Line At Bedside    Date/Time: 8/6/2023 11:47 AM  Performed by: Bradly Uribe MD  Authorized by: Bradly Uribe MD     Consent:     Consent obtained:  Written    Consent given by: Granddaughter.    Risks, benefits, and alternatives were discussed: yes      Risks discussed:  Arterial puncture, infection, bleeding, incorrect placement, nerve damage and pneumothorax    Alternatives discussed:  No treatment  Universal protocol:     Procedure explained and questions answered to patient or proxy's satisfaction: yes      Patient identity confirmed:  Verbally with patient  Pre-procedure details:     Indication(s): central venous access      Hand hygiene: Hand hygiene performed prior to insertion      Sterile barrier technique: All elements of maximal sterile technique followed      Skin preparation:  Chlorhexidine    Skin preparation agent: Skin preparation agent completely dried prior to procedure    Sedation:     Sedation type:  None  Anesthesia:     Anesthesia method:  Local infiltration    Local anesthetic:  Lidocaine 1% w/o epi  Procedure details:     Location:  L internal jugular    Patient position:  Trendelenburg    Procedural supplies:  Triple lumen    Catheter size:  7 Fr    Landmarks identified: yes      Ultrasound guidance: no      Number of attempts:  1    Successful placement: yes    Post-procedure details:     Post-procedure:  Dressing applied and line sutured    Assessment:  Blood return through all ports, no pneumothorax on x-ray, placement verified by x-ray and free fluid flow    Procedure completion:  Tolerated  Comments:      Patient's status post bilateral mastectomy.  3 unsuccessful right IJ attempts.  Results for orders placed or performed during the hospital encounter of 08/06/23    Blood Culture - Blood, Blood, Central Line    Specimen: Blood, Central Line   Result Value Ref Range    Blood Culture No growth at 5 days    Blood Culture - Blood, Blood, Central Line    Specimen: Blood, Central Line   Result Value Ref Range    Blood Culture No growth at 5 days    COVID-19 and FLU A/B PCR - Swab, Nasopharynx    Specimen: Nasopharynx; Swab   Result Value Ref Range    COVID19 Not Detected Not Detected - Ref. Range    Influenza A PCR Not Detected Not Detected    Influenza B PCR Not Detected Not Detected   Comprehensive Metabolic Panel    Specimen: Blood   Result Value Ref Range    Glucose 93 65 - 99 mg/dL    BUN 29 (H) 8 - 23 mg/dL    Creatinine 1.03 (H) 0.57 - 1.00 mg/dL    Sodium 135 (L) 136 - 145 mmol/L    Potassium 3.5 3.5 - 5.2 mmol/L    Chloride 98 98 - 107 mmol/L    CO2 18.0 (L) 22.0 - 29.0 mmol/L    Calcium 8.5 (L) 8.6 - 10.5 mg/dL    Total Protein 6.6 6.0 - 8.5 g/dL    Albumin 2.5 (L) 3.5 - 5.2 g/dL    ALT (SGPT) 10 1 - 33 U/L    AST (SGOT) 21 1 - 32 U/L    Alkaline Phosphatase 106 39 - 117 U/L    Total Bilirubin 1.2 0.0 - 1.2 mg/dL    Globulin 4.1 gm/dL    A/G Ratio 0.6 g/dL    BUN/Creatinine Ratio 28.2 (H) 7.0 - 25.0    Anion Gap 19.0 (H) 5.0 - 15.0 mmol/L    eGFR 53.1 (L) >60.0 mL/min/1.73   Lactic Acid, Plasma    Specimen: Blood   Result Value Ref Range    Lactate 1.2 0.5 - 2.0 mmol/L   CBC Auto Differential    Specimen: Blood   Result Value Ref Range    WBC 6.71 3.40 - 10.80 10*3/mm3    RBC 2.76 (L) 3.77 - 5.28 10*6/mm3    Hemoglobin 9.4 (L) 12.0 - 15.9 g/dL    Hematocrit 29.7 (L) 34.0 - 46.6 %    .6 (H) 79.0 - 97.0 fL    MCH 34.1 (H) 26.6 - 33.0 pg    MCHC 31.6 31.5 - 35.7 g/dL    RDW 19.3 (H) 12.3 - 15.4 %    RDW-SD 76.7 (H) 37.0 - 54.0 fl    MPV 8.8 6.0 - 12.0 fL    Platelets 252 140 - 450 10*3/mm3    Neutrophil % 80.4 (H) 42.7 - 76.0 %    Lymphocyte % 11.8 (L) 19.6 - 45.3 %    Monocyte % 6.6 5.0 - 12.0 %    Eosinophil % 0.1 (L) 0.3 - 6.2 %    Basophil % 0.1 0.0 - 1.5 %     Immature Grans % 1.0 (H) 0.0 - 0.5 %    Neutrophils, Absolute 5.39 1.70 - 7.00 10*3/mm3    Lymphocytes, Absolute 0.79 0.70 - 3.10 10*3/mm3    Monocytes, Absolute 0.44 0.10 - 0.90 10*3/mm3    Eosinophils, Absolute 0.01 0.00 - 0.40 10*3/mm3    Basophils, Absolute 0.01 0.00 - 0.20 10*3/mm3    Immature Grans, Absolute 0.07 (H) 0.00 - 0.05 10*3/mm3    nRBC 0.0 0.0 - 0.2 /100 WBC   Scan Slide    Specimen: Blood   Result Value Ref Range    Anisocytosis Mod/2+ None Seen    Macrocytes Mod/2+ None Seen    Platelet Morphology Normal Normal   Urinalysis With Culture If Indicated - Urine, Catheter    Specimen: Urine, Catheter   Result Value Ref Range    Color, UA Dark Yellow (A) Yellow, Straw    Appearance, UA Clear Clear    pH, UA 6.0 5.0 - 8.0    Specific Gravity, UA 1.023 1.005 - 1.030    Glucose, UA Negative Negative    Ketones, UA 15 mg/dL (1+) (A) Negative    Bilirubin, UA Moderate (2+) (A) Negative    Blood, UA Negative Negative    Protein, UA 30 mg/dL (1+) (A) Negative    Leuk Esterase, UA Trace (A) Negative    Nitrite, UA Negative Negative    Urobilinogen, UA 1.0 E.U./dL 0.2 - 1.0 E.U./dL   Urinalysis, Microscopic Only - Urine, Catheter    Specimen: Urine, Catheter   Result Value Ref Range    RBC, UA 0-2 None Seen, 0-2 /HPF    WBC, UA 0-2 None Seen, 0-2 /HPF    Bacteria, UA None Seen None Seen /HPF    Squamous Epithelial Cells, UA 3-6 (A) None Seen, 0-2 /HPF    Hyaline Casts, UA 7-12 None Seen /LPF    Methodology Automated Microscopy    Hemoglobin & Hematocrit, Blood    Specimen: Blood   Result Value Ref Range    Hemoglobin 8.6 (L) 12.0 - 15.9 g/dL    Hematocrit 27.2 (L) 34.0 - 46.6 %   C-reactive Protein    Specimen: Blood   Result Value Ref Range    C-Reactive Protein 3.19 (H) 0.00 - 0.50 mg/dL   Procalcitonin    Specimen: Blood   Result Value Ref Range    Procalcitonin 0.11 0.00 - 0.25 ng/mL   CK    Specimen: Blood   Result Value Ref Range    Creatine Kinase 42 20 - 180 U/L   CBC Auto Differential    Specimen: Blood    Result Value Ref Range    WBC 5.92 3.40 - 10.80 10*3/mm3    RBC 2.65 (L) 3.77 - 5.28 10*6/mm3    Hemoglobin 9.3 (L) 12.0 - 15.9 g/dL    Hematocrit 28.3 (L) 34.0 - 46.6 %    .8 (H) 79.0 - 97.0 fL    MCH 35.1 (H) 26.6 - 33.0 pg    MCHC 32.9 31.5 - 35.7 g/dL    RDW 18.9 (H) 12.3 - 15.4 %    RDW-SD 75.5 (H) 37.0 - 54.0 fl    MPV 8.9 6.0 - 12.0 fL    Platelets 221 140 - 450 10*3/mm3    Neutrophil % 73.4 42.7 - 76.0 %    Lymphocyte % 15.9 (L) 19.6 - 45.3 %    Monocyte % 9.1 5.0 - 12.0 %    Eosinophil % 0.2 (L) 0.3 - 6.2 %    Basophil % 0.2 0.0 - 1.5 %    Immature Grans % 1.2 (H) 0.0 - 0.5 %    Neutrophils, Absolute 4.35 1.70 - 7.00 10*3/mm3    Lymphocytes, Absolute 0.94 0.70 - 3.10 10*3/mm3    Monocytes, Absolute 0.54 0.10 - 0.90 10*3/mm3    Eosinophils, Absolute 0.01 0.00 - 0.40 10*3/mm3    Basophils, Absolute 0.01 0.00 - 0.20 10*3/mm3    Immature Grans, Absolute 0.07 (H) 0.00 - 0.05 10*3/mm3    nRBC 0.0 0.0 - 0.2 /100 WBC   Comprehensive Metabolic Panel    Specimen: Blood   Result Value Ref Range    Glucose 123 (H) 65 - 99 mg/dL    BUN 25 (H) 8 - 23 mg/dL    Creatinine 0.97 0.57 - 1.00 mg/dL    Sodium 134 (L) 136 - 145 mmol/L    Potassium 3.4 (L) 3.5 - 5.2 mmol/L    Chloride 100 98 - 107 mmol/L    CO2 21.0 (L) 22.0 - 29.0 mmol/L    Calcium 8.2 (L) 8.6 - 10.5 mg/dL    Total Protein 6.4 6.0 - 8.5 g/dL    Albumin 2.4 (L) 3.5 - 5.2 g/dL    ALT (SGPT) 9 1 - 33 U/L    AST (SGOT) 19 1 - 32 U/L    Alkaline Phosphatase 100 39 - 117 U/L    Total Bilirubin 0.7 0.0 - 1.2 mg/dL    Globulin 4.0 gm/dL    A/G Ratio 0.6 g/dL    BUN/Creatinine Ratio 25.8 (H) 7.0 - 25.0    Anion Gap 13.0 5.0 - 15.0 mmol/L    eGFR 57.0 (L) >60.0 mL/min/1.73   Hemoglobin & Hematocrit, Blood    Specimen: Blood   Result Value Ref Range    Hemoglobin 8.7 (L) 12.0 - 15.9 g/dL    Hematocrit 27.3 (L) 34.0 - 46.6 %   Scan Slide    Specimen: Blood   Result Value Ref Range    Anisocytosis Mod/2+ None Seen    Macrocytes Mod/2+ None Seen    Platelet  Morphology Normal Normal   Vitamin B12    Specimen: Blood   Result Value Ref Range    Vitamin B-12 1,486 (H) 211 - 946 pg/mL   Folate    Specimen: Blood   Result Value Ref Range    Folate 5.23 4.78 - 24.20 ng/mL   TSH    Specimen: Blood   Result Value Ref Range    TSH 69.300 (H) 0.270 - 4.200 uIU/mL   T4, Free    Specimen: Blood   Result Value Ref Range    Free T4 <0.10 (L) 0.93 - 1.70 ng/dL   Iron Profile    Specimen: Blood   Result Value Ref Range    Iron 55 37 - 145 mcg/dL    Iron Saturation (TSAT) 41 20 - 50 %    Transferrin 90 (L) 200 - 360 mg/dL    TIBC 134 (L) 298 - 536 mcg/dL   CBC (No Diff)    Specimen: Blood   Result Value Ref Range    WBC 5.37 3.40 - 10.80 10*3/mm3    RBC 2.37 (L) 3.77 - 5.28 10*6/mm3    Hemoglobin 8.3 (L) 12.0 - 15.9 g/dL    Hematocrit 25.1 (L) 34.0 - 46.6 %    .9 (H) 79.0 - 97.0 fL    MCH 35.0 (H) 26.6 - 33.0 pg    MCHC 33.1 31.5 - 35.7 g/dL    RDW 18.6 (H) 12.3 - 15.4 %    RDW-SD 73.3 (H) 37.0 - 54.0 fl    MPV 8.6 6.0 - 12.0 fL    Platelets 200 140 - 450 10*3/mm3   Basic Metabolic Panel    Specimen: Blood   Result Value Ref Range    Glucose 145 (H) 65 - 99 mg/dL    BUN 17 8 - 23 mg/dL    Creatinine 0.69 0.57 - 1.00 mg/dL    Sodium 129 (L) 136 - 145 mmol/L    Potassium 2.7 (L) 3.5 - 5.2 mmol/L    Chloride 99 98 - 107 mmol/L    CO2 23.9 22.0 - 29.0 mmol/L    Calcium 7.7 (L) 8.6 - 10.5 mg/dL    BUN/Creatinine Ratio 24.6 7.0 - 25.0    Anion Gap 6.1 5.0 - 15.0 mmol/L    eGFR 84.6 >60.0 mL/min/1.73   Magnesium    Specimen: Blood   Result Value Ref Range    Magnesium 1.9 1.6 - 2.4 mg/dL   Potassium    Specimen: Blood   Result Value Ref Range    Potassium 4.0 3.5 - 5.2 mmol/L   CBC (No Diff)    Specimen: Blood   Result Value Ref Range    WBC 4.89 3.40 - 10.80 10*3/mm3    RBC 2.21 (L) 3.77 - 5.28 10*6/mm3    Hemoglobin 7.8 (L) 12.0 - 15.9 g/dL    Hematocrit 23.9 (L) 34.0 - 46.6 %    .1 (H) 79.0 - 97.0 fL    MCH 35.3 (H) 26.6 - 33.0 pg    MCHC 32.6 31.5 - 35.7 g/dL    RDW 18.6 (H)  12.3 - 15.4 %    RDW-SD 72.8 (H) 37.0 - 54.0 fl    MPV 9.1 6.0 - 12.0 fL    Platelets 204 140 - 450 10*3/mm3   Basic Metabolic Panel    Specimen: Blood   Result Value Ref Range    Glucose 120 (H) 65 - 99 mg/dL    BUN 14 8 - 23 mg/dL    Creatinine 0.67 0.57 - 1.00 mg/dL    Sodium 128 (L) 136 - 145 mmol/L    Potassium 3.7 3.5 - 5.2 mmol/L    Chloride 99 98 - 107 mmol/L    CO2 22.3 22.0 - 29.0 mmol/L    Calcium 7.5 (L) 8.6 - 10.5 mg/dL    BUN/Creatinine Ratio 20.9 7.0 - 25.0    Anion Gap 6.7 5.0 - 15.0 mmol/L    eGFR 85.2 >60.0 mL/min/1.73   Basic Metabolic Panel    Specimen: Blood   Result Value Ref Range    Glucose 97 65 - 99 mg/dL    BUN 8 8 - 23 mg/dL    Creatinine 0.63 0.57 - 1.00 mg/dL    Sodium 128 (L) 136 - 145 mmol/L    Potassium 2.8 (L) 3.5 - 5.2 mmol/L    Chloride 100 98 - 107 mmol/L    CO2 18.6 (L) 22.0 - 29.0 mmol/L    Calcium 7.1 (L) 8.6 - 10.5 mg/dL    BUN/Creatinine Ratio 12.7 7.0 - 25.0    Anion Gap 9.4 5.0 - 15.0 mmol/L    eGFR 86.5 >60.0 mL/min/1.73   CBC Auto Differential    Specimen: Blood   Result Value Ref Range    WBC 5.39 3.40 - 10.80 10*3/mm3    RBC 2.17 (L) 3.77 - 5.28 10*6/mm3    Hemoglobin 7.6 (L) 12.0 - 15.9 g/dL    Hematocrit 22.9 (L) 34.0 - 46.6 %    .5 (H) 79.0 - 97.0 fL    MCH 35.0 (H) 26.6 - 33.0 pg    MCHC 33.2 31.5 - 35.7 g/dL    RDW 17.8 (H) 12.3 - 15.4 %    RDW-SD 68.6 (H) 37.0 - 54.0 fl    MPV 9.0 6.0 - 12.0 fL    Platelets 135 (L) 140 - 450 10*3/mm3    Neutrophil % 70.1 42.7 - 76.0 %    Lymphocyte % 17.4 (L) 19.6 - 45.3 %    Monocyte % 11.5 5.0 - 12.0 %    Eosinophil % 0.4 0.3 - 6.2 %    Basophil % 0.2 0.0 - 1.5 %    Immature Grans % 0.4 0.0 - 0.5 %    Neutrophils, Absolute 3.78 1.70 - 7.00 10*3/mm3    Lymphocytes, Absolute 0.94 0.70 - 3.10 10*3/mm3    Monocytes, Absolute 0.62 0.10 - 0.90 10*3/mm3    Eosinophils, Absolute 0.02 0.00 - 0.40 10*3/mm3    Basophils, Absolute 0.01 0.00 - 0.20 10*3/mm3    Immature Grans, Absolute 0.02 0.00 - 0.05 10*3/mm3    nRBC 0.0 0.0 - 0.2  /100 WBC   Potassium    Specimen: Blood   Result Value Ref Range    Potassium 4.8 3.5 - 5.2 mmol/L   Magnesium    Specimen: Blood   Result Value Ref Range    Magnesium 1.6 1.6 - 2.4 mg/dL   CBC (No Diff)    Specimen: Blood   Result Value Ref Range    WBC 5.66 3.40 - 10.80 10*3/mm3    RBC 2.58 (L) 3.77 - 5.28 10*6/mm3    Hemoglobin 8.9 (L) 12.0 - 15.9 g/dL    Hematocrit 26.6 (L) 34.0 - 46.6 %    .1 (H) 79.0 - 97.0 fL    MCH 34.5 (H) 26.6 - 33.0 pg    MCHC 33.5 31.5 - 35.7 g/dL    RDW 17.9 (H) 12.3 - 15.4 %    RDW-SD 68.1 (H) 37.0 - 54.0 fl    MPV 8.6 6.0 - 12.0 fL    Platelets 125 (L) 140 - 450 10*3/mm3   Basic Metabolic Panel    Specimen: Blood   Result Value Ref Range    Glucose 91 65 - 99 mg/dL    BUN 10 8 - 23 mg/dL    Creatinine 0.81 0.57 - 1.00 mg/dL    Sodium 128 (L) 136 - 145 mmol/L    Potassium 4.8 3.5 - 5.2 mmol/L    Chloride 100 98 - 107 mmol/L    CO2 18.9 (L) 22.0 - 29.0 mmol/L    Calcium 7.7 (L) 8.6 - 10.5 mg/dL    BUN/Creatinine Ratio 12.3 7.0 - 25.0    Anion Gap 9.1 5.0 - 15.0 mmol/L    eGFR 70.8 >60.0 mL/min/1.73   Cortisol    Specimen: Blood   Result Value Ref Range    Cortisol 31.17   mcg/dL   Comprehensive Metabolic Panel    Specimen: Blood   Result Value Ref Range    Glucose 117 (H) 65 - 99 mg/dL    BUN 9 8 - 23 mg/dL    Creatinine 0.66 0.57 - 1.00 mg/dL    Sodium 126 (L) 136 - 145 mmol/L    Potassium 3.9 3.5 - 5.2 mmol/L    Chloride 100 98 - 107 mmol/L    CO2 18.5 (L) 22.0 - 29.0 mmol/L    Calcium 7.8 (L) 8.6 - 10.5 mg/dL    Total Protein 5.9 (L) 6.0 - 8.5 g/dL    Albumin 1.8 (L) 3.5 - 5.2 g/dL    ALT (SGPT) 9 1 - 33 U/L    AST (SGOT) 17 1 - 32 U/L    Alkaline Phosphatase 118 (H) 39 - 117 U/L    Total Bilirubin 0.4 0.0 - 1.2 mg/dL    Globulin 4.1 gm/dL    A/G Ratio 0.4 g/dL    BUN/Creatinine Ratio 13.6 7.0 - 25.0    Anion Gap 7.5 5.0 - 15.0 mmol/L    eGFR 85.6 >60.0 mL/min/1.73   CBC Auto Differential    Specimen: Blood   Result Value Ref Range    WBC 4.03 3.40 - 10.80 10*3/mm3    RBC 2.51  (L) 3.77 - 5.28 10*6/mm3    Hemoglobin 8.7 (L) 12.0 - 15.9 g/dL    Hematocrit 26.3 (L) 34.0 - 46.6 %    .8 (H) 79.0 - 97.0 fL    MCH 34.7 (H) 26.6 - 33.0 pg    MCHC 33.1 31.5 - 35.7 g/dL    RDW 17.8 (H) 12.3 - 15.4 %    RDW-SD 69.7 (H) 37.0 - 54.0 fl    MPV 9.6 6.0 - 12.0 fL    Platelets 159 140 - 450 10*3/mm3    Neutrophil % 59.1 42.7 - 76.0 %    Lymphocyte % 22.6 19.6 - 45.3 %    Monocyte % 15.9 (H) 5.0 - 12.0 %    Eosinophil % 0.7 0.3 - 6.2 %    Basophil % 0.7 0.0 - 1.5 %    Immature Grans % 1.0 (H) 0.0 - 0.5 %    Neutrophils, Absolute 2.38 1.70 - 7.00 10*3/mm3    Lymphocytes, Absolute 0.91 0.70 - 3.10 10*3/mm3    Monocytes, Absolute 0.64 0.10 - 0.90 10*3/mm3    Eosinophils, Absolute 0.03 0.00 - 0.40 10*3/mm3    Basophils, Absolute 0.03 0.00 - 0.20 10*3/mm3    Immature Grans, Absolute 0.04 0.00 - 0.05 10*3/mm3    nRBC 0.0 0.0 - 0.2 /100 WBC   POC Glucose Once    Specimen: Blood   Result Value Ref Range    Glucose 69 (L) 70 - 130 mg/dL   POC Glucose Once    Specimen: Blood   Result Value Ref Range    Glucose 86 70 - 130 mg/dL   POC Glucose Once    Specimen: Blood   Result Value Ref Range    Glucose 57 (L) 70 - 130 mg/dL   POC Glucose Once    Specimen: Blood   Result Value Ref Range    Glucose 127 70 - 130 mg/dL   POC Glucose Once    Specimen: Blood   Result Value Ref Range    Glucose 184 (H) 70 - 130 mg/dL   POC Glucose Once    Specimen: Blood   Result Value Ref Range    Glucose 156 (H) 70 - 130 mg/dL   POC Glucose Once    Specimen: Blood   Result Value Ref Range    Glucose 86 70 - 130 mg/dL   POC Glucose Once    Specimen: Blood   Result Value Ref Range    Glucose 117 70 - 130 mg/dL   POC Glucose Once    Specimen: Blood   Result Value Ref Range    Glucose 114 70 - 130 mg/dL   POC Glucose Once    Specimen: Blood   Result Value Ref Range    Glucose 132 (H) 70 - 130 mg/dL   POC Glucose Once    Specimen: Blood   Result Value Ref Range    Glucose 131 (H) 70 - 130 mg/dL   POC Glucose Once    Specimen: Blood    Result Value Ref Range    Glucose 71 70 - 130 mg/dL   POC Glucose Once    Specimen: Blood   Result Value Ref Range    Glucose 79 70 - 130 mg/dL   POC Glucose Once    Specimen: Blood   Result Value Ref Range    Glucose 103 70 - 130 mg/dL   POC Glucose Once    Specimen: Blood   Result Value Ref Range    Glucose 135 (H) 70 - 130 mg/dL   POC Glucose Once    Specimen: Blood   Result Value Ref Range    Glucose 77 70 - 130 mg/dL   POC Glucose Once    Specimen: Blood   Result Value Ref Range    Glucose 61 (L) 70 - 130 mg/dL   POC Glucose Once    Specimen: Blood   Result Value Ref Range    Glucose 53 (L) 70 - 130 mg/dL   POC Glucose Once    Specimen: Blood   Result Value Ref Range    Glucose 68 (L) 70 - 130 mg/dL   POC Glucose Once    Specimen: Blood   Result Value Ref Range    Glucose 132 (H) 70 - 130 mg/dL   POC Glucose Once    Specimen: Blood   Result Value Ref Range    Glucose 143 (H) 70 - 130 mg/dL   POC Glucose Once    Specimen: Blood   Result Value Ref Range    Glucose 137 (H) 70 - 130 mg/dL   POC Glucose Once    Specimen: Blood   Result Value Ref Range    Glucose 65 (L) 70 - 130 mg/dL   POC Glucose Once    Specimen: Blood   Result Value Ref Range    Glucose 124 70 - 130 mg/dL   POC Glucose Once    Specimen: Blood   Result Value Ref Range    Glucose 80 70 - 130 mg/dL   POC Glucose Once    Specimen: Blood   Result Value Ref Range    Glucose 144 (H) 70 - 130 mg/dL   POC Glucose Once    Specimen: Blood   Result Value Ref Range    Glucose 75 70 - 130 mg/dL   POC Glucose Once    Specimen: Blood   Result Value Ref Range    Glucose 72 70 - 130 mg/dL   POC Glucose Once    Specimen: Blood   Result Value Ref Range    Glucose 52 (L) 70 - 130 mg/dL   POC Glucose Once    Specimen: Blood   Result Value Ref Range    Glucose 155 (H) 70 - 130 mg/dL   POC Glucose Once    Specimen: Blood   Result Value Ref Range    Glucose 74 70 - 130 mg/dL   POC Glucose Once    Specimen: Blood   Result Value Ref Range    Glucose 88 70 - 130 mg/dL    POC Glucose Once    Specimen: Blood   Result Value Ref Range    Glucose 107 70 - 130 mg/dL   POC Glucose Once    Specimen: Blood   Result Value Ref Range    Glucose 111 70 - 130 mg/dL   POC Glucose Once    Specimen: Blood   Result Value Ref Range    Glucose 107 70 - 130 mg/dL   POC Glucose Once    Specimen: Blood   Result Value Ref Range    Glucose 120 70 - 130 mg/dL   POC Glucose Once    Specimen: Blood   Result Value Ref Range    Glucose 56 (L) 70 - 130 mg/dL   POC Glucose Once    Specimen: Blood   Result Value Ref Range    Glucose 43 (C) 70 - 130 mg/dL   POC Glucose Once    Specimen: Blood   Result Value Ref Range    Glucose 31 (C) 70 - 130 mg/dL   POC Glucose Once    Specimen: Blood   Result Value Ref Range    Glucose 156 (H) 70 - 130 mg/dL   POC Glucose Once    Specimen: Blood   Result Value Ref Range    Glucose 77 70 - 130 mg/dL   POC Glucose Once    Specimen: Blood   Result Value Ref Range    Glucose 66 (L) 70 - 130 mg/dL   POC Glucose Once    Specimen: Blood   Result Value Ref Range    Glucose 83 70 - 130 mg/dL   POC Glucose Once    Specimen: Blood   Result Value Ref Range    Glucose 116 70 - 130 mg/dL   POC Glucose Once    Specimen: Blood   Result Value Ref Range    Glucose 171 (H) 70 - 130 mg/dL   POC Glucose Once    Specimen: Blood   Result Value Ref Range    Glucose 60 (L) 70 - 130 mg/dL   POC Glucose Once    Specimen: Blood   Result Value Ref Range    Glucose 48 (C) 70 - 130 mg/dL   POC Glucose Once    Specimen: Blood   Result Value Ref Range    Glucose 111 70 - 130 mg/dL   POC Glucose Once    Specimen: Blood   Result Value Ref Range    Glucose 34 (C) 70 - 130 mg/dL   POC Glucose Once    Specimen: Blood   Result Value Ref Range    Glucose 116 70 - 130 mg/dL   ECG 12 Lead Altered Mental Status   Result Value Ref Range    QT Interval 394 ms    QTC Interval 460 ms   ECG 12 Lead Chest Pain   Result Value Ref Range    QT Interval 392 ms    QTC Interval 455 ms   Adult Transthoracic Echo Complete W/ Cont if  Necessary Per Protocol   Result Value Ref Range    LVIDd 3.6 cm    LVIDs 2.6 cm    IVSd 1.50 cm    LVPWd 1.80 cm    FS 27.8 %    IVS/LVPW 0.83 cm    ESV(cubed) 17.6 ml    LV Sys Vol (BSA corrected) 10.0 cm2    EDV(cubed) 46.7 ml    LV [place] Vol (BSA corrected) 34.5 cm2    LVOT area 3.1 cm2    LV mass(C)d 235.1 grams    LVOT diam 2.00 cm    EDV(MOD-sp4) 50.0 ml    ESV(MOD-sp4) 14.5 ml    SV(MOD-sp4) 35.5 ml    SI(MOD-sp4) 24.5 ml/m2    EF(MOD-sp4) 71.0 %    MV E max leif 72.4 cm/sec    MV A max leif 39.2 cm/sec    MV E/A 1.85     Med Peak E' Leif 5.6 cm/sec    Lat Peak E' Leif 10.8 cm/sec    Avg E/e' ratio 8.83     SV(LVOT) 58.1 ml    TAPSE (>1.6) 1.00 cm    LA dimension (2D)  4.0 cm    LV V1 max 74.2 cm/sec    LV V1 max PG 2.20 mmHg    LV V1 mean PG 1.00 mmHg    LV V1 VTI 18.5 cm    Ao pk leif 172.0 cm/sec    Ao max PG 11.8 mmHg    Ao mean PG 6.0 mmHg    Ao V2 VTI 26.9 cm    ANGELLA(I,D) 2.16 cm2    TR max leif 261.0 cm/sec    TR max PG 27.2 mmHg    RVSP(TR) 37.2 mmHg    RAP systole 10.0 mmHg    PA acc time 0.07 sec    Ao root diam 3.2 cm    ACS 0.80 cm   TISSUE EXAM, [facility] ([facility])    Specimen: A: Gastric, Antrum; Tissue    B: Large Intestine, Sigmoid Colon; Tissue   Result Value Ref Range    Reference Lab Report       [facility]  [address] [place] [address]  Phone: [phone] or [phone]  Fax: [phone]  Johnny Currie M.D., Medical Director    PATIENT NAME                           LABORATORY NO.  [id number]  DMITRY GARCIAJulio                    [id number]  [id number]                         AGE              SEX  SSN           CLIENT REF #  [facility]              88      1935  F    [id number]   [id number]    [address]                     REQUESTING M.D.     ATTENDING MCLAUDINE     COPY TO.  ERIK CASAREZ [id number]                   ELIAS TARANGO  DATE COLLECTED      DATE RECEIVED      DATE REPORTED  2023          "08/11/2023    DIAGNOSIS:  A.   GASTRIC ANTRUM, BIOPSY:  Chronic inactive gastritis  No H. pylori organisms identified on immunohistochemical stain (with  appropriately reactive controls)  Negative for intestinal metaplasia, dysplasia, or carcinoma    B.   SIGMOID COLON BIOPSY:  Fragments of benign colonic  mucosa with focal ulceration, active colitis  and reactive changes  Negative for dysplasia or carcinoma    CLINICAL HISTORY:  Lower gastrointestinal bleed    SPECIMENS RECEIVED:  A.  GASTRIC ANTRUM, BIOPSY  B.  SIGMOID COLON BIOPSY    MICROSCOPIC DESCRIPTION:  Tissue blocks are prepared and slides are examined microscopically on all  specimens. See diagnosis for details.    The internal and external (both positive and negative) controls reacted  appropriately. Some of our immunohistochemical and in situ hybridization  studies are performed as analyte specific reagents. The following statement  applies to those tests: This test was developed, and its performance  characteristics determined by [facility]. It has not been  cleared or approved by the US Food and Drug Administration. However, the  FDA has determined that approval and clearance are not necessary.    Professional interpretation rendered by Ismael Serrano M.D.,CARLOS.C.A.P. at [facility], [facility], [address] [place] [address].    GROSS DESCRIPTION:  A.  Labeled as \"gastric antrum biopsy\", consisting of 1 piece of tan soft tissue  measuring 0.3 x 0.2 x 0.2 cm, submitted entirely in 1 cassette.  SOG  B.  Labeled as \"sigmoid colon biopsy\", consisting of multiple pieces of tan  soft tissue measuring 0.7 x 0.4 x 0.2 cm, submitted entirely in 1 cassette.    REVIEWED, DIAGNOSED AND ELECTRONICALLY  SIGNED BY:    Ismael Serrano M.D.,F.C.A.P.  CPT CODES:  88305x2, 80854     Green Top (Gel)   Result Value Ref Range    Extra Tube Hold for add-ons.    Lavender Top   Result Value Ref Range    Extra Tube hold for add-on    Gold Top - SST   Result Value " Ref Range    Extra Tube Hold for add-ons.    Light Blue Top   Result Value Ref Range    Extra Tube Hold for add-ons.      Adult Transthoracic Echo Complete W/ Cont if Necessary Per Protocol    Addendum Date: 8/7/2023 Addendum:     Left ventricular wall thickness is consistent with moderate concentric hypertrophy.   Left ventricular ejection fraction appears to be greater than 70%.   There is mild calcification of the aortic valve mainly affecting the left coronary cusp(s). The aortic valve was poorly visualized but appears trileaflet.   Trace aortic valve regurgitation is present. Mild aortic valve stenosis is present.   The mitral valve is structurally normal with no significant stenosis present. Moderate mitral valve regurgitation is present.   There is a trivial pericardial effusion.   There is a large echogenic mass which seem to be surrounded by sac of fluid in the liver as noted in the subcostal views.   Comments: Consider CT scan of the abdomen for further evaluation of this abnormal finding in the liver.     Result Date: 8/7/2023  Narrative:   Left ventricular wall thickness is consistent with moderate concentric hypertrophy.   Left ventricular ejection fraction appears to be greater than 70%.   There is mild calcification of the aortic valve mainly affecting the left coronary cusp(s). The aortic valve was poorly visualized but appears trileaflet.   Trace aortic valve regurgitation is present. Mild aortic valve stenosis is present.   The mitral valve is structurally normal with no significant stenosis present. Moderate mitral valve regurgitation is present.   There is a trivial pericardial effusion.     CT Head Without Contrast    Result Date: 8/6/2023  Narrative: EXAMINATION:Computed tomography(CT)of the head without IV contrast  TECHNIQUE:CT of the head was performed in the supine position without intravenous contrast according to as low as reasonably achievable dose protocol. Axial CT utilized with slice  thickness of 5 mm presented in soft tissue and bone algorithms.  COMPARISON:No prior studies are available for review at the time of this dictation.  FINDINGS: Moderate parenchymal volume loss is noted. No acute intra-or extra-axial hemorrhage or fluid collections are identified. The ventricles are of normal size,shape,and morphology. The basilar cisterns are patent. No mass effect or midline shift is seen. The gray-white matter differentiation is normal. The visualized portions of the orbits,paranasal sinuses,and mastoids appear normal. No acute fracture is identified.      Impression:  No acute intracranial hemorrhage,midline shift,or significant mass effect.  This report was finalized on 8/6/2023 7:50 PM by Mary oGmez MD.      XR Chest 1 View    Result Date: 8/6/2023  Narrative: XR CHEST 1 VW-  CLINICAL INDICATION: Line placement   COMPARISON: 04/03/2023  TECHNIQUE: Single frontal view of the chest.  FINDINGS:  LUNGS: Left-sided central line has been placed. The tip is in the superior vena cava. No evidence of pneumothorax  HEART AND MEDIASTINUM: Heart and mediastinal contours are unremarkable   SKELETON: Bony and soft tissue structures are unremarkable.        Impression: Central line tip in the superior vena cava. No pneumothorax  This report was finalized on 8/6/2023 12:15 PM by Dr. Erich Jenkins MD.              ED Course  ED Course as of 08/15/23 1249   Sun Aug 06, 2023   1639 ECG 12 Lead Altered Mental Status  Atrial fibrillation.  Rate 82.  Left axis deviation.  Normal QT interval.  Q wave in lead V1 and V2.  No ST elevation or depression.  Abnormal EKG.  Interpreted by me.  Electronically signed by Bradly Uribe MD, 08/06/23, 4:40 PM EDT.   [BC]   7060 Reviewed results of work-up.  Have discussed with patient.  She initially was reluctant to stay in the hospital, but agrees to stay now that her granddaughter, Jayleen, is going to stay with her.  She does not seem to have any active GI bleeding at  this time, although she did have gross blood on exam on arrival.  Her hemoglobin is down slightly from 4 months ago.  Hemodynamically she is stable.  I discussed with Dr. Vera.  Patient will be admitted, see orders.  She recommended I contact surgery.  I talked to Dr. Valdez who will consult. [BC]   2043 Case was discussed with on-call general surgeon Dr. Valdez who will consult once admitted by the hospitalist team. [ES]      ED Course User Index  [BC] Bradly Uribe MD  [ES] Rajeev Michaels MD                                           Medical Decision Making  Problems Addressed:  Chronic anemia: complicated acute illness or injury  Confusion: complicated acute illness or injury  LGI bleed: complicated acute illness or injury    Amount and/or Complexity of Data Reviewed  Labs: ordered.  Radiology: ordered.  ECG/medicine tests: ordered. Decision-making details documented in ED Course.    Risk  Prescription drug management.  Decision regarding hospitalization.        Final diagnoses:   LGI bleed   Chronic anemia   Confusion       ED Disposition  ED Disposition       ED Disposition   Decision to Admit    Condition   --    Comment   Level of Care: Telemetry [5]   Diagnosis: Acute encephalopathy [525738]   Admitting Physician: STIVEN KLINE [[id number]]   Attending Physician: STIVEN KLINE [[id number]]   Certification: I Certify That Inpatient Hospital Services Are Medically Necessary For Greater Than 2 Midnights                 No follow-up provider specified.       Medication List      No changes were made to your prescriptions during this visit.            Bradly Uribe MD  08/06/23 5922       Bradly Uribe MD  08/15/23 5543
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
## 2023-08-07 ENCOUNTER — ANESTHESIA EVENT (OUTPATIENT)
Dept: PERIOP | Facility: HOSPITAL | Age: 88
DRG: 640 | End: 2023-08-07
Payer: MEDICARE

## 2023-08-07 ENCOUNTER — APPOINTMENT (OUTPATIENT)
Dept: CARDIOLOGY | Facility: HOSPITAL | Age: 88
DRG: 640 | End: 2023-08-07
Payer: MEDICARE

## 2023-08-07 PROBLEM — K92.2 LGI BLEED: Status: ACTIVE | Noted: 2023-08-06

## 2023-08-07 PROBLEM — D64.9 CHRONIC ANEMIA: Status: ACTIVE | Noted: 2023-08-06

## 2023-08-07 LAB
ALBUMIN SERPL-MCNC: 2.4 G/DL (ref 3.5–5.2)
ALBUMIN/GLOB SERPL: 0.6 G/DL
ALP SERPL-CCNC: 100 U/L (ref 39–117)
ALT SERPL W P-5'-P-CCNC: 9 U/L (ref 1–33)
ANION GAP SERPL CALCULATED.3IONS-SCNC: 13 MMOL/L (ref 5–15)
ANISOCYTOSIS BLD QL: NORMAL
AST SERPL-CCNC: 19 U/L (ref 1–32)
BASOPHILS # BLD AUTO: 0.01 10*3/MM3 (ref 0–0.2)
BASOPHILS NFR BLD AUTO: 0.2 % (ref 0–1.5)
BH CV ECHO MEAS - ACS: 0.8 CM
BH CV ECHO MEAS - AO MAX PG: 11.8 MMHG
BH CV ECHO MEAS - AO MEAN PG: 6 MMHG
BH CV ECHO MEAS - AO ROOT DIAM: 3.2 CM
BH CV ECHO MEAS - AO V2 MAX: 172 CM/SEC
BH CV ECHO MEAS - AO V2 VTI: 26.9 CM
BH CV ECHO MEAS - AVA(I,D): 2.16 CM2
BH CV ECHO MEAS - EDV(CUBED): 46.7 ML
BH CV ECHO MEAS - EDV(MOD-SP4): 50 ML
BH CV ECHO MEAS - EF(MOD-SP4): 71 %
BH CV ECHO MEAS - ESV(CUBED): 17.6 ML
BH CV ECHO MEAS - ESV(MOD-SP4): 14.5 ML
BH CV ECHO MEAS - FS: 27.8 %
BH CV ECHO MEAS - IVS/LVPW: 0.83 CM
BH CV ECHO MEAS - IVSD: 1.5 CM
BH CV ECHO MEAS - LA DIMENSION: 4 CM
BH CV ECHO MEAS - LAT PEAK E' VEL: 10.8 CM/SEC
BH CV ECHO MEAS - LV DIASTOLIC VOL/BSA (35-75): 34.5 CM2
BH CV ECHO MEAS - LV MASS(C)D: 235.1 GRAMS
BH CV ECHO MEAS - LV MAX PG: 2.2 MMHG
BH CV ECHO MEAS - LV MEAN PG: 1 MMHG
BH CV ECHO MEAS - LV SYSTOLIC VOL/BSA (12-30): 10 CM2
BH CV ECHO MEAS - LV V1 MAX: 74.2 CM/SEC
BH CV ECHO MEAS - LV V1 VTI: 18.5 CM
BH CV ECHO MEAS - LVIDD: 3.6 CM
BH CV ECHO MEAS - LVIDS: 2.6 CM
BH CV ECHO MEAS - LVOT AREA: 3.1 CM2
BH CV ECHO MEAS - LVOT DIAM: 2 CM
BH CV ECHO MEAS - LVPWD: 1.8 CM
BH CV ECHO MEAS - MED PEAK E' VEL: 5.6 CM/SEC
BH CV ECHO MEAS - MV A MAX VEL: 39.2 CM/SEC
BH CV ECHO MEAS - MV E MAX VEL: 72.4 CM/SEC
BH CV ECHO MEAS - MV E/A: 1.85
BH CV ECHO MEAS - PA ACC TIME: 0.07 SEC
BH CV ECHO MEAS - RAP SYSTOLE: 10 MMHG
BH CV ECHO MEAS - RVSP: 37.2 MMHG
BH CV ECHO MEAS - SI(MOD-SP4): 24.5 ML/M2
BH CV ECHO MEAS - SV(LVOT): 58.1 ML
BH CV ECHO MEAS - SV(MOD-SP4): 35.5 ML
BH CV ECHO MEAS - TAPSE (>1.6): 1 CM
BH CV ECHO MEAS - TR MAX PG: 27.2 MMHG
BH CV ECHO MEAS - TR MAX VEL: 261 CM/SEC
BH CV ECHO MEASUREMENTS AVERAGE E/E' RATIO: 8.83
BILIRUB SERPL-MCNC: 0.7 MG/DL (ref 0–1.2)
BUN SERPL-MCNC: 25 MG/DL (ref 8–23)
BUN/CREAT SERPL: 25.8 (ref 7–25)
CALCIUM SPEC-SCNC: 8.2 MG/DL (ref 8.6–10.5)
CHLORIDE SERPL-SCNC: 100 MMOL/L (ref 98–107)
CO2 SERPL-SCNC: 21 MMOL/L (ref 22–29)
CREAT SERPL-MCNC: 0.97 MG/DL (ref 0.57–1)
DEPRECATED RDW RBC AUTO: 75.5 FL (ref 37–54)
EGFRCR SERPLBLD CKD-EPI 2021: 57 ML/MIN/1.73
EOSINOPHIL # BLD AUTO: 0.01 10*3/MM3 (ref 0–0.4)
EOSINOPHIL NFR BLD AUTO: 0.2 % (ref 0.3–6.2)
ERYTHROCYTE [DISTWIDTH] IN BLOOD BY AUTOMATED COUNT: 18.9 % (ref 12.3–15.4)
GLOBULIN UR ELPH-MCNC: 4 GM/DL
GLUCOSE BLDC GLUCOMTR-MCNC: 127 MG/DL (ref 70–130)
GLUCOSE BLDC GLUCOMTR-MCNC: 184 MG/DL (ref 70–130)
GLUCOSE BLDC GLUCOMTR-MCNC: 57 MG/DL (ref 70–130)
GLUCOSE BLDC GLUCOMTR-MCNC: 86 MG/DL (ref 70–130)
GLUCOSE SERPL-MCNC: 123 MG/DL (ref 65–99)
HCT VFR BLD AUTO: 27.3 % (ref 34–46.6)
HCT VFR BLD AUTO: 28.3 % (ref 34–46.6)
HGB BLD-MCNC: 8.7 G/DL (ref 12–15.9)
HGB BLD-MCNC: 9.3 G/DL (ref 12–15.9)
IMM GRANULOCYTES # BLD AUTO: 0.07 10*3/MM3 (ref 0–0.05)
IMM GRANULOCYTES NFR BLD AUTO: 1.2 % (ref 0–0.5)
IRON 24H UR-MRATE: 55 MCG/DL (ref 37–145)
IRON SATN MFR SERPL: 41 % (ref 20–50)
LYMPHOCYTES # BLD AUTO: 0.94 10*3/MM3 (ref 0.7–3.1)
LYMPHOCYTES NFR BLD AUTO: 15.9 % (ref 19.6–45.3)
MACROCYTES BLD QL SMEAR: NORMAL
MCH RBC QN AUTO: 35.1 PG (ref 26.6–33)
MCHC RBC AUTO-ENTMCNC: 32.9 G/DL (ref 31.5–35.7)
MCV RBC AUTO: 106.8 FL (ref 79–97)
MONOCYTES # BLD AUTO: 0.54 10*3/MM3 (ref 0.1–0.9)
MONOCYTES NFR BLD AUTO: 9.1 % (ref 5–12)
NEUTROPHILS NFR BLD AUTO: 4.35 10*3/MM3 (ref 1.7–7)
NEUTROPHILS NFR BLD AUTO: 73.4 % (ref 42.7–76)
NRBC BLD AUTO-RTO: 0 /100 WBC (ref 0–0.2)
PLAT MORPH BLD: NORMAL
PLATELET # BLD AUTO: 221 10*3/MM3 (ref 140–450)
PMV BLD AUTO: 8.9 FL (ref 6–12)
POTASSIUM SERPL-SCNC: 3.4 MMOL/L (ref 3.5–5.2)
PROT SERPL-MCNC: 6.4 G/DL (ref 6–8.5)
RBC # BLD AUTO: 2.65 10*6/MM3 (ref 3.77–5.28)
SODIUM SERPL-SCNC: 134 MMOL/L (ref 136–145)
T4 FREE SERPL-MCNC: <0.1 NG/DL (ref 0.93–1.7)
TIBC SERPL-MCNC: 134 MCG/DL (ref 298–536)
TRANSFERRIN SERPL-MCNC: 90 MG/DL (ref 200–360)
TSH SERPL DL<=0.05 MIU/L-ACNC: 69.3 UIU/ML (ref 0.27–4.2)
WBC NRBC COR # BLD: 5.92 10*3/MM3 (ref 3.4–10.8)

## 2023-08-07 PROCEDURE — 83540 ASSAY OF IRON: CPT | Performed by: INTERNAL MEDICINE

## 2023-08-07 PROCEDURE — 82746 ASSAY OF FOLIC ACID SERUM: CPT | Performed by: INTERNAL MEDICINE

## 2023-08-07 PROCEDURE — 97166 OT EVAL MOD COMPLEX 45 MIN: CPT

## 2023-08-07 PROCEDURE — 97162 PT EVAL MOD COMPLEX 30 MIN: CPT

## 2023-08-07 PROCEDURE — 93306 TTE W/DOPPLER COMPLETE: CPT

## 2023-08-07 PROCEDURE — 94799 UNLISTED PULMONARY SVC/PX: CPT

## 2023-08-07 PROCEDURE — 93306 TTE W/DOPPLER COMPLETE: CPT | Performed by: INTERNAL MEDICINE

## 2023-08-07 PROCEDURE — 85025 COMPLETE CBC W/AUTO DIFF WBC: CPT | Performed by: HOSPITALIST

## 2023-08-07 PROCEDURE — 84466 ASSAY OF TRANSFERRIN: CPT | Performed by: INTERNAL MEDICINE

## 2023-08-07 PROCEDURE — 82607 VITAMIN B-12: CPT | Performed by: INTERNAL MEDICINE

## 2023-08-07 PROCEDURE — 85014 HEMATOCRIT: CPT | Performed by: HOSPITALIST

## 2023-08-07 PROCEDURE — 85007 BL SMEAR W/DIFF WBC COUNT: CPT | Performed by: HOSPITALIST

## 2023-08-07 PROCEDURE — 82948 REAGENT STRIP/BLOOD GLUCOSE: CPT

## 2023-08-07 PROCEDURE — 84443 ASSAY THYROID STIM HORMONE: CPT | Performed by: INTERNAL MEDICINE

## 2023-08-07 PROCEDURE — 84439 ASSAY OF FREE THYROXINE: CPT | Performed by: INTERNAL MEDICINE

## 2023-08-07 PROCEDURE — 99233 SBSQ HOSP IP/OBS HIGH 50: CPT | Performed by: INTERNAL MEDICINE

## 2023-08-07 PROCEDURE — 99221 1ST HOSP IP/OBS SF/LOW 40: CPT | Performed by: PSYCHIATRY & NEUROLOGY

## 2023-08-07 PROCEDURE — 80053 COMPREHEN METABOLIC PANEL: CPT | Performed by: HOSPITALIST

## 2023-08-07 PROCEDURE — 85018 HEMOGLOBIN: CPT | Performed by: HOSPITALIST

## 2023-08-07 RX ORDER — HYDROCODONE BITARTRATE AND ACETAMINOPHEN 5; 325 MG/1; MG/1
1 TABLET ORAL EVERY 6 HOURS PRN
Status: DISPENSED | OUTPATIENT
Start: 2023-08-07 | End: 2023-08-14

## 2023-08-07 RX ORDER — LEVOTHYROXINE SODIUM 0.07 MG/1
75 TABLET ORAL
Status: DISCONTINUED | OUTPATIENT
Start: 2023-08-08 | End: 2023-08-16

## 2023-08-07 RX ORDER — CLONIDINE HYDROCHLORIDE 0.1 MG/1
0.1 TABLET ORAL 2 TIMES DAILY
Status: DISCONTINUED | OUTPATIENT
Start: 2023-08-07 | End: 2023-08-18

## 2023-08-07 RX ORDER — POLYETHYLENE GLYCOL 3350 17 G/17G
0.5 POWDER, FOR SOLUTION ORAL EVERY 12 HOURS
Status: COMPLETED | OUTPATIENT
Start: 2023-08-07 | End: 2023-08-08

## 2023-08-07 RX ADMIN — MUPIROCIN 1 APPLICATION: 20 OINTMENT TOPICAL at 10:22

## 2023-08-07 RX ADMIN — Medication 10 ML: at 10:23

## 2023-08-07 RX ADMIN — Medication 0.5 BOTTLE: at 17:53

## 2023-08-07 RX ADMIN — DEXTROSE AND SODIUM CHLORIDE 100 ML/HR: 5; 450 INJECTION, SOLUTION INTRAVENOUS at 10:22

## 2023-08-07 RX ADMIN — DEXTROSE AND SODIUM CHLORIDE 100 ML/HR: 5; 450 INJECTION, SOLUTION INTRAVENOUS at 20:12

## 2023-08-07 RX ADMIN — HYDROCODONE BITARTRATE AND ACETAMINOPHEN 1 TABLET: 5; 325 TABLET ORAL at 17:53

## 2023-08-07 RX ADMIN — MUPIROCIN 1 APPLICATION: 20 OINTMENT TOPICAL at 20:12

## 2023-08-07 RX ADMIN — Medication 10 ML: at 20:12
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
## 2023-08-07 NOTE — H&P
[facility] Medicine Services  HISTORY & PHYSICAL    Patient Identification:  Name:  Lashell Case  Age:  88 y.o.  Sex:  female  :  1935  MRN:  [id number]   Visit Number:  [id number]  Admit Date: 2023   Primary Care Physician:  Evelio Guerin DO     Subjective     Chief complaint:   Chief Complaint   Patient presents with    Wound Check    Altered Mental Status    Hypoglycemia    Shortness of Breath     History of presenting illness:   Patient is a 88 y.o. female with past medical history significant for hypertension, hyperlipidemia, hypothyroidism, arthritis, atrial fibrillation chronically anticoagulated with Eliquis that presented to the [facility] emergency department for evaluation of altered mental status.  Noted to have bright red blood per rectum while in ED.  General surgery consulted, advised to admit the patient, they will consult.    Patient examined at bedside on 3S. Per patients granddaughter, the patient has been declining over the past 2-3 months, becoming less mobile and less independent. Over the past week the patient has been not getting out of bed at all and became incontinent. Patient lives alone, with family members checking on her daily, but no one able to stay with her full time. Patient's family members said they have tried to get her to come to the hospital over the past 4 to 5 days and patient refused. Today patient had worsening weakness, periods of confusion, therefore agreed to come to the ER. Reports decreased oral intake.  Episodes of incontinence. Family member states they are interested in home health or rehab, but they dont think patient will be agreeable.     Upon arrival to the ED, vitals were temperature 97.1, HR 92, RR 16, /96, SPO2 94% on room air.  Significant for anion gap 19, BUN 29, creatinine 1.03, BUN/creatinine ratio 28.2, GFR 53.1, glucose 69, albumin 2.5, hemoglobin 9.4, hematocrit 29.7.  UA dark yellow,  1+ ketones, trace leukocytes, 1+ protein, moderate bilirubin, 3-6 squamous epithelial cells.    Chest x-ray shows central line tip in SVC.  No pneumothorax.    CT head shows no acute intracranial hemorrhage, midline shift, or significant mass effect.    Patient has been admitted to the Telemetry unit.  ---------------------------------------------------------------------------------------------------------------------   Review of Systems   Constitutional:  Positive for fatigue. Negative for chills, diaphoresis and fever.   Respiratory:  Negative for cough, shortness of breath and wheezing.    Cardiovascular:  Negative for chest pain, palpitations and leg swelling.   Gastrointestinal:  Negative for abdominal pain, constipation, diarrhea, nausea and vomiting.   Genitourinary:  Negative for difficulty urinating, dysuria and flank pain.   Musculoskeletal:  Negative for arthralgias, myalgias and neck stiffness.   Skin:  Negative for rash and wound.   Neurological:  Negative for dizziness, weakness and light-headedness.   Psychiatric/Behavioral:  Negative for agitation and confusion.     ---------------------------------------------------------------------------------------------------------------------   Past Medical History:   Diagnosis Date    Arthritis     Breast cancer 2015    lt br ca    Breast cancer 2006    rt br ca    Dizziness 7/8/2016    [place] (hard of hearing)     Hyperlipidemia     Hypertension     Hypothyroidism     Scabies exposure      Past Surgical History:   Procedure Laterality Date    BREAST BIOPSY      BREAST LUMPECTOMY Right 2006    BREAST SURGERY      MASTECTOMY Left 2015    ca    MASTECTOMY Right 2020    MASTECTOMY WITH SENTINEL NODE BIOPSY AND AXILLARY NODE DISSECTION Right 11/17/2020    Procedure: BREAST MASTECTOMY WITH SENTINEL NODE BIOPSY AND AXILLARY NODE DISSECTION;  Surgeon: Cynthia Ward MD;  Location: [facility];  Service: General;  Laterality: Right;     Family History   Problem  Relation Age of Onset    Hypertension Mother     Uterine cancer Mother     Diabetes Mother     Diabetes Daughter     Prostate cancer Son     Diabetes Son     Throat cancer Son     Hypertension Child     Breast cancer Neg Hx      Social History     Socioeconomic History    Marital status:    Tobacco Use    Smoking status: Never    Smokeless tobacco: Never   Vaping Use    Vaping Use: Never used   Substance and Sexual Activity    Alcohol use: No    Drug use: No    Sexual activity: Defer     Birth control/protection: None     ---------------------------------------------------------------------------------------------------------------------   Allergies:  Bactrim [sulfamethoxazole-trimethoprim], Ketorolac, Phenergan [promethazine hcl], Codeine, and Penicillins  ---------------------------------------------------------------------------------------------------------------------   Medications below are reported home medications pulling from within the system; at this time, these medications have not been reconciled unless otherwise specified and are in the verification process for further verifcation as current home medications.    Prior to Admission Medications       Prescriptions Last Dose Informant Patient Reported? Taking?    amiodarone (PACERONE) 200 MG tablet   No No    Take 1 tablet by mouth Daily.    apixaban (ELIQUIS) 2.5 MG tablet tablet   No No    Take 1 tablet by mouth Every 12 (Twelve) Hours.    cloNIDine (Catapres) 0.1 MG tablet   No No    Take 1 tablet by mouth 2 (Two) Times a Day.    hydroCHLOROthiazide (HYDRODIURIL) 25 MG tablet   No No    Take 1 tablet by mouth Daily for blood pressure.    levothyroxine (SYNTHROID, LEVOTHROID) 75 MCG tablet   No No    Take 1 tablet by mouth Every Morning.    lisinopril (PRINIVIL,ZESTRIL) 40 MG tablet   No No    Take 1 tablet by mouth daily for blood pressure.    metoprolol tartrate (LOPRESSOR) 100 MG tablet   No No    Take 1 tablet by mouth 2 (Two) Times a Day.           ---------------------------------------------------------------------------------------------------------------------    Objective     Hospital Scheduled Meds:  senna-docusate sodium, 2 tablet, Oral, BID  sodium chloride, 10 mL, Intravenous, Q12H      dextrose 5 % and sodium chloride 0.45 %, 100 mL/hr        Current listed hospital scheduled medications may not yet reflect those currently placed in orders that are signed and held, awaiting patient's arrival to floor/unit.    ---------------------------------------------------------------------------------------------------------------------   Vital Signs:  Temp:  [97.1 øF (36.2 øC)-97.9 øF (36.6 øC)] 97.7 øF (36.5 øC)  Heart Rate:  [] 84  Resp:  [16-18] 18  BP: (130-155)/(80-99) 130/85  Mean Arterial Pressure (Non-Invasive) for the past 24 hrs (Last 3 readings):   Noninvasive MAP (mmHg)   08/06/23 2211 101   08/06/23 2115 124   08/06/23 2100 104     SpO2 Percentage    08/06/23 2100 08/06/23 2115 08/06/23 2211   SpO2: 94% 97% 94%     SpO2:  [93 %-97 %] 94 %  on   ;   Device (Oxygen Therapy): room air    Body mass index is 15.28 kg/mý.  Wt Readings from Last 3 Encounters:   08/06/23 43 kg (94 lb 11.2 oz)   04/14/23 55.2 kg (121 lb 9.6 oz)   04/03/23 59.9 kg (132 lb)     ---------------------------------------------------------------------------------------------------------------------   Physical Exam:  Physical Exam  Constitutional:       General: She is not in acute distress.     Appearance: Normal appearance.   HENT:      Head: Normocephalic and atraumatic.      Right Ear: External ear normal.      Left Ear: External ear normal.      Nose: No nasal deformity.      Mouth/Throat:      Lips: Pink.      Mouth: Mucous membranes are moist.   Eyes:      Conjunctiva/sclera: Conjunctivae normal.      Pupils: Pupils are equal, round, and reactive to light.   Cardiovascular:      Rate and Rhythm: Normal rate and regular rhythm.      Pulses:           Dorsalis  pedis pulses are 1+ on the right side and 1+ on the left side.      Heart sounds: Normal heart sounds.   Pulmonary:      Effort: Pulmonary effort is normal.      Breath sounds: Normal breath sounds. No wheezing, rhonchi or rales.   Abdominal:      General: Abdomen is flat. Bowel sounds are normal.      Palpations: Abdomen is soft.      Tenderness: There is no guarding or rebound.   Genitourinary:     Comments: No carpio catheter in place   Musculoskeletal:      Cervical back: Neck supple. Normal range of motion.      Right lower le+ Pitting Edema present.      Left lower le+ Pitting Edema present.      Comments: To mid shin    Skin:     General: Skin is warm and dry.   Neurological:      General: No focal deficit present.      Mental Status: She is alert.      Comments: Oriented to person and place, but not time    Psychiatric:         Mood and Affect: Mood normal.         Behavior: Behavior normal.     ---------------------------------------------------------------------------------------------------------------------  EK fibrillation.  Heart rate 82. Confirmed by cardiology      Telemetry:        I have personally reviewed the EKG/Telemetry strip  ---------------------------------------------------------------------------------------------------------------------   Results from last 7 days   Lab Units 23   CK TOTAL U/L 42           Results from last 7 days   Lab Units 23  1152   CRP mg/dL  --  3.19*   LACTATE mmol/L  --  1.2   WBC 10*3/mm3  --  6.71   HEMOGLOBIN g/dL 8.6* 9.4*   HEMATOCRIT % 27.2* 29.7*   MCV fL  --  107.6*   MCHC g/dL  --  31.6   PLATELETS 10*3/mm3  --  252     Results from last 7 days   Lab Units 23  1152   SODIUM mmol/L 135*   POTASSIUM mmol/L 3.5   CHLORIDE mmol/L 98   CO2 mmol/L 18.0*   BUN mg/dL 29*   CREATININE mg/dL 1.03*   CALCIUM mg/dL 8.5*   GLUCOSE mg/dL 93   ALBUMIN g/dL 2.5*   BILIRUBIN mg/dL 1.2   ALK PHOS U/L 106   AST (SGOT) U/L  21   ALT (SGPT) U/L 10   Estimated Creatinine Clearance: 26.6 mL/min (A) (by C-G formula based on SCr of 1.03 mg/dL (H)).  No results found for: AMMONIA    Glucose   Date/Time Value Ref Range Status   08/06/2023 1037 69 (L) 70 - 130 mg/dL Final     Comment:     Meter: [id number] : [id number] [place]     No results found for: HGBA1C  Lab Results   Component Value Date    TSH 72.700 (H) 03/24/2023    FREET4 1.85 (H) 11/01/2021       No results found for: BLOODCX  No results found for: URINECX  No results found for: WOUNDCX  No results found for: STOOLCX  No results found for: RESPCX  No results found for: MRSACX  Pain Management Panel           No data to display              I have personally reviewed the above laboratory results.   ---------------------------------------------------------------------------------------------------------------------  Imaging Results (Last 7 Days)       Procedure Component Value Units Date/Time    CT Head Without Contrast [[id number]] Collected: 08/06/23 1947     Updated: 08/06/23 1952    Narrative:      EXAMINATION:Computed tomography(CT)of the head without IV contrast     TECHNIQUE:CT of the head was performed in the supine position without  intravenous contrast according to as low as reasonably achievable dose  protocol. Axial CT utilized with slice thickness of 5 mm presented in  soft tissue and bone algorithms.     COMPARISON:No prior studies are available for review at the time of this  dictation.     FINDINGS: Moderate parenchymal volume loss is noted.  No acute intra-or extra-axial hemorrhage or fluid collections are  identified. The ventricles are of normal size,shape,and morphology. The  basilar cisterns are patent. No mass effect or midline shift is seen.  The gray-white matter differentiation is normal. The visualized portions  of the orbits,paranasal sinuses,and mastoids appear normal. No acute  fracture is identified.       Impression:         No acute  intracranial hemorrhage,midline shift,or significant mass  effect.     This report was finalized on 8/6/2023 7:50 PM by Mary Gomez MD.       XR Chest 1 View [[id number]] Collected: 08/06/23 1215     Updated: 08/06/23 1218    Narrative:      XR CHEST 1 VW-     CLINICAL INDICATION: Line placement        COMPARISON: 04/03/2023      TECHNIQUE: Single frontal view of the chest.     FINDINGS:      LUNGS: Left-sided central line has been placed. The tip is in the  superior vena cava. No evidence of pneumothorax      HEART AND MEDIASTINUM: Heart and mediastinal contours are unremarkable        SKELETON: Bony and soft tissue structures are unremarkable.             Impression:      Central line tip in the superior vena cava. No pneumothorax     This report was finalized on 8/6/2023 12:15 PM by Dr. Erich Jenkins MD.             I have personally reviewed the above radiology results.     Last Echocardiogram:  Results for orders placed during the hospital encounter of 11/02/21    Adult Transesophageal Echo (DONALDO) W/ Cont if Necessary Per Protocol    Interpretation Summary  ú Normal left ventricular cavity size and wall thickness noted. All left ventricular wall segments contract normally.  ú Left ventricular ejection fraction appears to be 61 - 65%.  ú The aortic valve is abnormal in structure. The aortic valve exhibits sclerosis. There is calcification of the aortic valve. The aortic valve appears trileaflet. Mild aortic valve regurgitation is present. Mild aortic valve stenosis is present.  ú There are myxomatous changes of the mitral valve apparatus present. Moderate mitral valve regurgitation is present. No significant mitral valve stenosis is present  ú No evidence of a left atrial appendage thrombus was present.  ú Moderate mitral valve regurgitation is present. No significant mitral valve stenosis is present.  ú Moderate to severe tricuspid valve regurgitation is present.  ú There is no evidence of pericardial  effusion. .  ú Comments: Proceed with electrical cardioversion.    ---------------------------------------------------------------------------------------------------------------------    Assessment & Plan      Active Hospital Problems    Diagnosis  POA    **Acute encephalopathy [G93.40]  Yes     Acute encephalopathy, POA  Hypoglycemia, POA  Bright red blood per rectum, POA  Dehydration, POA   Progressive debility   CT head with no acute findings  H&H stable at this time  Inpatient general surgery consult  Keep patient n.p.o.  Nursing dysphagia screen prior to any p.o. medication  D5 and half-normal saline infusion at 100 mL/h  Patient presented with blood glucose 69.  Family reports poor p.o. intake.  Accu-Cheks every 6 hours  Recheck H&H after fluid resuscitation as well as repeating every 8 hours.  Case Management consulted  Strict I's and O's  Daily weights  Hold home anticoagulation  If placement needed on discharge, will need to consult psych for decisional capacity   PT/OT consult     CHRONIC MEDICAL PROBLEMS  Essential hypertension  BP appears moderately controlled   Plan to resume home antihypertensive regimen once reconciled per pharmacy with appropriate holding parameters to prevent hypotension and/or bradycardia   Closely monitor BP per hospital protocol, titrate medications as necessary    Hyperlipidemia   Restart home statin pending med rec     Hypothyroidism  Restart home synthroid pending med rec     Atrial fibrillation chronically anticoagulated with Eliquis  Continuous cardiac monitoring  Restart rate controlling medications pending med rec  Hold home Eliquis at this time  F/E/N: D5  0.45 NS at 100 mL/h.  Continue to monitor electrolytes.  NPO.    ---------------------------------------------------  DVT Prophylaxis: Already anticoagulated at home, hold on admission  GI Prophylaxis: Bowel regimen  Activity: Up with assistance    The patient is considered to be a high risk patient due to: Acute  encephalopathy, hypoglycemia    INPATIENT status due to the need for care which can only be reasonably provided in an hospital setting such as aggressive/expedited ancillary services and/or consultation services, the necessity for IV medications, close physician monitoring and/or the possible need for procedures.  In such, I feel patient's risk for adverse outcomes and need for care warrant INPATIENT evaluation and predict the patient's care encounter to likely last beyond 2 midnights.      Code Status: Full Code     Disposition/Discharge planning: Pending clinical course    I have discussed the patient's assessment and plan with Dr. Rodríguez.      Agustina Silver PA-C  Hospitalist Service -- [facility]       08/06/23  22:43 EDT    Attending Physician: González Rodríguez MD

## 2023-08-07 NOTE — THERAPY EVALUATION
Patient Name: Lashell Case  : 1935    MRN: [id number]                              Today's Date: 2023       Admit Date: 2023    Visit Dx:     ICD-10-CM ICD-9-CM   1. LGI bleed  K92.2 578.9   2. Chronic anemia  D64.9 285.9   3. Confusion  R41.0 298.9     Patient Active Problem List   Diagnosis    Arthritis    Abnormal ambulatory electrocardiogram    Essential hypertension    Hypothyroidism due to acquired atrophy of thyroid    Malignant neoplasm of left female breast    Mixed hyperlipidemia    Aromatase inhibitor use    Osteopenia    Hepatic cyst    Splenic cyst    Breast mass    Atrial fibrillation, persistent    Precordial chest pain    Nonrheumatic aortic (valve) stenosis    Atrial fibrillation with RVR    Paroxysmal atrial fibrillation    Nonrheumatic mitral valve regurgitation    Acute encephalopathy    LGI bleed    Chronic anemia     Past Medical History:   Diagnosis Date    Arthritis     Breast cancer     lt br ca    Breast cancer 2006    rt br ca    Dizziness 2016    [place] (hard of hearing)     Hyperlipidemia     Hypertension     Hypothyroidism     Scabies exposure      Past Surgical History:   Procedure Laterality Date    BREAST BIOPSY      BREAST LUMPECTOMY Right     BREAST SURGERY      MASTECTOMY Left     ca    MASTECTOMY Right     MASTECTOMY WITH SENTINEL NODE BIOPSY AND AXILLARY NODE DISSECTION Right 2020    Procedure: BREAST MASTECTOMY WITH SENTINEL NODE BIOPSY AND AXILLARY NODE DISSECTION;  Surgeon: Cynthia Ward MD;  Location: [facility] OR;  Service: General;  Laterality: Right;      General Information       Row Name 23 1509          OT Time and Intention    Document Type evaluation  -LM     Mode of Treatment occupational therapy  -LM       Row Name 23 1509          General Information    Patient Profile Reviewed yes  -LM     Prior Level of Function independent:;all household mobility;ADL's;transfer  recent progressive fxl decline per family  report  -     Existing Precautions/Restrictions fall  -     Barriers to Rehab hearing deficit;medically complex;previous functional deficit;cognitive status  -       Row Name 08/07/23 1509          Living Environment    People in Home alone  intermittent assist from family  -[facility] Name 08/07/23 1509          Cognition    Orientation Status (Cognition) oriented to;person;place  not time  -       Row Name 08/07/23 1509          Safety Issues, Functional Mobility    Safety Issues Affecting Function (Mobility) problem-solving;insight into deficits/self-awareness;judgment;awareness of need for assistance  -     Impairments Affecting Function (Mobility) balance;cognition;endurance/activity tolerance;strength  -LM     Cognitive Impairments, Mobility Safety/Performance insight into deficits/self-awareness;safety precaution follow-through;problem-solving/reasoning  -               User Key  (r) = Recorded By, (t) = Taken By, (c) = Cosigned By      Initials Name Provider Type     Nella Cobb, OT Occupational Therapist                     Mobility/ADL's       [facility] Name 08/07/23 1510          Transfers    Transfers toilet transfer  -[facility] Name 08/07/23 1510          Toilet Transfer    Type (Toilet Transfer) stand pivot/stand step  -     [place] Level (Toilet Transfer) minimum assist (75% patient effort);moderate assist (50% patient effort)  -     Assistive Device (Toilet Transfer) commode, 3-in-1;walker, front-wheeled  -[facility] Name 08/07/23 1510          Activities of Daily Living    BADL Assessment/Intervention bathing;upper body dressing;lower body dressing;grooming;feeding;toileting  -[facility] Name 08/07/23 1510          Bathing Assessment/Intervention    [place] Level (Bathing) maximum assist (25% patient effort)  -[facility] Name 08/07/23 1510          Upper Body Dressing Assessment/Training    [place] Level (Upper Body Dressing) maximum assist (25% patient  effort)  -LM       [facility] Name 08/07/23 1510          Lower Body Dressing Assessment/Training    [place] Level (Lower Body Dressing) dependent (less than 25% patient effort);maximum assist (25% patient effort)  -LM       [facility] Name 08/07/23 1510          Grooming Assessment/Training    [place] Level (Grooming) minimum assist (75% patient effort)  -[facility] Name 08/07/23 1510          Self-Feeding Assessment/Training    [place] Level (Feeding) set up  -[facility] Name 08/07/23 1510          Toileting Assessment/Training    [place] Level (Toileting) dependent (less than 25% patient effort);maximum assist (25% patient effort)  -LM               User Key  (r) = Recorded By, (t) = Taken By, (c) = Cosigned By      Initials Name Provider Type    LM Nella Cobb, OT Occupational Therapist                   Obj/Interventions       Row Name 08/07/23 1515          Sensory Assessment (Somatosensory)    Sensory Assessment (Somatosensory) sensation intact  -LM       Row Name 08/07/23 1515          Vision Assessment/Intervention    Visual Impairment/Limitations WFL  -LM       Row Name 08/07/23 1515          Range of Motion Comprehensive    General Range of Motion no range of motion deficits identified  -LM       Row Name 08/07/23 1515          Strength Comprehensive (MMT)    General Manual Muscle Testing (MMT) Assessment no strength deficits identified  -LM       Row Name 08/07/23 1515          Motor Skills    Motor Skills functional endurance  -LM     Functional Endurance F-  -LM               User Key  (r) = Recorded By, (t) = Taken By, (c) = Cosigned By      Initials Name Provider Type    Nella Harper OT Occupational Therapist                   Goals/Plan       Row Name 08/07/23 1518          Transfer Goal 1 (OT)    Activity/Assistive Device (Transfer Goal 1, OT) transfers, all;commode, 3-in-1;walker, rolling  -LM     [place] Level/Cues Needed (Transfer Goal 1, OT) minimum assist (75% or more  patient effort)  -LM     Time Frame (Transfer Goal 1, OT) by discharge  -LM       Row Name 08/07/23 1518          Bathing Goal 1 (OT)    Activity/Device (Bathing Goal 1, OT) bathing skills, all  -LM     [place] Level/Cues Needed (Bathing Goal 1, OT) minimum assist (75% or more patient effort)  -LM     Time Frame (Bathing Goal 1, OT) by discharge  -LM       Row Name 08/07/23 1518          Therapy Assessment/Plan (OT)    Planned Therapy Interventions (OT) activity tolerance training;adaptive equipment training;BADL retraining;ROM/therapeutic exercise;transfer/mobility retraining;strengthening exercise;patient/caregiver education/training  -LM               User Key  (r) = Recorded By, (t) = Taken By, (c) = Cosigned By      Initials Name Provider Type    Nella Harper, OT Occupational Therapist                   Clinical Impression       Row Name 08/07/23 1515          Plan of Care Review    Plan of Care Reviewed With patient  -LM       Row Name 08/07/23 1515          Therapy Assessment/Plan (OT)    Patient/Family Therapy Goal Statement (OT) return home  -LM     Rehab Potential (OT) fair, will monitor progress closely  -LM     Criteria for Skilled Therapeutic Interventions Met (OT) yes;meets criteria;skilled treatment is necessary  -LM     Therapy Frequency (OT) other (see comments)  prn and/or to monitor fxl progress  -LM       Row Name 08/07/23 1515          Therapy Plan Review/Discharge Plan (OT)    Anticipated Discharge Disposition (OT) --  TBD  -LM       Row Name 08/07/23 1515          Positioning and Restraints    Post Treatment Position bed  -LM     In Bed call light within reach;encouraged to call for assist;exit alarm on  -LM               User Key  (r) = Recorded By, (t) = Taken By, (c) = Cosigned By      Initials Name Provider Type    Nella Harper, OT Occupational Therapist                   Outcome Measures    No documentation.                     OT Recommendation and Plan  Planned Therapy  Interventions (OT): activity tolerance training, adaptive equipment training, BADL retraining, ROM/therapeutic exercise, transfer/mobility retraining, strengthening exercise, patient/caregiver education/training  Therapy Frequency (OT): other (see comments) (prn and/or to monitor fxl progress)  Plan of Care Review  Plan of Care Reviewed With: patient     Time Calculation:          Therapy Charges for Today       Code Description Service Date Service Provider Modifiers Qty    [id number]  OT EVAL MOD COMPLEXITY 4 8/7/2023 Nella Cobb, OT GO 1                 Nella Cobb OT  8/7/2023

## 2023-08-07 NOTE — PLAN OF CARE
Goal Outcome Evaluation:   Patient here from ER. Resting comfortably in bed. Grand daughter to the bedside. Bed alarm on. Care plan started.

## 2023-08-07 NOTE — CASE MANAGEMENT/SOCIAL WORK
Discharge Planning Assessment   [place]     Patient Name: Lashell Case  MRN: [id number]  Today's Date: 8/6/2023    Admit Date: 8/6/2023    Plan: Pt hard of hearing and most of information obtained from ken Clemens at bedside. Pt lives alone and is having difficulty caring for herself. Pt uses a walker and cane. Pt does not have home o2 or home health. Pcp is Dr Guerin she uses [facility] and has [place] Medicaid and Medicare a+b. Per nursing notes family has already had APS involved in patients care due to living conditions.   Discharge Needs Assessment       Row Name 08/06/23 2028       Living Environment    People in Home alone    Current Living Arrangements home    Potentially Unsafe Housing Conditions other (see comments)    Primary Care Provided by self;other (see comments)    Family Caregiver if Needed child(carmina), adult;grandchild(carmina), adult    Family Caregiver Names granddaugher Jayleen    Quality of Family Relationships unable to assess    Able to Return to Prior Arrangements other (see comments)       Transition Planning    Patient/Family Anticipated Services at Transition other (see comments)    Transportation Anticipated family or friend will provide       Discharge Needs Assessment    Readmission Within the Last 30 Days no previous admission in last 30 days    Equipment Currently Used at Home walker, standard    Concerns to be Addressed discharge planning    Anticipated Changes Related to Illness inability to care for self                   Discharge Plan       Row Name 08/06/23 2029       Plan    Plan Pt hard of hearing and most of information obtained from ken Clemens at bedside. Pt lives alone and is having difficulty caring for herself. Pt uses a walker and cane. Pt does not have home o2 or home health. Pcp is Dr Guerin she uses [facility] and has [place] Medicaid and Medicare a+b. Per nursing notes family has already had APS involved in patients care due to living  conditions.                  Continued Care and Services - Admitted Since 8/6/2023    Coordination has not been started for this encounter.       Expected Discharge Date and Time       Expected Discharge Date Expected Discharge Time    Aug 8, 2023            Demographic Summary       Row Name 08/06/23 2027       General Information    Arrived From home    Referral Source emergency department    Reason for Consult discharge planning                   Functional Status       Row Name 08/06/23 2028       Functional Status    Usual Activity Tolerance fair    Current Activity Tolerance poor                      Jacquelyn Britt RN

## 2023-08-07 NOTE — PROGRESS NOTES
[facility] HOSPITALIST PROGRESS NOTE     Patient Identification:  Name:  Lashell Case  Age:  88 y.o.  Sex:  female  :  1935  MRN:  [id number]  Visit Number:  [id number]  Primary Care Provider:  Evelio Guerin DO    Length of stay:  1    Chief complaint: Confusion    Subjective:    Patient seen and examined at bedside with patient's granddaughter present.  Patient does appear acutely confused but does appear to somewhat oriented to her immediate surroundings.  Discussion held with patient's granddaughter at bedside regarding overall plan of care.  Patient does not appear to have decision-making capacity at this time.  Patient will benefit from emergency guardianship.  We will plan for EGD and colonoscopy tomorrow.  Also, upon further questioning with patient's granddaughter, it does appear the duration of her slow mental decline has actually occurred over the past 8 to 10 months as opposed to the previous 2 to 3 months as described in H&P.  Overall, patient's presentation appears concerning for progressive dementia and patient will likely require guardianship from this point forward.  ----------------------------------------------------------------------------------------------------------------------  [facility] Meds:  mupirocin, 1 application , Each Nare, BID  polyethylene glycol, 0.5 bottle, Oral, Q12H  senna-docusate sodium, 2 tablet, Oral, BID  sodium chloride, 10 mL, Intravenous, Q12H      dextrose 5 % and sodium chloride 0.45 %, 100 mL/hr, Last Rate: 100 mL/hr (23 1022)      ----------------------------------------------------------------------------------------------------------------------  Vital Signs:  Temp:  [97.7 øF (36.5 øC)-98.4 øF (36.9 øC)] 98.4 øF (36.9 øC)  Heart Rate:  [] 91  Resp:  [16-18] 18  BP: (130-165)/(78-97) 165/84      23  1035 23  2211 23  0545   Weight: 49.9 kg (110 lb) 43 kg (94 lb 11.2 oz) 43 kg (94 lb 11.2 oz)  (admit weight less than 24 hours)     Body mass index is 15.29 kg/mý.    Intake/Output Summary (Last 24 hours) at 8/7/2023 1820  Last data filed at 8/7/2023 1700  Gross per 24 hour   Intake 0 ml   Output --   Net 0 ml     NPO Diet NPO Type: Strict NPO  ----------------------------------------------------------------------------------------------------------------------  Physical exam:  Constitutional: Elderly appearing appearing  female in no apparent distress.     HENT:  Head:  Normocephalic and atraumatic.  Mouth:  Moist mucous membranes.    Eyes:  Conjunctivae and EOM are normal.  Pupils are equal, round, and reactive to light.  No scleral icterus.    Neck:  Neck supple. No thyromegaly.  No JVD present.    Cardiovascular:  Regular rate and rhythm with no murmurs, rubs, clicks or gallops appreciated.  Pulmonary/Chest:  Clear to auscultation bilaterally with no crackles, wheezes or rhonchi appreciated.  Abdominal:  Soft. Nontender. Nondistended  Bowel sounds are normal in all four quadrants. No organomegally appreciated.   Musculoskeletal:  No edema, no tenderness, and no deformity.  No red or swollen joints anywhere.    Neurological:  Alert, Cranial nerves II-XII intact with no focal deficits.  No facial droop.  No slurred speech.   Skin:  Warm and dry to palpation with no rashes or lesions appreciated.  Peripheral vascular:  2+ radial and pedal pulses in bilateral upper and lower extremities.  Psychiatric:  Alert but not oriented to person, place or time, does not demonstrate appropriate decision-making capacity  -------------------------------------------------------------------------------------------------  ----------------------------------------------------------------------------------------------------------------------  Results from last 7 days   Lab Units 08/06/23 2157   CK TOTAL U/L 42     Results from last 7 days   Lab Units 08/07/23  1048 08/07/23  0405 08/06/23 2157 08/06/23  1152   CRP  mg/dL  --   --   --  3.19*   LACTATE mmol/L  --   --   --  1.2   WBC 10*3/mm3  --  5.92  --  6.71   HEMOGLOBIN g/dL 8.7* 9.3* 8.6* 9.4*   HEMATOCRIT % 27.3* 28.3* 27.2* 29.7*   MCV fL  --  106.8*  --  107.6*   MCHC g/dL  --  32.9  --  31.6   PLATELETS 10*3/mm3  --  221  --  252         Results from last 7 days   Lab Units 08/07/23  0405 08/06/23  1152   SODIUM mmol/L 134* 135*   POTASSIUM mmol/L 3.4* 3.5   CHLORIDE mmol/L 100 98   CO2 mmol/L 21.0* 18.0*   BUN mg/dL 25* 29*   CREATININE mg/dL 0.97 1.03*   CALCIUM mg/dL 8.2* 8.5*   GLUCOSE mg/dL 123* 93   ALBUMIN g/dL 2.4* 2.5*   BILIRUBIN mg/dL 0.7 1.2   ALK PHOS U/L 100 106   AST (SGOT) U/L 19 21   ALT (SGPT) U/L 9 10   Estimated Creatinine Clearance: 28.3 mL/min (by C-G formula based on SCr of 0.97 mg/dL).    No results found for: AMMONIA      Blood Culture   Date Value Ref Range Status   08/06/2023 No growth at 24 hours  Preliminary   08/06/2023 No growth at 24 hours  Preliminary     No results found for: URINECX  No results found for: WOUNDCX  No results found for: STOOLCX    I have personally looked at the labs and they are summarized above.  ----------------------------------------------------------------------------------------------------------------------  Imaging Results (Last 24 Hours)       Procedure Component Value Units Date/Time    CT Head Without Contrast [[id number]] Collected: 08/06/23 1947     Updated: 08/06/23 1952    Narrative:      EXAMINATION:Computed tomography(CT)of the head without IV contrast     TECHNIQUE:CT of the head was performed in the supine position without  intravenous contrast according to as low as reasonably achievable dose  protocol. Axial CT utilized with slice thickness of 5 mm presented in  soft tissue and bone algorithms.     COMPARISON:No prior studies are available for review at the time of this  dictation.     FINDINGS: Moderate parenchymal volume loss is noted.  No acute intra-or extra-axial hemorrhage or fluid  collections are  identified. The ventricles are of normal size,shape,and morphology. The  basilar cisterns are patent. No mass effect or midline shift is seen.  The gray-white matter differentiation is normal. The visualized portions  of the orbits,paranasal sinuses,and mastoids appear normal. No acute  fracture is identified.       Impression:         No acute intracranial hemorrhage,midline shift,or significant mass  effect.     This report was finalized on 8/6/2023 7:50 PM by Mary Gomez MD.             ----------------------------------------------------------------------------------------------------------------------  Assessment and Plan:    Macrocytic anemia -we will check B12 and folate as patient has had notable decreased oral intake over the past several months.  Patient will undergo EGD and colonoscopy tomorrow    2.  Hypothyroidism -we will repeat TSH and free T4 as patient has reported questionable history on taking home medications as appropriate.  Worsening confusion over the past 1 to 2 weeks may be secondary to medication noncompliance    3.  Essential hypertension -currently decently controlled, will restart patient's home dose of clonidine and monitor closely.  We will make medication adjustments as necessary.    4.  Hyperlipidemia -statin    5.  Chronic A-fib -patient reportedly on chronic anticoagulation at home.  However, patient with questionable medication compliance.  We will restart anticoagulation during this hospital stay    Disposition will likely require several days hospitalization    Austyn Cifuentes DO   08/07/23   18:20 EDT

## 2023-08-07 NOTE — THERAPY EVALUATION
Acute Care - Physical Therapy Initial Evaluation   [place]     Patient Name: Lashell Case  : 1935  MRN: [id number]  Today's Date: 2023   Onset of Illness/Injury or Date of Surgery: 23  Visit Dx:     ICD-10-CM ICD-9-CM   1. LGI bleed  K92.2 578.9   2. Chronic anemia  D64.9 285.9   3. Confusion  R41.0 298.9     Patient Active Problem List   Diagnosis    Arthritis    Abnormal ambulatory electrocardiogram    Essential hypertension    Hypothyroidism due to acquired atrophy of thyroid    Malignant neoplasm of left female breast    Mixed hyperlipidemia    Aromatase inhibitor use    Osteopenia    Hepatic cyst    Splenic cyst    Breast mass    Atrial fibrillation, persistent    Precordial chest pain    Nonrheumatic aortic (valve) stenosis    Atrial fibrillation with RVR    Paroxysmal atrial fibrillation    Nonrheumatic mitral valve regurgitation    Acute encephalopathy    LGI bleed    Chronic anemia     Past Medical History:   Diagnosis Date    Arthritis     Breast cancer     lt br ca    Breast cancer 2006    rt br ca    Dizziness 2016    [place] (hard of hearing)     Hyperlipidemia     Hypertension     Hypothyroidism     Scabies exposure      Past Surgical History:   Procedure Laterality Date    BREAST BIOPSY      BREAST LUMPECTOMY Right 2006    BREAST SURGERY      MASTECTOMY Left     ca    MASTECTOMY Right     MASTECTOMY WITH SENTINEL NODE BIOPSY AND AXILLARY NODE DISSECTION Right 2020    Procedure: BREAST MASTECTOMY WITH SENTINEL NODE BIOPSY AND AXILLARY NODE DISSECTION;  Surgeon: Cynthia Ward MD;  [facility];  Service: General;  Laterality: Right;     PT Assessment (last 12 hours)       PT Evaluation and Treatment       Row Name 23 0905          Physical Therapy Time and Intention    Subjective Information complains of;weakness;fatigue  -CT     Document Type evaluation  -CT     Mode of Treatment physical therapy  -CT     Patient Effort good  -CT     Comment Pt  confused on eval. She does report she lives alone and cares or a 3 y/o great granddaughter. Pt is Max A x 1-2 at time of eval.  -CT       Row Name 08/07/23 0905          General Information    Patient Profile Reviewed yes  -CT     Onset of Illness/Injury or Date of Surgery 08/06/23  -CT     Referring Physician Nadeem  -CT     Patient Observations alert;cooperative;agree to therapy  -CT     Prior Level of Function independent:  per pt report  -CT     Existing Precautions/Restrictions fall  -CT     Limitations/Impairments safety/cognitive  -CT     Equipment Issued to Patient gait belt  -CT     Risks Reviewed patient:  -CT     Benefits Reviewed patient:  -CT       Row Name 08/07/23 0905          Living Environment    Current Living Arrangements home  -CT       Row Name 08/07/23 0905          Home Use of Assistive/Adaptive Equipment    Equipment Currently Used at Home walker, rolling  -CT       Row Name 08/07/23 0905          Cognition    Affect/Mental Status (Cognition) confused  -CT     Orientation Status (Cognition) oriented to;person;place  -CT     Follows Commands (Cognition) follows one-step commands  -CT       Row Name 08/07/23 0905          Range of Motion Comprehensive    Comment, General Range of Motion BLE grossly WFL  -CT       Row Name 08/07/23 0905          Strength Comprehensive (MMT)    Comment, General Manual Muscle Testing (MMT) Assessment BLE grossly 3+/5  -CT       Row Name 08/07/23 0905          Bed Mobility    Bed Mobility bed mobility (all) activities  -CT     All Activities, [place] (Bed Mobility) moderate assist (50% patient effort)  -CT     Bed Mobility, Safety Issues decreased use of arms for pushing/pulling;decreased use of legs for bridging/pushing  -CT     Assistive Device (Bed Mobility) bed rails  -CT       Row Name 08/07/23 0905          Transfers    Transfers sit-stand transfer;stand-sit transfer  -CT       Row Name 08/07/23 0905          Sit-Stand Transfer    Sit-Stand  [place] (Transfers) moderate assist (50% patient effort);2 person assist;verbal cues;nonverbal cues (demo/gesture)  -CT     Assistive Device (Sit-Stand Transfers) --  bilateral handheld assist  -CT       Row Name 08/07/23 0905          Stand-Sit Transfer    Stand-Sit [place] (Transfers) moderate assist (50% patient effort);2 person assist;verbal cues;nonverbal cues (demo/gesture)  -CT     Assistive Device (Stand-Sit Transfers) --  bilateral handheld assist  -CT       Row Name 08/07/23 0905          Gait/Stairs (Locomotion)    [place] Level (Gait) moderate assist (50% patient effort);2 person assist  -CT     Assistive Device (Gait) --  bilateral handheld assist  -CT     Distance in Feet (Gait) 3 R side steps toward head of bed  -CT     Pattern (Gait) step-to  -CT       Row Name 08/07/23 0905          Balance    Balance Assessment sitting static balance;standing static balance  -CT     Static Sitting Balance contact guard  -CT     Position, Sitting Balance sitting edge of bed  -CT     Static Standing Balance moderate assist;2-person assist  -CT     Position/Device Used, Standing Balance --  bilateral handheld assist  -CT       Row Name 08/07/23 0905          Coping    Observed Emotional State calm;cooperative  -CT     Verbalized Emotional State acceptance  -CT       Row Name 08/07/23 0905          Plan of Care Review    Plan of Care Reviewed With patient  -CT       Row Name 08/07/23 0905          Positioning and Restraints    Pre-Treatment Position in bed  -CT     Post Treatment Position bed  -CT     In Bed supine;call light within reach;encouraged to call for assist;exit alarm on;side rails up x3  -CT       Row Name 08/07/23 0905          Therapy Assessment/Plan (PT)    Patient/Family Therapy Goals Statement (PT) Pt goals are to return to PLOF  -CT     Functional Level at Time of Evaluation (PT) Mod A x 2  -CT     PT Diagnosis (PT) impaired functional mobility  -CT     Rehab Potential (PT) good, to  achieve stated therapy goals  -CT     Criteria for Skilled Interventions Met (PT) yes;skilled treatment is necessary  -CT     Therapy Frequency (PT) 2 times/wk  2-5 times/wk  -CT     Predicted Duration of Therapy Intervention (PT) length of stay  -CT       Row Name 08/07/23 0905          Therapy Plan Review/Discharge Plan (PT)    Therapy Plan Review (PT) evaluation/treatment results reviewed;care plan/treatment goals reviewed;risks/benefits reviewed;current/potential barriers reviewed;participants voiced agreement with care plan;participants included;patient  -CT       Row Name 08/07/23 0905          Physical Therapy Goals    Bed Mobility Goal Selection (PT) bed mobility, PT goal 1  -CT     Transfer Goal Selection (PT) transfer, PT goal 1  -CT     Gait Training Goal Selection (PT) gait training, PT goal 1  -CT       Row Name 08/07/23 0905          Bed Mobility Goal 1 (PT)    Activity/Assistive Device (Bed Mobility Goal 1, PT) bed mobility activities, all  -CT     [place] Level/Cues Needed (Bed Mobility Goal 1, PT) minimum assist (75% or more patient effort)  -CT     Time Frame (Bed Mobility Goal 1, PT) by discharge  -CT       Row Name 08/07/23 0905          Transfer Goal 1 (PT)    Activity/Assistive Device (Transfer Goal 1, PT) sit-to-stand/stand-to-sit;bed-to-chair/chair-to-bed  -CT     [place] Level/Cues Needed (Transfer Goal 1, PT) minimum assist (75% or more patient effort)  -CT     Time Frame (Transfer Goal 1, PT) by discharge  -CT       Row Name 08/07/23 0905          Gait Training Goal 1 (PT)    Activity/Assistive Device (Gait Training Goal 1, PT) gait (walking locomotion);assistive device use  -CT     [place] Level (Gait Training Goal 1, PT) minimum assist (75% or more patient effort)  -CT     Distance (Gait Training Goal 1, PT) 15  -CT     Time Frame (Gait Training Goal 1, PT) by discharge  -CT               User Key  (r) = Recorded By, (t) = Taken By, (c) = Cosigned By      Initials Name  Provider Type    CT Ayanna Shaw PT Physical Therapist                      PT Recommendation and Plan  Planned Therapy Interventions (PT): balance training, bed mobility training, gait training, home exercise program, manual therapy techniques, motor coordination training, neuromuscular re-education, patient/family education, postural re-education, strengthening, transfer training  Therapy Frequency (PT): 2 times/wk (2-5 times/wk)  Plan of Care Reviewed With: patient       Time Calculation:    PT Charges       Row Name 08/07/23 1421             Time Calculation    PT Received On 08/07/23  -CT      PT Goal Re-Cert Due Date 08/21/23  -CT                User Key  (r) = Recorded By, (t) = Taken By, (c) = Cosigned By      Initials Name Provider Type    CT Ayanna Shaw PT Physical Therapist                  Therapy Charges for Today       Code Description Service Date Service Provider Modifiers Qty    09066061089 HC PT EVAL MOD COMPLEXITY 4 8/7/2023 Ayanna Shaw, PT GP 1                 Ayanna Shaw, PT  8/7/2023

## 2023-08-07 NOTE — CASE MANAGEMENT/SOCIAL WORK
Discharge Planning Assessment   [place]     Patient Name: Lashell Case  MRN: [id number]  Today's Date: 8/7/2023    Admit Date: 8/6/2023       Discharge Plan       Row Name 08/07/23 1141       Plan    Plan SS received ED CM consult for discharge planning. SS spoke with Pt and Pt's daughter Hattie at bedside. Pt lives alone, GD Hi lives next door. Pt does not utilize home health services. Pt has a tripod cane, rolling walker and standard walker via unknown provider. Pt does not have a POA/living will. Pt's PCP is Evelio Guerin. Pt's family to provide transportation at discharge. Pt's family state physician's expected discharge date is 08/08/23. SS to follow.    17:00pm: Psych evaluated Pt and determined she does not have capacity to make medical decisions and recommended Pt have an emergency guardian appointed. SS spoke with Pt's daughter Hatite who voices Pt's GD Jayleen is agreeable to become emergency guardian and plans to  emergency guardianship statement this afternoon or 08/08/23. SS discussed possible NHP. GD wishes to speak further with Pt's family about NHP vs home caregivers. SS to follow.                     WM EverettW

## 2023-08-08 ENCOUNTER — ANESTHESIA (OUTPATIENT)
Dept: PERIOP | Facility: HOSPITAL | Age: 88
DRG: 640 | End: 2023-08-08
Payer: MEDICARE

## 2023-08-08 LAB
ANION GAP SERPL CALCULATED.3IONS-SCNC: 6.1 MMOL/L (ref 5–15)
BUN SERPL-MCNC: 17 MG/DL (ref 8–23)
BUN/CREAT SERPL: 24.6 (ref 7–25)
CALCIUM SPEC-SCNC: 7.7 MG/DL (ref 8.6–10.5)
CHLORIDE SERPL-SCNC: 99 MMOL/L (ref 98–107)
CO2 SERPL-SCNC: 23.9 MMOL/L (ref 22–29)
CREAT SERPL-MCNC: 0.69 MG/DL (ref 0.57–1)
DEPRECATED RDW RBC AUTO: 73.3 FL (ref 37–54)
EGFRCR SERPLBLD CKD-EPI 2021: 84.6 ML/MIN/1.73
ERYTHROCYTE [DISTWIDTH] IN BLOOD BY AUTOMATED COUNT: 18.6 % (ref 12.3–15.4)
FOLATE SERPL-MCNC: 5.23 NG/ML (ref 4.78–24.2)
GLUCOSE BLDC GLUCOMTR-MCNC: 114 MG/DL (ref 70–130)
GLUCOSE BLDC GLUCOMTR-MCNC: 117 MG/DL (ref 70–130)
GLUCOSE BLDC GLUCOMTR-MCNC: 131 MG/DL (ref 70–130)
GLUCOSE BLDC GLUCOMTR-MCNC: 132 MG/DL (ref 70–130)
GLUCOSE BLDC GLUCOMTR-MCNC: 156 MG/DL (ref 70–130)
GLUCOSE BLDC GLUCOMTR-MCNC: 86 MG/DL (ref 70–130)
GLUCOSE SERPL-MCNC: 145 MG/DL (ref 65–99)
HCT VFR BLD AUTO: 25.1 % (ref 34–46.6)
HGB BLD-MCNC: 8.3 G/DL (ref 12–15.9)
MAGNESIUM SERPL-MCNC: 1.9 MG/DL (ref 1.6–2.4)
MCH RBC QN AUTO: 35 PG (ref 26.6–33)
MCHC RBC AUTO-ENTMCNC: 33.1 G/DL (ref 31.5–35.7)
MCV RBC AUTO: 105.9 FL (ref 79–97)
PLATELET # BLD AUTO: 200 10*3/MM3 (ref 140–450)
PMV BLD AUTO: 8.6 FL (ref 6–12)
POTASSIUM SERPL-SCNC: 2.7 MMOL/L (ref 3.5–5.2)
POTASSIUM SERPL-SCNC: 4 MMOL/L (ref 3.5–5.2)
QT INTERVAL: 392 MS
QTC INTERVAL: 455 MS
RBC # BLD AUTO: 2.37 10*6/MM3 (ref 3.77–5.28)
SODIUM SERPL-SCNC: 129 MMOL/L (ref 136–145)
VIT B12 BLD-MCNC: 1486 PG/ML (ref 211–946)
WBC NRBC COR # BLD: 5.37 10*3/MM3 (ref 3.4–10.8)

## 2023-08-08 PROCEDURE — 93010 ELECTROCARDIOGRAM REPORT: CPT | Performed by: INTERNAL MEDICINE

## 2023-08-08 PROCEDURE — 82948 REAGENT STRIP/BLOOD GLUCOSE: CPT

## 2023-08-08 PROCEDURE — 94799 UNLISTED PULMONARY SVC/PX: CPT

## 2023-08-08 PROCEDURE — 84132 ASSAY OF SERUM POTASSIUM: CPT

## 2023-08-08 PROCEDURE — 94761 N-INVAS EAR/PLS OXIMETRY MLT: CPT

## 2023-08-08 PROCEDURE — 80048 BASIC METABOLIC PNL TOTAL CA: CPT | Performed by: INTERNAL MEDICINE

## 2023-08-08 PROCEDURE — 85027 COMPLETE CBC AUTOMATED: CPT | Performed by: INTERNAL MEDICINE

## 2023-08-08 PROCEDURE — 99232 SBSQ HOSP IP/OBS MODERATE 35: CPT | Performed by: INTERNAL MEDICINE

## 2023-08-08 PROCEDURE — 83735 ASSAY OF MAGNESIUM: CPT

## 2023-08-08 PROCEDURE — 0 POTASSIUM CHLORIDE PER 2 MEQ

## 2023-08-08 PROCEDURE — 99221 1ST HOSP IP/OBS SF/LOW 40: CPT | Performed by: SURGERY

## 2023-08-08 RX ORDER — SODIUM CHLORIDE, SODIUM LACTATE, POTASSIUM CHLORIDE, CALCIUM CHLORIDE 600; 310; 30; 20 MG/100ML; MG/100ML; MG/100ML; MG/100ML
125 INJECTION, SOLUTION INTRAVENOUS ONCE
Status: DISCONTINUED | OUTPATIENT
Start: 2023-08-08 | End: 2023-08-09 | Stop reason: HOSPADM

## 2023-08-08 RX ORDER — SODIUM CHLORIDE 0.9 % (FLUSH) 0.9 %
10 SYRINGE (ML) INJECTION AS NEEDED
Status: DISCONTINUED | OUTPATIENT
Start: 2023-08-08 | End: 2023-08-09 | Stop reason: HOSPADM

## 2023-08-08 RX ORDER — POLYETHYLENE GLYCOL 3350 17 G/17G
0.5 POWDER, FOR SOLUTION ORAL EVERY 12 HOURS
Status: COMPLETED | OUTPATIENT
Start: 2023-08-08 | End: 2023-08-09

## 2023-08-08 RX ORDER — SODIUM CHLORIDE 0.9 % (FLUSH) 0.9 %
10 SYRINGE (ML) INJECTION EVERY 12 HOURS SCHEDULED
Status: DISCONTINUED | OUTPATIENT
Start: 2023-08-08 | End: 2023-08-09 | Stop reason: HOSPADM

## 2023-08-08 RX ORDER — POTASSIUM CHLORIDE 29.8 MG/ML
20 INJECTION INTRAVENOUS
Status: COMPLETED | OUTPATIENT
Start: 2023-08-08 | End: 2023-08-08

## 2023-08-08 RX ORDER — SODIUM CHLORIDE 9 MG/ML
40 INJECTION, SOLUTION INTRAVENOUS AS NEEDED
Status: DISCONTINUED | OUTPATIENT
Start: 2023-08-08 | End: 2023-08-09 | Stop reason: HOSPADM

## 2023-08-08 RX ADMIN — MUPIROCIN 1 APPLICATION: 20 OINTMENT TOPICAL at 08:19

## 2023-08-08 RX ADMIN — CLONIDINE HYDROCHLORIDE 0.1 MG: 0.1 TABLET ORAL at 20:29

## 2023-08-08 RX ADMIN — MUPIROCIN 1 APPLICATION: 20 OINTMENT TOPICAL at 20:30

## 2023-08-08 RX ADMIN — Medication 10 ML: at 20:30

## 2023-08-08 RX ADMIN — POTASSIUM CHLORIDE 20 MEQ: 29.8 INJECTION, SOLUTION INTRAVENOUS at 04:40

## 2023-08-08 RX ADMIN — POTASSIUM CHLORIDE 20 MEQ: 29.8 INJECTION, SOLUTION INTRAVENOUS at 03:54

## 2023-08-08 RX ADMIN — Medication 0.5 BOTTLE: at 17:03

## 2023-08-08 RX ADMIN — POTASSIUM CHLORIDE 20 MEQ: 29.8 INJECTION, SOLUTION INTRAVENOUS at 05:42

## 2023-08-08 RX ADMIN — DEXTROSE AND SODIUM CHLORIDE 100 ML/HR: 5; 450 INJECTION, SOLUTION INTRAVENOUS at 05:42

## 2023-08-08 RX ADMIN — APIXABAN 2.5 MG: 2.5 TABLET, FILM COATED ORAL at 20:29

## 2023-08-08 RX ADMIN — HYDROCODONE BITARTRATE AND ACETAMINOPHEN 1 TABLET: 5; 325 TABLET ORAL at 18:59

## 2023-08-08 RX ADMIN — Medication 0.5 BOTTLE: at 05:23

## 2023-08-08 RX ADMIN — Medication 10 ML: at 08:19

## 2023-08-08 NOTE — PROGRESS NOTES
[facility]     Patient Identification:  Name:  Lashell Case  Age:  88 y.o.  Sex:  female  :  1935  MRN:  [id number]  Visit Number:  [id number]  Primary Care Provider:  Evelio Guerin DO    Length of stay:  2    Chief complaint: Confusion    Subjective:    Patient seen and examined at bedside with no family present.  Patient was asleep when I entered the room but easily awakened.  Patient still appears pleasantly confused.  No new events overnight.  ----------------------------------------------------------------------------------------------------------------------  Current Hospital Meds:  cloNIDine, 0.1 mg, Oral, BID  levothyroxine, 75 mcg, Oral, Q AM  mupirocin, 1 application , Each Nare, BID  senna-docusate sodium, 2 tablet, Oral, BID  sodium chloride, 10 mL, Intravenous, Q12H      dextrose 5 % and sodium chloride 0.45 %, 100 mL/hr, Last Rate: 100 mL/hr (23 0542)      ----------------------------------------------------------------------------------------------------------------------  Vital Signs:  Temp:  [98 øF (36.7 øC)-98.4 øF (36.9 øC)] 98 øF (36.7 øC)  Heart Rate:  [75-91] 81  Resp:  [16-18] 18  BP: (151-168)/(80-88) 164/88      23  2211 23  0545 23  0545   Weight: 43 kg (94 lb 11.2 oz) 43 kg (94 lb 11.2 oz) (admit weight less than 24 hours) 44.4 kg (97 lb 12.8 oz)     Body mass index is 15.79 kg/mý.    Intake/Output Summary (Last 24 hours) at 202339  Last data filed at 202323  Gross per 24 hour   Intake 250 ml   Output --   Net 250 ml       NPO Diet NPO Type: Strict NPO  ----------------------------------------------------------------------------------------------------------------------  Physical exam:  Constitutional: Elderly appearing appearing  female in no apparent distress.     HENT:  Head:  Normocephalic and atraumatic.  Mouth:  Moist mucous membranes.    Eyes:  Conjunctivae and EOM are normal.   Pupils are equal, round, and reactive to light.  No scleral icterus.    Neck:  Neck supple. No thyromegaly.  No JVD present.    Cardiovascular:  Regular rate and rhythm with no murmurs, rubs, clicks or gallops appreciated.  Pulmonary/Chest:  Clear to auscultation bilaterally with no crackles, wheezes or rhonchi appreciated.  Abdominal:  Soft. Nontender. Nondistended  Bowel sounds are normal in all four quadrants. No organomegally appreciated.   Musculoskeletal:  No edema, no tenderness, and no deformity.  No red or swollen joints anywhere.    Neurological:  Alert, Cranial nerves II-XII intact with no focal deficits.  No facial droop.  No slurred speech.   Skin:  Warm and dry to palpation with no rashes or lesions appreciated.  Peripheral vascular:  2+ radial and pedal pulses in bilateral upper and lower extremities.  Psychiatric:  Alert but not oriented to person, place or time, does not demonstrate appropriate decision-making capacity    Little change in physical exam in comparison to 8/7/2023  -------------------------------------------------------------------------------------------------  ----------------------------------------------------------------------------------------------------------------------  Results from last 7 days   Lab Units 08/06/23  2157   CK TOTAL U/L 42       Results from last 7 days   Lab Units 08/08/23  0217 08/07/23  1048 08/07/23  0405 08/06/23  2157 08/06/23  1152   CRP mg/dL  --   --   --   --  3.19*   LACTATE mmol/L  --   --   --   --  1.2   WBC 10*3/mm3 5.37  --  5.92  --  6.71   HEMOGLOBIN g/dL 8.3* 8.7* 9.3*   < > 9.4*   HEMATOCRIT % 25.1* 27.3* 28.3*   < > 29.7*   MCV fL 105.9*  --  106.8*  --  107.6*   MCHC g/dL 33.1  --  32.9  --  31.6   PLATELETS 10*3/mm3 200  --  221  --  252    < > = values in this interval not displayed.           Results from last 7 days   Lab Units 08/08/23  0217 08/07/23  0405 08/06/23  1152   SODIUM mmol/L 129* 134* 135*   POTASSIUM mmol/L 2.7* 3.4*  3.5   MAGNESIUM mg/dL 1.9  --   --    CHLORIDE mmol/L 99 100 98   CO2 mmol/L 23.9 21.0* 18.0*   BUN mg/dL 17 25* 29*   CREATININE mg/dL 0.69 0.97 1.03*   CALCIUM mg/dL 7.7* 8.2* 8.5*   GLUCOSE mg/dL 145* 123* 93   ALBUMIN g/dL  --  2.4* 2.5*   BILIRUBIN mg/dL  --  0.7 1.2   ALK PHOS U/L  --  100 106   AST (SGOT) U/L  --  19 21   ALT (SGPT) U/L  --  9 10     Estimated Creatinine Clearance: 41 mL/min (by C-G formula based on SCr of 0.69 mg/dL).    No results found for: AMMONIA      Blood Culture   Date Value Ref Range Status   08/06/2023 No growth at 24 hours  Preliminary   08/06/2023 No growth at 24 hours  Preliminary     No results found for: URINECX  No results found for: WOUNDCX  No results found for: STOOLCX    I have personally looked at the labs and they are summarized above.  ----------------------------------------------------------------------------------------------------------------------  Imaging Results (Last 24 Hours)       ** No results found for the last 24 hours. **          ----------------------------------------------------------------------------------------------------------------------  Assessment and Plan:    Macrocytic anemia -B12 and folate levels are within normal limits.  Plan for EGD and colonoscopy today.    2.  Hypothyroidism -TSH is elevated at 69.3 with an undetectable free T4.  Patient with no signs or symptoms consistent with myxedema coma.  We will reinitiate thyroid hormone replacement therapy with Synthroid 75 mcg p.o. daily.    3.  Essential hypertension -currently uncontrolled, will add lisinopril 10 mg p.o. daily to daily antihypertensive medication regimen.  Will monitor for improvement.    4.  Hyperlipidemia -statin    5.  Chronic A-fib -have started dose adjusted Eliquis at 2.5 mg p.o. twice daily, currently in A-fib and rate controlled.    Disposition plan to have EGD/colonoscopy today.  Will likely benefit from placement at discharge    Austyn Cifuentes,     08/08/23   09:39 EDT

## 2023-08-08 NOTE — CONSULTS
Palliative Care Initial Consult     Attending Physician: Austyn Cifuentes,*  Referring Provider: Austyn Cifuentes    assistance with clarification of goals of care and psychosocial support  Code Status:   Code Status and Medical Interventions:   Ordered at: 08/06/23 2051     Code Status (Patient has no pulse and is not breathing):    CPR (Attempt to Resuscitate)     Medical Interventions (Patient has pulse or is breathing):    Full Support     Release to patient:    Routine Release      Advanced Directives: Advance Directive Status: Patient does not have advance directive   Healthcare surrogate: [place] daughter Hattie Santizo  Goals of Care: I discussed with yamilet Kuhn what their goals for Lashell were, would Lashell want resuscitated and intubated on a ventilator, would she want a Peg tube, and explained risks associated with. Hattie states she would like to discuss this with Jayleen and other family members, which I encouraged her to do.  Lashell remains a full code, full treat at this time.    HPI:  Lashell Case is a 88 y.o. female admitted on 8/6/2023 due to shortness of breath, hypoglycemia and altered mental status, she was also found to have bright red blood in her stool. Lashell has a medical history of dementia,  hypertension, hyperlipidemia, hypothyroidism, arthritis, atrial fibrillation chronically anticoagulated with Eliquis. Per notes, family states that Lashell has been declining for the last 2-3 months and has been less mobile, decreased oral intake, increased confusion, and has become incontinent. Dr Cifuentes had discussion with granddaughter this am and per her report Lashell has been declining for longer than the last several months, they had to take her keys around 10 months ago as well. Lashell had been living at home alone, with her family checking on her daily, however family felt that they would need assistance at home, home health, or even short term rehab. Palliative consulted to  discuss Goals of care and advanced care planning.        ROS: Negative except as above in HPI.     Past Medical History:   Diagnosis Date    Arthritis     Breast cancer 2015    lt br ca    Breast cancer 2006    rt br ca    Dizziness 7/8/2016    [place] (hard of hearing)     Hyperlipidemia     Hypertension     Hypothyroidism     Scabies exposure      Past Surgical History:   Procedure Laterality Date    BREAST BIOPSY      BREAST LUMPECTOMY Right 2006    BREAST SURGERY      MASTECTOMY Left 2015    ca    MASTECTOMY Right 2020    MASTECTOMY WITH SENTINEL NODE BIOPSY AND AXILLARY NODE DISSECTION Right 11/17/2020    Procedure: BREAST MASTECTOMY WITH SENTINEL NODE BIOPSY AND AXILLARY NODE DISSECTION;  Surgeon: Cynthia Ward MD;  Location: [facility];  Service: General;  Laterality: Right;     Social History     Socioeconomic History    Marital status:    Tobacco Use    Smoking status: Never    Smokeless tobacco: Never   Vaping Use    Vaping Use: Never used   Substance and Sexual Activity    Alcohol use: No    Drug use: No    Sexual activity: Defer     Birth control/protection: None     Family History   Problem Relation Age of Onset    Hypertension Mother     Uterine cancer Mother     Diabetes Mother     Diabetes Daughter     Prostate cancer Son     Diabetes Son     Throat cancer Son     Hypertension Child     Breast cancer Neg Hx        Allergies   Allergen Reactions    Bactrim [Sulfamethoxazole-Trimethoprim] GI Intolerance    Ketorolac Nausea And Vomiting    Phenergan [Promethazine Hcl] Other (See Comments)     Restless legs and trouble swallowing, seizures    Codeine Nausea And Vomiting    Penicillins Rash     tolerates Keflex per patient       Current Facility-Administered Medications   Medication Dose Route Frequency Provider Last Rate Last Admin    apixaban (ELIQUIS) tablet 2.5 mg  2.5 mg Oral Q12H Austyn Cifuentes DO        sennosides-docusate (PERICOLACE) 8.6-50 MG per tablet 2 tablet  2 tablet Oral  BID González Rodríguez MD   2 tablet at 08/06/23 2247    And    polyethylene glycol (MIRALAX) packet 17 g  17 g Oral Daily PRN González Rodríguez MD        And    bisacodyl (DULCOLAX) EC tablet 5 mg  5 mg Oral Daily PRN González Rodríguez MD        And    bisacodyl (DULCOLAX) suppository 10 mg  10 mg Rectal Daily PRN González Rodríguez MD        cloNIDine (CATAPRES) tablet 0.1 mg  0.1 mg Oral BID Austyn Cifuentes DO        dextrose 5 % and sodium chloride 0.45 % infusion  100 mL/hr Intravenous Continuous González Rodríguez  mL/hr at 08/08/23 0542 100 mL/hr at 08/08/23 0542    HYDROcodone-acetaminophen (NORCO) 5-325 MG per tablet 1 tablet  1 tablet Oral Q6H PRN Austyn Cifuentes DO   1 tablet at 08/07/23 1753    lactated ringers infusion  125 mL/hr Intravenous Once Alvin Sánchez MD        levothyroxine (SYNTHROID, LEVOTHROID) tablet 75 mcg  75 mcg Oral Q AM Austyn Cifuentes DO        mupirocin (BACTROBAN) 2 % nasal ointment 1 application   1 application  Each Nare BID Austyn Cifuentes DO   1 application  at 08/08/23 0819    nitroglycerin (NITROSTAT) SL tablet 0.4 mg  0.4 mg Sublingual Q5 Min PRN González Rodríguez MD        polyethylene glycol (MIRALAX) powder 0.5 bottle  0.5 bottle Oral Q12H Romeo Valdez MD        Potassium Replacement - Follow Nurse / BPA Driven Protocol   Does not apply PRN Agustina Silver PA-C        sodium chloride 0.9 % flush 10 mL  10 mL Intravenous PRN González Rodríguez MD        sodium chloride 0.9 % flush 10 mL  10 mL Intravenous Q12H González Rodríguez MD   10 mL at 08/08/23 0819    sodium chloride 0.9 % flush 10 mL  10 mL Intravenous PRN González Rodríguez MD        sodium chloride 0.9 % flush 10 mL  10 mL Intravenous Q12H Alvin Sánchez MD        sodium chloride 0.9 % flush 10 mL  10 mL Intravenous PRN Alvin Sánchez MD        sodium chloride 0.9 % infusion 40 mL  40 mL Intravenous PRN González Rodríguez  "MD Nikolay        sodium chloride 0.9 % infusion 40 mL  40 mL Intravenous PRN Alvin Sánchez MD         dextrose 5 % and sodium chloride 0.45 %, 100 mL/hr, Last Rate: 100 mL/hr (08/08/23 0542)        senna-docusate sodium **AND** polyethylene glycol **AND** bisacodyl **AND** bisacodyl    HYDROcodone-acetaminophen    nitroglycerin    Potassium Replacement - Follow Nurse / BPA Driven Protocol    sodium chloride    sodium chloride    sodium chloride    sodium chloride    sodium chloride    Current medication reviewed for route, type, dose and frequency and are current per MAR.    Palliative Performance Scale Score:     /93 (BP Location: Right arm, Patient Position: Lying)   Pulse 87   Temp 97.9 øF (36.6 øC) (Tympanic)   Resp 18   Ht 167.6 cm (66\")   Wt 44.4 kg (97 lb 12.8 oz)   SpO2 96%   BMI 15.79 kg/mý     Intake/Output Summary (Last 24 hours) at 8/8/2023 1409  Last data filed at 8/8/2023 0523  Gross per 24 hour   Intake 250 ml   Output --   Net 250 ml       PE:  NO PE done Lashell off the floor for EGD, nursing staff states she was confused and agitated, scratching staff.    Labs:   Results from last 7 days   Lab Units 08/08/23  0217   WBC 10*3/mm3 5.37   HEMOGLOBIN g/dL 8.3*   HEMATOCRIT % 25.1*   PLATELETS 10*3/mm3 200     Results from last 7 days   Lab Units 08/08/23  0217   SODIUM mmol/L 129*   POTASSIUM mmol/L 2.7*   CHLORIDE mmol/L 99   CO2 mmol/L 23.9   BUN mg/dL 17   CREATININE mg/dL 0.69   GLUCOSE mg/dL 145*   CALCIUM mg/dL 7.7*     Results from last 7 days   Lab Units 08/08/23  0217 08/07/23  0405   SODIUM mmol/L 129* 134*   POTASSIUM mmol/L 2.7* 3.4*   CHLORIDE mmol/L 99 100   CO2 mmol/L 23.9 21.0*   BUN mg/dL 17 25*   CREATININE mg/dL 0.69 0.97   CALCIUM mg/dL 7.7* 8.2*   BILIRUBIN mg/dL  --  0.7   ALK PHOS U/L  --  100   ALT (SGPT) U/L  --  9   AST (SGOT) U/L  --  19   GLUCOSE mg/dL 145* 123*     Imaging Results (Last 72 Hours)       Procedure Component Value Units Date/Time    CT Head " Without Contrast [[id number]] Collected: 08/06/23 1947     Updated: 08/06/23 1952    Narrative:      EXAMINATION:Computed tomography(CT)of the head without IV contrast     TECHNIQUE:CT of the head was performed in the supine position without  intravenous contrast according to as low as reasonably achievable dose  protocol. Axial CT utilized with slice thickness of 5 mm presented in  soft tissue and bone algorithms.     COMPARISON:No prior studies are available for review at the time of this  dictation.     FINDINGS: Moderate parenchymal volume loss is noted.  No acute intra-or extra-axial hemorrhage or fluid collections are  identified. The ventricles are of normal size,shape,and morphology. The  basilar cisterns are patent. No mass effect or midline shift is seen.  The gray-white matter differentiation is normal. The visualized portions  of the orbits,paranasal sinuses,and mastoids appear normal. No acute  fracture is identified.       Impression:         No acute intracranial hemorrhage,midline shift,or significant mass  effect.     This report was finalized on 8/6/2023 7:50 PM by Mary Gomez MD.       XR Chest 1 View [[id number]] Collected: 08/06/23 1215     Updated: 08/06/23 1218    Narrative:      XR CHEST 1 VW-     CLINICAL INDICATION: Line placement        COMPARISON: 04/03/2023      TECHNIQUE: Single frontal view of the chest.     FINDINGS:      LUNGS: Left-sided central line has been placed. The tip is in the  superior vena cava. No evidence of pneumothorax      HEART AND MEDIASTINUM: Heart and mediastinal contours are unremarkable        SKELETON: Bony and soft tissue structures are unremarkable.             Impression:      Central line tip in the superior vena cava. No pneumothorax     This report was finalized on 8/6/2023 12:15 PM by Dr. Erich Jenkins MD.               Diagnostics: Reviewed    A: Lashell Case is a 88 y.o. female  admitted on 8/6/2023 due to shortness of breath, hypoglycemia and  altered mental status, she was also found to have bright red blood in her stool. Lashell has a medical history of dementia,  hypertension, hyperlipidemia, hypothyroidism, arthritis, atrial fibrillation chronically anticoagulated with Eliquis. Per notes, family states that Lashell has been declining for the last 2-3 months and has been less mobile, decreased oral intake, increased confusion, and has become incontinent. Dr Cifuentes had discussion with granddaughter this am and per her report Lashell has been declining for longer than the last several months, they had to take her keys around 10 months ago as well. Lashell had been living at home alone, with her family checking on her daily, however family felt that they would need assistance at home, home health, or even short term rehab. Palliative consulted to discuss Goals of care and advanced care planning.    P:   I attempted to contact granddaughter Jayleen as I am told she was going to get emergency guardianship. I was unable to get a hold of her at four different numbers in the chart and given to me by her family, stating calls were restricted. I was able to get a hold of yamilet Kuhn to at least start goals of care conversations. I discussed with yamilet Kuhn what their goals for Lashell were, would Lashell want resuscitated and intubated on a ventilator, would she want a Peg tube, and explained risks associated with. Hattie states she would like to discuss this with Jayleen and other family members, which I encouraged her to do. I will follow up with Jayleen and other family tomorrow, I discussed this with Dr Cifuentes as well as Lynne in .    We appreciate the consult and the opportunity to participate in Lashell Case's care. We will continue to follow along. Please do not hesitate to contact us regarding further symptom management or goals of care needs, including after hours or on weekends via our on call provider at [phone].     Time: 75  minutes spent reviewing medical and medication records, assessing and examining patient, discussing with family, answering questions, providing some guidance about a plan and documentation of care, and coordinating care with other healthcare members, with > 50% time spent face to face.     Ligia Gates, APRN    8/8/2023

## 2023-08-08 NOTE — SIGNIFICANT NOTE
08/08/23 1419   OTHER   Discipline occupational therapist   Rehab Time/Intention   Session Not Performed patient/family declined treatment;patient/family declined, not feeling well  (declined to participate due to pain, notified nsg, confused)

## 2023-08-08 NOTE — ANESTHESIA PREPROCEDURE EVALUATION
Anesthesia Evaluation     Patient summary reviewed and Nursing notes reviewed   no history of anesthetic complications:   NPO Solid Status: > 8 hours  NPO Liquid Status: > 8 hours           Airway   Mallampati: II  TM distance: >3 FB  Neck ROM: limited  No difficulty expected  Dental    (+) edentulous    Pulmonary - negative pulmonary ROS   (+) ,decreased breath sounds  Cardiovascular   Exercise tolerance: good (4-7 METS)    NYHA Classification: II  ECG reviewed  Rhythm: irregular  Rate: abnormal    (+) hypertension, valvular problems/murmurs MRdysrhythmias Atrial Fib, murmur, hyperlipidemia      Neuro/Psych  (+) dizziness/light headedness  GI/Hepatic/Renal/Endo    (+) GI bleeding , liver disease, thyroid problem hypothyroidism    Musculoskeletal     Abdominal  - normal exam    Bowel sounds: normal.   Substance History - negative use     OB/GYN negative ob/gyn ROS         Other   arthritis,   history of cancer    ROS/Med Hx Other: On eliquis afib.low hemoglobin                  Anesthesia Plan    ASA 4        total IV anesthesia  intravenous induction     Anesthetic plan, risks, benefits, and alternatives have been provided, discussed and informed consent has been obtained with: patient.  Pre-procedure education provided  Use of blood products discussed with  Consented to blood products.    Plan discussed with CRNA.

## 2023-08-08 NOTE — CASE MANAGEMENT/SOCIAL WORK
Discharge Planning Assessment  [facility]     Patient Name: Lashell Case  MRN: [id number]  Today's Date: 8/8/2023    Admit Date: 8/6/2023            Discharge Plan       Row Name 08/08/23 1336       Plan    Plan SS attempted to speak with Pt's GD Jayleen about emergency guardianship at bedside but was not successful. SS attempted to call GD Jayleen [phone] but number was disconnected. SS attempted to speak with daughter but number was disconnected. SS attempted to speak with Pt's other GD Hi in hopes of finding another phone number to reach Jayleen but GD states Jayleen does not have a working phone. Pt discussed with Dr. Cifuentes who states Pt will need long term NHP and preferrable an emergency guardian that can be reached by phone. SS notified RN. SS to continue to try to reach family about emergency guardianship and placement. SS to follow.    14:40pm: Pt discussed in rounds. Physician states Palliative has also be consulted and has not yet been able to reach DELIA Clemens and stressed the importance of an emergency guardian would need a telephone where she can be reached. SS discussed Pt with Palliative care SOFI Strong. SS contacted Pt's daughter, Hattie [phone] who states she does not want to become guardian due to her having poor health. Daughter states she has reached DELIA Clemens on FB message and she plans to come back to hospital around 16:00pm today. Daughter states she will help assist GD with obtaining a phone were she can be reached. SS to follow.                    Lynne Gage, WMW

## 2023-08-08 NOTE — CONSULTS
[facility]   Consult Note    Patient Name: Lashell Case  : 1935  MRN: [id number]  Primary Care Physician:  Evelio Guerin DO  Referring Physician: No Known Provider  Date of admission: 2023    Consults  Subjective   Subjective     Reason for Consult/ Chief Complaint: BRBPR    Wound Check    Altered Mental Status    Hypoglycemia    Shortness of Breath    Lashell Case is a 88 y.o. female with dementia and multiple medical comorbidities presenting with acute encephalopathy and noted to have anemia and bright red blood per rectum.  No large-volume bleeding but the bright red blood was noted in her undergarments and concerning for source of anemia.  Patient was initially scheduled for EGD and colonoscopy but bowel prep was unsuccessful overnight and procedure will be deferred until tomorrow.    Review of Systems   Unable to perform ROS: Dementia      Personal History     Past Medical History:   Diagnosis Date    Arthritis     Breast cancer     lt br ca    Breast cancer 2006    rt br ca    Dizziness 2016    [place] (hard of hearing)     Hyperlipidemia     Hypertension     Hypothyroidism     Scabies exposure        Past Surgical History:   Procedure Laterality Date    BREAST BIOPSY      BREAST LUMPECTOMY Right     BREAST SURGERY      MASTECTOMY Left     ca    MASTECTOMY Right     MASTECTOMY WITH SENTINEL NODE BIOPSY AND AXILLARY NODE DISSECTION Right 2020    Procedure: BREAST MASTECTOMY WITH SENTINEL NODE BIOPSY AND AXILLARY NODE DISSECTION;  Surgeon: Cynthia Ward MD;  Location: [facility];  Service: General;  Laterality: Right;       Family History: family history includes Diabetes in her daughter, mother, and son; Hypertension in her child and mother; Prostate cancer in her son; Throat cancer in her son; Uterine cancer in her mother. Otherwise pertinent FHx was reviewed and not pertinent to current issue.    Social History:  reports that she has never smoked. She  has never used smokeless tobacco. She reports that she does not drink alcohol and does not use drugs.    Home Medications:   cloNIDine and levothyroxine    Allergies:  Allergies   Allergen Reactions    Bactrim [Sulfamethoxazole-Trimethoprim] GI Intolerance    Ketorolac Nausea And Vomiting    Phenergan [Promethazine Hcl] Other (See Comments)     Restless legs and trouble swallowing, seizures    Codeine Nausea And Vomiting    Penicillins Rash     tolerates Keflex per patient       Objective    Objective     Vitals:  Temp:  [97.9 øF (36.6 øC)-98.4 øF (36.9 øC)] 97.9 øF (36.6 øC)  Heart Rate:  [76-97] 87  Resp:  [16-18] 18  BP: (134-168)/(83-93) 150/93    Physical Exam  Constitutional:       Appearance: She is well-developed.   HENT:      Head: Normocephalic and atraumatic.   Eyes:      Conjunctiva/sclera: Conjunctivae normal.      Pupils: Pupils are equal, round, and reactive to light.   Neck:      Thyroid: No thyromegaly.      Vascular: No JVD.      Trachea: No tracheal deviation.   Cardiovascular:      Rate and Rhythm: Normal rate and regular rhythm.      Heart sounds: No murmur heard.    No friction rub. No gallop.   Pulmonary:      Effort: Pulmonary effort is normal.      Breath sounds: Normal breath sounds.   Abdominal:      General: There is no distension.      Palpations: Abdomen is soft. There is no hepatomegaly or splenomegaly.      Tenderness: There is no abdominal tenderness.      Hernia: No hernia is present.   Musculoskeletal:         General: No deformity. Normal range of motion.      Cervical back: Neck supple.   Skin:     General: Skin is warm and dry.   Neurological:      Mental Status: She is alert and oriented to person, place, and time.       Result Review    Result Review:  I have personally reviewed the results from the time of this admission to 8/8/2023 13:40 EDT and agree with these findings:  [x]  Laboratory list / accordion  []  Microbiology  [x]  Radiology  []  EKG/Telemetry   []   Cardiology/Vascular   []  Pathology  []  Old records  []  Other:      Assessment & Plan   Assessment / Plan     Brief Patient Summary:  Lashell Case is a 88 y.o. female who has bright red blood per rectum and anemia of uncertain etiology.  Initial attempts for colonoscopy today were unsuccessful due to lack of bowel prep and the procedure was canceled and will be deferred after another day of bowel prep and we will add EGD as well.    Active Hospital Problems:  Active Hospital Problems    Diagnosis     **Acute encephalopathy     LGI bleed     Chronic anemia      Plan:   Bowel prep tonight, OR for EGD and colonoscopy tomorrow.    Romeo Valdez MD

## 2023-08-08 NOTE — PROGRESS NOTES
Malnutrition Severity Assessment      Patient meets criteria for : Severe Malnutrition  Malnutrition Type (last 8 hours)       Malnutrition Severity Assessment       Row Name 08/08/23 1513       Malnutrition Severity Assessment    Malnutrition Type Chronic Disease - Related Malnutrition      Row Name 08/08/23 1513       Insufficient Energy Intake     Insufficient Energy Intake Findings Severe    Insufficient Energy Intake  <75% of est. energy requirement for > or equal to 1 month      Row Name 08/08/23 1513       Unintentional Weight Loss     Unintentional Weight Loss Findings Severe  26.5% previous 4 months    Unintentional Weight Loss  Weight loss greater than 7.5% in three months      Row Name 08/08/23 1513       Muscle Loss    Loss of Muscle Mass Findings Severe    [place] Region Severe - deep hollowing/scooping, lack of muscle to touch, facial bones well defined    Clavicle Bone Region Severe - protruding prominent bone    Acromion Bone Region Severe - squared shoulders, bones, and acromion process protrusion prominent    Scapular Bone Region Severe - prominent bones, depressions easily visible between ribs, scapula, spine, shoulders    Dorsal Hand Region Severe - prominent depression    Patellar Region Moderate - patella more prominent, less muscle definition around patella    Anterior Thigh Region Moderate - mild depression on inner thigh    Posterior Calf Region Moderate - some roundness, slight firmness      Row Name 08/08/23 1513       Fat Loss    Subcutaneous Fat Loss Findings Severe    Orbital Region  Severe - pronounced hollowness/depression, dark circles, loose saggy skin    Upper Arm Region Severe - mostly skin, very little space between folds, fingers touch    Thoracic & Lumbar Region Moderate - ribs visible with mild depressions, iliac crest somewhat prominent      Row Name 08/08/23 1513       Criteria Met (Must meet criteria for severity in at least 2 of these categories: M Wasting, Fat Loss, Fluid,  Secondary Signs, Wt. Status, Intake)    Patient meets criteria for  Severe Malnutrition

## 2023-08-08 NOTE — SIGNIFICANT NOTE
08/08/23 1343   OTHER   Discipline physical therapist   Rehab Time/Intention   Session Not Performed patient/family declined treatment

## 2023-08-08 NOTE — PLAN OF CARE
Goal Outcome Evaluation:  Pt resting in bed with no s/s of distress noted. VSS. IV access maintained throughout shift. Pt on RA and tolerating well. Will continue with plan of care.

## 2023-08-09 ENCOUNTER — ANESTHESIA EVENT (OUTPATIENT)
Dept: PERIOP | Facility: HOSPITAL | Age: 88
DRG: 640 | End: 2023-08-09
Payer: MEDICARE

## 2023-08-09 ENCOUNTER — ANESTHESIA (OUTPATIENT)
Dept: PERIOP | Facility: HOSPITAL | Age: 88
DRG: 640 | End: 2023-08-09
Payer: MEDICARE

## 2023-08-09 PROBLEM — E43 SEVERE MALNUTRITION: Status: ACTIVE | Noted: 2023-08-09

## 2023-08-09 LAB
ANION GAP SERPL CALCULATED.3IONS-SCNC: 6.7 MMOL/L (ref 5–15)
BUN SERPL-MCNC: 14 MG/DL (ref 8–23)
BUN/CREAT SERPL: 20.9 (ref 7–25)
CALCIUM SPEC-SCNC: 7.5 MG/DL (ref 8.6–10.5)
CHLORIDE SERPL-SCNC: 99 MMOL/L (ref 98–107)
CO2 SERPL-SCNC: 22.3 MMOL/L (ref 22–29)
CREAT SERPL-MCNC: 0.67 MG/DL (ref 0.57–1)
DEPRECATED RDW RBC AUTO: 72.8 FL (ref 37–54)
EGFRCR SERPLBLD CKD-EPI 2021: 85.2 ML/MIN/1.73
ERYTHROCYTE [DISTWIDTH] IN BLOOD BY AUTOMATED COUNT: 18.6 % (ref 12.3–15.4)
GLUCOSE BLDC GLUCOMTR-MCNC: 103 MG/DL (ref 70–130)
GLUCOSE BLDC GLUCOMTR-MCNC: 71 MG/DL (ref 70–130)
GLUCOSE BLDC GLUCOMTR-MCNC: 79 MG/DL (ref 70–130)
GLUCOSE SERPL-MCNC: 120 MG/DL (ref 65–99)
HCT VFR BLD AUTO: 23.9 % (ref 34–46.6)
HGB BLD-MCNC: 7.8 G/DL (ref 12–15.9)
MCH RBC QN AUTO: 35.3 PG (ref 26.6–33)
MCHC RBC AUTO-ENTMCNC: 32.6 G/DL (ref 31.5–35.7)
MCV RBC AUTO: 108.1 FL (ref 79–97)
PLATELET # BLD AUTO: 204 10*3/MM3 (ref 140–450)
PMV BLD AUTO: 9.1 FL (ref 6–12)
POTASSIUM SERPL-SCNC: 3.7 MMOL/L (ref 3.5–5.2)
RBC # BLD AUTO: 2.21 10*6/MM3 (ref 3.77–5.28)
SODIUM SERPL-SCNC: 128 MMOL/L (ref 136–145)
WBC NRBC COR # BLD: 4.89 10*3/MM3 (ref 3.4–10.8)

## 2023-08-09 PROCEDURE — 25010000002 PROPOFOL 200 MG/20ML EMULSION: Performed by: NURSE ANESTHETIST, CERTIFIED REGISTERED

## 2023-08-09 PROCEDURE — 43239 EGD BIOPSY SINGLE/MULTIPLE: CPT | Performed by: SURGERY

## 2023-08-09 PROCEDURE — 0DB78ZX EXCISION OF STOMACH, PYLORUS, VIA NATURAL OR ARTIFICIAL OPENING ENDOSCOPIC, DIAGNOSTIC: ICD-10-PCS | Performed by: SURGERY

## 2023-08-09 PROCEDURE — 88342 IMHCHEM/IMCYTCHM 1ST ANTB: CPT

## 2023-08-09 PROCEDURE — 85027 COMPLETE CBC AUTOMATED: CPT | Performed by: INTERNAL MEDICINE

## 2023-08-09 PROCEDURE — 45378 DIAGNOSTIC COLONOSCOPY: CPT | Performed by: SURGERY

## 2023-08-09 PROCEDURE — 99232 SBSQ HOSP IP/OBS MODERATE 35: CPT | Performed by: INTERNAL MEDICINE

## 2023-08-09 PROCEDURE — 82948 REAGENT STRIP/BLOOD GLUCOSE: CPT

## 2023-08-09 PROCEDURE — 88305 TISSUE EXAM BY PATHOLOGIST: CPT

## 2023-08-09 PROCEDURE — 80048 BASIC METABOLIC PNL TOTAL CA: CPT | Performed by: INTERNAL MEDICINE

## 2023-08-09 PROCEDURE — 0DJD8ZZ INSPECTION OF LOWER INTESTINAL TRACT, VIA NATURAL OR ARTIFICIAL OPENING ENDOSCOPIC: ICD-10-PCS | Performed by: SURGERY

## 2023-08-09 RX ORDER — MIDAZOLAM HYDROCHLORIDE 1 MG/ML
0.5 INJECTION INTRAMUSCULAR; INTRAVENOUS
Status: DISCONTINUED | OUTPATIENT
Start: 2023-08-09 | End: 2023-08-09 | Stop reason: HOSPADM

## 2023-08-09 RX ORDER — SODIUM CHLORIDE, SODIUM LACTATE, POTASSIUM CHLORIDE, CALCIUM CHLORIDE 600; 310; 30; 20 MG/100ML; MG/100ML; MG/100ML; MG/100ML
100 INJECTION, SOLUTION INTRAVENOUS ONCE AS NEEDED
Status: DISCONTINUED | OUTPATIENT
Start: 2023-08-09 | End: 2023-08-09

## 2023-08-09 RX ORDER — IPRATROPIUM BROMIDE AND ALBUTEROL SULFATE 2.5; .5 MG/3ML; MG/3ML
3 SOLUTION RESPIRATORY (INHALATION) ONCE AS NEEDED
Status: DISCONTINUED | OUTPATIENT
Start: 2023-08-09 | End: 2023-08-09

## 2023-08-09 RX ORDER — SODIUM CHLORIDE 0.9 % (FLUSH) 0.9 %
10 SYRINGE (ML) INJECTION AS NEEDED
Status: DISCONTINUED | OUTPATIENT
Start: 2023-08-09 | End: 2023-08-09 | Stop reason: HOSPADM

## 2023-08-09 RX ORDER — PROPOFOL 10 MG/ML
INJECTION, EMULSION INTRAVENOUS AS NEEDED
Status: DISCONTINUED | OUTPATIENT
Start: 2023-08-09 | End: 2023-08-09 | Stop reason: SURG

## 2023-08-09 RX ORDER — SODIUM CHLORIDE, SODIUM LACTATE, POTASSIUM CHLORIDE, CALCIUM CHLORIDE 600; 310; 30; 20 MG/100ML; MG/100ML; MG/100ML; MG/100ML
125 INJECTION, SOLUTION INTRAVENOUS ONCE
Status: DISCONTINUED | OUTPATIENT
Start: 2023-08-09 | End: 2023-08-09 | Stop reason: HOSPADM

## 2023-08-09 RX ORDER — PHENYLEPHRINE HCL IN 0.9% NACL 1 MG/10 ML
SYRINGE (ML) INTRAVENOUS AS NEEDED
Status: DISCONTINUED | OUTPATIENT
Start: 2023-08-09 | End: 2023-08-09 | Stop reason: SURG

## 2023-08-09 RX ORDER — FENTANYL CITRATE 50 UG/ML
50 INJECTION, SOLUTION INTRAMUSCULAR; INTRAVENOUS
Status: DISCONTINUED | OUTPATIENT
Start: 2023-08-09 | End: 2023-08-09

## 2023-08-09 RX ORDER — OXYCODONE HYDROCHLORIDE AND ACETAMINOPHEN 5; 325 MG/1; MG/1
1 TABLET ORAL ONCE AS NEEDED
Status: DISCONTINUED | OUTPATIENT
Start: 2023-08-09 | End: 2023-08-09

## 2023-08-09 RX ORDER — DEXTROSE MONOHYDRATE 25 G/50ML
50 INJECTION, SOLUTION INTRAVENOUS ONCE
Status: COMPLETED | OUTPATIENT
Start: 2023-08-09 | End: 2023-08-09

## 2023-08-09 RX ORDER — SODIUM CHLORIDE 0.9 % (FLUSH) 0.9 %
10 SYRINGE (ML) INJECTION EVERY 12 HOURS SCHEDULED
Status: DISCONTINUED | OUTPATIENT
Start: 2023-08-09 | End: 2023-08-09 | Stop reason: HOSPADM

## 2023-08-09 RX ORDER — SODIUM CHLORIDE 9 MG/ML
40 INJECTION, SOLUTION INTRAVENOUS AS NEEDED
Status: DISCONTINUED | OUTPATIENT
Start: 2023-08-09 | End: 2023-08-09 | Stop reason: HOSPADM

## 2023-08-09 RX ORDER — ONDANSETRON 2 MG/ML
4 INJECTION INTRAMUSCULAR; INTRAVENOUS AS NEEDED
Status: DISCONTINUED | OUTPATIENT
Start: 2023-08-09 | End: 2023-08-09

## 2023-08-09 RX ADMIN — LEVOTHYROXINE SODIUM 75 MCG: 0.07 TABLET ORAL at 05:12

## 2023-08-09 RX ADMIN — MUPIROCIN 1 APPLICATION: 20 OINTMENT TOPICAL at 21:52

## 2023-08-09 RX ADMIN — MUPIROCIN 1 APPLICATION: 20 OINTMENT TOPICAL at 10:09

## 2023-08-09 RX ADMIN — Medication 0.5 BOTTLE: at 05:12

## 2023-08-09 RX ADMIN — Medication 10 ML: at 10:09

## 2023-08-09 RX ADMIN — Medication 10 ML: at 21:51

## 2023-08-09 RX ADMIN — Medication 1 EACH: at 18:00

## 2023-08-09 RX ADMIN — PROPOFOL 40 MG: 10 INJECTION, EMULSION INTRAVENOUS at 11:14

## 2023-08-09 RX ADMIN — CLONIDINE HYDROCHLORIDE 0.1 MG: 0.1 TABLET ORAL at 21:51

## 2023-08-09 RX ADMIN — Medication 100 MCG: at 11:18

## 2023-08-09 RX ADMIN — DEXTROSE MONOHYDRATE 50 ML: 25 INJECTION, SOLUTION INTRAVENOUS at 07:50

## 2023-08-09 RX ADMIN — DEXTROSE AND SODIUM CHLORIDE 100 ML/HR: 5; 450 INJECTION, SOLUTION INTRAVENOUS at 21:54

## 2023-08-09 RX ADMIN — Medication 100 MCG: at 11:19

## 2023-08-09 RX ADMIN — APIXABAN 2.5 MG: 2.5 TABLET, FILM COATED ORAL at 21:51

## 2023-08-09 RX ADMIN — DEXTROSE AND SODIUM CHLORIDE 100 ML/HR: 5; 450 INJECTION, SOLUTION INTRAVENOUS at 01:06

## 2023-08-09 NOTE — PROGRESS NOTES
[facility]     Patient Identification:  Name:  Lashell Case  Age:  88 y.o.  Sex:  female  :  1935  MRN:  [id number]  Visit Number:  [id number]  Primary Care Provider:  Evelio Guerin DO    Length of stay:  3    Chief complaint: Confusion    Subjective:    Patient seen and examined at bedside with no nursing staff present.  Patient was asleep when I entered the room and will awaken but quickly falls back asleep.  Per nursing staff, no new events overnight.  Patient is scheduled for EGD and colonoscopy today.  Patient was unable to have EGD and colonoscopy yesterday as patient had poor bowel prep and colonoscopy had to be aborted.  ----------------------------------------------------------------------------------------------------------------------  Current [facility] Meds:  apixaban, 2.5 mg, Oral, Q12H  cloNIDine, 0.1 mg, Oral, BID  levothyroxine, 75 mcg, Oral, Q AM  mupirocin, 1 application , Each Nare, BID  senna-docusate sodium, 2 tablet, Oral, BID  sodium chloride, 10 mL, Intravenous, Q12H      dextrose 5 % and sodium chloride 0.45 %, 100 mL/hr, Last Rate: 100 mL/hr (23 0106)      ----------------------------------------------------------------------------------------------------------------------  Vital Signs:  Temp:  [96.3 øF (35.7 øC)-98 øF (36.7 øC)] 97.5 øF (36.4 øC)  Heart Rate:  [63-88] 74  Resp:  [18-20] 18  BP: (105-152)/(65-86) 119/75      23  0545 23  0545 23  0500   Weight: 43 kg (94 lb 11.2 oz) (admit weight less than 24 hours) 44.4 kg (97 lb 12.8 oz) 44 kg (96 lb 14.4 oz)     Body mass index is 15.64 kg/mý.    Intake/Output Summary (Last 24 hours) at 2023 1416  Last data filed at 2023 0800  Gross per 24 hour   Intake 0 ml   Output --   Net 0 ml       No diet orders on file  ----------------------------------------------------------------------------------------------------------------------  Physical  exam:  Constitutional: Elderly appearing appearing  female in no apparent distress.     HENT:  Head:  Normocephalic and atraumatic.  Mouth:  Moist mucous membranes.    Eyes:  Conjunctivae and EOM are normal.  Pupils are equal, round, and reactive to light.  No scleral icterus.    Neck:  Neck supple. No thyromegaly.  No JVD present.    Cardiovascular:  Irregular rhythm with rate in the 70's,  no murmurs, rubs, clicks or gallops appreciated.  Pulmonary/Chest:  Clear to auscultation bilaterally with no crackles, wheezes or rhonchi appreciated.  Abdominal:  Soft. Nontender. Nondistended  Bowel sounds are normal in all four quadrants. No organomegally appreciated.   Musculoskeletal:  No edema, no tenderness, and no deformity.  No red or swollen joints anywhere.    Neurological:  Alert, Cranial nerves II-XII intact with no focal deficits.  No facial droop.  No slurred speech.   Skin:  Warm and dry to palpation with no rashes or lesions appreciated.  Peripheral vascular:  2+ radial and pedal pulses in bilateral upper and lower extremities.  Psychiatric:  Alert but not oriented to person, place or time, does not demonstrate appropriate decision-making capacity    Little change in physical exam in comparison to 8/8/2023  -------------------------------------------------------------------------------------------------  ----------------------------------------------------------------------------------------------------------------------  Results from last 7 days   Lab Units 08/06/23  2157   CK TOTAL U/L 42       Results from last 7 days   Lab Units 08/09/23  0030 08/08/23  0217 08/07/23  1048 08/07/23  0405 08/06/23  2157 08/06/23  1152   CRP mg/dL  --   --   --   --   --  3.19*   LACTATE mmol/L  --   --   --   --   --  1.2   WBC 10*3/mm3 4.89 5.37  --  5.92  --  6.71   HEMOGLOBIN g/dL 7.8* 8.3* 8.7* 9.3*   < > 9.4*   HEMATOCRIT % 23.9* 25.1* 27.3* 28.3*   < > 29.7*   MCV fL 108.1* 105.9*  --  106.8*  --  107.6*    MCHC g/dL 32.6 33.1  --  32.9  --  31.6   PLATELETS 10*3/mm3 204 200  --  221  --  252    < > = values in this interval not displayed.           Results from last 7 days   Lab Units 08/09/23  0030 08/08/23  1544 08/08/23  0217 08/07/23  0405 08/06/23  1152   SODIUM mmol/L 128*  --  129* 134* 135*   POTASSIUM mmol/L 3.7 4.0 2.7* 3.4* 3.5   MAGNESIUM mg/dL  --   --  1.9  --   --    CHLORIDE mmol/L 99  --  99 100 98   CO2 mmol/L 22.3  --  23.9 21.0* 18.0*   BUN mg/dL 14  --  17 25* 29*   CREATININE mg/dL 0.67  --  0.69 0.97 1.03*   CALCIUM mg/dL 7.5*  --  7.7* 8.2* 8.5*   GLUCOSE mg/dL 120*  --  145* 123* 93   ALBUMIN g/dL  --   --   --  2.4* 2.5*   BILIRUBIN mg/dL  --   --   --  0.7 1.2   ALK PHOS U/L  --   --   --  100 106   AST (SGOT) U/L  --   --   --  19 21   ALT (SGPT) U/L  --   --   --  9 10     Estimated Creatinine Clearance: 41.9 mL/min (by C-G formula based on SCr of 0.67 mg/dL).    No results found for: AMMONIA      Blood Culture   Date Value Ref Range Status   08/06/2023 No growth at 24 hours  Preliminary   08/06/2023 No growth at 24 hours  Preliminary     No results found for: URINECX  No results found for: WOUNDCX  No results found for: STOOLCX    I have personally looked at the labs and they are summarized above.  ----------------------------------------------------------------------------------------------------------------------  Imaging Results (Last 24 Hours)       ** No results found for the last 24 hours. **          ----------------------------------------------------------------------------------------------------------------------  Assessment and Plan:    Macrocytic anemia -B12 and folate levels are within normal limits.  Plan for EGD and colonoscopy today.    2.  Hypothyroidism -continue Synthroid 75 mcg p.o. daily.    3.  Essential hypertension -much better controlled with addition of lisinopril 10 mg p.o. daily.  Continue to monitor closely     4.  Hyperlipidemia -statin    5.  Chronic  A-fib -continue Eliquis at 2.5 mg p.o. twice daily, currently in A-fib and rate controlled.    Disposition plan to have EGD/colonoscopy today.  Will likely benefit from placement at discharge    Austyn Cifuentes,    08/09/23   14:16 EDT

## 2023-08-09 NOTE — PLAN OF CARE
Goal Outcome Evaluation:  Pt resting in bed with no s/s of distress noted at this time. VSS. IV access maintained throughout shift. Family is at bedside and involved in care. No requests at this time. Will continue with plan of care.

## 2023-08-09 NOTE — PLAN OF CARE
Goal Outcome Evaluation:         Pt has been resting this shift. D5 1/2NS infusing at 100ml/hr. No signs and symptoms of acute distress noted. No complaints of chest pain or SOB reported.

## 2023-08-09 NOTE — ANESTHESIA POSTPROCEDURE EVALUATION
Patient: Lashell Case    Procedure Summary       Date: 08/09/23 Room / Location: [facility] OR [address] [place] [address] COR OR    Anesthesia Start: 1110 Anesthesia Stop: 1134    Procedures:       COLONOSCOPY      ESOPHAGOGASTRODUODENOSCOPY (Esophagus) Diagnosis:       LGI bleed      (LGI bleed [K92.2])    Surgeons: Romeo Valdez MD Provider: Landry Workman MD    Anesthesia Type: Not recorded ASA Status: 4            Anesthesia Type: No value filed.    Vitals  Vitals Value Taken Time   /75 08/09/23 1205   Temp 97 øF (36.1 øC) 08/09/23 1135   Pulse 72 08/09/23 1205   Resp 18 08/09/23 1205   SpO2 97 % 08/09/23 1205           Post Anesthesia Care and Evaluation    Patient location during evaluation: bedside  Patient participation: complete - patient participated  Level of consciousness: awake and alert  Pain score: 1  Pain management: adequate    Airway patency: patent  Anesthetic complications: No anesthetic complications  PONV Status: none  Cardiovascular status: acceptable  Respiratory status: acceptable  Hydration status: acceptable

## 2023-08-09 NOTE — ANESTHESIA POSTPROCEDURE EVALUATION
Patient: Lashell Case    Procedure Summary       Date: 08/09/23 Room / Location: [facility] OR [address] [place] [address] COR OR    Anesthesia Start: 1110 Anesthesia Stop: 1134    Procedures:       COLONOSCOPY      ESOPHAGOGASTRODUODENOSCOPY (Esophagus) Diagnosis:       LGI bleed      (LGI bleed [K92.2])    Surgeons: Romeo Valdez MD Provider: Landry Workman MD    Anesthesia Type: general ASA Status: 4            Anesthesia Type: general    Vitals  Vitals Value Taken Time   /75 08/09/23 1205   Temp 97 øF (36.1 øC) 08/09/23 1135   Pulse 72 08/09/23 1205   Resp 18 08/09/23 1205   SpO2 97 % 08/09/23 1205           Post Anesthesia Care and Evaluation    Patient location during evaluation: PHASE II  Patient participation: complete - patient participated  Level of consciousness: awake and alert  Pain score: 0  Pain management: adequate    Airway patency: patent  Anesthetic complications: No anesthetic complications    Cardiovascular status: acceptable  Respiratory status: acceptable  Hydration status: acceptable

## 2023-08-09 NOTE — PHARMACY PATIENT ASSISTANCE
Pharmacy looked at coverage/cost of eliquis.  According to her insurance, she will have a $10.35 copay in our apothecary. No other issues at this time.    Thank You;  Lacy Gonzales, PharmD  08/09/23  16:18 EDT

## 2023-08-09 NOTE — CASE MANAGEMENT/SOCIAL WORK
Discharge Planning Assessment  [facility]     Patient Name: Lashell Case  MRN: [id number]  Today's Date: 8/9/2023    Admit Date: 8/6/2023            Discharge Plan       Row Name 08/09/23 1256       Plan    Plan SS met with Pt's daughter at bedside who states DELIA Clemens will be at hospital today for SS to speak with about emergency guardianship. SS to follow.    15:49pm: SS met with Pt's GD Jayleen Brown on this date. GD states she has working phone [phone]. Phone number has been updated on contact list. SS explained the details of emergency guardianship and that Brittanie has expressed Pt will need long term NHP. GD is agreeable to placement and plans to start emergency guardianship process tomorrow at 's office.  requested SS send NH referrals to all SNF locations in [place].SS notified and faxed referrals to [place]  per Gwendolyn, [facility] per Farhana, [facility] per Ani, and [facility] per Brda for review. GD states Pt has said suicide ideations statements at bedside. SS notified Dr. Cifuentes, RN and Lead RN. Pt also discussed with Palliative Care APRN. SS to follow.     17:59pm: The [facility] darrin Delcid states she does not have any beds available. SS to follow up with other SNF;s on 08/10/23.                     Lynne Gage, WMW

## 2023-08-09 NOTE — DISCHARGE PLACEMENT REQUEST
"Lashell Garcia (88 y.o. Female)       Date of Birth   1935    Social Security Number       Address   [address] [place] [address]    Home Phone   [phone]    MRN   [id number]       [facility]    Marital Status                               Admission Date   8/6/23    Admission Type   Emergency    Admitting Provider   González Rodríguez MD    Attending Provider   Austyn Cifuentes DO    Department, Room/Bed   [address], 3306/2S       Discharge Date       Discharge Disposition       Discharge Destination                                 Attending Provider: Austyn Cifuentes DO    Allergies: Bactrim [Sulfamethoxazole-trimethoprim], Ketorolac, Phenergan [Promethazine Hcl], Codeine, Penicillins    Isolation: Contact   Infection: None   Code Status: No CPR    Ht: 167.6 cm (66\")   Wt: 44 kg (96 lb 14.4 oz)    Admission Cmt: None   Principal Problem: Acute encephalopathy [G93.40]                   Active Insurance as of 8/6/2023       Primary Coverage       Payor Plan Insurance Group Employer/Plan Group    MEDICARE MEDICARE A & B        Payor Plan Address Payor Plan Phone Number Payor Plan Fax Number Effective Dates    [address] [phone]  6/1/2002 - None Entered    [facility] [address]         Subscriber Name Subscriber Birth Date Member ID       LASHELL GARCIA 1935 [id number]               Secondary Coverage       Payor Plan Insurance Group Employer/Plan Group    [place]        Payor Plan Address Payor Plan Phone Number Payor Plan Fax Number Effective Dates    [address] [phone]  7/7/2016 - None Entered    [place] [address]         Subscriber Name Subscriber Birth Date Member ID       LASHELL GARCIA 1935 [id number]                     Emergency Contacts        (Rel.) Home Phone Work Phone Mobile Phone    JUANA THORPE (Grandchild) [phone] -- --    Lolly Watkins " (Grandchild) [phone] -- --    Hattie Santizo (Daughter) -- -- [phone]                 History & Physical        Agustina Silver PA-C at 23       Attestation signed by González Rodríguez MD at 23 [phone]    I have seen and examined this patient independently from Agustina Silver PA-c, and have  reviewed this documentation. Suspect encephalopathy is acute on chronic based on history from family, has she has been declining in recent months but got significantly worse over the last week. Likely has underlying dementia not formally diagnosed. Worsening encephalopathy could be from hypoglycemia and dehydration, as well as GI blood loss. Gently hydrate with IV fluids containing D5 1/2 NS as patient has some lower extremity edema and dependent edema in her arms, though this could be due to malnutrition more so than CHF as albumin is low which correlates with family's report of decreasing PO intake. Monitor glucose levels every 6 hours. Repeat H/H now, after midnight with morning labs and again at 8 am tomorrow. Transfuse if hemoglobin falls below 7, or higher if becomes hemodynamically unstable. General surgery notified by ED of patient's case; Dr Valdez agreed to see in consultation. Keep NPO after midnight in case he wishes to proceed with colonoscopy tomorrow though he may want her to receive adequate bowel prep before proceeding. Hold eliquis for now. Would add to PMH and assessment/plan that patient has stage IIIa CKD based on today's labs and previous labs in her chart, with GFR in mid-50s. Continue to monitor renal function moving forward.                        [facility]  HISTORY & PHYSICAL    Patient Identification:  Name:  Lashell Case  Age:  88 y.o.  Sex:  female  :  1935  MRN:  [id number]   Visit Number:  [id number]  Admit Date: 2023   Primary Care Physician:  Evelio Guerin DO Subjective     Chief complaint:   Chief  Complaint   Patient presents with    Wound Check    Altered Mental Status    Hypoglycemia    Shortness of Breath     History of presenting illness:   Patient is a 88 y.o. female with past medical history significant for hypertension, hyperlipidemia, hypothyroidism, arthritis, atrial fibrillation chronically anticoagulated with Eliquis that presented to the [facility] emergency department for evaluation of altered mental status.  Noted to have bright red blood per rectum while in ED.  General surgery consulted, advised to admit the patient, they will consult.    Patient examined at bedside on 3S. Per patients granddaughter, the patient has been declining over the past 2-3 months, becoming less mobile and less independent. Over the past week the patient has been not getting out of bed at all and became incontinent. Patient lives alone, with family members checking on her daily, but no one able to stay with her full time. Patient's family members said they have tried to get her to come to the hospital over the past 4 to 5 days and patient refused. Today patient had worsening weakness, periods of confusion, therefore agreed to come to the ER. Reports decreased oral intake.  Episodes of incontinence. Family member states they are interested in home health or rehab, but they dont think patient will be agreeable.     Upon arrival to the ED, vitals were temperature 97.1, HR 92, RR 16, /96, SPO2 94% on room air.  Significant for anion gap 19, BUN 29, creatinine 1.03, BUN/creatinine ratio 28.2, GFR 53.1, glucose 69, albumin 2.5, hemoglobin 9.4, hematocrit 29.7.  UA dark yellow, 1+ ketones, trace leukocytes, 1+ protein, moderate bilirubin, 3-6 squamous epithelial cells.    Chest x-ray shows central line tip in SVC.  No pneumothorax.    CT head shows no acute intracranial hemorrhage, midline shift, or significant mass effect.    Patient has been admitted to the Telemetry  unit.  ---------------------------------------------------------------------------------------------------------------------   Review of Systems   Constitutional:  Positive for fatigue. Negative for chills, diaphoresis and fever.   Respiratory:  Negative for cough, shortness of breath and wheezing.    Cardiovascular:  Negative for chest pain, palpitations and leg swelling.   Gastrointestinal:  Negative for abdominal pain, constipation, diarrhea, nausea and vomiting.   Genitourinary:  Negative for difficulty urinating, dysuria and flank pain.   Musculoskeletal:  Negative for arthralgias, myalgias and neck stiffness.   Skin:  Negative for rash and wound.   Neurological:  Negative for dizziness, weakness and light-headedness.   Psychiatric/Behavioral:  Negative for agitation and confusion.     ---------------------------------------------------------------------------------------------------------------------   Past Medical History:   Diagnosis Date    Arthritis     Breast cancer 2015    lt br ca    Breast cancer 2006    rt br ca    Dizziness 7/8/2016    [place] (hard of hearing)     Hyperlipidemia     Hypertension     Hypothyroidism     Scabies exposure      Past Surgical History:   Procedure Laterality Date    BREAST BIOPSY      BREAST LUMPECTOMY Right 2006    BREAST SURGERY      MASTECTOMY Left 2015    ca    MASTECTOMY Right 2020    MASTECTOMY WITH SENTINEL NODE BIOPSY AND AXILLARY NODE DISSECTION Right 11/17/2020    Procedure: BREAST MASTECTOMY WITH SENTINEL NODE BIOPSY AND AXILLARY NODE DISSECTION;  Surgeon: Cynthia Ward MD;  Location: [facility];  Service: General;  Laterality: Right;     Family History   Problem Relation Age of Onset    Hypertension Mother     Uterine cancer Mother     Diabetes Mother     Diabetes Daughter     Prostate cancer Son     Diabetes Son     Throat cancer Son     Hypertension Child     Breast cancer Neg Hx      Social History     Socioeconomic History    Marital status:     Tobacco Use    Smoking status: Never    Smokeless tobacco: Never   Vaping Use    Vaping Use: Never used   Substance and Sexual Activity    Alcohol use: No    Drug use: No    Sexual activity: Defer     Birth control/protection: None     ---------------------------------------------------------------------------------------------------------------------   Allergies:  Bactrim [sulfamethoxazole-trimethoprim], Ketorolac, Phenergan [promethazine hcl], Codeine, and Penicillins  ---------------------------------------------------------------------------------------------------------------------   Medications below are reported home medications pulling from within the system; at this time, these medications have not been reconciled unless otherwise specified and are in the verification process for further verifcation as current home medications.    Prior to Admission Medications       Prescriptions Last Dose Informant Patient Reported? Taking?    amiodarone (PACERONE) 200 MG tablet   No No    Take 1 tablet by mouth Daily.    apixaban (ELIQUIS) 2.5 MG tablet tablet   No No    Take 1 tablet by mouth Every 12 (Twelve) Hours.    cloNIDine (Catapres) 0.1 MG tablet   No No    Take 1 tablet by mouth 2 (Two) Times a Day.    hydroCHLOROthiazide (HYDRODIURIL) 25 MG tablet   No No    Take 1 tablet by mouth Daily for blood pressure.    levothyroxine (SYNTHROID, LEVOTHROID) 75 MCG tablet   No No    Take 1 tablet by mouth Every Morning.    lisinopril (PRINIVIL,ZESTRIL) 40 MG tablet   No No    Take 1 tablet by mouth daily for blood pressure.    metoprolol tartrate (LOPRESSOR) 100 MG tablet   No No    Take 1 tablet by mouth 2 (Two) Times a Day.          ---------------------------------------------------------------------------------------------------------------------    Objective     Hospital Scheduled Meds:  senna-docusate sodium, 2 tablet, Oral, BID  sodium chloride, 10 mL, Intravenous, Q12H      dextrose 5 % and sodium chloride 0.45  %, 100 mL/hr        Current listed hospital scheduled medications may not yet reflect those currently placed in orders that are signed and held, awaiting patient's arrival to floor/unit.    ---------------------------------------------------------------------------------------------------------------------   Vital Signs:  Temp:  [97.1 øF (36.2 øC)-97.9 øF (36.6 øC)] 97.7 øF (36.5 øC)  Heart Rate:  [] 84  Resp:  [16-18] 18  BP: (130-155)/(80-99) 130/85  Mean Arterial Pressure (Non-Invasive) for the past 24 hrs (Last 3 readings):   Noninvasive MAP (mmHg)   08/06/23 2211 101   08/06/23 2115 124   08/06/23 2100 104     SpO2 Percentage    08/06/23 2100 08/06/23 2115 08/06/23 2211   SpO2: 94% 97% 94%     SpO2:  [93 %-97 %] 94 %  on   ;   Device (Oxygen Therapy): room air    Body mass index is 15.28 kg/mý.  Wt Readings from Last 3 Encounters:   08/06/23 43 kg (94 lb 11.2 oz)   04/14/23 55.2 kg (121 lb 9.6 oz)   04/03/23 59.9 kg (132 lb)     ---------------------------------------------------------------------------------------------------------------------   Physical Exam:  Physical Exam  Constitutional:       General: She is not in acute distress.     Appearance: Normal appearance.   HENT:      Head: Normocephalic and atraumatic.      Right Ear: External ear normal.      Left Ear: External ear normal.      Nose: No nasal deformity.      Mouth/Throat:      Lips: Pink.      Mouth: Mucous membranes are moist.   Eyes:      Conjunctiva/sclera: Conjunctivae normal.      Pupils: Pupils are equal, round, and reactive to light.   Cardiovascular:      Rate and Rhythm: Normal rate and regular rhythm.      Pulses:           Dorsalis pedis pulses are 1+ on the right side and 1+ on the left side.      Heart sounds: Normal heart sounds.   Pulmonary:      Effort: Pulmonary effort is normal.      Breath sounds: Normal breath sounds. No wheezing, rhonchi or rales.   Abdominal:      General: Abdomen is flat. Bowel sounds are normal.       Palpations: Abdomen is soft.      Tenderness: There is no guarding or rebound.   Genitourinary:     Comments: No carpio catheter in place   Musculoskeletal:      Cervical back: Neck supple. Normal range of motion.      Right lower le+ Pitting Edema present.      Left lower le+ Pitting Edema present.      Comments: To mid shin    Skin:     General: Skin is warm and dry.   Neurological:      General: No focal deficit present.      Mental Status: She is alert.      Comments: Oriented to person and place, but not time    Psychiatric:         Mood and Affect: Mood normal.         Behavior: Behavior normal.     ---------------------------------------------------------------------------------------------------------------------  EK fibrillation.  Heart rate 82. Confirmed by cardiology      Telemetry:        I have personally reviewed the EKG/Telemetry strip  ---------------------------------------------------------------------------------------------------------------------   Results from last 7 days   Lab Units 23   CK TOTAL U/L 42           Results from last 7 days   Lab Units 23  1152   CRP mg/dL  --  3.19*   LACTATE mmol/L  --  1.2   WBC 10*3/mm3  --  6.71   HEMOGLOBIN g/dL 8.6* 9.4*   HEMATOCRIT % 27.2* 29.7*   MCV fL  --  107.6*   MCHC g/dL  --  31.6   PLATELETS 10*3/mm3  --  252     Results from last 7 days   Lab Units 23  1152   SODIUM mmol/L 135*   POTASSIUM mmol/L 3.5   CHLORIDE mmol/L 98   CO2 mmol/L 18.0*   BUN mg/dL 29*   CREATININE mg/dL 1.03*   CALCIUM mg/dL 8.5*   GLUCOSE mg/dL 93   ALBUMIN g/dL 2.5*   BILIRUBIN mg/dL 1.2   ALK PHOS U/L 106   AST (SGOT) U/L 21   ALT (SGPT) U/L 10   Estimated Creatinine Clearance: 26.6 mL/min (A) (by C-G formula based on SCr of 1.03 mg/dL (H)).  No results found for: AMMONIA    Glucose   Date/Time Value Ref Range Status   2023 1037 69 (L) 70 - 130 mg/dL Final     Comment:     Meter: [id number] : [id number]  [facility]     No results found for: HGBA1C  Lab Results   Component Value Date    TSH 72.700 (H) 03/24/2023    FREET4 1.85 (H) 11/01/2021       No results found for: BLOODCX  No results found for: URINECX  No results found for: WOUNDCX  No results found for: STOOLCX  No results found for: RESPCX  No results found for: MRSACX  Pain Management Panel           No data to display              I have personally reviewed the above laboratory results.   ---------------------------------------------------------------------------------------------------------------------  Imaging Results (Last 7 Days)       Procedure Component Value Units Date/Time    CT Head Without Contrast [[id number]] Collected: 08/06/23 1947     Updated: 08/06/23 1952    Narrative:      EXAMINATION:Computed tomography(CT)of the head without IV contrast     TECHNIQUE:CT of the head was performed in the supine position without  intravenous contrast according to as low as reasonably achievable dose  protocol. Axial CT utilized with slice thickness of 5 mm presented in  soft tissue and bone algorithms.     COMPARISON:No prior studies are available for review at the time of this  dictation.     FINDINGS: Moderate parenchymal volume loss is noted.  No acute intra-or extra-axial hemorrhage or fluid collections are  identified. The ventricles are of normal size,shape,and morphology. The  basilar cisterns are patent. No mass effect or midline shift is seen.  The gray-white matter differentiation is normal. The visualized portions  of the orbits,paranasal sinuses,and mastoids appear normal. No acute  fracture is identified.       Impression:         No acute intracranial hemorrhage,midline shift,or significant mass  effect.     This report was finalized on 8/6/2023 7:50 PM by Mary Gomez MD.       XR Chest 1 View [[id number]] Collected: 08/06/23 1215     Updated: 08/06/23 1218    Narrative:      XR CHEST 1 VW-     CLINICAL INDICATION: Line placement         COMPARISON: 04/03/2023      TECHNIQUE: Single frontal view of the chest.     FINDINGS:      LUNGS: Left-sided central line has been placed. The tip is in the  superior vena cava. No evidence of pneumothorax      HEART AND MEDIASTINUM: Heart and mediastinal contours are unremarkable        SKELETON: Bony and soft tissue structures are unremarkable.             Impression:      Central line tip in the superior vena cava. No pneumothorax     This report was finalized on 8/6/2023 12:15 PM by Dr. Erich Jenkins MD.             I have personally reviewed the above radiology results.     Last Echocardiogram:  Results for orders placed during the hospital encounter of 11/02/21    Adult Transesophageal Echo (DONALDO) W/ Cont if Necessary Per Protocol    Interpretation Summary  ú Normal left ventricular cavity size and wall thickness noted. All left ventricular wall segments contract normally.  ú Left ventricular ejection fraction appears to be 61 - 65%.  ú The aortic valve is abnormal in structure. The aortic valve exhibits sclerosis. There is calcification of the aortic valve. The aortic valve appears trileaflet. Mild aortic valve regurgitation is present. Mild aortic valve stenosis is present.  ú There are myxomatous changes of the mitral valve apparatus present. Moderate mitral valve regurgitation is present. No significant mitral valve stenosis is present  ú No evidence of a left atrial appendage thrombus was present.  ú Moderate mitral valve regurgitation is present. No significant mitral valve stenosis is present.  ú Moderate to severe tricuspid valve regurgitation is present.  ú There is no evidence of pericardial effusion. .  ú Comments: Proceed with electrical cardioversion.    ---------------------------------------------------------------------------------------------------------------------    Assessment & Plan      Active Hospital Problems    Diagnosis  POA    **Acute encephalopathy [G93.40]  Yes     Acute  encephalopathy, POA  Hypoglycemia, POA  Bright red blood per rectum, POA  Dehydration, POA   Progressive debility   CT head with no acute findings  H&H stable at this time  Inpatient general surgery consult  Keep patient n.p.o.  Nursing dysphagia screen prior to any p.o. medication  D5 and half-normal saline infusion at 100 mL/h  Patient presented with blood glucose 69.  Family reports poor p.o. intake.  Accu-Cheks every 6 hours  Recheck H&H after fluid resuscitation as well as repeating every 8 hours.  Case Management consulted  Strict I's and O's  Daily weights  Hold home anticoagulation  If placement needed on discharge, will need to consult psych for decisional capacity   PT/OT consult     CHRONIC MEDICAL PROBLEMS  Essential hypertension  BP appears moderately controlled   Plan to resume home antihypertensive regimen once reconciled per pharmacy with appropriate holding parameters to prevent hypotension and/or bradycardia   Closely monitor BP per hospital protocol, titrate medications as necessary    Hyperlipidemia   Restart home statin pending med rec     Hypothyroidism  Restart home synthroid pending med rec     Atrial fibrillation chronically anticoagulated with Eliquis  Continuous cardiac monitoring  Restart rate controlling medications pending med rec  Hold home Eliquis at this time  F/E/N: D5  0.45 NS at 100 mL/h.  Continue to monitor electrolytes.  NPO.    ---------------------------------------------------  DVT Prophylaxis: Already anticoagulated at home, hold on admission  GI Prophylaxis: Bowel regimen  Activity: Up with assistance    The patient is considered to be a high risk patient due to: Acute encephalopathy, hypoglycemia    INPATIENT status due to the need for care which can only be reasonably provided in an hospital setting such as aggressive/expedited ancillary services and/or consultation services, the necessity for IV medications, close physician monitoring and/or the possible need for  procedures.  In such, I feel patient's risk for adverse outcomes and need for care warrant INPATIENT evaluation and predict the patient's care encounter to likely last beyond 2 midnights.      Code Status: Full Code     Disposition/Discharge planning: Pending clinical course    I have discussed the patient's assessment and plan with Dr. Rodríguez.      Agustina Silver PA-C  Hospitalist Service -- [facility]       08/06/23  22:43 EDT    Attending Physician: González Rodríguez MD     Electronically signed by González Rodríguez MD at 08/06/23 6311       Vital Signs (last day)       Date/Time Temp Temp src Pulse Resp BP Patient Position SpO2    08/09/23 1230 97.5 (36.4) Axillary 74 18 -- Lying 98    08/09/23 1205 -- -- 72 18 119/75 Lying 97    08/09/23 1150 -- -- 72 18 125/85 Lying 97    08/09/23 1145 -- -- 72 18 124/84 Lying 98    08/09/23 1140 -- -- 67 18 110/79 Lying 97    08/09/23 1135 97 (36.1) Tympanic 72 18 105/72 Lying 96    08/09/23 1039 97.9 (36.6) Oral 63 18 132/78 Lying 95    08/09/23 0720 96.3 (35.7) Axillary 65 18 119/65 Lying 95    08/09/23 0355 98 (36.7) Oral 71 20 118/73 Lying 96    08/08/23 2323 97.8 (36.6) Oral 82 20 115/75 Lying 96    08/08/23 2000 98 (36.7) Oral 85 20 144/65 Lying 96    08/08/23 1500 98 (36.7) Oral 88 20 152/86 Lying 96    08/08/23 1246 97.9 (36.6) Tympanic 87 18 150/93 Lying 96    08/08/23 1100 98 (36.7) Oral 97 18 134/83 Lying 95    08/08/23 0858 -- -- -- -- -- -- 96    08/08/23 0700 98 (36.7) Oral 81 18 164/88 Lying 96    08/08/23 0429 -- Oral 88 18 151/88 Lying 95          Current Facility-Administered Medications   Medication Dose Route Frequency Provider Last Rate Last Admin    apixaban (ELIQUIS) tablet 2.5 mg  2.5 mg Oral Q12H Romeo Valdez MD   2.5 mg at 08/08/23 2029    sennosides-docusate (PERICOLACE) 8.6-50 MG per tablet 2 tablet  2 tablet Oral BID Romeo Valdez MD   2 tablet at 08/06/23 2247    And    polyethylene glycol (MIRALAX) packet  17 g  17 g Oral Daily PRN Romeo Valdez MD        And    bisacodyl (DULCOLAX) EC tablet 5 mg  5 mg Oral Daily PRN Romeo Valdez MD        And    bisacodyl (DULCOLAX) suppository 10 mg  10 mg Rectal Daily PRN Romeo Valdez MD        cloNIDine (CATAPRES) tablet 0.1 mg  0.1 mg Oral BID Romeo Valdez MD   0.1 mg at 239    dextrose 5 % and sodium chloride 0.45 % infusion  100 mL/hr Intravenous Continuous Romeo Valdez  mL/hr at 23 0106 100 mL/hr at 23 0106    HYDROcodone-acetaminophen (NORCO) 5-325 MG per tablet 1 tablet  1 tablet Oral Q6H PRN Romeo Valdez MD   1 tablet at 23 1859    levothyroxine (SYNTHROID, LEVOTHROID) tablet 75 mcg  75 mcg Oral Q AM Romeo Valdez MD   75 mcg at 23 0512    mupirocin (BACTROBAN) 2 % nasal ointment 1 application   1 application  Each Nare BID Romeo Valdez MD   1 application  at 23 1009    nitroglycerin (NITROSTAT) SL tablet 0.4 mg  0.4 mg Sublingual Q5 Min PRN Romeo Valdez MD        Potassium Replacement - Follow Nurse / BPA Driven Protocol   Does not apply PRN Romeo Valdez MD        sodium chloride 0.9 % flush 10 mL  10 mL Intravenous PRN Romeo Valdez MD        sodium chloride 0.9 % flush 10 mL  10 mL Intravenous Q12H Romeo Valdez MD   10 mL at 23 1009    sodium chloride 0.9 % flush 10 mL  10 mL Intravenous PRN Romeo Valdez MD        sodium chloride 0.9 % infusion 40 mL  40 mL Intravenous PRN Romeo Valdez MD         Operative/Procedure Notes (most recent note)    No notes of this type exist for this encounter.          Physician Progress Notes (most recent note)        Austyn Cifuentes DO at 23 1416              [facility]     Patient Identification:  Name:  Lashell Case  Age:  88 y.o.  Sex:  female  :  1935  MRN:  [id number]  Visit Number:   [id number]  Primary Care Provider:  Evelio Guerin DO    Length of stay:  3    Chief complaint: Confusion    Subjective:    Patient seen and examined at bedside with no nursing staff present.  Patient was asleep when I entered the room and will awaken but quickly falls back asleep.  Per nursing staff, no new events overnight.  Patient is scheduled for EGD and colonoscopy today.  Patient was unable to have EGD and colonoscopy yesterday as patient had poor bowel prep and colonoscopy had to be aborted.  ----------------------------------------------------------------------------------------------------------------------  Current Hospital Meds:  apixaban, 2.5 mg, Oral, Q12H  cloNIDine, 0.1 mg, Oral, BID  levothyroxine, 75 mcg, Oral, Q AM  mupirocin, 1 application , Each Nare, BID  senna-docusate sodium, 2 tablet, Oral, BID  sodium chloride, 10 mL, Intravenous, Q12H      dextrose 5 % and sodium chloride 0.45 %, 100 mL/hr, Last Rate: 100 mL/hr (08/09/23 0106)      ----------------------------------------------------------------------------------------------------------------------  Vital Signs:  Temp:  [96.3 øF (35.7 øC)-98 øF (36.7 øC)] 97.5 øF (36.4 øC)  Heart Rate:  [63-88] 74  Resp:  [18-20] 18  BP: (105-152)/(65-86) 119/75      08/07/23  0545 08/08/23  0545 08/09/23  0500   Weight: 43 kg (94 lb 11.2 oz) (admit weight less than 24 hours) 44.4 kg (97 lb 12.8 oz) 44 kg (96 lb 14.4 oz)     Body mass index is 15.64 kg/mý.    Intake/Output Summary (Last 24 hours) at 8/9/2023 1416  Last data filed at 8/9/2023 0800  Gross per 24 hour   Intake 0 ml   Output --   Net 0 ml       No diet orders on file  ----------------------------------------------------------------------------------------------------------------------  Physical exam:  Constitutional: Elderly appearing appearing  female in no apparent distress.     HENT:  Head:  Normocephalic and atraumatic.  Mouth:  Moist mucous membranes.    Eyes:   Conjunctivae and EOM are normal.  Pupils are equal, round, and reactive to light.  No scleral icterus.    Neck:  Neck supple. No thyromegaly.  No JVD present.    Cardiovascular:  Irregular rhythm with rate in the 70's,  no murmurs, rubs, clicks or gallops appreciated.  Pulmonary/Chest:  Clear to auscultation bilaterally with no crackles, wheezes or rhonchi appreciated.  Abdominal:  Soft. Nontender. Nondistended  Bowel sounds are normal in all four quadrants. No organomegally appreciated.   Musculoskeletal:  No edema, no tenderness, and no deformity.  No red or swollen joints anywhere.    Neurological:  Alert, Cranial nerves II-XII intact with no focal deficits.  No facial droop.  No slurred speech.   Skin:  Warm and dry to palpation with no rashes or lesions appreciated.  Peripheral vascular:  2+ radial and pedal pulses in bilateral upper and lower extremities.  Psychiatric:  Alert but not oriented to person, place or time, does not demonstrate appropriate decision-making capacity    Little change in physical exam in comparison to 8/8/2023  -------------------------------------------------------------------------------------------------  ----------------------------------------------------------------------------------------------------------------------  Results from last 7 days   Lab Units 08/06/23 2157   CK TOTAL U/L 42       Results from last 7 days   Lab Units 08/09/23  0030 08/08/23  0217 08/07/23  1048 08/07/23  0405 08/06/23 2157 08/06/23  1152   CRP mg/dL  --   --   --   --   --  3.19*   LACTATE mmol/L  --   --   --   --   --  1.2   WBC 10*3/mm3 4.89 5.37  --  5.92  --  6.71   HEMOGLOBIN g/dL 7.8* 8.3* 8.7* 9.3*   < > 9.4*   HEMATOCRIT % 23.9* 25.1* 27.3* 28.3*   < > 29.7*   MCV fL 108.1* 105.9*  --  106.8*  --  107.6*   MCHC g/dL 32.6 33.1  --  32.9  --  31.6   PLATELETS 10*3/mm3 204 200  --  221  --  252    < > = values in this interval not displayed.           Results from last 7 days   Lab Units  08/09/23  0030 08/08/23  1544 08/08/23  0217 08/07/23  0405 08/06/23  1152   SODIUM mmol/L 128*  --  129* 134* 135*   POTASSIUM mmol/L 3.7 4.0 2.7* 3.4* 3.5   MAGNESIUM mg/dL  --   --  1.9  --   --    CHLORIDE mmol/L 99  --  99 100 98   CO2 mmol/L 22.3  --  23.9 21.0* 18.0*   BUN mg/dL 14  --  17 25* 29*   CREATININE mg/dL 0.67  --  0.69 0.97 1.03*   CALCIUM mg/dL 7.5*  --  7.7* 8.2* 8.5*   GLUCOSE mg/dL 120*  --  145* 123* 93   ALBUMIN g/dL  --   --   --  2.4* 2.5*   BILIRUBIN mg/dL  --   --   --  0.7 1.2   ALK PHOS U/L  --   --   --  100 106   AST (SGOT) U/L  --   --   --  19 21   ALT (SGPT) U/L  --   --   --  9 10     Estimated Creatinine Clearance: 41.9 mL/min (by C-G formula based on SCr of 0.67 mg/dL).    No results found for: AMMONIA      Blood Culture   Date Value Ref Range Status   08/06/2023 No growth at 24 hours  Preliminary   08/06/2023 No growth at 24 hours  Preliminary     No results found for: URINECX  No results found for: WOUNDCX  No results found for: STOOLCX    I have personally looked at the labs and they are summarized above.  ----------------------------------------------------------------------------------------------------------------------  Imaging Results (Last 24 Hours)       ** No results found for the last 24 hours. **          ----------------------------------------------------------------------------------------------------------------------  Assessment and Plan:    Macrocytic anemia -B12 and folate levels are within normal limits.  Plan for EGD and colonoscopy today.    2.  Hypothyroidism -continue Synthroid 75 mcg p.o. daily.    3.  Essential hypertension -much better controlled with addition of lisinopril 10 mg p.o. daily.  Continue to monitor closely     4.  Hyperlipidemia -statin    5.  Chronic A-fib -continue Eliquis at 2.5 mg p.o. twice daily, currently in A-fib and rate controlled.    Disposition plan to have EGD/colonoscopy today.  Will likely benefit from placement at  discharge    Austyn Cifuentes DO   23   14:16 EDT       Electronically signed by Austyn Cifuentes DO at 23 1420          Consult Notes (most recent note)        Romeo Valdez MD at 23 1340          [facility]   Consult Note    Patient Name: Lashell Case  : 1935  MRN: [id number]  Primary Care Physician:  Evelio Guerin DO  Referring Physician: No Known Provider  Date of admission: 2023    Consults  Subjective   Subjective     Reason for Consult/ Chief Complaint: BRBPR    Wound Check    Altered Mental Status    Hypoglycemia    Shortness of Breath    Lashell Case is a 88 y.o. female with dementia and multiple medical comorbidities presenting with acute encephalopathy and noted to have anemia and bright red blood per rectum.  No large-volume bleeding but the bright red blood was noted in her undergarments and concerning for source of anemia.  Patient was initially scheduled for EGD and colonoscopy but bowel prep was unsuccessful overnight and procedure will be deferred until tomorrow.    Review of Systems   Unable to perform ROS: Dementia      Personal History     Past Medical History:   Diagnosis Date    Arthritis     Breast cancer     lt br ca    Breast cancer 2006    rt br ca    Dizziness 2016    [place] (hard of hearing)     Hyperlipidemia     Hypertension     Hypothyroidism     Scabies exposure        Past Surgical History:   Procedure Laterality Date    BREAST BIOPSY      BREAST LUMPECTOMY Right     BREAST SURGERY      MASTECTOMY Left     ca    MASTECTOMY Right     MASTECTOMY WITH SENTINEL NODE BIOPSY AND AXILLARY NODE DISSECTION Right 2020    Procedure: BREAST MASTECTOMY WITH SENTINEL NODE BIOPSY AND AXILLARY NODE DISSECTION;  Surgeon: Cynthia Ward MD;  Location: [facility];  Service: General;  Laterality: Right;       Family History: family history includes Diabetes in her daughter, mother, and son; Hypertension in  her child and mother; Prostate cancer in her son; Throat cancer in her son; Uterine cancer in her mother. Otherwise pertinent FHx was reviewed and not pertinent to current issue.    Social History:  reports that she has never smoked. She has never used smokeless tobacco. She reports that she does not drink alcohol and does not use drugs.    Home Medications:   cloNIDine and levothyroxine    Allergies:  Allergies   Allergen Reactions    Bactrim [Sulfamethoxazole-Trimethoprim] GI Intolerance    Ketorolac Nausea And Vomiting    Phenergan [Promethazine Hcl] Other (See Comments)     Restless legs and trouble swallowing, seizures    Codeine Nausea And Vomiting    Penicillins Rash     tolerates Keflex per patient       Objective    Objective     Vitals:  Temp:  [97.9 øF (36.6 øC)-98.4 øF (36.9 øC)] 97.9 øF (36.6 øC)  Heart Rate:  [76-97] 87  Resp:  [16-18] 18  BP: (134-168)/(83-93) 150/93    Physical Exam  Constitutional:       Appearance: She is well-developed.   HENT:      Head: Normocephalic and atraumatic.   Eyes:      Conjunctiva/sclera: Conjunctivae normal.      Pupils: Pupils are equal, round, and reactive to light.   Neck:      Thyroid: No thyromegaly.      Vascular: No JVD.      Trachea: No tracheal deviation.   Cardiovascular:      Rate and Rhythm: Normal rate and regular rhythm.      Heart sounds: No murmur heard.    No friction rub. No gallop.   Pulmonary:      Effort: Pulmonary effort is normal.      Breath sounds: Normal breath sounds.   Abdominal:      General: There is no distension.      Palpations: Abdomen is soft. There is no hepatomegaly or splenomegaly.      Tenderness: There is no abdominal tenderness.      Hernia: No hernia is present.   Musculoskeletal:         General: No deformity. Normal range of motion.      Cervical back: Neck supple.   Skin:     General: Skin is warm and dry.   Neurological:      Mental Status: She is alert and oriented to person, place, and time.       Result Review    Result  Review:  I have personally reviewed the results from the time of this admission to 8/8/2023 13:40 EDT and agree with these findings:  [x]  Laboratory list / accordion  []  Microbiology  [x]  Radiology  []  EKG/Telemetry   []  Cardiology/Vascular   []  Pathology  []  Old records  []  Other:      Assessment & Plan   Assessment / Plan     Brief Patient Summary:  Lashell Case is a 88 y.o. female who has bright red blood per rectum and anemia of uncertain etiology.  Initial attempts for colonoscopy today were unsuccessful due to lack of bowel prep and the procedure was canceled and will be deferred after another day of bowel prep and we will add EGD as well.    Active Hospital Problems:  Active Hospital Problems    Diagnosis     **Acute encephalopathy     LGI bleed     Chronic anemia      Plan:   Bowel prep tonight, OR for EGD and colonoscopy tomorrow.    Romeo Valdez MD      Electronically signed by Romeo Valdez MD at 08/08/23 1344          Physical Therapy Notes (most recent note)        Ayanna Shaw, PT at 08/08/23 1343  Version 1 of 1            08/08/23 1343   OTHER   Discipline physical therapist   Rehab Time/Intention   Session Not Performed patient/family declined treatment         Electronically signed by Ayanna Shaw, PT at 08/08/23 1343          Occupational Therapy Notes (most recent note)        Nella Cobb OT at 08/08/23 1420             08/08/23 1419   OTHER   Discipline occupational therapist   Rehab Time/Intention   Session Not Performed patient/family declined treatment;patient/family declined, not feeling well  (declined to participate due to pain, notified nsg, confused)         Electronically signed by Nella Cobb OT at 08/08/23 1420

## 2023-08-10 LAB
GLUCOSE BLDC GLUCOMTR-MCNC: 132 MG/DL (ref 70–130)
GLUCOSE BLDC GLUCOMTR-MCNC: 135 MG/DL (ref 70–130)
GLUCOSE BLDC GLUCOMTR-MCNC: 137 MG/DL (ref 70–130)
GLUCOSE BLDC GLUCOMTR-MCNC: 143 MG/DL (ref 70–130)
GLUCOSE BLDC GLUCOMTR-MCNC: 53 MG/DL (ref 70–130)
GLUCOSE BLDC GLUCOMTR-MCNC: 61 MG/DL (ref 70–130)
GLUCOSE BLDC GLUCOMTR-MCNC: 68 MG/DL (ref 70–130)
GLUCOSE BLDC GLUCOMTR-MCNC: 77 MG/DL (ref 70–130)

## 2023-08-10 PROCEDURE — 99221 1ST HOSP IP/OBS SF/LOW 40: CPT | Performed by: PSYCHIATRY & NEUROLOGY

## 2023-08-10 PROCEDURE — 97116 GAIT TRAINING THERAPY: CPT

## 2023-08-10 PROCEDURE — 82948 REAGENT STRIP/BLOOD GLUCOSE: CPT

## 2023-08-10 PROCEDURE — 97535 SELF CARE MNGMENT TRAINING: CPT

## 2023-08-10 PROCEDURE — 97530 THERAPEUTIC ACTIVITIES: CPT

## 2023-08-10 PROCEDURE — 99232 SBSQ HOSP IP/OBS MODERATE 35: CPT | Performed by: INTERNAL MEDICINE

## 2023-08-10 RX ORDER — DEXTROSE MONOHYDRATE 25 G/50ML
50 INJECTION, SOLUTION INTRAVENOUS ONCE
Status: COMPLETED | OUTPATIENT
Start: 2023-08-10 | End: 2023-08-10

## 2023-08-10 RX ADMIN — Medication 10 ML: at 20:35

## 2023-08-10 RX ADMIN — CLONIDINE HYDROCHLORIDE 0.1 MG: 0.1 TABLET ORAL at 20:34

## 2023-08-10 RX ADMIN — DEXTROSE MONOHYDRATE 50 ML: 25 INJECTION, SOLUTION INTRAVENOUS at 13:40

## 2023-08-10 RX ADMIN — MUPIROCIN 1 APPLICATION: 20 OINTMENT TOPICAL at 20:35

## 2023-08-10 RX ADMIN — DEXTROSE AND SODIUM CHLORIDE 100 ML/HR: 5; 450 INJECTION, SOLUTION INTRAVENOUS at 08:31

## 2023-08-10 RX ADMIN — DOCUSATE SODIUM 50 MG AND SENNOSIDES 8.6 MG 2 TABLET: 8.6; 5 TABLET, FILM COATED ORAL at 08:28

## 2023-08-10 RX ADMIN — DEXTROSE MONOHYDRATE 50 ML: 25 INJECTION, SOLUTION INTRAVENOUS at 12:59

## 2023-08-10 RX ADMIN — CLONIDINE HYDROCHLORIDE 0.1 MG: 0.1 TABLET ORAL at 08:28

## 2023-08-10 RX ADMIN — HYDROCODONE BITARTRATE AND ACETAMINOPHEN 1 TABLET: 5; 325 TABLET ORAL at 20:34

## 2023-08-10 RX ADMIN — DOCUSATE SODIUM 50 MG AND SENNOSIDES 8.6 MG 2 TABLET: 8.6; 5 TABLET, FILM COATED ORAL at 20:34

## 2023-08-10 RX ADMIN — Medication 10 ML: at 08:28

## 2023-08-10 RX ADMIN — APIXABAN 2.5 MG: 2.5 TABLET, FILM COATED ORAL at 08:28

## 2023-08-10 RX ADMIN — APIXABAN 2.5 MG: 2.5 TABLET, FILM COATED ORAL at 20:34

## 2023-08-10 RX ADMIN — MUPIROCIN 1 APPLICATION: 20 OINTMENT TOPICAL at 08:28

## 2023-08-10 RX ADMIN — LEVOTHYROXINE SODIUM 75 MCG: 0.07 TABLET ORAL at 06:16

## 2023-08-10 NOTE — THERAPY TREATMENT NOTE
Acute Care - Occupational Therapy Treatment Note   [place]     Patient Name: Lashell Case  : 1935  MRN: [id number]  Today's Date: 8/10/2023  Onset of Illness/Injury or Date of Surgery: 23     Referring Physician: Nadeem    Admit Date: 2023       ICD-10-CM ICD-9-CM   1. LGI bleed  K92.2 578.9   2. Chronic anemia  D64.9 285.9   3. Confusion  R41.0 298.9     Patient Active Problem List   Diagnosis    Arthritis    Abnormal ambulatory electrocardiogram    Essential hypertension    Hypothyroidism due to acquired atrophy of thyroid    Malignant neoplasm of left female breast    Mixed hyperlipidemia    Aromatase inhibitor use    Osteopenia    Hepatic cyst    Splenic cyst    Breast mass    Atrial fibrillation, persistent    Precordial chest pain    Nonrheumatic aortic (valve) stenosis    Atrial fibrillation with RVR    Paroxysmal atrial fibrillation    Nonrheumatic mitral valve regurgitation    Acute encephalopathy    LGI bleed    Chronic anemia    Severe malnutrition     Past Medical History:   Diagnosis Date    Arthritis     Breast cancer     lt br ca    Breast cancer 2006    rt br ca    Dizziness 2016    [place] (hard of hearing)     Hyperlipidemia     Hypertension     Hypothyroidism     Scabies exposure      Past Surgical History:   Procedure Laterality Date    BREAST BIOPSY      BREAST LUMPECTOMY Right     BREAST SURGERY      COLONOSCOPY N/A 2023    Procedure: COLONOSCOPY;  Surgeon: Romeo Valdez MD;  Location: [facility];  Service: Gastroenterology;  Laterality: N/A;    ENDOSCOPY N/A 2023    Procedure: ESOPHAGOGASTRODUODENOSCOPY;  Surgeon: Romeo Valdez MD;  Location: [facility];  Service: Gastroenterology;  Laterality: N/A;    MASTECTOMY Left     ca    MASTECTOMY Right     MASTECTOMY WITH SENTINEL NODE BIOPSY AND AXILLARY NODE DISSECTION Right 2020    Procedure: BREAST MASTECTOMY WITH SENTINEL NODE BIOPSY AND AXILLARY NODE DISSECTION;  Surgeon:  Cynthia Ward MD;  Location: [facility];  Service: General;  Laterality: Right;         OT ASSESSMENT FLOWSHEET (last 12 hours)       OT Evaluation and Treatment       Row Name 08/10/23 1145                   OT Time and Intention    Subjective Information complains of;fatigue;pain;weakness  -LM        Document Type therapy note (daily note)  -LM        Mode of Treatment occupational therapy  -LM        Patient Effort adequate  -LM        Comment Patient seen this date for adl retraining/education and fxl mobility.  Patient demonstrates confusion.  Patient agreeable to perform bed mobility, sitting balance on eob with supervision, and standing tolerance/transfer with min assist x 2.  -LM           General Information    Existing Precautions/Restrictions fall  -LM        Limitations/Impairments safety/cognitive  -LM           Wound 08/10/23 0851 Left proximal arm Skin Tear    Wound - Properties Group Placement Date: 08/10/23  -RD Placement Time: 0851  -RD Present on Hospital Admission: N  -RD Side: Left  -RD Orientation: proximal  -RD Location: arm  -RD Primary Wound Type: Skin tear  -RD    Retired Wound - Properties Group Placement Date: 08/10/23  -RD Placement Time: 0851  -RD Present on Hospital Admission: N  -RD Side: Left  -RD Orientation: proximal  -RD Location: arm  -RD Primary Wound Type: Skin tear  -RD    Retired Wound - Properties Group Date first assessed: 08/10/23  -RD Time first assessed: 0851  -RD Present on Hospital Admission: N  -RD Side: Left  -RD Location: arm  -RD Primary Wound Type: Skin tear  -RD       Positioning and Restraints    Pre-Treatment Position in bed  -LM        Post Treatment Position bed  -LM        In Bed call light within reach;encouraged to call for assist;exit alarm on  -LM                  User Key  (r) = Recorded By, (t) = Taken By, (c) = Cosigned By      Initials Name Effective Dates    LM Nella Cobb OT 06/16/21 -     RD Galilea Marrero RN 06/16/21 -                             OT Recommendation and Plan  Planned Therapy Interventions (OT): activity tolerance training, adaptive equipment training, BADL retraining, ROM/therapeutic exercise, transfer/mobility retraining, strengthening exercise, patient/caregiver education/training  Therapy Frequency (OT): other (see comments) (prn and/or to monitor fxl progress)  Plan of Care Review  Plan of Care Reviewed With: patient  Plan of Care Reviewed With: patient        Time Calculation:     Therapy Charges for Today       Code Description Service Date Service Provider Modifiers Qty    01052401237  OT SELF CARE/MGMT/TRAIN EA 15 MIN 8/10/2023 Nella Cobb, OT GO 2                 Nella Cobb OT  8/10/2023

## 2023-08-10 NOTE — PLAN OF CARE
Goal Outcome Evaluation: Patient remains pleasantly confused. Compliant with all medications and care as ordered. She remains on RA, saturations maintaining well above 90%. Patient noted to be hypoglycemic during shift, critical glucoses reported to MD Cifuentes, treated per orders. Fluids remain infusing. Isolation precautions followed. Family has been at bedside, updated on plan of care. She has been setup for each meal, poor intake noted, snacks offered. She is currently resting in bed. Will continue with plan of care.

## 2023-08-10 NOTE — THERAPY TREATMENT NOTE
Acute Care - Physical Therapy Treatment Note   [place]     Patient Name: Lashell Case  : 1935  MRN: [id number]  Today's Date: 8/10/2023   Onset of Illness/Injury or Date of Surgery: 23  Visit Dx:     ICD-10-CM ICD-9-CM   1. LGI bleed  K92.2 578.9   2. Chronic anemia  D64.9 285.9   3. Confusion  R41.0 298.9     Patient Active Problem List   Diagnosis    Arthritis    Abnormal ambulatory electrocardiogram    Essential hypertension    Hypothyroidism due to acquired atrophy of thyroid    Malignant neoplasm of left female breast    Mixed hyperlipidemia    Aromatase inhibitor use    Osteopenia    Hepatic cyst    Splenic cyst    Breast mass    Atrial fibrillation, persistent    Precordial chest pain    Nonrheumatic aortic (valve) stenosis    Atrial fibrillation with RVR    Paroxysmal atrial fibrillation    Nonrheumatic mitral valve regurgitation    Acute encephalopathy    LGI bleed    Chronic anemia    Severe malnutrition     Past Medical History:   Diagnosis Date    Arthritis     Breast cancer     lt br ca    Breast cancer 2006    rt br ca    Dizziness 2016    [place] (hard of hearing)     Hyperlipidemia     Hypertension     Hypothyroidism     Scabies exposure      Past Surgical History:   Procedure Laterality Date    BREAST BIOPSY      BREAST LUMPECTOMY Right     BREAST SURGERY      COLONOSCOPY N/A 2023    Procedure: COLONOSCOPY;  Surgeon: Romeo Valdez MD;  Location: [facility];  Service: Gastroenterology;  Laterality: N/A;    ENDOSCOPY N/A 2023    Procedure: ESOPHAGOGASTRODUODENOSCOPY;  Surgeon: Romeo Valdez MD;  Location: [facility];  Service: Gastroenterology;  Laterality: N/A;    MASTECTOMY Left     ca    MASTECTOMY Right     MASTECTOMY WITH SENTINEL NODE BIOPSY AND AXILLARY NODE DISSECTION Right 2020    Procedure: BREAST MASTECTOMY WITH SENTINEL NODE BIOPSY AND AXILLARY NODE DISSECTION;  Surgeon: Cynthia Ward MD;  Location: [facility];  Service:  General;  Laterality: Right;     PT Assessment (last 12 hours)       PT Evaluation and Treatment       Row Name 08/10/23 1123          Physical Therapy Time and Intention    Subjective Information complains of;weakness;fatigue  -CT     Document Type therapy note (daily note)  -CT     Mode of Treatment physical therapy  -CT     Patient Effort good  -CT       Row Name 08/10/23 1123          General Information    Patient Profile Reviewed yes  -CT     Existing Precautions/Restrictions fall  -CT     Limitations/Impairments safety/cognitive  -CT       Row Name 08/10/23 1123          Home Use of Assistive/Adaptive Equipment    Equipment Currently Used at Home walker, rolling  -CT       Row Name 08/10/23 1123          Cognition    Affect/Mental Status (Cognition) confused  -CT     Orientation Status (Cognition) oriented to;person;place  -CT     Follows Commands (Cognition) follows one-step commands  -CT       Row Name 08/10/23 1123          Bed Mobility    Bed Mobility bed mobility (all) activities  -CT     All Activities, [place] (Bed Mobility) moderate assist (50% patient effort)  -CT     Bed Mobility, Safety Issues decreased use of arms for pushing/pulling;decreased use of legs for bridging/pushing  -CT     Assistive Device (Bed Mobility) bed rails  -CT       Row Name 08/10/23 1123          Transfers    Transfers sit-stand transfer;stand-sit transfer  -CT       Row Name 08/10/23 1123          Sit-Stand Transfer    Sit-Stand [place] (Transfers) moderate assist (50% patient effort);2 person assist;verbal cues;nonverbal cues (demo/gesture)  -CT     Assistive Device (Sit-Stand Transfers) --  bilateral handheld assist  -CT       Row Name 08/10/23 1123          Stand-Sit Transfer    Stand-Sit [place] (Transfers) moderate assist (50% patient effort);2 person assist;verbal cues;nonverbal cues (demo/gesture)  -CT     Assistive Device (Stand-Sit Transfers) --  bilateral handheld assist  -CT       Row Name 08/10/23  1123          Gait/Stairs (Locomotion)    [place] Level (Gait) moderate assist (50% patient effort);2 person assist  -CT     Assistive Device (Gait) --  bilateral handheld assist  -CT     Distance in Feet (Gait) 3 L side steps toward head of bed  -CT     Pattern (Gait) step-to  -CT       Row Name             Wound 08/10/23 0851 Left proximal arm Skin Tear    Wound - Properties Group Placement Date: 08/10/23  -RD Placement Time: 0851  -RD Present on Hospital Admission: N  -RD Side: Left  -RD Orientation: proximal  -RD Location: arm  -RD Primary Wound Type: Skin tear  -RD    Retired Wound - Properties Group Placement Date: 08/10/23  -RD Placement Time: 0851  -RD Present on Hospital Admission: N  -RD Side: Left  -RD Orientation: proximal  -RD Location: arm  -RD Primary Wound Type: Skin tear  -RD    Retired Wound - Properties Group Date first assessed: 08/10/23  -RD Time first assessed: 0851  -RD Present on Hospital Admission: N  -RD Side: Left  -RD Location: arm  -RD Primary Wound Type: Skin tear  -RD      Row Name 08/10/23 1123          Coping    Observed Emotional State calm;cooperative  -CT     Verbalized Emotional State acceptance  -CT       Row Name 08/10/23 1123          Plan of Care Review    Plan of Care Reviewed With patient  -CT       Row Name 08/10/23 1123          Positioning and Restraints    Pre-Treatment Position in bed  -CT     Post Treatment Position bed  -CT     In Bed supine;call light within reach;encouraged to call for assist;exit alarm on;side rails up x3  -CT       Row Name 08/10/23 1123          Therapy Assessment/Plan (PT)    Rehab Potential (PT) good, to achieve stated therapy goals  -CT     Criteria for Skilled Interventions Met (PT) yes;skilled treatment is necessary  -CT     Therapy Frequency (PT) 2 times/wk  2-5 times/wk  -CT               User Key  (r) = Recorded By, (t) = Taken By, (c) = Cosigned By      Initials Name Provider Type    CT Ayanna Shaw, PT Physical Therapist    RD  Galilea Marrero, RN Registered Nurse                      PT Recommendation and Plan  Planned Therapy Interventions (PT): balance training, bed mobility training, gait training, home exercise program, manual therapy techniques, motor coordination training, neuromuscular re-education, patient/family education, postural re-education, strengthening, transfer training  Therapy Frequency (PT): 2 times/wk (2-5 times/wk)  Plan of Care Reviewed With: patient       Time Calculation:    PT Charges       Row Name 08/10/23 1125             Time Calculation    PT Received On 08/10/23  -CT         Time Calculation- PT    Total Timed Code Minutes- PT 42 minute(s)  -CT                User Key  (r) = Recorded By, (t) = Taken By, (c) = Cosigned By      Initials Name Provider Type    CT Ayanna Shaw, PT Physical Therapist                  Therapy Charges for Today       Code Description Service Date Service Provider Modifiers Qty    96615362620 HC GAIT TRAINING EA 15 MIN 8/10/2023 Ayanna Shaw, PT GP 1    20387338992 HC PT THERAPEUTIC ACT EA 15 MIN 8/10/2023 Ayanna Shaw, PT GP 2            PT G-Codes  AM-PAC 6 Clicks Score (PT): 15    yAanna Shaw PT  8/10/2023

## 2023-08-10 NOTE — PROGRESS NOTES
"Palliative Care Daily Progress Note     S: Medical record reviewed, upon entering the room pt was asleep however awakens easily. She is pleasantly confused. She is alert to herself and knows that she is in the hospital. She is able to verbalize that she is not having any pain, no SOA, denies any n/v/d. This morning, she expresses that she is ready to go home and wants to know when she can leave. She denies any suicidal ideations at this time. Pt does complain of having to take the bowel prep again, she reports that \"this is crazy\".      O:   Palliative Performance Scale Score:     /72 (BP Location: Right arm, Patient Position: Lying)   Pulse 59   Temp 98 øF (36.7 øC) (Oral)   Resp 18   Ht 167.6 cm (66\")   Wt 44.1 kg (97 lb 4.8 oz)   SpO2 93%   BMI 15.70 kg/mý     Intake/Output Summary (Last 24 hours) at 8/10/2023 0939  Last data filed at 8/9/2023 1400  Gross per 24 hour   Intake 0 ml   Output --   Net 0 ml       PE:    General Appearance:    Chronically ill appearing, alert, cooperative, NAD   HEENT:    NC/AT, without obvious abnormality, EOMI, anicteric    Neck:   supple, trachea midline, no JVD, left IJ noted with IVF's infusing    Lungs:     CTAB without w/r/r    Heart:    Irregular rhythm, normal S1 and S2, no M/R/G   Abdomen:     Soft, NT, ND, NABS    Extremities:   Moves all extremities, no edema, cachetic, frail    Pulses:   Pulses palpable and equal bilaterally   Skin:   Warm, dry   Neurologic:   A/Ox2, pleasantly confused, disoriented, difficult to redirect   Psych:   Calm, appropriate         Meds: Reviewed and with no changes     Labs:   Results from last 7 days   Lab Units 08/09/23  0030   WBC 10*3/mm3 4.89   HEMOGLOBIN g/dL 7.8*   HEMATOCRIT % 23.9*   PLATELETS 10*3/mm3 204     Results from last 7 days   Lab Units 08/09/23  0030   SODIUM mmol/L 128*   POTASSIUM mmol/L 3.7   CHLORIDE mmol/L 99   CO2 mmol/L 22.3   BUN mg/dL 14   CREATININE mg/dL 0.67   GLUCOSE mg/dL 120*   CALCIUM mg/dL 7.5* "     Results from last 7 days   Lab Units 08/09/23  0030 08/08/23  0217 08/07/23  0405   SODIUM mmol/L 128*   < > 134*   POTASSIUM mmol/L 3.7   < > 3.4*   CHLORIDE mmol/L 99   < > 100   CO2 mmol/L 22.3   < > 21.0*   BUN mg/dL 14   < > 25*   CREATININE mg/dL 0.67   < > 0.97   CALCIUM mg/dL 7.5*   < > 8.2*   BILIRUBIN mg/dL  --   --  0.7   ALK PHOS U/L  --   --  100   ALT (SGPT) U/L  --   --  9   AST (SGOT) U/L  --   --  19   GLUCOSE mg/dL 120*   < > 123*    < > = values in this interval not displayed.     Imaging Results (Last 72 Hours)       ** No results found for the last 72 hours. **              Diagnostics: Reviewed    A: Assessment performed today with no significant changes, she remains pleasantly confused. Psych has deamed that she does not have capacity to make medical decisions. Labs from yesterday reviewed- sodium 128, Calcium 7.5, with normal kidney function. H&H 7.8/23.9 platelets 204.       P: Pt is scheduled to have repeat colonoscopy today. She went down for a colonoscopy yesterday however she had a poor prep and the procedure had to be aborted. Pt remains confused. Her granddaughter Jayleen is scheduled to start working on emergency guardianship today. Will follow up later this afternoon to check on the status. SW continues to work on LTC placement. Will continue to provide symptomatic and supportive palliative care for pt and family. Will assist with dispo as needed.        We will continue to follow along. Please do not hesitate to contact us regarding further sx mgmt or GOC needs, including after hours or on weekends via our on call provider at [phone].     Cindy Alaniz, APRN    8/10/2023
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
## 2023-08-10 NOTE — CONSULTS
Referring Provider: Dr. Cifuentes  Reason for Consultation: Suicidal ideations      Chief complaint/Focus of Exam: evaluate for suicidal ideations    Subjective .     History of present illness:  Lashell Case is a 88 y.o. female who was admitted on 8/6/2023 with complaints of altered mental status and she has a history of hypertension, hyperlipidemia, hypothyrodisim, arthritis, a-fib and cognitive decline and an inability to take care of her ADL and IADLs, getting progressively worse over last 2-3 months. The patient was seen for capacity evaluation earlier and deemed lacking capacity to make informed decisions about her care and disposition. Per report, her daughter has expressed concern that patient has threatened to kill herself and another psych consult needed to be done. The patient was seen in her room where she is resting comfortably in her bed. She is hard of hearing but is cooperative and pleasant. She is not sure why she is in the hospital. She reports her mood to be good and denies any thoughts of harm to self or others. Per collateral from staff, the patient has not verbalized SI, nor has she demonstrated any self harming behaviors.  No fever or chills reported. No chest pain or nvd reported. No focal neurologic deficits reported.     Review of Systems  No fever or chills reported. No chest pain or nvd reported. No focal neurologic deficits reported.     History  Past Medical History:   Diagnosis Date    Arthritis     Breast cancer 2015    lt br ca    Breast cancer 2006    rt br ca    Dizziness 7/8/2016    [place] (hard of hearing)     Hyperlipidemia     Hypertension     Hypothyroidism     Scabies exposure    ,   Past Surgical History:   Procedure Laterality Date    BREAST BIOPSY      BREAST LUMPECTOMY Right 2006    BREAST SURGERY      COLONOSCOPY N/A 8/9/2023    Procedure: COLONOSCOPY;  Surgeon: Romeo Valdez MD;  Location: [facility];  Service: Gastroenterology;  Laterality: N/A;    ENDOSCOPY  N/A 8/9/2023    Procedure: ESOPHAGOGASTRODUODENOSCOPY;  Surgeon: Romeo Valdez MD;  Location:  [facility];  Service: Gastroenterology;  Laterality: N/A;    MASTECTOMY Left 2015    ca    MASTECTOMY Right 2020    MASTECTOMY WITH SENTINEL NODE BIOPSY AND AXILLARY NODE DISSECTION Right 11/17/2020    Procedure: BREAST MASTECTOMY WITH SENTINEL NODE BIOPSY AND AXILLARY NODE DISSECTION;  Surgeon: Cynthia Ward MD;  Location:  [facility];  Service: General;  Laterality: Right;   ,   Family History   Problem Relation Age of Onset    Hypertension Mother     Uterine cancer Mother     Diabetes Mother     Diabetes Daughter     Prostate cancer Son     Diabetes Son     Throat cancer Son     Hypertension Child     Breast cancer Neg Hx    ,   Social History     Socioeconomic History    Marital status:    Tobacco Use    Smoking status: Never    Smokeless tobacco: Never   Vaping Use    Vaping Use: Never used   Substance and Sexual Activity    Alcohol use: No    Drug use: No    Sexual activity: Defer     Birth control/protection: None     E-cigarette/Vaping    E-cigarette/Vaping Use Never User      E-cigarette/Vaping Substances    Nicotine No     THC No     CBD No     Flavoring No      E-cigarette/Vaping Devices    Disposable No     Pre-filled or Refillable Cartridge No     Refillable Tank No     Pre-filled Pod No          ,   Medications Prior to Admission   Medication Sig Dispense Refill Last Dose    cloNIDine (Catapres) 0.1 MG tablet Take 1 tablet by mouth 2 (Two) Times a Day. 180 tablet 0 Past Week    levothyroxine (SYNTHROID, LEVOTHROID) 75 MCG tablet Take 1 tablet by mouth Every Morning. 90 tablet 0 Past Week   , Scheduled Meds:  apixaban, 2.5 mg, Oral, Q12H  cloNIDine, 0.1 mg, Oral, BID  dextrose, 50 mL, Intravenous, Once  levothyroxine, 75 mcg, Oral, Q AM  mupirocin, 1 application , Each Nare, BID  senna-docusate sodium, 2 tablet, Oral, BID  sodium chloride, 10 mL, Intravenous, Q12H   , Continuous Infusions:   dextrose 5 % and sodium chloride 0.45 %, 125 mL/hr, Last Rate: 100 mL/hr (08/10/23 0831)   , PRN Meds:    senna-docusate sodium **AND** polyethylene glycol **AND** bisacodyl **AND** bisacodyl    HYDROcodone-acetaminophen    nitroglycerin    Potassium Replacement - Follow Nurse / BPA Driven Protocol    sodium chloride    sodium chloride    sodium chloride, and Allergies:  Bactrim [sulfamethoxazole-trimethoprim], Ketorolac, Phenergan [promethazine hcl], Codeine, and Penicillins    Objective     Vital Signs   Temp:  [97.8 øF (36.6 øC)-98.1 øF (36.7 øC)] 98.1 øF (36.7 øC)  Heart Rate:  [59-84] 60  Resp:  [18] 18  BP: (117-138)/(70-88) 122/88    Mental Status Exam:   Mental Status Exam:    Hygiene:   fair  Cooperation:  Cooperative  Eye Contact:  Fair  Psychomotor Behavior:  Appropriate  Affect:  Appropriate  Hopelessness: Denies  Speech:  Normal  Thought Progress:  Goal directed  Thought Content:  Normal  Suicidal:  None  Homicidal:  None  Hallucinations:  None  Delusion:  None  Memory:  Deficits  Orientation:  Person and Place  Reliability:  poor  Insight:  Poor  Judgement:  Poor  Impulse Control:  Fair    Results Review:   I reviewed the patient's new clinical results.  Lab Results (last 24 hours)       Procedure Component Value Units Date/Time    POC Glucose Once [[id number]]  (Abnormal) Collected: 08/10/23 1339    Specimen: Blood Updated: 08/10/23 1345     Glucose 68 mg/dL     POC Glucose Once [[id number]]  (Abnormal) Collected: 08/10/23 1246    Specimen: Blood Updated: 08/10/23 1251     Glucose 53 mg/dL     Blood Culture - Blood, Blood, Central Line [[id number]]  (Normal) Collected: 08/06/23 1143    Specimen: Blood, Central Line Updated: 08/10/23 1215     Blood Culture No growth at 4 days    Blood Culture - Blood, Blood, Central Line [[id number]]  (Normal) Collected: 08/06/23 1152    Specimen: Blood, Central Line Updated: 08/10/23 1215     Blood Culture No growth at 4 days    POC Glucose Once [[id number]]  (Abnormal)  Collected: 08/10/23 1145    Specimen: Blood Updated: 08/10/23 1151     Glucose 61 mg/dL     POC Glucose Once [[id number]]  (Normal) Collected: 08/10/23 0728    Specimen: Blood Updated: 08/10/23 0734     Glucose 77 mg/dL     POC Glucose Once [[id number]]  (Abnormal) Collected: 08/10/23 0222    Specimen: Blood Updated: 08/10/23 0229     Glucose 135 mg/dL     POC Glucose Once [[id number]]  (Normal) Collected: 08/09/23 2001    Specimen: Blood Updated: 08/09/23 2007     Glucose 103 mg/dL     POC Glucose Once [[id number]]  (Normal) Collected: 08/09/23 1636    Specimen: Blood Updated: 08/09/23 1643     Glucose 79 mg/dL           Imaging Results (Last 24 Hours)       ** No results found for the last 24 hours. **              Assessment & Plan     Principal Problem:    Acute encephalopathy  Active Problems:    LGI bleed    Chronic anemia    Severe malnutrition     The patient is denying any thoughts of harm to self or others and has not demonstrated any behaviors indicating any such intentions.     I discussed the patient's findings and my recommendations with patient, nursing staff, and primary care team    Violetta Ni MD  08/10/23  14:28 EDT

## 2023-08-10 NOTE — CASE MANAGEMENT/SOCIAL WORK
Discharge Planning Assessment  [facility]     Patient Name: Lashell Case  MRN: [id number]  Today's Date: 8/10/2023    Admit Date: 8/6/2023            Discharge Plan       Row Name 08/10/23 1054       Plan    Plan SS spoke with [facility] per Ani who states she does not have an available bed. SS contacted [facility] per Gwendolyn who states Adminstrator continues to review. SS spoke with [facility] per [facility] states referral to be reviewed. SS contacted [facility] [phone] states she has spoken with 's office but has not been able to complete paperwork yet due to working today.  was provided with an update about placement. SS to follow.                    WM EverettW

## 2023-08-10 NOTE — PLAN OF CARE
Goal Outcome Evaluation:  Patient has rested well in bed this shift. Alert and pleasantly confused. VSS. Triple lumen left IJ remains patent infusing D5W 0.45% NS @ 100mL/hr with no complications. No visible signs or symptoms of distress noted. Patient has no complaints or requests at this time. Will continue with the plan of care.

## 2023-08-11 LAB
BACTERIA SPEC AEROBE CULT: NORMAL
BACTERIA SPEC AEROBE CULT: NORMAL
GLUCOSE BLDC GLUCOMTR-MCNC: 124 MG/DL (ref 70–130)
GLUCOSE BLDC GLUCOMTR-MCNC: 144 MG/DL (ref 70–130)
GLUCOSE BLDC GLUCOMTR-MCNC: 65 MG/DL (ref 70–130)
GLUCOSE BLDC GLUCOMTR-MCNC: 75 MG/DL (ref 70–130)
GLUCOSE BLDC GLUCOMTR-MCNC: 80 MG/DL (ref 70–130)
REF LAB TEST METHOD: NORMAL

## 2023-08-11 PROCEDURE — 82948 REAGENT STRIP/BLOOD GLUCOSE: CPT

## 2023-08-11 PROCEDURE — 99232 SBSQ HOSP IP/OBS MODERATE 35: CPT | Performed by: INTERNAL MEDICINE

## 2023-08-11 RX ORDER — GLUCAGON 1 MG/ML
1 KIT INJECTION
Status: DISCONTINUED | OUTPATIENT
Start: 2023-08-11 | End: 2023-08-21 | Stop reason: HOSPADM

## 2023-08-11 RX ORDER — DEXTROSE MONOHYDRATE 25 G/50ML
25 INJECTION, SOLUTION INTRAVENOUS
Status: DISCONTINUED | OUTPATIENT
Start: 2023-08-11 | End: 2023-08-21 | Stop reason: HOSPADM

## 2023-08-11 RX ORDER — NICOTINE POLACRILEX 4 MG
15 LOZENGE BUCCAL
Status: DISCONTINUED | OUTPATIENT
Start: 2023-08-11 | End: 2023-08-21 | Stop reason: HOSPADM

## 2023-08-11 RX ORDER — DEXTROSE MONOHYDRATE 25 G/50ML
INJECTION, SOLUTION INTRAVENOUS
Status: COMPLETED
Start: 2023-08-11 | End: 2023-08-11

## 2023-08-11 RX ADMIN — DOCUSATE SODIUM 50 MG AND SENNOSIDES 8.6 MG 2 TABLET: 8.6; 5 TABLET, FILM COATED ORAL at 08:45

## 2023-08-11 RX ADMIN — APIXABAN 2.5 MG: 2.5 TABLET, FILM COATED ORAL at 08:45

## 2023-08-11 RX ADMIN — Medication 10 ML: at 08:46

## 2023-08-11 RX ADMIN — APIXABAN 2.5 MG: 2.5 TABLET, FILM COATED ORAL at 20:20

## 2023-08-11 RX ADMIN — MUPIROCIN 1 APPLICATION: 20 OINTMENT TOPICAL at 08:46

## 2023-08-11 RX ADMIN — CLONIDINE HYDROCHLORIDE 0.1 MG: 0.1 TABLET ORAL at 20:19

## 2023-08-11 RX ADMIN — DEXTROSE MONOHYDRATE 50 ML: 25 INJECTION, SOLUTION INTRAVENOUS at 00:30

## 2023-08-11 RX ADMIN — LEVOTHYROXINE SODIUM 75 MCG: 0.07 TABLET ORAL at 05:49

## 2023-08-11 RX ADMIN — MUPIROCIN 1 APPLICATION: 20 OINTMENT TOPICAL at 21:42

## 2023-08-11 RX ADMIN — DEXTROSE AND SODIUM CHLORIDE 125 ML/HR: 5; 450 INJECTION, SOLUTION INTRAVENOUS at 17:33

## 2023-08-11 RX ADMIN — CLONIDINE HYDROCHLORIDE 0.1 MG: 0.1 TABLET ORAL at 08:45

## 2023-08-11 NOTE — PLAN OF CARE
Goal Outcome Evaluation:  Pt did have a hyperglycemic episode during the night. Marni LOPEZ notified. See orders. No acute signs of distress. Will continue to follow plan of care.
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
## 2023-08-11 NOTE — PROGRESS NOTES
[facility]     Patient Identification:  Name:  Lashell Case  Age:  88 y.o.  Sex:  female  :  1935  MRN:  [id number]  Visit Number:  [id number]  Primary Care Provider:  Evelio Guerin DO    Length of stay:  5    Chief complaint: Confusion    Subjective:    Patient seen and examined at bedside with no nursing staff present.  Patient was awake when I entered the room does appear to be in a pleasant mood.  Patient is requesting to be sat in a chair today and if possible to ambulate through her room.  Otherwise, no new events overnight.  ----------------------------------------------------------------------------------------------------------------------  Current Hospital Meds:  apixaban, 2.5 mg, Oral, Q12H  cloNIDine, 0.1 mg, Oral, BID  levothyroxine, 75 mcg, Oral, Q AM  mupirocin, 1 application , Each Nare, BID  senna-docusate sodium, 2 tablet, Oral, BID  sodium chloride, 10 mL, Intravenous, Q12H      dextrose 5 % and sodium chloride 0.45 %, 125 mL/hr, Last Rate: 125 mL/hr (08/10/23 2021)      ----------------------------------------------------------------------------------------------------------------------  Vital Signs:  Temp:  [97.6 øF (36.4 øC)-98.1 øF (36.7 øC)] 97.6 øF (36.4 øC)  Heart Rate:  [60-62] 62  Resp:  [18] 18  BP: (122-134)/(70-88) 128/76      23  0500 08/10/23  0500 23  0500   Weight: 44 kg (96 lb 14.4 oz) 44.1 kg (97 lb 4.8 oz) 44.5 kg (98 lb 3.2 oz)     Body mass index is 15.85 kg/mý.    Intake/Output Summary (Last 24 hours) at 2023 0943  Last data filed at 2023 0800  Gross per 24 hour   Intake 520 ml   Output --   Net 520 ml       Diet: Regular/House Diet; Texture: Regular Texture (IDDSI 7); Fluid Consistency: Thin (IDDSI 0)  ----------------------------------------------------------------------------------------------------------------------  Physical exam:  Constitutional: Elderly appearing appearing   female in no apparent distress.     HENT:  Head:  Normocephalic and atraumatic.  Mouth:  Moist mucous membranes.    Eyes:  Conjunctivae and EOM are normal.  Pupils are equal, round, and reactive to light.  No scleral icterus.    Neck:  Neck supple. No thyromegaly.  No JVD present.    Cardiovascular:  Irregular rhythm with rate in the 70's,  no murmurs, rubs, clicks or gallops appreciated.  Pulmonary/Chest:  Clear to auscultation bilaterally with no crackles, wheezes or rhonchi appreciated.  Abdominal:  Soft. Nontender. Nondistended  Bowel sounds are normal in all four quadrants. No organomegally appreciated.   Musculoskeletal:  No edema, no tenderness, and no deformity.  No red or swollen joints anywhere.    Neurological:  Alert, Cranial nerves II-XII intact with no focal deficits.  No facial droop.  No slurred speech.   Skin:  Warm and dry to palpation with no rashes or lesions appreciated.  Peripheral vascular:  2+ radial and pedal pulses in bilateral upper and lower extremities.  Psychiatric:  Alert but not oriented to person, place or time, does not demonstrate appropriate decision-making capacity    Little change in physical exam in comparison to 8/10/2023  -------------------------------------------------------------------------------------------------  ----------------------------------------------------------------------------------------------------------------------  Results from last 7 days   Lab Units 08/06/23  2157   CK TOTAL U/L 42       Results from last 7 days   Lab Units 08/09/23  0030 08/08/23  0217 08/07/23  1048 08/07/23  0405 08/06/23  2157 08/06/23  1152   CRP mg/dL  --   --   --   --   --  3.19*   LACTATE mmol/L  --   --   --   --   --  1.2   WBC 10*3/mm3 4.89 5.37  --  5.92  --  6.71   HEMOGLOBIN g/dL 7.8* 8.3* 8.7* 9.3*   < > 9.4*   HEMATOCRIT % 23.9* 25.1* 27.3* 28.3*   < > 29.7*   MCV fL 108.1* 105.9*  --  106.8*  --  107.6*   MCHC g/dL 32.6 33.1  --  32.9  --  31.6   PLATELETS 10*3/mm3  204 200  --  221  --  252    < > = values in this interval not displayed.           Results from last 7 days   Lab Units 08/09/23  0030 08/08/23  1544 08/08/23  0217 08/07/23  0405 08/06/23  1152   SODIUM mmol/L 128*  --  129* 134* 135*   POTASSIUM mmol/L 3.7 4.0 2.7* 3.4* 3.5   MAGNESIUM mg/dL  --   --  1.9  --   --    CHLORIDE mmol/L 99  --  99 100 98   CO2 mmol/L 22.3  --  23.9 21.0* 18.0*   BUN mg/dL 14  --  17 25* 29*   CREATININE mg/dL 0.67  --  0.69 0.97 1.03*   CALCIUM mg/dL 7.5*  --  7.7* 8.2* 8.5*   GLUCOSE mg/dL 120*  --  145* 123* 93   ALBUMIN g/dL  --   --   --  2.4* 2.5*   BILIRUBIN mg/dL  --   --   --  0.7 1.2   ALK PHOS U/L  --   --   --  100 106   AST (SGOT) U/L  --   --   --  19 21   ALT (SGPT) U/L  --   --   --  9 10     Estimated Creatinine Clearance: 42.3 mL/min (by C-G formula based on SCr of 0.67 mg/dL).    No results found for: AMMONIA      Blood Culture   Date Value Ref Range Status   08/06/2023 No growth at 24 hours  Preliminary   08/06/2023 No growth at 24 hours  Preliminary     No results found for: URINECX  No results found for: WOUNDCX  No results found for: STOOLCX    I have personally looked at the labs and they are summarized above.  ----------------------------------------------------------------------------------------------------------------------  Imaging Results (Last 24 Hours)       ** No results found for the last 24 hours. **          ----------------------------------------------------------------------------------------------------------------------  Assessment and Plan:    Macrocytic anemia -B12 and folate levels are within normal limits.  Patient did have EGD and colonoscopy performed 8/9/2023.  EGD demonstrated localized mildly erythematous mucosa with no bleeding.  Colonoscopy demonstrated sigmoid diverticulum with no evidence of bleeding.    2.  Hypothyroidism -continue Synthroid 75 mcg p.o. daily.    3.  Essential hypertension -well-controlled, continue  lisinopril    4.  Hyperlipidemia -statin    5.  Chronic A-fib -continue Eliquis at 2.5 mg p.o. twice daily, currently in A-fib and rate controlled.    Disposition patient is now stable for discharge, awaiting safe discharge disposition to be confirmed by case management.    Austyn Cifuentes DO   08/11/23   09:43 EDT

## 2023-08-11 NOTE — PLAN OF CARE
Goal Outcome Evaluation:   Pt resting in bed. No signs or symptoms of distress noted. Received pt from Galilea CABRAL at this time. I agree with charline assessment charted. Will continue with plan of care.

## 2023-08-12 LAB
GLUCOSE BLDC GLUCOMTR-MCNC: 155 MG/DL (ref 70–130)
GLUCOSE BLDC GLUCOMTR-MCNC: 52 MG/DL (ref 70–130)
GLUCOSE BLDC GLUCOMTR-MCNC: 72 MG/DL (ref 70–130)
GLUCOSE BLDC GLUCOMTR-MCNC: 74 MG/DL (ref 70–130)
GLUCOSE BLDC GLUCOMTR-MCNC: 88 MG/DL (ref 70–130)

## 2023-08-12 PROCEDURE — 82948 REAGENT STRIP/BLOOD GLUCOSE: CPT

## 2023-08-12 RX ORDER — FAMOTIDINE 20 MG/1
20 TABLET, FILM COATED ORAL
Status: DISCONTINUED | OUTPATIENT
Start: 2023-08-12 | End: 2023-08-21 | Stop reason: HOSPADM

## 2023-08-12 RX ORDER — ASPIRIN 81 MG/1
81 TABLET ORAL DAILY
Status: DISCONTINUED | OUTPATIENT
Start: 2023-08-12 | End: 2023-08-16

## 2023-08-12 RX ORDER — CLOPIDOGREL BISULFATE 75 MG/1
75 TABLET ORAL DAILY
Status: DISCONTINUED | OUTPATIENT
Start: 2023-08-12 | End: 2023-08-16

## 2023-08-12 RX ORDER — LISINOPRIL 10 MG/1
10 TABLET ORAL
Status: DISCONTINUED | OUTPATIENT
Start: 2023-08-12 | End: 2023-08-17

## 2023-08-12 RX ADMIN — CLONIDINE HYDROCHLORIDE 0.1 MG: 0.1 TABLET ORAL at 10:13

## 2023-08-12 RX ADMIN — CLOPIDOGREL BISULFATE 75 MG: 75 TABLET, FILM COATED ORAL at 16:11

## 2023-08-12 RX ADMIN — CLONIDINE HYDROCHLORIDE 0.1 MG: 0.1 TABLET ORAL at 21:16

## 2023-08-12 RX ADMIN — LISINOPRIL 10 MG: 10 TABLET ORAL at 16:11

## 2023-08-12 RX ADMIN — HYDROCODONE BITARTRATE AND ACETAMINOPHEN 1 TABLET: 5; 325 TABLET ORAL at 21:17

## 2023-08-12 RX ADMIN — APIXABAN 2.5 MG: 2.5 TABLET, FILM COATED ORAL at 10:13

## 2023-08-12 RX ADMIN — DOCUSATE SODIUM 50 MG AND SENNOSIDES 8.6 MG 2 TABLET: 8.6; 5 TABLET, FILM COATED ORAL at 21:16

## 2023-08-12 RX ADMIN — FAMOTIDINE 20 MG: 20 TABLET, FILM COATED ORAL at 17:21

## 2023-08-12 RX ADMIN — LEVOTHYROXINE SODIUM 75 MCG: 0.07 TABLET ORAL at 05:29

## 2023-08-12 RX ADMIN — Medication 10 ML: at 10:13

## 2023-08-12 RX ADMIN — APIXABAN 2.5 MG: 2.5 TABLET, FILM COATED ORAL at 21:16

## 2023-08-12 RX ADMIN — DOCUSATE SODIUM 50 MG AND SENNOSIDES 8.6 MG 2 TABLET: 8.6; 5 TABLET, FILM COATED ORAL at 10:13

## 2023-08-12 RX ADMIN — ASPIRIN 81 MG: 81 TABLET, COATED ORAL at 16:11

## 2023-08-12 RX ADMIN — DEXTROSE MONOHYDRATE 25 G: 25 INJECTION, SOLUTION INTRAVENOUS at 12:06

## 2023-08-12 NOTE — PROGRESS NOTES
[facility]     Patient Identification:  Name:  Lashell Case  Age:  88 y.o.  Sex:  female  :  1935  MRN:  [id number]  Visit Number:  [id number]  Primary Care Provider:  Evelio Guerin DO    Length of stay:  6    Subjective: Patient seen and examined, she is comfortable, she is eating but not much.  On general appearance she has poor hygiene with nails overgrowth, chronically ill-appearing comfortable.  She answers questions appropriately.  She is aware that she is in the hospital.  Blood pressure is still elevated.    Chief Complaint: Altered mental status  ----------------------------------------------------------------------------------------------------------------------  Current Hospital Meds:  apixaban, 2.5 mg, Oral, Q12H  cloNIDine, 0.1 mg, Oral, BID  levothyroxine, 75 mcg, Oral, Q AM  senna-docusate sodium, 2 tablet, Oral, BID  sodium chloride, 10 mL, Intravenous, Q12H      dextrose 5 % and sodium chloride 0.45 %, 125 mL/hr, Last Rate: 125 mL/hr (23 1733)      ----------------------------------------------------------------------------------------------------------------------  Vital Signs:  Temp:  [97.9 øF (36.6 øC)-98.3 øF (36.8 øC)] 98 øF (36.7 øC)  Heart Rate:  [65-71] 71  Resp:  [18] 18  BP: (127-169)/(66-85) 169/83       Tele:       08/10/23  0500 23  0500 23  0500   Weight: 44.1 kg (97 lb 4.8 oz) 44.5 kg (98 lb 3.2 oz) 45 kg (99 lb 4.8 oz)     Body mass index is 16.04 kg/mý.    Intake/Output Summary (Last 24 hours) at 2023 1509  Last data filed at 2023 1200  Gross per 24 hour   Intake 240 ml   Output --   Net 240 ml     Diet: Regular/House Diet; Texture: Regular Texture (IDDSI 7); Fluid Consistency: Thin (IDDSI 0)  ----------------------------------------------------------------------------------------------------------------------  Physical exam:  General: Comfortable,awake, alert, oriented to self, place,  chronically ill-appearingNo respiratory distress.    Skin:  Skin is warm and dry. No rash noted. No pallor.    HENT:  Head:  Normocephalic and atraumatic.  Mouth:  Moist mucous membranes.    Eyes:  Conjunctivae and EOM are normal.  Pupils are equal, round, and reactive to light.  No scleral icterus.    Neck:  Neck supple.  No JVD present.  No bruit  Pulmonary/Chest:  No respiratory distress, no wheezes, no crackles, with normal breath sounds and good air movement.  Cardiovascular:  Normal rate, regular rhythm and normal heart sounds with no murmur.  Abdominal:  Soft.  Bowel sounds are normal.  No distension and no tenderness.   Extremities:  No edema, no tenderness, and no deformity.  No red or swollen joints anywhere.  Strong pulses in all 4 extremities with no clubbing, no cyanosis, no edema.  Neurological:  Motor strength equal no obvious deficit, sensory grossly intact.   No cranial nerve deficit.  No tongue deviation.  No facial droop.  No slurred speech.    Genitourinary: No Sifuentes catheter  Back:  ----------------------------------------------------------------------------------------------------------------------  ----------------------------------------------------------------------------------------------------------------------  Results from last 7 days   Lab Units 08/06/23  2157   CK TOTAL U/L 42     Results from last 7 days   Lab Units 08/09/23  0030 08/08/23  0217 08/07/23  1048 08/07/23  0405 08/06/23  2157 08/06/23  1152   CRP mg/dL  --   --   --   --   --  3.19*   LACTATE mmol/L  --   --   --   --   --  1.2   WBC 10*3/mm3 4.89 5.37  --  5.92  --  6.71   HEMOGLOBIN g/dL 7.8* 8.3* 8.7* 9.3*   < > 9.4*   HEMATOCRIT % 23.9* 25.1* 27.3* 28.3*   < > 29.7*   MCV fL 108.1* 105.9*  --  106.8*  --  107.6*   MCHC g/dL 32.6 33.1  --  32.9  --  31.6   PLATELETS 10*3/mm3 204 200  --  221  --  252    < > = values in this interval not displayed.         Results from last 7 days   Lab Units 08/09/23  0030  08/08/23  1544 08/08/23  0217 08/07/23  0405 08/06/23  1152   SODIUM mmol/L 128*  --  129* 134* 135*   POTASSIUM mmol/L 3.7 4.0 2.7* 3.4* 3.5   MAGNESIUM mg/dL  --   --  1.9  --   --    CHLORIDE mmol/L 99  --  99 100 98   CO2 mmol/L 22.3  --  23.9 21.0* 18.0*   BUN mg/dL 14  --  17 25* 29*   CREATININE mg/dL 0.67  --  0.69 0.97 1.03*   CALCIUM mg/dL 7.5*  --  7.7* 8.2* 8.5*   GLUCOSE mg/dL 120*  --  145* 123* 93   ALBUMIN g/dL  --   --   --  2.4* 2.5*   BILIRUBIN mg/dL  --   --   --  0.7 1.2   ALK PHOS U/L  --   --   --  100 106   AST (SGOT) U/L  --   --   --  19 21   ALT (SGPT) U/L  --   --   --  9 10   Estimated Creatinine Clearance: 42.8 mL/min (by C-G formula based on SCr of 0.67 mg/dL).    No results found for: AMMONIA      Blood Culture   Date Value Ref Range Status   08/06/2023 No growth at 5 days  Final   08/06/2023 No growth at 5 days  Final     No results found for: URINECX  No results found for: WOUNDCX  No results found for: STOOLCX    I have personally looked at the labs and they are summarized above.  ----------------------------------------------------------------------------------------------------------------------  Imaging Results (Last 24 Hours)       ** No results found for the last 24 hours. **          ----------------------------------------------------------------------------------------------------------------------  Assessment and Plan:  -Episodes of hematochezia, upper endoscopy is unremarkable except for mild erythema and mild hiatal hernia, colonoscopy nondiagnostic poorly prepped with no obvious bleeding  -Dementia  -Chronic persistent atrial fibrillation  -Chronic hyponatremia with worsening  -Hypothyroidism chemically hypothyroid  -Macrocytosis with anemia  -Essential hypertension  -Chronic anticoagulation  -Advanced age    Her hyponatremia could be related to-thyroidism, monitor symptomatically.  Patient started on Synthroid, monitor H&H, repeat TSH/thyroid function test in 4  weeks.    Disposition patient needs placement      Destini Lao MD  08/12/23  15:09 EDT

## 2023-08-12 NOTE — PLAN OF CARE
Goal Outcome Evaluation:                     Pt currently resting in bed. No s/s of acute distress noted at this time. Precautions maintained. No complaints verbalized at this time. Plan of care ongoing.

## 2023-08-12 NOTE — PLAN OF CARE
Goal Outcome Evaluation:         Pt resting in bed, VSS, no c/o CP or acute distress ntd. This shift.  Will continue to follow plan of care.

## 2023-08-13 LAB
ANION GAP SERPL CALCULATED.3IONS-SCNC: 9.4 MMOL/L (ref 5–15)
BASOPHILS # BLD AUTO: 0.01 10*3/MM3 (ref 0–0.2)
BASOPHILS NFR BLD AUTO: 0.2 % (ref 0–1.5)
BUN SERPL-MCNC: 8 MG/DL (ref 8–23)
BUN/CREAT SERPL: 12.7 (ref 7–25)
CALCIUM SPEC-SCNC: 7.1 MG/DL (ref 8.6–10.5)
CHLORIDE SERPL-SCNC: 100 MMOL/L (ref 98–107)
CO2 SERPL-SCNC: 18.6 MMOL/L (ref 22–29)
CREAT SERPL-MCNC: 0.63 MG/DL (ref 0.57–1)
DEPRECATED RDW RBC AUTO: 68.6 FL (ref 37–54)
EGFRCR SERPLBLD CKD-EPI 2021: 86.5 ML/MIN/1.73
EOSINOPHIL # BLD AUTO: 0.02 10*3/MM3 (ref 0–0.4)
EOSINOPHIL NFR BLD AUTO: 0.4 % (ref 0.3–6.2)
ERYTHROCYTE [DISTWIDTH] IN BLOOD BY AUTOMATED COUNT: 17.8 % (ref 12.3–15.4)
GLUCOSE BLDC GLUCOMTR-MCNC: 107 MG/DL (ref 70–130)
GLUCOSE BLDC GLUCOMTR-MCNC: 107 MG/DL (ref 70–130)
GLUCOSE BLDC GLUCOMTR-MCNC: 111 MG/DL (ref 70–130)
GLUCOSE BLDC GLUCOMTR-MCNC: 120 MG/DL (ref 70–130)
GLUCOSE SERPL-MCNC: 97 MG/DL (ref 65–99)
HCT VFR BLD AUTO: 22.9 % (ref 34–46.6)
HGB BLD-MCNC: 7.6 G/DL (ref 12–15.9)
IMM GRANULOCYTES # BLD AUTO: 0.02 10*3/MM3 (ref 0–0.05)
IMM GRANULOCYTES NFR BLD AUTO: 0.4 % (ref 0–0.5)
LYMPHOCYTES # BLD AUTO: 0.94 10*3/MM3 (ref 0.7–3.1)
LYMPHOCYTES NFR BLD AUTO: 17.4 % (ref 19.6–45.3)
MAGNESIUM SERPL-MCNC: 1.6 MG/DL (ref 1.6–2.4)
MCH RBC QN AUTO: 35 PG (ref 26.6–33)
MCHC RBC AUTO-ENTMCNC: 33.2 G/DL (ref 31.5–35.7)
MCV RBC AUTO: 105.5 FL (ref 79–97)
MONOCYTES # BLD AUTO: 0.62 10*3/MM3 (ref 0.1–0.9)
MONOCYTES NFR BLD AUTO: 11.5 % (ref 5–12)
NEUTROPHILS NFR BLD AUTO: 3.78 10*3/MM3 (ref 1.7–7)
NEUTROPHILS NFR BLD AUTO: 70.1 % (ref 42.7–76)
NRBC BLD AUTO-RTO: 0 /100 WBC (ref 0–0.2)
PLATELET # BLD AUTO: 135 10*3/MM3 (ref 140–450)
PMV BLD AUTO: 9 FL (ref 6–12)
POTASSIUM SERPL-SCNC: 2.8 MMOL/L (ref 3.5–5.2)
POTASSIUM SERPL-SCNC: 4.8 MMOL/L (ref 3.5–5.2)
RBC # BLD AUTO: 2.17 10*6/MM3 (ref 3.77–5.28)
SODIUM SERPL-SCNC: 128 MMOL/L (ref 136–145)
WBC NRBC COR # BLD: 5.39 10*3/MM3 (ref 3.4–10.8)

## 2023-08-13 PROCEDURE — 82948 REAGENT STRIP/BLOOD GLUCOSE: CPT

## 2023-08-13 PROCEDURE — 83735 ASSAY OF MAGNESIUM: CPT | Performed by: INTERNAL MEDICINE

## 2023-08-13 PROCEDURE — 80048 BASIC METABOLIC PNL TOTAL CA: CPT | Performed by: HOSPITALIST

## 2023-08-13 PROCEDURE — 84132 ASSAY OF SERUM POTASSIUM: CPT | Performed by: HOSPITALIST

## 2023-08-13 PROCEDURE — 85025 COMPLETE CBC W/AUTO DIFF WBC: CPT | Performed by: HOSPITALIST

## 2023-08-13 PROCEDURE — 99232 SBSQ HOSP IP/OBS MODERATE 35: CPT | Performed by: INTERNAL MEDICINE

## 2023-08-13 RX ORDER — POTASSIUM CHLORIDE 20 MEQ/1
40 TABLET, EXTENDED RELEASE ORAL EVERY 4 HOURS
Status: COMPLETED | OUTPATIENT
Start: 2023-08-13 | End: 2023-08-13

## 2023-08-13 RX ADMIN — DOCUSATE SODIUM 50 MG AND SENNOSIDES 8.6 MG 2 TABLET: 8.6; 5 TABLET, FILM COATED ORAL at 09:16

## 2023-08-13 RX ADMIN — ASPIRIN 81 MG: 81 TABLET, COATED ORAL at 09:16

## 2023-08-13 RX ADMIN — FAMOTIDINE 20 MG: 20 TABLET, FILM COATED ORAL at 09:15

## 2023-08-13 RX ADMIN — POTASSIUM CHLORIDE 40 MEQ: 1500 TABLET, EXTENDED RELEASE ORAL at 16:31

## 2023-08-13 RX ADMIN — LISINOPRIL 10 MG: 10 TABLET ORAL at 09:16

## 2023-08-13 RX ADMIN — CLONIDINE HYDROCHLORIDE 0.1 MG: 0.1 TABLET ORAL at 09:16

## 2023-08-13 RX ADMIN — POTASSIUM CHLORIDE 40 MEQ: 1500 TABLET, EXTENDED RELEASE ORAL at 12:19

## 2023-08-13 RX ADMIN — Medication 10 ML: at 20:40

## 2023-08-13 RX ADMIN — Medication 10 ML: at 09:17

## 2023-08-13 RX ADMIN — APIXABAN 2.5 MG: 2.5 TABLET, FILM COATED ORAL at 20:35

## 2023-08-13 RX ADMIN — FAMOTIDINE 20 MG: 20 TABLET, FILM COATED ORAL at 16:32

## 2023-08-13 RX ADMIN — POTASSIUM CHLORIDE 40 MEQ: 1500 TABLET, EXTENDED RELEASE ORAL at 06:30

## 2023-08-13 RX ADMIN — LEVOTHYROXINE SODIUM 75 MCG: 0.07 TABLET ORAL at 06:31

## 2023-08-13 RX ADMIN — APIXABAN 2.5 MG: 2.5 TABLET, FILM COATED ORAL at 09:16

## 2023-08-13 RX ADMIN — CLOPIDOGREL BISULFATE 75 MG: 75 TABLET, FILM COATED ORAL at 09:16

## 2023-08-13 RX ADMIN — HYDROCODONE BITARTRATE AND ACETAMINOPHEN 1 TABLET: 5; 325 TABLET ORAL at 06:31

## 2023-08-13 NOTE — PLAN OF CARE
Patient resting in the bed throughout the night. No s/s of distress noted. No complaints. Will continue to follow plan of care.

## 2023-08-13 NOTE — PROGRESS NOTES
[facility]     Patient Identification:  Name:  Lashell Case  Age:  88 y.o.  Sex:  female  :  1935  MRN:  [id number]  Visit Number:  [id number]  Primary Care Provider:  Evelio Guerin DO    Length of stay:  7    Chief complaint: Confusion    Subjective:    Patient with persistent intermittent confusion.  Patient was asleep when I entered the room but easily awakened.  Patient states that she is still not sure why she is in the hospital.  Per nursing staff, no new events overnight.  Patient is still awaiting placement.  ----------------------------------------------------------------------------------------------------------------------  Current Hospital Meds:  apixaban, 2.5 mg, Oral, Q12H  aspirin, 81 mg, Oral, Daily  cloNIDine, 0.1 mg, Oral, BID  clopidogrel, 75 mg, Oral, Daily  famotidine, 20 mg, Oral, BID AC  levothyroxine, 75 mcg, Oral, Q AM  lisinopril, 10 mg, Oral, Q24H  potassium chloride ER, 40 mEq, Oral, Q4H  senna-docusate sodium, 2 tablet, Oral, BID  sodium chloride, 10 mL, Intravenous, Q12H      dextrose 5 % and sodium chloride 0.45 %, 50 mL/hr, Last Rate: 50 mL/hr (23 1611)      ----------------------------------------------------------------------------------------------------------------------  Vital Signs:  Temp:  [98 øF (36.7 øC)-99.3 øF (37.4 øC)] 98.7 øF (37.1 øC)  Heart Rate:  [] 72  Resp:  [18] 18  BP: (101-143)/(48-84) 104/51      23  0500 23  0500 23  0500   Weight: 44.5 kg (98 lb 3.2 oz) 45 kg (99 lb 4.8 oz) 46.9 kg (103 lb 8 oz)     Body mass index is 16.71 kg/mý.    Intake/Output Summary (Last 24 hours) at 2023 1110  Last data filed at 2023 0408  Gross per 24 hour   Intake 120 ml   Output 100 ml   Net 20 ml       Diet: Regular/House Diet; Texture: Regular Texture (IDDSI 7); Fluid Consistency: Thin (IDDSI  0)  ----------------------------------------------------------------------------------------------------------------------  Physical exam:  Constitutional: Elderly appearing appearing  female in no apparent distress.     HENT:  Head:  Normocephalic and atraumatic.  Mouth:  Moist mucous membranes.    Eyes:  Conjunctivae and EOM are normal.  Pupils are equal, round, and reactive to light.  No scleral icterus.    Neck:  Neck supple. No thyromegaly.  No JVD present.    Cardiovascular:  Irregular rhythm with rate in the 70's,  no murmurs, rubs, clicks or gallops appreciated.  Pulmonary/Chest:  Clear to auscultation bilaterally with no crackles, wheezes or rhonchi appreciated.  Abdominal:  Soft. Nontender. Nondistended  Bowel sounds are normal in all four quadrants. No organomegally appreciated.   Musculoskeletal:  No edema, no tenderness, and no deformity.  No red or swollen joints anywhere.    Neurological:  Alert, Cranial nerves II-XII intact with no focal deficits.  No facial droop.  No slurred speech.   Skin:  Warm and dry to palpation with no rashes or lesions appreciated.  Peripheral vascular:  2+ radial and pedal pulses in bilateral upper and lower extremities.  Psychiatric:  Alert but not oriented to person, place or time, does not demonstrate appropriate decision-making capacity      -------------------------------------------------------------------------------------------------  ----------------------------------------------------------------------------------------------------------------------  Results from last 7 days   Lab Units 08/06/23  2157   CK TOTAL U/L 42       Results from last 7 days   Lab Units 08/13/23  0246 08/09/23  0030 08/08/23  0217 08/06/23  2157 08/06/23  1152   CRP mg/dL  --   --   --   --  3.19*   LACTATE mmol/L  --   --   --   --  1.2   WBC 10*3/mm3 5.39 4.89 5.37   < > 6.71   HEMOGLOBIN g/dL 7.6* 7.8* 8.3*   < > 9.4*   HEMATOCRIT % 22.9* 23.9* 25.1*   < > 29.7*   MCV fL 105.5*  108.1* 105.9*   < > 107.6*   MCHC g/dL 33.2 32.6 33.1   < > 31.6   PLATELETS 10*3/mm3 135* 204 200   < > 252    < > = values in this interval not displayed.           Results from last 7 days   Lab Units 08/13/23  0246 08/09/23  0030 08/08/23  1544 08/08/23  0217 08/07/23  0405 08/06/23  1152   SODIUM mmol/L 128* 128*  --  129* 134* 135*   POTASSIUM mmol/L 2.8* 3.7 4.0 2.7* 3.4* 3.5   MAGNESIUM mg/dL  --   --   --  1.9  --   --    CHLORIDE mmol/L 100 99  --  99 100 98   CO2 mmol/L 18.6* 22.3  --  23.9 21.0* 18.0*   BUN mg/dL 8 14  --  17 25* 29*   CREATININE mg/dL 0.63 0.67  --  0.69 0.97 1.03*   CALCIUM mg/dL 7.1* 7.5*  --  7.7* 8.2* 8.5*   GLUCOSE mg/dL 97 120*  --  145* 123* 93   ALBUMIN g/dL  --   --   --   --  2.4* 2.5*   BILIRUBIN mg/dL  --   --   --   --  0.7 1.2   ALK PHOS U/L  --   --   --   --  100 106   AST (SGOT) U/L  --   --   --   --  19 21   ALT (SGPT) U/L  --   --   --   --  9 10     Estimated Creatinine Clearance: 47.5 mL/min (by C-G formula based on SCr of 0.63 mg/dL).    No results found for: AMMONIA      Blood Culture   Date Value Ref Range Status   08/06/2023 No growth at 24 hours  Preliminary   08/06/2023 No growth at 24 hours  Preliminary     No results found for: URINECX  No results found for: WOUNDCX  No results found for: STOOLCX    I have personally looked at the labs and they are summarized above.  ----------------------------------------------------------------------------------------------------------------------  Imaging Results (Last 24 Hours)       ** No results found for the last 24 hours. **          ----------------------------------------------------------------------------------------------------------------------  Assessment and Plan:    Macrocytic anemia -B12 and folate levels are within normal limits.  Patient did have EGD and colonoscopy performed 8/9/2023.  EGD demonstrated localized mildly erythematous mucosa with no bleeding.  Colonoscopy demonstrated sigmoid diverticulum  with no evidence of bleeding.    2.  Hypothyroidism -continue Synthroid 75 mcg p.o. daily.    3.  Essential hypertension -well-controlled, continue lisinopril    4.  Hyperlipidemia -statin    5.  Chronic A-fib -continue Eliquis at 2.5 mg p.o. twice daily, currently in A-fib and rate controlled.    Disposition patient is now stable for discharge, awaiting safe discharge disposition to be confirmed by case management.    Austyn Cifuentes DO   08/13/23   11:10 EDT

## 2023-08-13 NOTE — PLAN OF CARE
Goal Outcome Evaluation:      Pt resting in bed, compliant with medication and treatment plan today. Continues in contact precautions. Telemetry d/c'd awaiting transfer to [facility]. Will continue to follow plan of care.

## 2023-08-14 LAB
ANION GAP SERPL CALCULATED.3IONS-SCNC: 9.1 MMOL/L (ref 5–15)
BUN SERPL-MCNC: 10 MG/DL (ref 8–23)
BUN/CREAT SERPL: 12.3 (ref 7–25)
CALCIUM SPEC-SCNC: 7.7 MG/DL (ref 8.6–10.5)
CHLORIDE SERPL-SCNC: 100 MMOL/L (ref 98–107)
CO2 SERPL-SCNC: 18.9 MMOL/L (ref 22–29)
CREAT SERPL-MCNC: 0.81 MG/DL (ref 0.57–1)
DEPRECATED RDW RBC AUTO: 68.1 FL (ref 37–54)
EGFRCR SERPLBLD CKD-EPI 2021: 70.8 ML/MIN/1.73
ERYTHROCYTE [DISTWIDTH] IN BLOOD BY AUTOMATED COUNT: 17.9 % (ref 12.3–15.4)
GLUCOSE BLDC GLUCOMTR-MCNC: 116 MG/DL (ref 70–130)
GLUCOSE BLDC GLUCOMTR-MCNC: 156 MG/DL (ref 70–130)
GLUCOSE BLDC GLUCOMTR-MCNC: 171 MG/DL (ref 70–130)
GLUCOSE BLDC GLUCOMTR-MCNC: 31 MG/DL (ref 70–130)
GLUCOSE BLDC GLUCOMTR-MCNC: 43 MG/DL (ref 70–130)
GLUCOSE BLDC GLUCOMTR-MCNC: 56 MG/DL (ref 70–130)
GLUCOSE BLDC GLUCOMTR-MCNC: 66 MG/DL (ref 70–130)
GLUCOSE BLDC GLUCOMTR-MCNC: 77 MG/DL (ref 70–130)
GLUCOSE BLDC GLUCOMTR-MCNC: 83 MG/DL (ref 70–130)
GLUCOSE SERPL-MCNC: 91 MG/DL (ref 65–99)
HCT VFR BLD AUTO: 26.6 % (ref 34–46.6)
HGB BLD-MCNC: 8.9 G/DL (ref 12–15.9)
MCH RBC QN AUTO: 34.5 PG (ref 26.6–33)
MCHC RBC AUTO-ENTMCNC: 33.5 G/DL (ref 31.5–35.7)
MCV RBC AUTO: 103.1 FL (ref 79–97)
PLATELET # BLD AUTO: 125 10*3/MM3 (ref 140–450)
PMV BLD AUTO: 8.6 FL (ref 6–12)
POTASSIUM SERPL-SCNC: 4.8 MMOL/L (ref 3.5–5.2)
RBC # BLD AUTO: 2.58 10*6/MM3 (ref 3.77–5.28)
SODIUM SERPL-SCNC: 128 MMOL/L (ref 136–145)
WBC NRBC COR # BLD: 5.66 10*3/MM3 (ref 3.4–10.8)

## 2023-08-14 PROCEDURE — 97535 SELF CARE MNGMENT TRAINING: CPT

## 2023-08-14 PROCEDURE — 99232 SBSQ HOSP IP/OBS MODERATE 35: CPT | Performed by: HOSPITALIST

## 2023-08-14 PROCEDURE — 82948 REAGENT STRIP/BLOOD GLUCOSE: CPT

## 2023-08-14 PROCEDURE — 80048 BASIC METABOLIC PNL TOTAL CA: CPT | Performed by: INTERNAL MEDICINE

## 2023-08-14 PROCEDURE — 97110 THERAPEUTIC EXERCISES: CPT

## 2023-08-14 PROCEDURE — 97530 THERAPEUTIC ACTIVITIES: CPT

## 2023-08-14 PROCEDURE — 85027 COMPLETE CBC AUTOMATED: CPT | Performed by: INTERNAL MEDICINE

## 2023-08-14 RX ORDER — DEXTROSE AND SODIUM CHLORIDE 5; .9 G/100ML; G/100ML
50 INJECTION, SOLUTION INTRAVENOUS CONTINUOUS
Status: DISCONTINUED | OUTPATIENT
Start: 2023-08-14 | End: 2023-08-15

## 2023-08-14 RX ORDER — COSYNTROPIN 0.25 MG/ML
0.25 INJECTION, POWDER, FOR SOLUTION INTRAMUSCULAR; INTRAVENOUS ONCE
Status: COMPLETED | OUTPATIENT
Start: 2023-08-15 | End: 2023-08-15

## 2023-08-14 RX ORDER — HYDROCODONE BITARTRATE AND ACETAMINOPHEN 5; 325 MG/1; MG/1
1 TABLET ORAL EVERY 6 HOURS PRN
Status: DISCONTINUED | OUTPATIENT
Start: 2023-08-14 | End: 2023-08-21 | Stop reason: HOSPADM

## 2023-08-14 RX ADMIN — FAMOTIDINE 20 MG: 20 TABLET, FILM COATED ORAL at 08:10

## 2023-08-14 RX ADMIN — CLOPIDOGREL BISULFATE 75 MG: 75 TABLET, FILM COATED ORAL at 08:10

## 2023-08-14 RX ADMIN — APIXABAN 2.5 MG: 2.5 TABLET, FILM COATED ORAL at 20:38

## 2023-08-14 RX ADMIN — ASPIRIN 81 MG: 81 TABLET, COATED ORAL at 08:10

## 2023-08-14 RX ADMIN — DEXTROSE MONOHYDRATE 25 G: 25 INJECTION, SOLUTION INTRAVENOUS at 08:16

## 2023-08-14 RX ADMIN — CLONIDINE HYDROCHLORIDE 0.1 MG: 0.1 TABLET ORAL at 20:38

## 2023-08-14 RX ADMIN — LISINOPRIL 10 MG: 10 TABLET ORAL at 08:10

## 2023-08-14 RX ADMIN — LEVOTHYROXINE SODIUM 75 MCG: 0.07 TABLET ORAL at 06:18

## 2023-08-14 RX ADMIN — APIXABAN 2.5 MG: 2.5 TABLET, FILM COATED ORAL at 08:10

## 2023-08-14 RX ADMIN — Medication 10 ML: at 20:39

## 2023-08-14 RX ADMIN — DEXTROSE AND SODIUM CHLORIDE 50 ML/HR: 5; 900 INJECTION, SOLUTION INTRAVENOUS at 15:26

## 2023-08-14 RX ADMIN — FAMOTIDINE 20 MG: 20 TABLET, FILM COATED ORAL at 19:11

## 2023-08-14 RX ADMIN — Medication 10 ML: at 08:10

## 2023-08-14 RX ADMIN — DEXTROSE 15 G: 15 GEL ORAL at 20:39

## 2023-08-14 RX ADMIN — CLONIDINE HYDROCHLORIDE 0.1 MG: 0.1 TABLET ORAL at 08:09

## 2023-08-14 NOTE — CASE MANAGEMENT/SOCIAL WORK
Discharge Planning Assessment  [facility]     Patient Name: Lashell Case  MRN: [id number]  Today's Date: 8/14/2023    Admit Date: 8/6/2023    Plan: SS spoke with pt's Granddaughter Jayleen Brown at [phone] who stated she plans to attempt obtaining Guardianship in am.  Jayleen requested SS send pt's referral to [facility].  SS will send referral and will follow up with [facility] in am.       Discharge Plan       Row Name 08/14/23 8430       Plan    Plan SS spoke with pt's Granddaughter Jayleen Brown at [phone] who stated she plans to attempt obtaining Guardianship in am.  Jayleen requested SS send pt's referral to [facility].  SS will send referral and will follow up with [facility] in am.      Row Name 08/14/23 4755       Plan    Plan SS spoke with pt's daughter Hattie Santizo who stated she has spoken with Jayleen on this date and Jayleen attempted to obtain Guardianship on this date and the  was not available.  Hattie to attempt to notify Jayleen to contact [facility] SS direct line.  SS will follow.      Row Name 08/14/23 5096       Plan    Plan SS attempted several times to contact Granddaughter Jayleen on this date without success.  SS noted per ED nursing notes APS has been involved in pt's case.  SS left message for [facility] to return call regarding pt situation.  Pt has been denied by [facility], [facility], [facility].  SS noted pt needs an Emergent Guardian as pt lacks capacity per Psych.  SS unable to verify that Granddaughter obtained Guardianship.  Pt in contact isolation due to lice.  SS will follow.      Row Name 08/14/23 1422                              Continued Care and Services - Admitted Since 8/6/2023       Destination       Service Provider Request Status Selected Services Address Phone Fax Patient Preferred    [facility]  Bed not available N/A [address],  [place] [address] [phone] --    [facility] Declined N/A [address] [address] [phone] --    [facility]  Not accepting any referrals at this time. N/A [address] [address] [phone] --    [facility] Declined  Bed not available N/A [address] [facility] [address] [phone] --                  Expected Discharge Date and Time       Expected Discharge Date Expected Discharge Time    Aug 16, 2023               Shelly Servin, WMW

## 2023-08-14 NOTE — CASE MANAGEMENT/SOCIAL WORK
Discharge Planning Assessment  [facility]     Patient Name: Lashell Case  MRN: [id number]  Today's Date: 8/14/2023    Admit Date: 8/6/2023    Plan: SS attempted contact with pt's Granddaughter Jayleen at [phone] without success.  SS left message for Brad at [facility] to return call regarding pt.  SS will follow.       Discharge Plan       Row Name 08/14/23 1059       Plan    Plan SS attempted contact with pt's Granddaughter Jayleen at [phone] without success.  SS left message for Brad at [facility] to return call regarding pt.  SS will follow.                  Continued Care and Services - Admitted Since 8/6/2023       Destination       Service Provider Request Status Selected Services Address Phone Fax Patient Preferred    [facility] Pending - Request Sent N/A [address] [id number] [phone] --    [facility] Pending - Request Sent N/A [address] [id number] [phone] --    [facility] Declined  Bed not available N/A [address] [id number] [phone] --    [facility] Declined  Bed not available N/A [address] [address] [phone] --                  Expected Discharge Date and Time       Expected Discharge Date Expected Discharge Time    Aug 14, 2023           NISSA Monae

## 2023-08-14 NOTE — PLAN OF CARE
Goal Outcome Evaluation:  Patient has rested well in bed this shift. Patient remains alert and confused. VSS. Central venous access remained patent and infusing D5 0.45% NS @ 50 mL/hr with no complications. No visible signs or symptoms of distress noted at this time. Awaiting transfer to med surg. Will continue with the plan of care.    5

## 2023-08-14 NOTE — PLAN OF CARE
Goal Outcome Evaluation:         Pt resting in bed, compliant with medication and treatment plan this shift. VSS, no c/o CP or acute distress ntd this shift.  D5 NS infusing @ 50 ml/hr into [facility] without difficulty. Will continue to follow plan of care.

## 2023-08-14 NOTE — THERAPY TREATMENT NOTE
Acute Care - Occupational Therapy Treatment Note   [facility]     Patient Name: Lashell Case  : 1935  MRN: [id number]  Today's Date: 2023  Onset of Illness/Injury or Date of Surgery: 23     Referring Physician: Nadeem    Admit Date: 2023       ICD-10-CM ICD-9-CM   1. LGI bleed  K92.2 578.9   2. Chronic anemia  D64.9 285.9   3. Confusion  R41.0 298.9     Patient Active Problem List   Diagnosis    Arthritis    Abnormal ambulatory electrocardiogram    Essential hypertension    Hypothyroidism due to acquired atrophy of thyroid    Malignant neoplasm of left female breast    Mixed hyperlipidemia    Aromatase inhibitor use    Osteopenia    Hepatic cyst    Splenic cyst    Breast mass    Atrial fibrillation, persistent    Precordial chest pain    Nonrheumatic aortic (valve) stenosis    Atrial fibrillation with RVR    Paroxysmal atrial fibrillation    Nonrheumatic mitral valve regurgitation    Acute encephalopathy    LGI bleed    Chronic anemia    Severe malnutrition     Past Medical History:   Diagnosis Date    Arthritis     Breast cancer     lt br ca    Breast cancer 2006    rt br ca    Dizziness 2016    [place] (hard of hearing)     Hyperlipidemia     Hypertension     Hypothyroidism     Scabies exposure      Past Surgical History:   Procedure Laterality Date    BREAST BIOPSY      BREAST LUMPECTOMY Right     BREAST SURGERY      COLONOSCOPY N/A 2023    Procedure: COLONOSCOPY;  Surgeon: Romeo Valdez MD;  Location: [facility];  Service: Gastroenterology;  Laterality: N/A;    ENDOSCOPY N/A 2023    Procedure: ESOPHAGOGASTRODUODENOSCOPY;  Surgeon: Romeo Valdez MD;  Location: [facility];  Service: Gastroenterology;  Laterality: N/A;    MASTECTOMY Left     ca    MASTECTOMY Right     MASTECTOMY WITH SENTINEL NODE BIOPSY AND AXILLARY NODE DISSECTION Right 2020    Procedure: BREAST MASTECTOMY WITH SENTINEL NODE BIOPSY AND AXILLARY NODE DISSECTION;  Surgeon:  Cynthia Ward MD;  Location: [facility];  Service: General;  Laterality: Right;         OT ASSESSMENT FLOWSHEET (last 12 hours)       OT Evaluation and Treatment       Row Name 08/14/23 0951                   OT Time and Intention    Subjective Information no complaints  -LM        Document Type therapy note (daily note)  -LM        Mode of Treatment occupational therapy  -LM        Patient Effort adequate  -LM        Comment Patient seen this date for adl retraining.  Patient performing self feeding with setup.  Max assist with bathing, dressing, toileting tasks. Mod/max assist with grooming.  Patient confused about situation and decreased insight/judgment.  BUE arom wfl.  -LM           General Information    Existing Precautions/Restrictions fall  -LM        Limitations/Impairments safety/cognitive  -LM           Cognition    Affect/Mental Status (Cognition) confused  -LM        Orientation Status (Cognition) oriented to;person;place  -LM        Follows Commands (Cognition) repetition of directions required;physical/tactile prompts required;verbal cues/prompting required  -LM        Cognitive Function executive function deficit;memory deficit;safety deficit  -LM           Wound 08/10/23 0851 Left proximal arm Skin Tear    Wound - Properties Group Placement Date: 08/10/23  -RD Placement Time: 0851  -RD Present on Hospital Admission: N  -RD Side: Left  -RD Orientation: proximal  -RD Location: arm  -RD Primary Wound Type: Skin tear  -RD    Retired Wound - Properties Group Placement Date: 08/10/23  -RD Placement Time: 0851  -RD Present on Hospital Admission: N  -RD Side: Left  -RD Orientation: proximal  -RD Location: arm  -RD Primary Wound Type: Skin tear  -RD    Retired Wound - Properties Group Date first assessed: 08/10/23  -RD Time first assessed: 0851  -RD Present on Hospital Admission: N  -RD Side: Left  -RD Location: arm  -RD Primary Wound Type: Skin tear  -RD       Positioning and Restraints    Post  Treatment Position bed  -LM        In Bed call light within reach;encouraged to call for assist;exit alarm on  -LM                  User Key  (r) = Recorded By, (t) = Taken By, (c) = Cosigned By      Initials Name Effective Dates    LM Nella Cobb OT 06/16/21 -      Galilea Marrero RN 06/16/21 -                            OT Recommendation and Plan  Planned Therapy Interventions (OT): activity tolerance training, adaptive equipment training, BADL retraining, ROM/therapeutic exercise, transfer/mobility retraining, strengthening exercise, patient/caregiver education/training  Therapy Frequency (OT): other (see comments) (prn and/or to monitor fxl progress)  Plan of Care Review  Plan of Care Reviewed With: patient  Plan of Care Reviewed With: patient        Time Calculation:     Therapy Charges for Today       Code Description Service Date Service Provider Modifiers Qty    [id number]  OT SELF CARE/MGMT/TRAIN EA 15 MIN 8/14/2023 Nella Cobb OT GO 2                 Nella Cobb OT  8/14/2023

## 2023-08-14 NOTE — CASE MANAGEMENT/SOCIAL WORK
Discharge Planning Assessment  [facility]     Patient Name: Lashell Case  MRN: [id number]  Today's Date: 8/14/2023    Admit Date: 8/6/2023     Discharge Plan       Row Name 08/14/23 1643       Plan    Plan SS spoke with pt's daughter Hattie Santizo who stated she has spoken with Jayleen on this date and Jayleen attempted to obtain Guardianship on this date and the  was not available.  Hattie to attempt to notify Jayleen to contact [facility] SS direct line.  SS will follow.      Row Name 08/14/23 1621       Plan    Plan SS attempted several times to contact Granddaughter Jayleen on this date without success.  SS noted per ED nursing notes APS has been involved in pt's case.  SS left message for Jaja BORJA [facility] to return call regarding pt situation.  Pt has been denied by [facility], [facility], [facility].  SS noted pt needs an Emergent Guardian as pt lacks capacity per Psych.  SS unable to verify that Granddaughter obtained Guardianship.  Pt in contact isolation due to lice.  SS will follow.      Row Name 08/14/23 3493                  Shelly Servin, WMW

## 2023-08-14 NOTE — PROGRESS NOTES
[facility]     Patient Identification:  Name:  Lashell Case  Age:  88 y.o.  Sex:  female  :  1935  MRN:  [id number]  Visit Number:  [id number]  Primary Care Provider:  Evelio Guerin DO    Length of stay:  8    Subjective: Patient seen and examined, patient is eating, developed multiple episodes of hypoglycemia, no clear-cut etiology patient is not on any oral hypoglycemic or insulin.  She was started on levothyroxine for hypothyroidism.  We will check cosyntropin test.  Otherwise patient reports she is fine she is eating she is somewhat upset she wants to go home.  Pleasantly demented    Chief Complaint: Confusion  ----------------------------------------------------------------------------------------------------------------------  Current Hospital Meds:  apixaban, 2.5 mg, Oral, Q12H  aspirin, 81 mg, Oral, Daily  cloNIDine, 0.1 mg, Oral, BID  clopidogrel, 75 mg, Oral, Daily  [START ON 8/15/2023] cosyntropin, 0.25 mg, Intravenous, Once  famotidine, 20 mg, Oral, BID AC  levothyroxine, 75 mcg, Oral, Q AM  lisinopril, 10 mg, Oral, Q24H  senna-docusate sodium, 2 tablet, Oral, BID  sodium chloride, 10 mL, Intravenous, Q12H      dextrose 5 % and sodium chloride 0.9 %, 50 mL/hr      ----------------------------------------------------------------------------------------------------------------------  Vital Signs:  Temp:  [97.6 øF (36.4 øC)-98.3 øF (36.8 øC)] 98 øF (36.7 øC)  Heart Rate:  [63-74] 74  Resp:  [16-18] 18  BP: (122-148)/(52-72) 131/72       Tele:       23  0500 23  0521 23  0711   Weight: 46.9 kg (103 lb 8 oz) 58.7 kg (129 lb 8 oz) (MARIANNA Ulloa aware of weight gain, weighed with one sheet and one pillow) 57.1 kg (125 lb 14.1 oz)     Body mass index is 20.33 kg/mý.    Intake/Output Summary (Last 24 hours) at 2023 1223  Last data filed at 2023 1114  Gross per 24 hour   Intake 600 ml   Output 1150 ml   Net -550 ml     Diet:  Regular/House Diet; Texture: Regular Texture (IDDSI 7); Fluid Consistency: Thin (IDDSI 0)  ----------------------------------------------------------------------------------------------------------------------  Physical exam:  General: Comfortable,awake, alert, oriented to self, place, but not time,   No respiratory distress.    Skin:  Skin is warm and dry. No rash noted. No pallor.    HENT:  Head:  Normocephalic and atraumatic.  Mouth:  Moist mucous membranes.    Eyes:  Conjunctivae and EOM are normal.  Pupils are equal, round, and reactive to light.  No scleral icterus.    Neck:  Neck supple.  No JVD present.    Pulmonary/Chest:  No respiratory distress, no wheezes, no crackles, with normal breath sounds and good air movement.  Cardiovascular:  Normal rate, irregular irregular with no murmur.  Abdominal:  Soft.  Bowel sounds are normal.  No distension and no tenderness.   Extremities: +1 edema both lower extremity, overgrown toenails.  Neurological:  Motor strength equal no obvious deficit, sensory grossly intact.   No cranial nerve deficit.  No tongue deviation.  No facial droop.  No slurred speech.    Genitourinary: No Sifuentes catheter  Back:  ----------------------------------------------------------------------------------------------------------------------  ----------------------------------------------------------------------------------------------------------------------      Results from last 7 days   Lab Units 08/14/23 0244 08/13/23 0246 08/09/23  0030   WBC 10*3/mm3 5.66 5.39 4.89   HEMOGLOBIN g/dL 8.9* 7.6* 7.8*   HEMATOCRIT % 26.6* 22.9* 23.9*   MCV fL 103.1* 105.5* 108.1*   MCHC g/dL 33.5 33.2 32.6   PLATELETS 10*3/mm3 125* 135* 204         Results from last 7 days   Lab Units 08/14/23  0244 08/13/23  1636 08/13/23  0246 08/09/23  0030 08/08/23  1544 08/08/23  0217   SODIUM mmol/L 128*  --  128* 128*  --  129*   POTASSIUM mmol/L 4.8 4.8 2.8* 3.7   < > 2.7*   MAGNESIUM mg/dL  --  1.6  --   --   --   1.9   CHLORIDE mmol/L 100  --  100 99  --  99   CO2 mmol/L 18.9*  --  18.6* 22.3  --  23.9   BUN mg/dL 10  --  8 14  --  17   CREATININE mg/dL 0.81  --  0.63 0.67  --  0.69   CALCIUM mg/dL 7.7*  --  7.1* 7.5*  --  7.7*   GLUCOSE mg/dL 91  --  97 120*  --  145*    < > = values in this interval not displayed.   Estimated Creatinine Clearance: 44.9 mL/min (by C-G formula based on SCr of 0.81 mg/dL).    No results found for: AMMONIA      No results found for: BLOODCX  No results found for: URINECX  No results found for: WOUNDCX  No results found for: STOOLCX    I have personally looked at the labs and they are summarized above.  ----------------------------------------------------------------------------------------------------------------------  Imaging Results (Last 24 Hours)       ** No results found for the last 24 hours. **          ----------------------------------------------------------------------------------------------------------------------  Assessment and Plan:  -Episode of hematochezia upper endoscopy unremarkable, lower endoscopy incomplete due to poor prep  -Advanced dementia  -Chronic persistent atrial fibrillation  -Episode of hypoglycemia  -Chronic hyponatremia with worsening  -Macrocytosis with anemia  -Advanced age  -Essential hypertension  -Chronic anticoagulation for stroke prophylaxis  -Hypocalcemia    Patient's hypoglycemia etiology is not clear, patient has very elevated TSH and was started on Synthroid, not sure if the patient is compliant at home with her medication, her hypoglycemia could be related to adrenal insufficiency after starting hypothyroid medication.  Especially with her BMP showing hyponatremia, will check cosyntropin test, change IV fluids to D5 NS for now, may need short course systemic corticosteroids if the patient has abnormal cosyntropin test.  In the meantime supportive care.  With regards to the hematochezia, H&H so far stable.  We will check albumin to calculate  corrected calcium.    Disposition needs nursing home placement      Destini Lao MD  08/14/23  12:23 EDT

## 2023-08-14 NOTE — DISCHARGE PLACEMENT REQUEST
"Lashell Garcia (88 y.o. Female)       Date of Birth   1935    Social Security Number       Address   [address] [place], [place] [address]    Home Phone   [phone]    MRN   [id number]       [facility]    Marital Status                               Admission Date   8/6/23    Admission Type   Emergency    Admitting Provider   González Rodríguez MD    Attending Provider   Destini Lao MD    Department, Room/Bed   [address], 3306/2S       Discharge Date       Discharge Disposition       Discharge Destination                                 Attending Provider: Destini Lao MD    Allergies: Bactrim [Sulfamethoxazole-trimethoprim], Ketorolac, Phenergan [Promethazine Hcl], Codeine, Penicillins    Isolation: Contact   Infection: Lice (08/11/23)   Code Status: No CPR    Ht: 167.6 cm (65.98\")   Wt: 57.1 kg (125 lb 14.1 oz)    Admission Cmt: None   Principal Problem: Acute encephalopathy [G93.40]                   Active Insurance as of 8/6/2023       Primary Coverage       Payor Plan Insurance Group Employer/Plan Group    MEDICARE MEDICARE A & B        Payor Plan Address Payor Plan Phone Number Payor Plan Fax Number Effective Dates    [address] [phone]  6/1/2002 - None Entered    [facility] [id number]         Subscriber Name Subscriber Birth Date Member ID       LASHELL GARCIA 1935 [id number]               Secondary Coverage       Payor Plan Insurance Group Employer/Plan Group    [place]        Payor Plan Address Payor Plan Phone Number Payor Plan Fax Number Effective Dates    [address] [phone]  7/7/2016 - None Entered    [place] [address]         Subscriber Name Subscriber Birth Date Member ID       LASHELL GARCIA 1935 [id number]                     Emergency Contacts        (Rel.) Home Phone Work Phone Mobile Phone    JUANA THORPE (Grandchild) [phone] -- --    Lolly Watkins " (Grandchild) [phone] -- --    Hattie Santizo (Daughter) -- -- [phone]              Insurance Information                  MEDICARE/MEDICARE A & B Phone: [phone]    Subscriber: Lashell Case Subscriber#: [id number]    Group#: -- Precert#: --        [place] Phone: [phone]    Subscriber: Lashell Case Subscriber#: [id number]    Group#: -- Precert#: --          Problem List             Codes Noted - Resolved       Hospital    Severe malnutrition ICD-10-CM: E43  ICD-9-CM: 261 8/9/2023 - Present    * (Principal) Acute encephalopathy ICD-10-CM: G93.40  ICD-9-CM: 348.30 8/6/2023 - Present    LGI bleed ICD-10-CM: K92.2  ICD-9-CM: 578.9 8/6/2023 - Present    Chronic anemia ICD-10-CM: D64.9  ICD-9-CM: 285.9 8/6/2023 - Present       Non-Hospital    Paroxysmal atrial fibrillation ICD-10-CM: I48.0  ICD-9-CM: 427.31 12/9/2021 - Present    Nonrheumatic mitral valve regurgitation ICD-10-CM: I34.0  ICD-9-CM: 424.0 12/9/2021 - Present    Atrial fibrillation, persistent (Chronic) ICD-10-CM: I48.19  ICD-9-CM: 427.31 11/2/2021 - Present    Precordial chest pain ICD-10-CM: R07.2  ICD-9-CM: 786.51 11/2/2021 - Present    Nonrheumatic aortic (valve) stenosis ICD-10-CM: I35.0  ICD-9-CM: 424.1 11/2/2021 - Present    Atrial fibrillation with RVR ICD-10-CM: I48.91  ICD-9-CM: 427.31 11/2/2021 - Present    Breast mass ICD-10-CM: N63.0  ICD-9-CM: 611.72 11/12/2020 - Present    Hepatic cyst ICD-10-CM: K76.89  ICD-9-CM: 573.8 12/3/2018 - Present    Splenic cyst ICD-10-CM: D73.4  ICD-9-CM: 289.59 12/3/2018 - Present    Aromatase inhibitor use ICD-10-CM: Z79.811  ICD-9-CM: V07.52 8/27/2017 - Present    Osteopenia ICD-10-CM: M85.80  ICD-9-CM: 733.90 8/27/2017 - Present    Mixed hyperlipidemia (Chronic) ICD-10-CM: E78.2  ICD-9-CM: 272.2 1/3/2017 - Present    Malignant neoplasm of left female breast ICD-10-CM: C50.912  ICD-9-CM: 174.9 10/2/2016 - Present    Arthritis ICD-10-CM: M19.90  ICD-9-CM:  716.90 7/8/2016 - Present    Abnormal ambulatory electrocardiogram ICD-10-CM: R94.31  ICD-9-CM: 794.31 7/8/2016 - Present    Essential hypertension (Chronic) ICD-10-CM: I10  ICD-9-CM: 401.9 7/8/2016 - Present    Hypothyroidism due to acquired atrophy of thyroid (Chronic) ICD-10-CM: E03.4  ICD-9-CM: 244.8, 246.8 7/8/2016 - Present     Vital Signs (last day)       Date/Time Temp Temp src Pulse Resp BP Patient Position SpO2    08/14/23 1114 98 (36.7) Oral 74 18 131/72 Lying 95    08/14/23 0658 97.9 (36.6) Oral 65 18 148/72 Lying 96    08/14/23 0345 97.6 (36.4) Oral 73 16 136/71 Lying 95    08/14/23 0019 98.2 (36.8) Oral 63 16 127/52 Lying 94    08/13/23 2035 97.6 (36.4) Oral 65 18 122/54 Lying 95    08/13/23 1500 98.3 (36.8) Oral 65 18 122/66 Lying 95    08/13/23 0801 98.7 (37.1) Oral 72 18 104/51 Lying 94    08/13/23 0408 98.9 (37.2) Axillary 75 18 103/62 Lying 90    08/13/23 0005 99.3 (37.4) Axillary 73 18 101/48 Lying 94          Lines, Drains & Airways       Active LDAs       Name Placement date Placement time Site Days    CVC Triple Lumen 08/06/23 Non-tunneled Left Internal jugular 08/06/23  1155  Internal jugular  8    External Urinary Catheter --  --  --  --                  Current Facility-Administered Medications   Medication Dose Route Frequency Provider Last Rate Last Admin    apixaban (ELIQUIS) tablet 2.5 mg  2.5 mg Oral Q12H Romeo Valdez MD   2.5 mg at 08/14/23 0810    aspirin EC tablet 81 mg  81 mg Oral Daily Destini Lao MD   81 mg at 08/14/23 0810    sennosides-docusate (PERICOLACE) 8.6-50 MG per tablet 2 tablet  2 tablet Oral BID Romeo Valdez MD   2 tablet at 08/13/23 0916    And    polyethylene glycol (MIRALAX) packet 17 g  17 g Oral Daily PRN Romeo Valdez MD        And    bisacodyl (DULCOLAX) EC tablet 5 mg  5 mg Oral Daily PRN Romeo Valdez MD        And    bisacodyl (DULCOLAX) suppository 10 mg  10 mg Rectal Daily PRN Romeo Valdez MD         cloNIDine (CATAPRES) tablet 0.1 mg  0.1 mg Oral BID Romeo Valdez MD   0.1 mg at 08/14/23 0809    clopidogrel (PLAVIX) tablet 75 mg  75 mg Oral Daily Destini Lao MD   75 mg at 08/14/23 0810    [START ON 8/15/2023] cosyntropin (CORTROSYN) injection 0.25 mg  0.25 mg Intravenous Once Destini Lao MD        dextrose (D50W) (25 g/50 mL) IV injection 25 g  25 g Intravenous Q15 Min PRN Whitney Grider PA-C   25 g at 08/14/23 0816    dextrose (GLUTOSE) oral gel 15 g  15 g Oral Q15 Min PRN Whitney Grider PA-C        dextrose 5 % and sodium chloride 0.9 % infusion  50 mL/hr Intravenous Continuous Destini Lao MD 50 mL/hr at 08/14/23 1526 50 mL/hr at 08/14/23 1526    famotidine (PEPCID) tablet 20 mg  20 mg Oral BID AC Destini Lao MD   20 mg at 08/14/23 0810    glucagon HCl (Diagnostic) injection 1 mg  1 mg Subcutaneous Q15 Min PRN Whitney Grider PA-C        HYDROcodone-acetaminophen (NORCO) 5-325 MG per tablet 1 tablet  1 tablet Oral Q6H PRN Romeo Valdez MD   1 tablet at 08/13/23 0631    levothyroxine (SYNTHROID, LEVOTHROID) tablet 75 mcg  75 mcg Oral Q AM Romeo Valdez MD   75 mcg at 08/14/23 0618    lisinopril (PRINIVIL,ZESTRIL) tablet 10 mg  10 mg Oral Q24H Destini Lao MD   10 mg at 08/14/23 0810    nitroglycerin (NITROSTAT) SL tablet 0.4 mg  0.4 mg Sublingual Q5 Min PRN Romeo Valdez MD        Potassium Replacement - Follow Nurse / BPA Driven Protocol   Does not apply PRN Romeo Valdez MD        sodium chloride 0.9 % flush 10 mL  10 mL Intravenous PRN Romeo Valdez MD        sodium chloride 0.9 % flush 10 mL  10 mL Intravenous Q12H Romeo Valdez MD   10 mL at 08/14/23 0810    sodium chloride 0.9 % flush 10 mL  10 mL Intravenous PRN Romeo Valdez MD        sodium chloride 0.9 % infusion 40 mL  40 mL Intravenous PRN Romeo Valdez MD         Lab Results (last 24 hours)       Procedure  Component Value Units Date/Time    POC Glucose Once [[id number]]  (Normal) Collected: 08/14/23 1624    Specimen: Blood Updated: 08/14/23 1635     Glucose 77 mg/dL     POC Glucose Once [[id number]]  (Abnormal) Collected: 08/14/23 1118    Specimen: Blood Updated: 08/14/23 1125     Glucose 156 mg/dL     POC Glucose Once [[id number]]  (Abnormal) Collected: 08/11/23 0023    Specimen: Blood Updated: 08/14/23 1117     Glucose 31 mg/dL     POC Glucose Once [[id number]]  (Abnormal) Collected: 08/14/23 0801    Specimen: Blood Updated: 08/14/23 0809     Glucose 43 mg/dL     POC Glucose Once [[id number]]  (Abnormal) Collected: 08/14/23 0703    Specimen: Blood Updated: 08/14/23 0711     Glucose 56 mg/dL     Basic Metabolic Panel [[id number]]  (Abnormal) Collected: 08/14/23 0244    Specimen: Blood Updated: 08/14/23 0311     Glucose 91 mg/dL      BUN 10 mg/dL      Creatinine 0.81 mg/dL      Sodium 128 mmol/L      Potassium 4.8 mmol/L      Comment: Slight hemolysis detected by analyzer. Results may be affected.        Chloride 100 mmol/L      CO2 18.9 mmol/L      Calcium 7.7 mg/dL      BUN/Creatinine Ratio 12.3     Anion Gap 9.1 mmol/L      eGFR 70.8 mL/min/1.73     Narrative:      GFR Normal >60  Chronic Kidney Disease <60  Kidney Failure <15    The GFR formula is only valid for adults with stable renal function between ages 18 and 70.    CBC (No Diff) [[id number]]  (Abnormal) Collected: 08/14/23 0244    Specimen: Blood Updated: 08/14/23 0254     WBC 5.66 10*3/mm3      RBC 2.58 10*6/mm3      Hemoglobin 8.9 g/dL      Hematocrit 26.6 %      .1 fL      MCH 34.5 pg      MCHC 33.5 g/dL      RDW 17.9 %      RDW-SD 68.1 fl      MPV 8.6 fL      Platelets 125 10*3/mm3     POC Glucose Once [[id number]]  (Normal) Collected: 08/13/23 2033    Specimen: Blood Updated: 08/13/23 2039     Glucose 120 mg/dL     Magnesium [[id number]]  (Normal) Collected: 08/13/23 1636    Specimen: Blood Updated: 08/13/23 1723     Magnesium 1.6 mg/dL     Potassium  [[id number]]  (Normal) Collected: 23 1636    Specimen: Blood Updated: 23 1723     Potassium 4.8 mmol/L      Comment: Specimen hemolyzed.  Results may be affected. 1+ Hemolysis                    Physician Progress Notes (last 24 hours)        Destini Lao MD at 23 1222              [facility]     Patient Identification:  Name:  Lashell Case  Age:  88 y.o.  Sex:  female  :  1935  MRN:  [id number]  Visit Number:  [id number]  Primary Care Provider:  Evelio Guerin DO    Length of stay:  8    Subjective: Patient seen and examined, patient is eating, developed multiple episodes of hypoglycemia, no clear-cut etiology patient is not on any oral hypoglycemic or insulin.  She was started on levothyroxine for hypothyroidism.  We will check cosyntropin test.  Otherwise patient reports she is fine she is eating she is somewhat upset she wants to go home.  Pleasantly demented    Chief Complaint: Confusion  ----------------------------------------------------------------------------------------------------------------------  Current [facility] Meds:  apixaban, 2.5 mg, Oral, Q12H  aspirin, 81 mg, Oral, Daily  cloNIDine, 0.1 mg, Oral, BID  clopidogrel, 75 mg, Oral, Daily  [START ON 8/15/2023] cosyntropin, 0.25 mg, Intravenous, Once  famotidine, 20 mg, Oral, BID AC  levothyroxine, 75 mcg, Oral, Q AM  lisinopril, 10 mg, Oral, Q24H  senna-docusate sodium, 2 tablet, Oral, BID  sodium chloride, 10 mL, Intravenous, Q12H      dextrose 5 % and sodium chloride 0.9 %, 50 mL/hr      ----------------------------------------------------------------------------------------------------------------------  Vital Signs:  Temp:  [97.6 øF (36.4 øC)-98.3 øF (36.8 øC)] 98 øF (36.7 øC)  Heart Rate:  [63-74] 74  Resp:  [16-18] 18  BP: (122-148)/(52-72) 131/72       Tele:       23  0500 23  0521 23  0711   Weight: 46.9 kg (103 lb 8 oz) 58.7 kg (129 lb 8 oz) (RN  Laila aware of weight gain, weighed with one sheet and one pillow) 57.1 kg (125 lb 14.1 oz)     Body mass index is 20.33 kg/mý.    Intake/Output Summary (Last 24 hours) at 8/14/2023 1223  Last data filed at 8/14/2023 1114  Gross per 24 hour   Intake 600 ml   Output 1150 ml   Net -550 ml     Diet: Regular/House Diet; Texture: Regular Texture (IDDSI 7); Fluid Consistency: Thin (IDDSI 0)  ----------------------------------------------------------------------------------------------------------------------  Physical exam:  General: Comfortable,awake, alert, oriented to self, place, but not time,   No respiratory distress.    Skin:  Skin is warm and dry. No rash noted. No pallor.    HENT:  Head:  Normocephalic and atraumatic.  Mouth:  Moist mucous membranes.    Eyes:  Conjunctivae and EOM are normal.  Pupils are equal, round, and reactive to light.  No scleral icterus.    Neck:  Neck supple.  No JVD present.    Pulmonary/Chest:  No respiratory distress, no wheezes, no crackles, with normal breath sounds and good air movement.  Cardiovascular:  Normal rate, irregular irregular with no murmur.  Abdominal:  Soft.  Bowel sounds are normal.  No distension and no tenderness.   Extremities: +1 edema both lower extremity, overgrown toenails.  Neurological:  Motor strength equal no obvious deficit, sensory grossly intact.   No cranial nerve deficit.  No tongue deviation.  No facial droop.  No slurred speech.    Genitourinary: No Sifuentes catheter  Back:  ----------------------------------------------------------------------------------------------------------------------  ----------------------------------------------------------------------------------------------------------------------      Results from last 7 days   Lab Units 08/14/23  0244 08/13/23  0246 08/09/23  0030   WBC 10*3/mm3 5.66 5.39 4.89   HEMOGLOBIN g/dL 8.9* 7.6* 7.8*   HEMATOCRIT % 26.6* 22.9* 23.9*   MCV fL 103.1* 105.5* 108.1*   MCHC g/dL 33.5 33.2 32.6    PLATELETS 10*3/mm3 125* 135* 204         Results from last 7 days   Lab Units 08/14/23  0244 08/13/23  1636 08/13/23  0246 08/09/23  0030 08/08/23  1544 08/08/23  0217   SODIUM mmol/L 128*  --  128* 128*  --  129*   POTASSIUM mmol/L 4.8 4.8 2.8* 3.7   < > 2.7*   MAGNESIUM mg/dL  --  1.6  --   --   --  1.9   CHLORIDE mmol/L 100  --  100 99  --  99   CO2 mmol/L 18.9*  --  18.6* 22.3  --  23.9   BUN mg/dL 10  --  8 14  --  17   CREATININE mg/dL 0.81  --  0.63 0.67  --  0.69   CALCIUM mg/dL 7.7*  --  7.1* 7.5*  --  7.7*   GLUCOSE mg/dL 91  --  97 120*  --  145*    < > = values in this interval not displayed.   Estimated Creatinine Clearance: 44.9 mL/min (by C-G formula based on SCr of 0.81 mg/dL).    No results found for: AMMONIA      No results found for: BLOODCX  No results found for: URINECX  No results found for: WOUNDCX  No results found for: STOOLCX    I have personally looked at the labs and they are summarized above.  ----------------------------------------------------------------------------------------------------------------------  Imaging Results (Last 24 Hours)       ** No results found for the last 24 hours. **          ----------------------------------------------------------------------------------------------------------------------  Assessment and Plan:  -Episode of hematochezia upper endoscopy unremarkable, lower endoscopy incomplete due to poor prep  -Advanced dementia  -Chronic persistent atrial fibrillation  -Episode of hypoglycemia  -Chronic hyponatremia with worsening  -Macrocytosis with anemia  -Advanced age  -Essential hypertension  -Chronic anticoagulation for stroke prophylaxis  -Hypocalcemia    Patient's hypoglycemia etiology is not clear, patient has very elevated TSH and was started on Synthroid, not sure if the patient is compliant at home with her medication, her hypoglycemia could be related to adrenal insufficiency after starting hypothyroid medication.  Especially with her BMP  "showing hyponatremia, will check cosyntropin test, change IV fluids to D5 NS for now, may need short course systemic corticosteroids if the patient has abnormal cosyntropin test.  In the meantime supportive care.  With regards to the hematochezia, H&H so far stable.  We will check albumin to calculate corrected calcium.    Disposition needs nursing home placement      Destini Lao MD  08/14/23  12:23 EDT    Electronically signed by Destini Lao MD at 08/14/23 1231       Cindy Alaniz APRN at 08/14/23 0900          Palliative Care Daily Progress Note     S: Medical record reviewed, upon entering the room pt laying in bed. She verbalized no needs at this time. She denies any pain, no SOA, nor n/v/d. She is pleasantly confused, just reports that she is ready to go home so she can get some cheese puffs.       O:   Palliative Performance Scale Score:     /72 (BP Location: Right arm, Patient Position: Lying)   Pulse 74   Temp 98 øF (36.7 øC) (Oral)   Resp 18   Ht 167.6 cm (65.98\")   Wt 57.1 kg (125 lb 14.1 oz)   SpO2 95%   BMI 20.33 kg/mý     Intake/Output Summary (Last 24 hours) at 8/14/2023 1248  Last data filed at 8/14/2023 1114  Gross per 24 hour   Intake 600 ml   Output 1150 ml   Net -550 ml       PE:    General Appearance:    Chronically ill appearing, alert, cooperative, NAD   HEENT:    NC/AT, without obvious abnormality, EOMI, anicteric    Neck:   supple, trachea midline, no JVD   Lungs:     CTAB without w/r/r    Heart:    irregular, normal S1 and S2, no M/R/G   Abdomen:     Soft, NT, ND, NABS    Extremities:   Moves all extremities, 1+ edema BLE, overgrown fingernails/toenails   Pulses:   Pulses palpable and equal bilaterally   Skin:   Warm, dry   Neurologic:   Alert to self and place only, pleasantly confused/disoriented   Psych:   Calm, appropriate         Meds: Reviewed and changes not noted    Labs:   Results from last 7 days   Lab Units 08/14/23  0244   WBC 10*3/mm3 5.66 "   HEMOGLOBIN g/dL 8.9*   HEMATOCRIT % 26.6*   PLATELETS 10*3/mm3 125*     Results from last 7 days   Lab Units 08/14/23  0244   SODIUM mmol/L 128*   POTASSIUM mmol/L 4.8   CHLORIDE mmol/L 100   CO2 mmol/L 18.9*   BUN mg/dL 10   CREATININE mg/dL 0.81   GLUCOSE mg/dL 91   CALCIUM mg/dL 7.7*     Results from last 7 days   Lab Units 08/14/23  0244   SODIUM mmol/L 128*   POTASSIUM mmol/L 4.8   CHLORIDE mmol/L 100   CO2 mmol/L 18.9*   BUN mg/dL 10   CREATININE mg/dL 0.81   CALCIUM mg/dL 7.7*   GLUCOSE mg/dL 91     Imaging Results (Last 72 Hours)       ** No results found for the last 72 hours. **              Diagnostics: Reviewed    A: Pt has no symptoms to manage at this time. Labs- Sodium 128, BUN 10, Creat 0.81, H&H 8.9/26.6. bp 131/72 hr 74 rr 18 sat 95%       P: Continue with current regimen, will continue to provide symptomatic and supportive palliative care.  has attempted to reach granddaughter Jayleen with no success and is still working on nursing home placement.       We will continue to follow along. Please do not hesitate to contact us regarding further sx mgmt or GOC needs, including after hours or on weekends via our on call provider at [phone].     SOFI Ponce    8/14/2023    Electronically signed by Cindy Alaniz APRN at 08/14/23 1257          Consult Notes (most recent note)        Violetta Ni MD at 08/10/23 1428        Consult Orders    1. Inpatient Psychiatrist Consult [[id number]] ordered by Austyn Cifuentes DO at 08/09/23 7604                     Referring Provider: Dr. Cifuentes  Reason for Consultation: Suicidal ideations      Chief complaint/Focus of Exam: evaluate for suicidal ideations    Subjective .     History of present illness:  Lashell Case is a 88 y.o. female who was admitted on 8/6/2023 with complaints of altered mental status and she has a history of hypertension, hyperlipidemia, hypothyrodisim, arthritis, a-fib and cognitive decline and an  inability to take care of her ADL and IADLs, getting progressively worse over last 2-3 months. The patient was seen for capacity evaluation earlier and deemed lacking capacity to make informed decisions about her care and disposition. Per report, her daughter has expressed concern that patient has threatened to kill herself and another psych consult needed to be done. The patient was seen in her room where she is resting comfortably in her bed. She is hard of hearing but is cooperative and pleasant. She is not sure why she is in the hospital. She reports her mood to be good and denies any thoughts of harm to self or others. Per collateral from staff, the patient has not verbalized SI, nor has she demonstrated any self harming behaviors.  No fever or chills reported. No chest pain or nvd reported. No focal neurologic deficits reported.     Review of Systems  No fever or chills reported. No chest pain or nvd reported. No focal neurologic deficits reported.     History  Past Medical History:   Diagnosis Date    Arthritis     Breast cancer 2015    lt br ca    Breast cancer 2006    rt br ca    Dizziness 7/8/2016    [place] (hard of hearing)     Hyperlipidemia     Hypertension     Hypothyroidism     Scabies exposure    ,   Past Surgical History:   Procedure Laterality Date    BREAST BIOPSY      BREAST LUMPECTOMY Right 2006    BREAST SURGERY      COLONOSCOPY N/A 8/9/2023    Procedure: COLONOSCOPY;  Surgeon: Romeo Valdez MD;  Location: [facility] OR;  Service: Gastroenterology;  Laterality: N/A;    ENDOSCOPY N/A 8/9/2023    Procedure: ESOPHAGOGASTRODUODENOSCOPY;  Surgeon: Romeo Valdez MD;  Location: [facility] OR;  Service: Gastroenterology;  Laterality: N/A;    MASTECTOMY Left 2015    ca    MASTECTOMY Right 2020    MASTECTOMY WITH SENTINEL NODE BIOPSY AND AXILLARY NODE DISSECTION Right 11/17/2020    Procedure: BREAST MASTECTOMY WITH SENTINEL NODE BIOPSY AND AXILLARY NODE DISSECTION;  Surgeon: Cynthia Ward MD;   Location: [facility] OR;  Service: General;  Laterality: Right;   ,   Family History   Problem Relation Age of Onset    Hypertension Mother     Uterine cancer Mother     Diabetes Mother     Diabetes Daughter     Prostate cancer Son     Diabetes Son     Throat cancer Son     Hypertension Child     Breast cancer Neg Hx    ,   Social History     Socioeconomic History    Marital status:    Tobacco Use    Smoking status: Never    Smokeless tobacco: Never   Vaping Use    Vaping Use: Never used   Substance and Sexual Activity    Alcohol use: No    Drug use: No    Sexual activity: Defer     Birth control/protection: None     E-cigarette/Vaping    E-cigarette/Vaping Use Never User      E-cigarette/Vaping Substances    Nicotine No     THC No     CBD No     Flavoring No      E-cigarette/Vaping Devices    Disposable No     Pre-filled or Refillable Cartridge No     Refillable Tank No     Pre-filled Pod No          ,   Medications Prior to Admission   Medication Sig Dispense Refill Last Dose    cloNIDine (Catapres) 0.1 MG tablet Take 1 tablet by mouth 2 (Two) Times a Day. 180 tablet 0 Past Week    levothyroxine (SYNTHROID, LEVOTHROID) 75 MCG tablet Take 1 tablet by mouth Every Morning. 90 tablet 0 Past Week   , Scheduled Meds:  apixaban, 2.5 mg, Oral, Q12H  cloNIDine, 0.1 mg, Oral, BID  dextrose, 50 mL, Intravenous, Once  levothyroxine, 75 mcg, Oral, Q AM  mupirocin, 1 application , Each Nare, BID  senna-docusate sodium, 2 tablet, Oral, BID  sodium chloride, 10 mL, Intravenous, Q12H   , Continuous Infusions:  dextrose 5 % and sodium chloride 0.45 %, 125 mL/hr, Last Rate: 100 mL/hr (08/10/23 0831)   , PRN Meds:    senna-docusate sodium **AND** polyethylene glycol **AND** bisacodyl **AND** bisacodyl    HYDROcodone-acetaminophen    nitroglycerin    Potassium Replacement - Follow Nurse / BPA Driven Protocol    sodium chloride    sodium chloride    sodium chloride, and Allergies:  Bactrim [sulfamethoxazole-trimethoprim], Ketorolac,  Phenergan [promethazine hcl], Codeine, and Penicillins    Objective     Vital Signs   Temp:  [97.8 øF (36.6 øC)-98.1 øF (36.7 øC)] 98.1 øF (36.7 øC)  Heart Rate:  [59-84] 60  Resp:  [18] 18  BP: (117-138)/(70-88) 122/88    Mental Status Exam:   Mental Status Exam:    Hygiene:   fair  Cooperation:  Cooperative  Eye Contact:  Fair  Psychomotor Behavior:  Appropriate  Affect:  Appropriate  Hopelessness: Denies  Speech:  Normal  Thought Progress:  Goal directed  Thought Content:  Normal  Suicidal:  None  Homicidal:  None  Hallucinations:  None  Delusion:  None  Memory:  Deficits  Orientation:  Person and Place  Reliability:  poor  Insight:  Poor  Judgement:  Poor  Impulse Control:  Fair    Results Review:   I reviewed the patient's new clinical results.  Lab Results (last 24 hours)       Procedure Component Value Units Date/Time    POC Glucose Once [[id number]]  (Abnormal) Collected: 08/10/23 1339    Specimen: Blood Updated: 08/10/23 1345     Glucose 68 mg/dL     POC Glucose Once [[id number]]  (Abnormal) Collected: 08/10/23 1246    Specimen: Blood Updated: 08/10/23 1251     Glucose 53 mg/dL     Blood Culture - Blood, Blood, Central Line [[id number]]  (Normal) Collected: 08/06/23 1143    Specimen: Blood, Central Line Updated: 08/10/23 1215     Blood Culture No growth at 4 days    Blood Culture - Blood, Blood, Central Line [[id number]]  (Normal) Collected: 08/06/23 1152    Specimen: Blood, Central Line Updated: 08/10/23 1215     Blood Culture No growth at 4 days    POC Glucose Once [[id number]]  (Abnormal) Collected: 08/10/23 1145    Specimen: Blood Updated: 08/10/23 1151     Glucose 61 mg/dL     POC Glucose Once [[id number]]  (Normal) Collected: 08/10/23 0728    Specimen: Blood Updated: 08/10/23 0734     Glucose 77 mg/dL     POC Glucose Once [[id number]]  (Abnormal) Collected: 08/10/23 0222    Specimen: Blood Updated: 08/10/23 0229     Glucose 135 mg/dL     POC Glucose Once [[id number]]  (Normal) Collected: 08/09/23 2001     Specimen: Blood Updated: 23     Glucose 103 mg/dL     POC Glucose Once [[id number]]  (Normal) Collected: 23 1636    Specimen: Blood Updated: 23 1643     Glucose 79 mg/dL           Imaging Results (Last 24 Hours)       ** No results found for the last 24 hours. **              Assessment & Plan     Principal Problem:    Acute encephalopathy  Active Problems:    LGI bleed    Chronic anemia    Severe malnutrition     The patient is denying any thoughts of harm to self or others and has not demonstrated any behaviors indicating any such intentions.     I discussed the patient's findings and my recommendations with patient, nursing staff, and primary care team    Violetta Ni MD  08/10/23  14:28 EDT          Electronically signed by Violetta Ni MD at 08/10/23 1437          Physical Therapy Notes (most recent note)        Kiki Rodriguez, PT at 23 1544  Version 1 of 1         Acute Care - Physical Therapy Treatment Note   [place]     Patient Name: Lashell Case  : 1935  MRN: [id number]  Today's Date: 2023   Onset of Illness/Injury or Date of Surgery: 23  Visit Dx:     ICD-10-CM ICD-9-CM   1. LGI bleed  K92.2 578.9   2. Chronic anemia  D64.9 285.9   3. Confusion  R41.0 298.9     Patient Active Problem List   Diagnosis    Arthritis    Abnormal ambulatory electrocardiogram    Essential hypertension    Hypothyroidism due to acquired atrophy of thyroid    Malignant neoplasm of left female breast    Mixed hyperlipidemia    Aromatase inhibitor use    Osteopenia    Hepatic cyst    Splenic cyst    Breast mass    Atrial fibrillation, persistent    Precordial chest pain    Nonrheumatic aortic (valve) stenosis    Atrial fibrillation with RVR    Paroxysmal atrial fibrillation    Nonrheumatic mitral valve regurgitation    Acute encephalopathy    LGI bleed    Chronic anemia    Severe malnutrition     Past Medical History:   Diagnosis Date    Arthritis     Breast cancer     lt br  ca    Breast cancer 2006    rt br ca    Dizziness 7/8/2016    [place] of hearing)     Hyperlipidemia     Hypertension     Hypothyroidism     Scabies exposure      Past Surgical History:   Procedure Laterality Date    BREAST BIOPSY      BREAST LUMPECTOMY Right 2006    BREAST SURGERY      COLONOSCOPY N/A 8/9/2023    Procedure: COLONOSCOPY;  Surgeon: Romeo Valdez MD;  [facility];  Service: Gastroenterology;  Laterality: N/A;    ENDOSCOPY N/A 8/9/2023    Procedure: ESOPHAGOGASTRODUODENOSCOPY;  Surgeon: Romeo Valdez MD;  Location:  [facility];  Service: Gastroenterology;  Laterality: N/A;    ENDOSCOPY  8/8/2023    Procedure: ESOPHAGOGASTRODUODENOSCOPY;  Surgeon: Romeo Valdez MD;  Location: [facility] OR;  Service: Gastroenterology;;    MASTECTOMY Left 2015    ca    MASTECTOMY Right 2020    MASTECTOMY WITH SENTINEL NODE BIOPSY AND AXILLARY NODE DISSECTION Right 11/17/2020    Procedure: BREAST MASTECTOMY WITH SENTINEL NODE BIOPSY AND AXILLARY NODE DISSECTION;  Surgeon: Cynthia Ward MD;  Location: [facility] OR;  Service: General;  Laterality: Right;     PT Assessment (last 12 hours)       PT Evaluation and Treatment       Row Name 08/14/23 1500          Physical Therapy Time and Intention    Subjective Information --  Pt reports soreness in arms and legs during mobility and movement  -     Document Type therapy note (daily note)  -     Mode of Treatment physical therapy  -     Patient Effort adequate  -       Row Name 08/14/23 1500          General Information    Patient Profile Reviewed yes  -     Patient Observations agree to therapy;cooperative  -     Patient/Family/Caregiver Comments/Observations Pt presents confused and voicing wanting to go home.  -     Existing Precautions/Restrictions fall  -     Limitations/Impairments safety/cognitive  -       Row Name 08/14/23 1500          Pain    Pre/Posttreatment Pain Comment Intermittent soreness in arms and legs with  movement and mobility  -       Row Name 08/14/23 1500          Cognition    Affect/Mental Status (Cognition) confused  -     Follows Commands (Cognition) follows one-step commands;75-90% accuracy;physical/tactile prompts required;repetition of directions required;verbal cues/prompting required  -       Row Name 08/14/23 1500          Strength Comprehensive (MMT)    Comment, General Manual Muscle Testing (MMT) Assessment Decreased strength at right hip compared to left.  Decreased strength in bilateral LE overall. 3-/5  -       Row Name 08/14/23 1500          Bed Mobility    Bed Mobility supine-sit;sit-supine;scooting/bridging  -KP     Scooting/Bridging [place] (Bed Mobility) moderate assist (50% patient effort);2 person assist;1 person assist  -     Supine-Sit [place] (Bed Mobility) moderate assist (50% patient effort);2 person assist  -KP     Sit-Supine [place] (Bed Mobility) moderate assist (50% patient effort);1 person assist  -     Bed Mobility, Safety Issues decreased use of arms for pushing/pulling;decreased use of legs for bridging/pushing;impaired trunk control for bed mobility  -     Assistive Device (Bed Mobility) bed rails;head of bed elevated;draw sheet  -       Row Name 08/14/23 1500          Sit-Stand Transfer    Sit-Stand [place] (Transfers) minimum assist (75% patient effort);moderate assist (50% patient effort);2 person assist  -     Assistive Device (Sit-Stand Transfers) other (see comments)  [facility]       Row Name 08/14/23 1500          Stand-Sit Transfer    Stand-Sit [place] (Transfers) minimum assist (75% patient effort);2 person assist  -     Assistive Device (Stand-Sit Transfers) other (see comments)  [facility]       Row Name 08/14/23 1500          Gait/Stairs (Locomotion)    [place] Level (Gait) minimum assist (75% patient effort);moderate assist (50% patient effort);2 person assist  -     Assistive Device (Gait) other (see comments)  [facility]   -     Distance in Feet (Gait) Side steps toward head of bed 2x.  Standing marches in preparation for ambulation.  -       Row Name 08/14/23 1500          Balance    Balance Assessment sitting dynamic balance  -     Static Sitting Balance standby assist  -     Dynamic Sitting Balance contact guard;1-person assist  -KP     Position, Sitting Balance sitting edge of bed  -     Static Standing Balance minimal assist;2-person assist  -     Position/Device Used, Standing Balance other (see comments)  [facility]  -       Row Name 08/14/23 1500          Motor Skills    Therapeutic Exercise hip;knee;ankle  -       Row Name 08/14/23 1500          Hip (Therapeutic Exercise)    Hip (Therapeutic Exercise) AAROM (active assistive range of motion);strengthening exercise  -     Hip Strengthening (Therapeutic Exercise) left;right;flexion;marching while seated;heel slides;sitting;supine;10 repetitions;2 sets  -       Row Name 08/14/23 1500          Knee (Therapeutic Exercise)    Knee (Therapeutic Exercise) strengthening exercise  -     Knee Strengthening (Therapeutic Exercise) left;right;SAQ (short arc quad);LAQ (long arc quad);sitting;supine;flexion;extension;10 repetitions;3 sets  -       Row Name 08/14/23 1500          Ankle (Therapeutic Exercise)    Ankle (Therapeutic Exercise) strengthening exercise  -     Ankle Strengthening (Therapeutic Exercise) left;right;dorsiflexion;plantarflexion;sitting;supine;10 repetitions;3 sets  -       Row Name             Wound 08/10/23 0851 Left proximal arm Skin Tear    Wound - Properties Group Placement Date: 08/10/23  -RD Placement Time: 0851 -RD Present on Hospital Admission: N  -RD Side: Left  -RD Orientation: proximal  -RD Location: arm  -RD Primary Wound Type: Skin tear  -RD    Retired Wound - Properties Group Placement Date: 08/10/23  -RD Placement Time: 0851 -RD Present on Hospital Admission: N  -RD Side: Left  -RD Orientation: proximal  -RD Location: arm  -RD  Primary Wound Type: Skin tear  -RD    Retired Wound - Properties Group Date first assessed: 08/10/23  -RD Time first assessed: 0851  -RD Present on Hospital Admission: N  -RD Side: Left  -RD Location: arm  -RD Primary Wound Type: Skin tear  -RD      Row Name 08/14/23 1500          Plan of Care Review    Plan of Care Reviewed With patient  -     Outcome Evaluation PT treatment completed.  Patient presents with some confusion but participated well with therapy today.  Transfers performed 3x with hand held assist.  Standing marches and side steps.  Seated and supine therapeutic exercises completed.  Pt fatigues easily.  Continue POC.  -       Row Name 08/14/23 1500          Positioning and Restraints    Pre-Treatment Position in bed  -     Post Treatment Position bed  -     In Bed notified nsg;supine;call light within reach;encouraged to call for assist;side rails up x3;exit alarm on;heels elevated  Lines in tact and needs in reach  -KP               User Key  (r) = Recorded By, (t) = Taken By, (c) = Cosigned By      Initials Name Provider Type    RD Galilea Marrero, RN Registered Nurse    Kiki Gleason, PT Physical Therapist                      PT Recommendation and Plan     Plan of Care Reviewed With: patient  Outcome Evaluation: PT treatment completed.  Patient presents with some confusion but participated well with therapy today.  Transfers performed 3x with hand held assist.  Standing marches and side steps.  Seated and supine therapeutic exercises completed.  Pt fatigues easily.  Continue POC.       Time Calculation:    PT Charges       Row Name 08/14/23 1544             Time Calculation    PT Received On 08/14/23  -      PT Goal Re-Cert Due Date 08/21/23  -         Timed Charges    58776 - PT Therapeutic Exercise Minutes 15  -KP      87603 - PT Therapeutic Activity Minutes 15  -KP         Total Minutes    Timed Charges Total Minutes 30  -KP       Total Minutes 30  -KP                User Key  (r) =  Recorded By, (t) = Taken By, (c) = Cosigned By      Initials Name Provider Type     Kiki Rodriguez, PT Physical Therapist                  Therapy Charges for Today       Code Description Service Date Service Provider Modifiers Qty    09894479350 HC PT THER PROC EA 15 MIN 2023 Kiki Rodriguez, PT GP 1    41609856110 HC PT THERAPEUTIC ACT EA 15 MIN 2023 Kiki Rodriguez, PT GP 1            PT G-Codes  AM-PAC 6 Clicks Score (PT): 13    Kiki Rodriguez PT  2023      Electronically signed by Kiki Rodriguez, PT at 23 1545          Occupational Therapy Notes (most recent note)        Nella Cobb OT at 23 0955          Acute Care - Occupational Therapy Treatment Note   [place]     Patient Name: Lashell Case  : 1935  MRN: [id number]  Today's Date: 2023  Onset of Illness/Injury or Date of Surgery: 23     Referring Physician: Nadeem    Admit Date: 2023       ICD-10-CM ICD-9-CM   1. LGI bleed  K92.2 578.9   2. Chronic anemia  D64.9 285.9   3. Confusion  R41.0 298.9     Patient Active Problem List   Diagnosis    Arthritis    Abnormal ambulatory electrocardiogram    Essential hypertension    Hypothyroidism due to acquired atrophy of thyroid    Malignant neoplasm of left female breast    Mixed hyperlipidemia    Aromatase inhibitor use    Osteopenia    Hepatic cyst    Splenic cyst    Breast mass    Atrial fibrillation, persistent    Precordial chest pain    Nonrheumatic aortic (valve) stenosis    Atrial fibrillation with RVR    Paroxysmal atrial fibrillation    Nonrheumatic mitral valve regurgitation    Acute encephalopathy    LGI bleed    Chronic anemia    Severe malnutrition     Past Medical History:   Diagnosis Date    Arthritis     Breast cancer 2015    lt br ca    Breast cancer 2006    rt br ca    Dizziness 2016    [place] (hard of hearing)     Hyperlipidemia     Hypertension     Hypothyroidism     Scabies exposure      Past Surgical History:   Procedure Laterality  Date    BREAST BIOPSY      BREAST LUMPECTOMY Right 2006    BREAST SURGERY      COLONOSCOPY N/A 8/9/2023    Procedure: COLONOSCOPY;  Surgeon: Romeo Valdez MD;  Location:  [facility];  Service: Gastroenterology;  Laterality: N/A;    ENDOSCOPY N/A 8/9/2023    Procedure: ESOPHAGOGASTRODUODENOSCOPY;  Surgeon: Romeo Valdez MD;  Location:  [facility];  Service: Gastroenterology;  Laterality: N/A;    MASTECTOMY Left 2015    ca    MASTECTOMY Right 2020    MASTECTOMY WITH SENTINEL NODE BIOPSY AND AXILLARY NODE DISSECTION Right 11/17/2020    Procedure: BREAST MASTECTOMY WITH SENTINEL NODE BIOPSY AND AXILLARY NODE DISSECTION;  Surgeon: Cynthia Ward MD;  Location:  [facility];  Service: General;  Laterality: Right;         OT ASSESSMENT FLOWSHEET (last 12 hours)       OT Evaluation and Treatment       Row Name 08/14/23 0951                   OT Time and Intention    Subjective Information no complaints  -LM        Document Type therapy note (daily note)  -LM        Mode of Treatment occupational therapy  -LM        Patient Effort adequate  -LM        Comment Patient seen this date for adl retraining.  Patient performing self feeding with setup.  Max assist with bathing, dressing, toileting tasks. Mod/max assist with grooming.  Patient confused about situation and decreased insight/judgment.  BUE arom wfl.  -LM           General Information    Existing Precautions/Restrictions fall  -LM        Limitations/Impairments safety/cognitive  -LM           Cognition    Affect/Mental Status (Cognition) confused  -LM        Orientation Status (Cognition) oriented to;person;place  -LM        Follows Commands (Cognition) repetition of directions required;physical/tactile prompts required;verbal cues/prompting required  -LM        Cognitive Function executive function deficit;memory deficit;safety deficit  -LM           Wound 08/10/23 0851 Left proximal arm Skin Tear    Wound - Properties Group Placement Date: 08/10/23  -RD  Placement Time: 0851 -RD Present on Hospital Admission: N  -RD Side: Left  -RD Orientation: proximal  -RD Location: arm  -RD Primary Wound Type: Skin tear  -RD    Retired Wound - Properties Group Placement Date: 08/10/23  -RD Placement Time: 0851 -RD Present on Hospital Admission: N  -RD Side: Left  -RD Orientation: proximal  -RD Location: arm  -RD Primary Wound Type: Skin tear  -RD    Retired Wound - Properties Group Date first assessed: 08/10/23  -RD Time first assessed: 0851 -RD Present on Hospital Admission: N  -RD Side: Left  -RD Location: arm  -RD Primary Wound Type: Skin tear  -RD       Positioning and Restraints    Post Treatment Position bed  -LM        In Bed call light within reach;encouraged to call for assist;exit alarm on  -LM                  User Key  (r) = Recorded By, (t) = Taken By, (c) = Cosigned By      Initials Name Effective Dates    LM Nella Cobb OT 06/16/21 -     RD Galilea Marrero RN 06/16/21 -                            OT Recommendation and Plan  Planned Therapy Interventions (OT): activity tolerance training, adaptive equipment training, BADL retraining, ROM/therapeutic exercise, transfer/mobility retraining, strengthening exercise, patient/caregiver education/training  Therapy Frequency (OT): other (see comments) (prn and/or to monitor fxl progress)  Plan of Care Review  Plan of Care Reviewed With: patient  Plan of Care Reviewed With: patient        Time Calculation:     Therapy Charges for Today       Code Description Service Date Service Provider Modifiers Qty    75760315527 HC OT SELF CARE/MGMT/TRAIN EA 15 MIN 8/14/2023 Nella Cobb OT GO 2                 Nella Cobb OT  8/14/2023    Electronically signed by Nella Cobb OT at 08/14/23 0955       Speech Language Pathology Notes (most recent note)    No notes exist for this encounter.       ADL Documentation (last day)       Date/Time Transferring Toileting Bathing Dressing Eating Communication Swallowing    08/14/23  0800 3 - assistive equipment and person 3 - assistive equipment and person 1 - assistive equipment 1 - assistive equipment 0 - independent 0 - understands/communicates without difficulty 0 - swallows foods/liquids without difficulty    08/13/23 2035 3 - assistive equipment and person 3 - assistive equipment and person 2 - assistive person 2 - assistive person 0 - independent 0 - understands/communicates without difficulty 0 - swallows foods/liquids without difficulty    08/13/23 0800 3 - assistive equipment and person 3 - assistive equipment and person 1 - assistive equipment 1 - assistive equipment 0 - independent 0 - understands/communicates without difficulty 0 - swallows foods/liquids without difficulty

## 2023-08-14 NOTE — CASE MANAGEMENT/SOCIAL WORK
Discharge Planning Assessment  [facility]     Patient Name: Lashell Case  MRN: [id number]  Today's Date: 8/14/2023    Admit Date: 8/6/2023    Plan: SS attempted several times to contact Granddaughter Jayleen on this date without success.  SS noted per ED nursing notes APS has been involved in pt's case.  SS left message for [facility] to return call regarding pt situation.  Pt has been denied by [facility] and [facility] at this time.  SS noted pt needs an Emergent Guardian as pt lacks capacity per Psych.  SS unable to verify that Granddaughter obtained Guardianship.  Pt in contact isolation due to lice.  SS will follow.       Discharge Plan       Row Name 08/14/23 1628       Plan    Plan SS attempted several times to contact Granddaalexia Clemens on this date without success.  SS noted per ED nursing notes APS has been involved in pt's case.  SS left message for [facility] to return call regarding pt situation.  Pt has been denied by [facility], [facility], [facility] and [facility] at this time.  SS noted pt needs an Emergent Guardian as pt lacks capacity per Psych.  SS unable to verify that Granddaughter obtained Guardianship.  Pt in contact isolation due to lice.  SS will follow.      Row Name 08/14/23 1421                  Continued Care and Services - Admitted Since 8/6/2023       Destination       Service Provider Request Status Selected Services Address Phone Fax Patient Preferred    [facility] Declined  Bed not available N/A [address] [id number] [phone] --    [facility] Declined N/A [address] [facility] [address] [phone] --    [facility] Declined  Not accepting any referrals at this time. N/A [address] [facility] [address] [phone] --     [facility] Declined  Bed not available N/A [address] [place] [address] [phone] --                  Expected Discharge Date and Time       Expected Discharge Date Expected Discharge Time    Aug 16, 2023             Shelly Servin, BSW

## 2023-08-14 NOTE — PROGRESS NOTES
"Palliative Care Daily Progress Note     S: Medical record reviewed, upon entering the room pt laying in bed. She verbalized no needs at this time. She denies any pain, no SOA, nor n/v/d. She is pleasantly confused, just reports that she is ready to go home so she can get some cheese puffs.       O:   Palliative Performance Scale Score:     /72 (BP Location: Right arm, Patient Position: Lying)   Pulse 74   Temp 98 øF (36.7 øC) (Oral)   Resp 18   Ht 167.6 cm (65.98\")   Wt 57.1 kg (125 lb 14.1 oz)   SpO2 95%   BMI 20.33 kg/mý     Intake/Output Summary (Last 24 hours) at 8/14/2023 1248  Last data filed at 8/14/2023 1114  Gross per 24 hour   Intake 600 ml   Output 1150 ml   Net -550 ml       PE:    General Appearance:    Chronically ill appearing, alert, cooperative, NAD   HEENT:    NC/AT, without obvious abnormality, EOMI, anicteric    Neck:   supple, trachea midline, no JVD   Lungs:     CTAB without w/r/r    Heart:    irregular, normal S1 and S2, no M/R/G   Abdomen:     Soft, NT, ND, NABS    Extremities:   Moves all extremities, 1+ edema BLE, overgrown fingernails/toenails   Pulses:   Pulses palpable and equal bilaterally   Skin:   Warm, dry   Neurologic:   Alert to self and place only, pleasantly confused/disoriented   Psych:   Calm, appropriate         Meds: Reviewed and changes not noted    Labs:   Results from last 7 days   Lab Units 08/14/23  0244   WBC 10*3/mm3 5.66   HEMOGLOBIN g/dL 8.9*   HEMATOCRIT % 26.6*   PLATELETS 10*3/mm3 125*     Results from last 7 days   Lab Units 08/14/23  0244   SODIUM mmol/L 128*   POTASSIUM mmol/L 4.8   CHLORIDE mmol/L 100   CO2 mmol/L 18.9*   BUN mg/dL 10   CREATININE mg/dL 0.81   GLUCOSE mg/dL 91   CALCIUM mg/dL 7.7*     Results from last 7 days   Lab Units 08/14/23  0244   SODIUM mmol/L 128*   POTASSIUM mmol/L 4.8   CHLORIDE mmol/L 100   CO2 mmol/L 18.9*   BUN mg/dL 10   CREATININE mg/dL 0.81   CALCIUM mg/dL 7.7*   GLUCOSE mg/dL 91     Imaging Results (Last 72 " Hours)       ** No results found for the last 72 hours. **              Diagnostics: Reviewed    A: Pt has no symptoms to manage at this time. Labs- Sodium 128, BUN 10, Creat 0.81, H&H 8.9/26.6. bp 131/72 hr 74 rr 18 sat 95%       P: Continue with current regimen, will continue to provide symptomatic and supportive palliative care.  has attempted to reach granddaughter Jayleen with no success and is still working on nursing home placement.       We will continue to follow along. Please do not hesitate to contact us regarding further sx mgmt or GOC needs, including after hours or on weekends via our on call provider at [phone].     Cindy Alaniz, APRN    8/14/2023

## 2023-08-14 NOTE — THERAPY TREATMENT NOTE
Acute Care - Physical Therapy Treatment Note   [facility]     Patient Name: Lashell Case  : 1935  MRN: [id number]  Today's Date: 2023   Onset of Illness/Injury or Date of Surgery: 23  Visit Dx:     ICD-10-CM ICD-9-CM   1. LGI bleed  K92.2 578.9   2. Chronic anemia  D64.9 285.9   3. Confusion  R41.0 298.9     Patient Active Problem List   Diagnosis    Arthritis    Abnormal ambulatory electrocardiogram    Essential hypertension    Hypothyroidism due to acquired atrophy of thyroid    Malignant neoplasm of left female breast    Mixed hyperlipidemia    Aromatase inhibitor use    Osteopenia    Hepatic cyst    Splenic cyst    Breast mass    Atrial fibrillation, persistent    Precordial chest pain    Nonrheumatic aortic (valve) stenosis    Atrial fibrillation with RVR    Paroxysmal atrial fibrillation    Nonrheumatic mitral valve regurgitation    Acute encephalopathy    LGI bleed    Chronic anemia    Severe malnutrition     Past Medical History:   Diagnosis Date    Arthritis     Breast cancer     lt br ca    Breast cancer 2006    rt br ca    Dizziness 2016    [place] (hard of hearing)     Hyperlipidemia     Hypertension     Hypothyroidism     Scabies exposure      Past Surgical History:   Procedure Laterality Date    BREAST BIOPSY      BREAST LUMPECTOMY Right 2006    BREAST SURGERY      COLONOSCOPY N/A 2023    Procedure: COLONOSCOPY;  Surgeon: Romeo Valdez MD;  Location: [facility] OR;  Service: Gastroenterology;  Laterality: N/A;    ENDOSCOPY N/A 2023    Procedure: ESOPHAGOGASTRODUODENOSCOPY;  Surgeon: Romeo Valdez MD;  Location: [facility] OR;  Service: Gastroenterology;  Laterality: N/A;    ENDOSCOPY  2023    Procedure: ESOPHAGOGASTRODUODENOSCOPY;  Surgeon: Romeo Valdez MD;  Location: [facility] OR;  Service: Gastroenterology;;    MASTECTOMY Left     ca    MASTECTOMY Right     MASTECTOMY WITH SENTINEL NODE BIOPSY AND AXILLARY NODE DISSECTION Right 2020     Procedure: BREAST MASTECTOMY WITH SENTINEL NODE BIOPSY AND AXILLARY NODE DISSECTION;  Surgeon: Cynthia Ward MD;  Location: [facility];  Service: General;  Laterality: Right;     PT Assessment (last 12 hours)       PT Evaluation and Treatment       Row Name 08/14/23 1500          Physical Therapy Time and Intention    Subjective Information --  Pt reports soreness in arms and legs during mobility and movement  -     Document Type therapy note (daily note)  -     Mode of Treatment physical therapy  -     Patient Effort adequate  -       Row Name 08/14/23 1500          General Information    Patient Profile Reviewed yes  -     Patient Observations agree to therapy;cooperative  -     Patient/Family/Caregiver Comments/Observations Pt presents confused and voicing wanting to go home.  -     Existing Precautions/Restrictions fall  -     Limitations/Impairments safety/cognitive  -       Row Name 08/14/23 1500          Pain    Pre/Posttreatment Pain Comment Intermittent soreness in arms and legs with movement and mobility  -       Row Name 08/14/23 1500          Cognition    Affect/Mental Status (Cognition) confused  -     Follows Commands (Cognition) follows one-step commands;75-90% accuracy;physical/tactile prompts required;repetition of directions required;verbal cues/prompting required  -       Row Name 08/14/23 1500          Strength Comprehensive (MMT)    Comment, General Manual Muscle Testing (MMT) Assessment Decreased strength at right hip compared to left.  Decreased strength in bilateral LE overall. 3-/5  -       Row Name 08/14/23 1500          Bed Mobility    Bed Mobility supine-sit;sit-supine;scooting/bridging  -     Scooting/Bridging [place] (Bed Mobility) moderate assist (50% patient effort);2 person assist;1 person assist  -     Supine-Sit [place] (Bed Mobility) moderate assist (50% patient effort);2 person assist  -     Sit-Supine [place] (Bed Mobility)  moderate assist (50% patient effort);1 person assist  -     Bed Mobility, Safety Issues decreased use of arms for pushing/pulling;decreased use of legs for bridging/pushing;impaired trunk control for bed mobility  -     Assistive Device (Bed Mobility) bed rails;head of bed elevated;draw sheet  -       Row Name 08/14/23 1500          Sit-Stand Transfer    Sit-Stand [place] (Transfers) minimum assist (75% patient effort);moderate assist (50% patient effort);2 person assist  -     Assistive Device (Sit-Stand Transfers) other (see comments)  [facility]       Row Name 08/14/23 1500          Stand-Sit Transfer    Stand-Sit [place] (Transfers) minimum assist (75% patient effort);2 person assist  -     Assistive Device (Stand-Sit Transfers) other (see comments)  [facility]       Row Name 08/14/23 1500          Gait/Stairs (Locomotion)    [place] Level (Gait) minimum assist (75% patient effort);moderate assist (50% patient effort);2 person assist  -     Assistive Device (Gait) other (see comments)  [facility]     Distance in Feet (Gait) Side steps toward head of bed 2x.  Standing marches in preparation for ambulation.  -       Row Name 08/14/23 1500          Balance    Balance Assessment sitting dynamic balance  -     Static Sitting Balance standby assist  -     Dynamic Sitting Balance contact guard;1-person assist  -     Position, Sitting Balance sitting edge of bed  -     Static Standing Balance minimal assist;2-person assist  -     Position/Device Used, Standing Balance other (see comments)  [facility]       Row Name 08/14/23 1500          Motor Skills    Therapeutic Exercise hip;knee;ankle  [facility]       Row Name 08/14/23 1500          Hip (Therapeutic Exercise)    Hip (Therapeutic Exercise) AAROM (active assistive range of motion);strengthening exercise  -     Hip Strengthening (Therapeutic Exercise) left;right;flexion;marching while seated;heel slides;sitting;supine;10 repetitions;2 sets   -       Row Name 08/14/23 1500          Knee (Therapeutic Exercise)    Knee (Therapeutic Exercise) strengthening exercise  -     Knee Strengthening (Therapeutic Exercise) left;right;SAQ (short arc quad);LAQ (long arc quad);sitting;supine;flexion;extension;10 repetitions;3 sets  -       Row Name 08/14/23 1500          Ankle (Therapeutic Exercise)    Ankle (Therapeutic Exercise) strengthening exercise  -     Ankle Strengthening (Therapeutic Exercise) left;right;dorsiflexion;plantarflexion;sitting;supine;10 repetitions;3 sets  -       Row Name             Wound 08/10/23 0851 Left proximal arm Skin Tear    Wound - Properties Group Placement Date: 08/10/23  -RD Placement Time: 0851  -RD Present on Hospital Admission: N  -RD Side: Left  -RD Orientation: proximal  -RD Location: arm  -RD Primary Wound Type: Skin tear  -RD    Retired Wound - Properties Group Placement Date: 08/10/23  -RD Placement Time: 0851  -RD Present on Hospital Admission: N  -RD Side: Left  -RD Orientation: proximal  -RD Location: arm  -RD Primary Wound Type: Skin tear  -RD    Retired Wound - Properties Group Date first assessed: 08/10/23  -RD Time first assessed: 0851  -RD Present on Hospital Admission: N  -RD Side: Left  -RD Location: arm  -RD Primary Wound Type: Skin tear  -RD      Row Name 08/14/23 1500          Plan of Care Review    Plan of Care Reviewed With patient  -     Outcome Evaluation PT treatment completed.  Patient presents with some confusion but participated well with therapy today.  Transfers performed 3x with hand held assist.  Standing marches and side steps.  Seated and supine therapeutic exercises completed.  Pt fatigues easily.  Continue POC.  -       Row Name 08/14/23 1500          Positioning and Restraints    Pre-Treatment Position in bed  -     Post Treatment Position bed  -     In Bed notified nsg;supine;call light within reach;encouraged to call for assist;side rails up x3;exit alarm on;heels elevated   Lines in tact and needs in reach  -KP               User Key  (r) = Recorded By, (t) = Taken By, (c) = Cosigned By      Initials Name Provider Type    Galilea Dumont, RN Registered Nurse    Kiki Gleason PT Physical Therapist                      PT Recommendation and Plan     Plan of Care Reviewed With: patient  Outcome Evaluation: PT treatment completed.  Patient presents with some confusion but participated well with therapy today.  Transfers performed 3x with hand held assist.  Standing marches and side steps.  Seated and supine therapeutic exercises completed.  Pt fatigues easily.  Continue POC.       Time Calculation:    PT Charges       Row Name 08/14/23 1544             Time Calculation    PT Received On 08/14/23  -      PT Goal Re-Cert Due Date 08/21/23  -         Timed Charges    86042 - PT Therapeutic Exercise Minutes 15  -KP      69360 - PT Therapeutic Activity Minutes 15  -KP         Total Minutes    Timed Charges Total Minutes 30  -KP       Total Minutes 30  -KP                User Key  (r) = Recorded By, (t) = Taken By, (c) = Cosigned By      Initials Name Provider Type    Kiki Gleason PT Physical Therapist                  Therapy Charges for Today       Code Description Service Date Service Provider Modifiers Qty    01885926603 HC PT THER PROC EA 15 MIN 8/14/2023 Kiki Rodriguez, PT GP 1    97370997009 HC PT THERAPEUTIC ACT EA 15 MIN 8/14/2023 Kiki Rodriguez, PT GP 1            PT G-Codes  AM-PAC 6 Clicks Score (PT): 13    Kiki Rodriguez PT  8/14/2023

## 2023-08-15 LAB
ALBUMIN SERPL-MCNC: 1.8 G/DL (ref 3.5–5.2)
ALBUMIN/GLOB SERPL: 0.4 G/DL
ALP SERPL-CCNC: 118 U/L (ref 39–117)
ALT SERPL W P-5'-P-CCNC: 9 U/L (ref 1–33)
ANION GAP SERPL CALCULATED.3IONS-SCNC: 7.5 MMOL/L (ref 5–15)
AST SERPL-CCNC: 17 U/L (ref 1–32)
BASOPHILS # BLD AUTO: 0.03 10*3/MM3 (ref 0–0.2)
BASOPHILS NFR BLD AUTO: 0.7 % (ref 0–1.5)
BILIRUB SERPL-MCNC: 0.4 MG/DL (ref 0–1.2)
BUN SERPL-MCNC: 9 MG/DL (ref 8–23)
BUN/CREAT SERPL: 13.6 (ref 7–25)
CALCIUM SPEC-SCNC: 7.8 MG/DL (ref 8.6–10.5)
CHLORIDE SERPL-SCNC: 100 MMOL/L (ref 98–107)
CK SERPL-CCNC: 37 U/L (ref 20–180)
CO2 SERPL-SCNC: 18.5 MMOL/L (ref 22–29)
CORTIS SERPL-MCNC: 31.17 MCG/DL
CREAT SERPL-MCNC: 0.66 MG/DL (ref 0.57–1)
DEPRECATED RDW RBC AUTO: 69.7 FL (ref 37–54)
EGFRCR SERPLBLD CKD-EPI 2021: 85.6 ML/MIN/1.73
EOSINOPHIL # BLD AUTO: 0.03 10*3/MM3 (ref 0–0.4)
EOSINOPHIL NFR BLD AUTO: 0.7 % (ref 0.3–6.2)
ERYTHROCYTE [DISTWIDTH] IN BLOOD BY AUTOMATED COUNT: 17.8 % (ref 12.3–15.4)
GLOBULIN UR ELPH-MCNC: 4.1 GM/DL
GLUCOSE BLDC GLUCOMTR-MCNC: 111 MG/DL (ref 70–130)
GLUCOSE BLDC GLUCOMTR-MCNC: 116 MG/DL (ref 70–130)
GLUCOSE BLDC GLUCOMTR-MCNC: 142 MG/DL (ref 70–130)
GLUCOSE BLDC GLUCOMTR-MCNC: 34 MG/DL (ref 70–130)
GLUCOSE BLDC GLUCOMTR-MCNC: 48 MG/DL (ref 70–130)
GLUCOSE BLDC GLUCOMTR-MCNC: 60 MG/DL (ref 70–130)
GLUCOSE BLDC GLUCOMTR-MCNC: 87 MG/DL (ref 70–130)
GLUCOSE SERPL-MCNC: 117 MG/DL (ref 65–99)
HCT VFR BLD AUTO: 26.3 % (ref 34–46.6)
HGB BLD-MCNC: 8.7 G/DL (ref 12–15.9)
IMM GRANULOCYTES # BLD AUTO: 0.04 10*3/MM3 (ref 0–0.05)
IMM GRANULOCYTES NFR BLD AUTO: 1 % (ref 0–0.5)
LYMPHOCYTES # BLD AUTO: 0.91 10*3/MM3 (ref 0.7–3.1)
LYMPHOCYTES NFR BLD AUTO: 22.6 % (ref 19.6–45.3)
MCH RBC QN AUTO: 34.7 PG (ref 26.6–33)
MCHC RBC AUTO-ENTMCNC: 33.1 G/DL (ref 31.5–35.7)
MCV RBC AUTO: 104.8 FL (ref 79–97)
MONOCYTES # BLD AUTO: 0.64 10*3/MM3 (ref 0.1–0.9)
MONOCYTES NFR BLD AUTO: 15.9 % (ref 5–12)
NEUTROPHILS NFR BLD AUTO: 2.38 10*3/MM3 (ref 1.7–7)
NEUTROPHILS NFR BLD AUTO: 59.1 % (ref 42.7–76)
NRBC BLD AUTO-RTO: 0 /100 WBC (ref 0–0.2)
PLATELET # BLD AUTO: 159 10*3/MM3 (ref 140–450)
PMV BLD AUTO: 9.6 FL (ref 6–12)
POTASSIUM SERPL-SCNC: 3.9 MMOL/L (ref 3.5–5.2)
PROT SERPL-MCNC: 5.9 G/DL (ref 6–8.5)
RBC # BLD AUTO: 2.51 10*6/MM3 (ref 3.77–5.28)
SODIUM SERPL-SCNC: 126 MMOL/L (ref 136–145)
WBC NRBC COR # BLD: 4.03 10*3/MM3 (ref 3.4–10.8)

## 2023-08-15 PROCEDURE — 97530 THERAPEUTIC ACTIVITIES: CPT

## 2023-08-15 PROCEDURE — 82533 TOTAL CORTISOL: CPT | Performed by: HOSPITALIST

## 2023-08-15 PROCEDURE — 85025 COMPLETE CBC W/AUTO DIFF WBC: CPT | Performed by: HOSPITALIST

## 2023-08-15 PROCEDURE — 97535 SELF CARE MNGMENT TRAINING: CPT

## 2023-08-15 PROCEDURE — 25010000002 COSYNTROPIN PER 0.25 MG: Performed by: HOSPITALIST

## 2023-08-15 PROCEDURE — 99232 SBSQ HOSP IP/OBS MODERATE 35: CPT | Performed by: HOSPITALIST

## 2023-08-15 PROCEDURE — 80053 COMPREHEN METABOLIC PANEL: CPT | Performed by: HOSPITALIST

## 2023-08-15 PROCEDURE — 82550 ASSAY OF CK (CPK): CPT | Performed by: HOSPITALIST

## 2023-08-15 PROCEDURE — 82948 REAGENT STRIP/BLOOD GLUCOSE: CPT

## 2023-08-15 PROCEDURE — 25010000002 DEXAMETHASONE PER 1 MG: Performed by: HOSPITALIST

## 2023-08-15 RX ORDER — DEXAMETHASONE SODIUM PHOSPHATE 4 MG/ML
4 INJECTION, SOLUTION INTRA-ARTICULAR; INTRALESIONAL; INTRAMUSCULAR; INTRAVENOUS; SOFT TISSUE EVERY 12 HOURS SCHEDULED
Status: DISCONTINUED | OUTPATIENT
Start: 2023-08-15 | End: 2023-08-16

## 2023-08-15 RX ORDER — COSYNTROPIN 0.25 MG/ML
0.25 INJECTION, POWDER, FOR SOLUTION INTRAMUSCULAR; INTRAVENOUS ONCE
Status: COMPLETED | OUTPATIENT
Start: 2023-08-16 | End: 2023-08-16

## 2023-08-15 RX ADMIN — LEVOTHYROXINE SODIUM 75 MCG: 0.07 TABLET ORAL at 05:46

## 2023-08-15 RX ADMIN — DEXAMETHASONE SODIUM PHOSPHATE 4 MG: 4 INJECTION, SOLUTION INTRA-ARTICULAR; INTRALESIONAL; INTRAMUSCULAR; INTRAVENOUS; SOFT TISSUE at 13:39

## 2023-08-15 RX ADMIN — CLONIDINE HYDROCHLORIDE 0.1 MG: 0.1 TABLET ORAL at 20:46

## 2023-08-15 RX ADMIN — FAMOTIDINE 20 MG: 20 TABLET, FILM COATED ORAL at 08:12

## 2023-08-15 RX ADMIN — Medication 10 ML: at 20:47

## 2023-08-15 RX ADMIN — HYDROCODONE BITARTRATE AND ACETAMINOPHEN 1 TABLET: 5; 325 TABLET ORAL at 09:51

## 2023-08-15 RX ADMIN — APIXABAN 2.5 MG: 2.5 TABLET, FILM COATED ORAL at 20:46

## 2023-08-15 RX ADMIN — LISINOPRIL 10 MG: 10 TABLET ORAL at 08:12

## 2023-08-15 RX ADMIN — CLOPIDOGREL BISULFATE 75 MG: 75 TABLET, FILM COATED ORAL at 08:12

## 2023-08-15 RX ADMIN — ASPIRIN 81 MG: 81 TABLET, COATED ORAL at 08:12

## 2023-08-15 RX ADMIN — DEXAMETHASONE SODIUM PHOSPHATE 4 MG: 4 INJECTION, SOLUTION INTRA-ARTICULAR; INTRALESIONAL; INTRAMUSCULAR; INTRAVENOUS; SOFT TISSUE at 20:46

## 2023-08-15 RX ADMIN — COSYNTROPIN 0.25 MG: 0.25 INJECTION, POWDER, LYOPHILIZED, FOR SOLUTION INTRAMUSCULAR; INTRAVENOUS at 05:47

## 2023-08-15 RX ADMIN — DEXTROSE MONOHYDRATE 25 G: 25 INJECTION, SOLUTION INTRAVENOUS at 06:43

## 2023-08-15 RX ADMIN — DOCUSATE SODIUM 50 MG AND SENNOSIDES 8.6 MG 2 TABLET: 8.6; 5 TABLET, FILM COATED ORAL at 20:46

## 2023-08-15 RX ADMIN — HYDROCODONE BITARTRATE AND ACETAMINOPHEN 1 TABLET: 5; 325 TABLET ORAL at 20:46

## 2023-08-15 RX ADMIN — DEXTROSE 15 G: 15 GEL ORAL at 05:46

## 2023-08-15 RX ADMIN — APIXABAN 2.5 MG: 2.5 TABLET, FILM COATED ORAL at 08:12

## 2023-08-15 RX ADMIN — Medication 10 ML: at 08:12

## 2023-08-15 RX ADMIN — CLONIDINE HYDROCHLORIDE 0.1 MG: 0.1 TABLET ORAL at 08:12

## 2023-08-15 NOTE — THERAPY TREATMENT NOTE
Acute Care - Physical Therapy Treatment Note   [facility]     Patient Name: Lashell Case  : 1935  MRN: [id number]  Today's Date: 8/15/2023   Onset of Illness/Injury or Date of Surgery: 23  Visit Dx:     ICD-10-CM ICD-9-CM   1. LGI bleed  K92.2 578.9   2. Chronic anemia  D64.9 285.9   3. Confusion  R41.0 298.9     Patient Active Problem List   Diagnosis    Arthritis    Abnormal ambulatory electrocardiogram    Essential hypertension    Hypothyroidism due to acquired atrophy of thyroid    Malignant neoplasm of left female breast    Mixed hyperlipidemia    Aromatase inhibitor use    Osteopenia    Hepatic cyst    Splenic cyst    Breast mass    Atrial fibrillation, persistent    Precordial chest pain    Nonrheumatic aortic (valve) stenosis    Atrial fibrillation with RVR    Paroxysmal atrial fibrillation    Nonrheumatic mitral valve regurgitation    Acute encephalopathy    LGI bleed    Chronic anemia    Severe malnutrition     Past Medical History:   Diagnosis Date    Arthritis     Breast cancer     lt br ca    Breast cancer 2006    rt br ca    Dizziness 2016    [place] (hard of hearing)     Hyperlipidemia     Hypertension     Hypothyroidism     Scabies exposure      Past Surgical History:   Procedure Laterality Date    BREAST BIOPSY      BREAST LUMPECTOMY Right 2006    BREAST SURGERY      COLONOSCOPY N/A 2023    Procedure: COLONOSCOPY;  Surgeon: Romeo Valdez MD;  Location: [facility] OR;  Service: Gastroenterology;  Laterality: N/A;    ENDOSCOPY N/A 2023    Procedure: ESOPHAGOGASTRODUODENOSCOPY;  Surgeon: Romeo Valdez MD;  Location: [facility] OR;  Service: Gastroenterology;  Laterality: N/A;    ENDOSCOPY  2023    Procedure: ESOPHAGOGASTRODUODENOSCOPY;  Surgeon: Romeo Valdez MD;  Location: [facility] OR;  Service: Gastroenterology;;    MASTECTOMY Left     ca    MASTECTOMY Right     MASTECTOMY WITH SENTINEL NODE BIOPSY AND AXILLARY NODE DISSECTION Right 2020     Procedure: BREAST MASTECTOMY WITH SENTINEL NODE BIOPSY AND AXILLARY NODE DISSECTION;  Surgeon: Cynthia Ward MD;  Location: [facility];  Service: General;  Laterality: Right;     PT Assessment (last 12 hours)       PT Evaluation and Treatment       Row Name 08/15/23 1100          Physical Therapy Time and Intention    Subjective Information complains of;weakness;pain  Pain in legs with movement  -     Document Type therapy note (daily note)  -     Mode of Treatment physical therapy  -     Patient Effort good  -       Row Name 08/15/23 1100          General Information    Patient Profile Reviewed yes  -     Existing Precautions/Restrictions fall  -     Limitations/Impairments safety/cognitive  -       Row Name 08/15/23 1100          Pain    Pre/Posttreatment Pain Comment Intermittent pain in LE with movement from sores on her legs.  -       Row Name 08/15/23 1100          Cognition    Affect/Mental Status (Cognition) confused  -     Follows Commands (Cognition) follows one-step commands;over 90% accuracy;physical/tactile prompts required  -       Row Name 08/15/23 1100          Bed Mobility    Scooting/Bridging [place] (Bed Mobility) moderate assist (50% patient effort);1 person assist  -     Supine-Sit [place] (Bed Mobility) moderate assist (50% patient effort);1 person assist;2 person assist  -     Sit-Supine [place] (Bed Mobility) moderate assist (50% patient effort);1 person assist  -KP     Bed Mobility, Safety Issues decreased use of legs for bridging/pushing;decreased use of arms for pushing/pulling;impaired trunk control for bed mobility;cognitive deficits limit understanding  -     Assistive Device (Bed Mobility) bed rails;draw sheet;head of bed elevated  -       Row Name 08/15/23 1100          Sit-Stand Transfer    Sit-Stand [place] (Transfers) minimum assist (75% patient effort);1 person assist;2 person assist  -     Assistive Device (Sit-Stand  Transfers) walker, front-wheeled  -     Comment, (Sit-Stand Transfer) 2x.  HHA first time and use of FWW 2nd time  -       Row Name 08/15/23 1100          Stand-Sit Transfer    Stand-Sit [place] (Transfers) minimum assist (75% patient effort);1 person assist  -     Assistive Device (Stand-Sit Transfers) walker, front-wheeled  -KP       Row Name 08/15/23 1100          Gait/Stairs (Locomotion)    [place] Level (Gait) contact guard;1 person assist  -     Assistive Device (Gait) walker, front-wheeled  -     Distance in Feet (Gait) 20  -KP     Pattern (Gait) step-through;step-to  -KP     Deviations/Abnormal Patterns (Gait) dang decreased;festinating/shuffling;gait speed decreased;stride length decreased  -     Bilateral Gait Deviations forward flexed posture;heel strike decreased  -       Row Name 08/15/23 1100          Balance    Static Sitting Balance standby assist  -     Dynamic Sitting Balance contact guard  -     Position, Sitting Balance sitting edge of bed  -     Static Standing Balance contact guard;1-person assist  -     Position/Device Used, Standing Balance walker, front-wheeled  -       Row Name 08/15/23 1100          Knee (Therapeutic Exercise)    Knee Strengthening (Therapeutic Exercise) left;right;LAQ (long arc quad);sitting;10 repetitions;2 sets  -       Row Name 08/15/23 1100          Ankle (Therapeutic Exercise)    Ankle Strengthening (Therapeutic Exercise) left;right;dorsiflexion;plantarflexion;sitting;10 repetitions;2 sets  -       Row Name             Wound 08/10/23 0851 Left proximal arm Skin Tear    Wound - Properties Group Placement Date: 08/10/23  -RD Placement Time: 0851 -RD Present on Hospital Admission: N  -RD Side: Left  -RD Orientation: proximal  -RD Location: arm  -RD Primary Wound Type: Skin tear  -RD    Retired Wound - Properties Group Placement Date: 08/10/23  -RD Placement Time: 0851 -RD Present on Hospital Admission: N  -RD Side: Left  -RD  Orientation: proximal  -RD Location: arm  -RD Primary Wound Type: Skin tear  -RD    Retired Wound - Properties Group Date first assessed: 08/10/23  -RD Time first assessed: 0851  -RD Present on Hospital Admission: N  -RD Side: Left  -RD Location: arm  -RD Primary Wound Type: Skin tear  -RD      Row Name 08/15/23 1100          Plan of Care Review    Plan of Care Reviewed With patient  -KP     Outcome Evaluation PT treatment completed.  Patient demonstrated improvement with ambulation and standing.  She was able to ambulate 20ft with FWW and CGA.  Max cues for safety.  Continue PT POC.  -       Row Name 08/15/23 1100          Positioning and Restraints    Pre-Treatment Position in bed  -KP     Post Treatment Position bed  -KP     In Bed notified nsg;supine;call light within reach;encouraged to call for assist;exit alarm on;side rails up x3  Lines in tact and needs in reach  -KP               User Key  (r) = Recorded By, (t) = Taken By, (c) = Cosigned By      Initials Name Provider Type    Galilea Dumont RN Registered Nurse    Kiki Gleason PT Physical Therapist                      PT Recommendation and Plan     Plan of Care Reviewed With: patient  Outcome Evaluation: PT treatment completed.  Patient demonstrated improvement with ambulation and standing.  She was able to ambulate 20ft with FWW and CGA.  Max cues for safety.  Continue PT POC.       Time Calculation:    PT Charges       Row Name 08/15/23 1150             Time Calculation    PT Received On 08/15/23  -      PT Goal Re-Cert Due Date 08/21/23  -         Timed Charges    78035 - PT Therapeutic Activity Minutes 25  -KP         Total Minutes    Timed Charges Total Minutes 25  -KP       Total Minutes 25  -KP                User Key  (r) = Recorded By, (t) = Taken By, (c) = Cosigned By      Initials Name Provider Type    Kiki Gleason PT Physical Therapist                  Therapy Charges for Today       Code Description Service Date Service  Provider Modifiers Qty    71542998776 HC PT THER PROC EA 15 MIN 8/14/2023 Kiki Rodriguez, PT GP 1    86556524080 HC PT THERAPEUTIC ACT EA 15 MIN 8/14/2023 Kiki Rodriguez, PT GP 1    00071218241 HC PT THERAPEUTIC ACT EA 15 MIN 8/15/2023 Kiki Rodriguez, PT GP 2            PT G-Codes  AM-PAC 6 Clicks Score (PT): 14    Kiki Rodriguez, PT  8/15/2023

## 2023-08-15 NOTE — CASE MANAGEMENT/SOCIAL WORK
Discharge Planning Assessment  [facility]     Patient Name: Lashell Case  MRN: [id number]  Today's Date: 8/15/2023    Admit Date: 8/6/2023    Plan: [facility] reviewing pt's referral.  SS will follow.,       Discharge Plan       Row Name 08/15/23 1040       Plan    Plan [facility] Miles reviewing pt's referral.  SS will follow.,                  Shelly Servin, WMW

## 2023-08-15 NOTE — PROGRESS NOTES
[facility]     Patient Identification:  Name:  Lashell Case  Age:  88 y.o.  Sex:  female  :  1935  MRN:  [id number]  Visit Number:  [id number]  Primary Care Provider:  Evelio Guerin DO    Length of stay:  9    Subjective: Patient seen and examined, she eats fair, again has developed 1 episode of hypoglycemia which is severe, this is even on dextrose containing IV fluids.  Unfortunately the cosyntropin was not done since the patient received cosyntropin before getting a baseline cortisol level.  Cortisol level that we have currently is after receiving cosyntropin stimulation.  Sodium continues to worsen.    Chief Complaint: Altered mental status  ----------------------------------------------------------------------------------------------------------------------  Current [facility] Meds:  apixaban, 2.5 mg, Oral, Q12H  aspirin, 81 mg, Oral, Daily  cloNIDine, 0.1 mg, Oral, BID  clopidogrel, 75 mg, Oral, Daily  [START ON 2023] cosyntropin, 0.25 mg, Intravenous, Once  dexAMETHasone, 4 mg, Intravenous, Q12H  famotidine, 20 mg, Oral, BID AC  levothyroxine, 75 mcg, Oral, Q AM  lisinopril, 10 mg, Oral, Q24H  senna-docusate sodium, 2 tablet, Oral, BID  sodium chloride, 10 mL, Intravenous, Q12H         ----------------------------------------------------------------------------------------------------------------------  Vital Signs:  Temp:  [97.4 øF (36.3 øC)-97.8 øF (36.6 øC)] 97.4 øF (36.3 øC)  Heart Rate:  [69-75] 70  Resp:  [16-18] 18  BP: (128-159)/(61-71) 150/68       Tele:       23  0521 23  0711 08/15/23  0552   Weight: 58.7 kg (129 lb 8 oz) (MARIANNA Ulloa aware of weight gain, weighed with one sheet and one pillow) 57.1 kg (125 lb 14.1 oz) 58.3 kg (128 lb 9.6 oz)     Body mass index is 20.77 kg/mý.    Intake/Output Summary (Last 24 hours) at 8/15/2023 1223  Last data filed at 8/15/2023 0800  Gross per 24 hour   Intake 480 ml   Output 600 ml    Net -120 ml     Diet: Regular/House Diet; Texture: Regular Texture (IDDSI 7); Fluid Consistency: Thin (IDDSI 0)  ----------------------------------------------------------------------------------------------------------------------  Physical exam:  General: Chronically ill-appearing, pleasantly demented, comfortable,awake, alert, oriented to self, place, No respiratory distress.    Skin:  Skin is warm and dry. No rash noted. No pallor.  Coarse skin  HENT:  Head: Hairs are coarse, normocephalic and atraumatic.  Mouth:  Moist mucous membranes.    Eyes:  Conjunctivae and EOM are normal.  Pupils are equal, round, and reactive to light.  No scleral icterus.    Neck:  Neck supple.  No JVD present.    Pulmonary/Chest:  No respiratory distress, no wheezes, no crackles, with normal breath sounds and good air movement.  Cardiovascular:  Normal rate, irregular irregular rhythm, no murmur.  Abdominal:  Soft.  Bowel sounds are normal.  No distension and no tenderness.   Extremities:  No edema, no tenderness, and no deformity.  No red or swollen joints anywhere.  Strong pulses in all 4 extremities with no clubbing, no cyanosis, no edema.  Neurological:  Motor strength equal no obvious deficit, sensory grossly intact.   No cranial nerve deficit.  No tongue deviation.  No facial droop.  No slurred speech.    Genitourinary: External urinary catheter  Back:  ----------------------------------------------------------------------------------------------------------------------  ----------------------------------------------------------------------------------------------------------------------      Results from last 7 days   Lab Units 08/15/23  0637 08/14/23  0244 08/13/23  0246   WBC 10*3/mm3 4.03 5.66 5.39   HEMOGLOBIN g/dL 8.7* 8.9* 7.6*   HEMATOCRIT % 26.3* 26.6* 22.9*   MCV fL 104.8* 103.1* 105.5*   MCHC g/dL 33.1 33.5 33.2   PLATELETS 10*3/mm3 159 125* 135*         Results from last 7 days   Lab Units 08/15/23  0637  08/14/23  0244 08/13/23  1636 08/13/23  0246   SODIUM mmol/L 126* 128*  --  128*   POTASSIUM mmol/L 3.9 4.8 4.8 2.8*   MAGNESIUM mg/dL  --   --  1.6  --    CHLORIDE mmol/L 100 100  --  100   CO2 mmol/L 18.5* 18.9*  --  18.6*   BUN mg/dL 9 10  --  8   CREATININE mg/dL 0.66 0.81  --  0.63   CALCIUM mg/dL 7.8* 7.7*  --  7.1*   GLUCOSE mg/dL 117* 91  --  97   ALBUMIN g/dL 1.8*  --   --   --    BILIRUBIN mg/dL 0.4  --   --   --    ALK PHOS U/L 118*  --   --   --    AST (SGOT) U/L 17  --   --   --    ALT (SGPT) U/L 9  --   --   --    Estimated Creatinine Clearance: 56.3 mL/min (by C-G formula based on SCr of 0.66 mg/dL).    No results found for: AMMONIA      No results found for: BLOODCX  No results found for: URINECX  No results found for: WOUNDCX  No results found for: STOOLCX    I have personally looked at the labs and they are summarized above.  ----------------------------------------------------------------------------------------------------------------------  Imaging Results (Last 24 Hours)       ** No results found for the last 24 hours. **          ----------------------------------------------------------------------------------------------------------------------  Assessment and Plan:  -Hematochezia upper endoscopy negative lower endoscopy was suboptimal study poor prep so far H&H has remained stable  -Dementia without behavioral disturbances  -Episodes of hypoglycemia no oral hypoglycemic or insulin  -Hypothyroidism treatment initiated on this admission  -Chronic hyponatremia with worsening  -Macrocytosis likely related to hypothyroidism  -Advanced age-chronically anticoagulated for stroke prophylaxis   -Chronic persistent atrial fibrillation     Treatment has been delayed because of the cosyntropin test, will start her empirically with Decadron since this will not interfere with cosyntropin test, will DC IV fluids, will monitor response.      Disposition for nursing home placement      Destini Lao,  MD  08/15/23  12:23 EDT

## 2023-08-15 NOTE — THERAPY EVALUATION
Acute Care - Occupational Therapy Treatment Note   [facility]     Patient Name: Lashell Case  : 1935  MRN: [id number]  Today's Date: 8/15/2023  Onset of Illness/Injury or Date of Surgery: 23     Referring Physician: Nadeem    Admit Date: 2023       ICD-10-CM ICD-9-CM   1. LGI bleed  K92.2 578.9   2. Chronic anemia  D64.9 285.9   3. Confusion  R41.0 298.9     Patient Active Problem List   Diagnosis    Arthritis    Abnormal ambulatory electrocardiogram    Essential hypertension    Hypothyroidism due to acquired atrophy of thyroid    Malignant neoplasm of left female breast    Mixed hyperlipidemia    Aromatase inhibitor use    Osteopenia    Hepatic cyst    Splenic cyst    Breast mass    Atrial fibrillation, persistent    Precordial chest pain    Nonrheumatic aortic (valve) stenosis    Atrial fibrillation with RVR    Paroxysmal atrial fibrillation    Nonrheumatic mitral valve regurgitation    Acute encephalopathy    LGI bleed    Chronic anemia    Severe malnutrition     Past Medical History:   Diagnosis Date    Arthritis     Breast cancer     lt br ca    Breast cancer 2006    rt br ca    Dizziness 2016    [place] (hard of hearing)     Hyperlipidemia     Hypertension     Hypothyroidism     Scabies exposure      Past Surgical History:   Procedure Laterality Date    BREAST BIOPSY      BREAST LUMPECTOMY Right 2006    BREAST SURGERY      COLONOSCOPY N/A 2023    Procedure: COLONOSCOPY;  Surgeon: Romeo Valdez MD;  Location: [facility] OR;  Service: Gastroenterology;  Laterality: N/A;    ENDOSCOPY N/A 2023    Procedure: ESOPHAGOGASTRODUODENOSCOPY;  Surgeon: Romeo Valdez MD;  Location: [facility] OR;  Service: Gastroenterology;  Laterality: N/A;    ENDOSCOPY  2023    Procedure: ESOPHAGOGASTRODUODENOSCOPY;  Surgeon: Romeo Valdez MD;  Location: [facility] OR;  Service: Gastroenterology;;    MASTECTOMY Left     ca    MASTECTOMY Right     MASTECTOMY WITH SENTINEL NODE  BIOPSY AND AXILLARY NODE DISSECTION Right 11/17/2020    Procedure: BREAST MASTECTOMY WITH SENTINEL NODE BIOPSY AND AXILLARY NODE DISSECTION;  Surgeon: Cynthia Ward MD;  Location: [facility];  Service: General;  Laterality: Right;         OT ASSESSMENT FLOWSHEET (last 12 hours)       OT Evaluation and Treatment       Row Name 08/15/23 1055                   OT Time and Intention    Subjective Information complains of;weakness;fatigue  -LM        Document Type therapy note (daily note)  -LM        Mode of Treatment occupational therapy  -LM        Patient Effort fair  -LM        Comment Patient seen this date for adl retraining/education.  Patient continues to feed self and perform light grooming with mod assist.  Patient requires max assist with bathing, dressing, toileting.  Patient confused about situation and time, alert and oriented person and place.  -LM           General Information    Existing Precautions/Restrictions fall  -LM        Limitations/Impairments safety/cognitive  -LM           Cognition    Affect/Mental Status (Cognition) confused  -LM           Wound 08/10/23 0851 Left proximal arm Skin Tear    Wound - Properties Group Placement Date: 08/10/23  -RD Placement Time: 0851  -RD Present on Hospital Admission: N  -RD Side: Left  -RD Orientation: proximal  -RD Location: arm  -RD Primary Wound Type: Skin tear  -RD    Retired Wound - Properties Group Placement Date: 08/10/23  -RD Placement Time: 0851  -RD Present on Hospital Admission: N  -RD Side: Left  -RD Orientation: proximal  -RD Location: arm  -RD Primary Wound Type: Skin tear  -RD    Retired Wound - Properties Group Date first assessed: 08/10/23  -RD Time first assessed: 0851  -RD Present on Hospital Admission: N  -RD Side: Left  -RD Location: arm  -RD Primary Wound Type: Skin tear  -RD       Positioning and Restraints    Post Treatment Position bed  -LM        In Bed call light within reach;encouraged to call for assist;exit alarm on  -LM                   User Key  (r) = Recorded By, (t) = Taken By, (c) = Cosigned By      Initials Name Effective Dates    LM Nella Cobb OT 06/16/21 -     Galilea Dumont RN 06/16/21 -                            OT Recommendation and Plan  Planned Therapy Interventions (OT): activity tolerance training, adaptive equipment training, BADL retraining, ROM/therapeutic exercise, transfer/mobility retraining, strengthening exercise, patient/caregiver education/training  Therapy Frequency (OT): other (see comments) (prn and/or to monitor fxl progress)  Plan of Care Review  Plan of Care Reviewed With: patient  Plan of Care Reviewed With: patient        Time Calculation:     Therapy Charges for Today       Code Description Service Date Service Provider Modifiers Qty    15438055929  OT SELF CARE/MGMT/TRAIN EA 15 MIN 8/14/2023 Nella Cobb OT GO 2    [id number]  OT SELF CARE/MGMT/TRAIN EA 15 MIN 8/15/2023 Nella Cobb OT GO 1                 Nella Cobb OT  8/15/2023

## 2023-08-15 NOTE — PROGRESS NOTES
"Palliative Care Daily Progress Note     S: Medical record reviewed, upon entering the room pt is very pleasantly confused, she is talkative. She is requesting that she get to go home. She denies any symptoms at this time.       O:   Palliative Performance Scale Score:     /68 (BP Location: Left arm, Patient Position: Lying)   Pulse 70   Temp 97.4 øF (36.3 øC) (Axillary)   Resp 18   Ht 167.6 cm (65.98\")   Wt 58.3 kg (128 lb 9.6 oz)   SpO2 95%   BMI 20.77 kg/mý     Intake/Output Summary (Last 24 hours) at 8/15/2023 1249  Last data filed at 8/15/2023 0800  Gross per 24 hour   Intake 240 ml   Output 600 ml   Net -360 ml       PE:    General Appearance:    Chronically ill appearing, alert, cooperative, NAD   HEENT:    NC/AT, without obvious abnormality, EOMI, anicteric    Neck:   supple, trachea midline, no JVD   Lungs:     CTAB without w/r/r    Heart:    irregular, normal S1 and S2, no M/R/G   Abdomen:     Soft, NT, ND, NABS    Extremities:   Moves all extremities, 1+ edema BLE, overgrown fingernails/toenails   Pulses:   Pulses palpable and equal bilaterally   Skin:   Warm, dry   Neurologic:   Alert to self and place only, pleasantly confused/disoriented   Psych:   Calm, appropriate         Meds: Reviewed and changes not noted    Labs:   Results from last 7 days   Lab Units 08/15/23  0637   WBC 10*3/mm3 4.03   HEMOGLOBIN g/dL 8.7*   HEMATOCRIT % 26.3*   PLATELETS 10*3/mm3 159     Results from last 7 days   Lab Units 08/15/23  0637   SODIUM mmol/L 126*   POTASSIUM mmol/L 3.9   CHLORIDE mmol/L 100   CO2 mmol/L 18.5*   BUN mg/dL 9   CREATININE mg/dL 0.66   GLUCOSE mg/dL 117*   CALCIUM mg/dL 7.8*     Results from last 7 days   Lab Units 08/15/23  0637   SODIUM mmol/L 126*   POTASSIUM mmol/L 3.9   CHLORIDE mmol/L 100   CO2 mmol/L 18.5*   BUN mg/dL 9   CREATININE mg/dL 0.66   CALCIUM mg/dL 7.8*   BILIRUBIN mg/dL 0.4   ALK PHOS U/L 118*   ALT (SGPT) U/L 9   AST (SGOT) U/L 17   GLUCOSE mg/dL 117*     Imaging " Results (Last 72 Hours)       ** No results found for the last 72 hours. **              Diagnostics: Reviewed    A: : Pt has no symptoms to manage at this time. Labs- Sodium 126 (previously 128), BUN 9, Creat 0.66, H&H 8.7/26.3. bp 150/68 hr 70 rr 18 sat 95% ra      P: We are still currently waiting on LTC placement for pt. She has been denied at several different facilities. Jayleen Brown granddaughter is scheduled to go to for emergency guardianship r/t pt's lack of capacity. Will continue to provide symptomatic and supportive palliative care for pt and family. Will assist with disposition if needed.       We will continue to follow along. Please do not hesitate to contact us regarding further sx mgmt or GOC needs, including after hours or on weekends via our on call provider at [phone].     Cindy Alaniz, APRN    8/15/2023

## 2023-08-15 NOTE — PLAN OF CARE
Goal Outcome Evaluation:      Pt resting in bed, VSS, no c/o CP or acute distress ntd this shift.  Ambulated in room with walker and assistance by PT, tolerated well. Treatment for lice completed this shift. Will continue to monitor and follow plan of care.

## 2023-08-15 NOTE — PLAN OF CARE
Goal Outcome Evaluation:  Patient has rested well in bed this shift. IV access remained patent and infusing D5 NS @ 50mL/hr with no complications. Patient remains alert and confused this shift. VSS. No visible signs or symptoms of distress noted at this time. No complaints or requests at this time. Will continue with the plan of care.

## 2023-08-16 LAB
ANION GAP SERPL CALCULATED.3IONS-SCNC: 8.2 MMOL/L (ref 5–15)
BASOPHILS # BLD AUTO: 0.01 10*3/MM3 (ref 0–0.2)
BASOPHILS NFR BLD AUTO: 0.3 % (ref 0–1.5)
BUN SERPL-MCNC: 13 MG/DL (ref 8–23)
BUN/CREAT SERPL: 19.4 (ref 7–25)
CALCIUM SPEC-SCNC: 8.1 MG/DL (ref 8.6–10.5)
CHLORIDE SERPL-SCNC: 100 MMOL/L (ref 98–107)
CO2 SERPL-SCNC: 19.8 MMOL/L (ref 22–29)
CORTIS SERPL-MCNC: 23.92 MCG/DL
CORTIS SERPL-MCNC: 31.98 MCG/DL
CORTIS SERPL-MCNC: 8.16 MCG/DL
CREAT SERPL-MCNC: 0.67 MG/DL (ref 0.57–1)
DEPRECATED RDW RBC AUTO: 69.7 FL (ref 37–54)
EGFRCR SERPLBLD CKD-EPI 2021: 85.2 ML/MIN/1.73
EOSINOPHIL # BLD AUTO: 0 10*3/MM3 (ref 0–0.4)
EOSINOPHIL NFR BLD AUTO: 0 % (ref 0.3–6.2)
ERYTHROCYTE [DISTWIDTH] IN BLOOD BY AUTOMATED COUNT: 17.9 % (ref 12.3–15.4)
GEN 5 2HR TROPONIN T REFLEX: 14 NG/L
GLUCOSE BLDC GLUCOMTR-MCNC: 121 MG/DL (ref 70–130)
GLUCOSE BLDC GLUCOMTR-MCNC: 153 MG/DL (ref 70–130)
GLUCOSE BLDC GLUCOMTR-MCNC: 85 MG/DL (ref 70–130)
GLUCOSE BLDC GLUCOMTR-MCNC: 92 MG/DL (ref 70–130)
GLUCOSE BLDC GLUCOMTR-MCNC: 98 MG/DL (ref 70–130)
GLUCOSE SERPL-MCNC: 93 MG/DL (ref 65–99)
HCT VFR BLD AUTO: 24.7 % (ref 34–46.6)
HGB BLD-MCNC: 8 G/DL (ref 12–15.9)
IMM GRANULOCYTES # BLD AUTO: 0.04 10*3/MM3 (ref 0–0.05)
IMM GRANULOCYTES NFR BLD AUTO: 1.2 % (ref 0–0.5)
LYMPHOCYTES # BLD AUTO: 0.82 10*3/MM3 (ref 0.7–3.1)
LYMPHOCYTES NFR BLD AUTO: 24.3 % (ref 19.6–45.3)
MCH RBC QN AUTO: 34.6 PG (ref 26.6–33)
MCHC RBC AUTO-ENTMCNC: 32.4 G/DL (ref 31.5–35.7)
MCV RBC AUTO: 106.9 FL (ref 79–97)
MONOCYTES # BLD AUTO: 0.2 10*3/MM3 (ref 0.1–0.9)
MONOCYTES NFR BLD AUTO: 5.9 % (ref 5–12)
NEUTROPHILS NFR BLD AUTO: 2.3 10*3/MM3 (ref 1.7–7)
NEUTROPHILS NFR BLD AUTO: 68.3 % (ref 42.7–76)
NRBC BLD AUTO-RTO: 0 /100 WBC (ref 0–0.2)
PLATELET # BLD AUTO: 146 10*3/MM3 (ref 140–450)
PMV BLD AUTO: 9.8 FL (ref 6–12)
POTASSIUM SERPL-SCNC: 4.2 MMOL/L (ref 3.5–5.2)
RBC # BLD AUTO: 2.31 10*6/MM3 (ref 3.77–5.28)
SODIUM SERPL-SCNC: 128 MMOL/L (ref 136–145)
T4 FREE SERPL-MCNC: 0.51 NG/DL (ref 0.93–1.7)
TROPONIN T DELTA: 0 NG/L
TROPONIN T SERPL HS-MCNC: 14 NG/L
TSH SERPL DL<=0.05 MIU/L-ACNC: 44.82 UIU/ML (ref 0.27–4.2)
WBC NRBC COR # BLD: 3.37 10*3/MM3 (ref 3.4–10.8)

## 2023-08-16 PROCEDURE — 80048 BASIC METABOLIC PNL TOTAL CA: CPT | Performed by: HOSPITALIST

## 2023-08-16 PROCEDURE — 82948 REAGENT STRIP/BLOOD GLUCOSE: CPT

## 2023-08-16 PROCEDURE — 25010000002 DEXAMETHASONE PER 1 MG: Performed by: HOSPITALIST

## 2023-08-16 PROCEDURE — 93005 ELECTROCARDIOGRAM TRACING: CPT | Performed by: HOSPITALIST

## 2023-08-16 PROCEDURE — 85025 COMPLETE CBC W/AUTO DIFF WBC: CPT | Performed by: HOSPITALIST

## 2023-08-16 PROCEDURE — 93010 ELECTROCARDIOGRAM REPORT: CPT | Performed by: INTERNAL MEDICINE

## 2023-08-16 PROCEDURE — 82533 TOTAL CORTISOL: CPT | Performed by: HOSPITALIST

## 2023-08-16 PROCEDURE — 84443 ASSAY THYROID STIM HORMONE: CPT | Performed by: HOSPITALIST

## 2023-08-16 PROCEDURE — 25010000002 COSYNTROPIN PER 0.25 MG: Performed by: HOSPITALIST

## 2023-08-16 PROCEDURE — 84439 ASSAY OF FREE THYROXINE: CPT | Performed by: HOSPITALIST

## 2023-08-16 PROCEDURE — 99232 SBSQ HOSP IP/OBS MODERATE 35: CPT | Performed by: HOSPITALIST

## 2023-08-16 PROCEDURE — 84484 ASSAY OF TROPONIN QUANT: CPT | Performed by: HOSPITALIST

## 2023-08-16 RX ORDER — LEVOTHYROXINE SODIUM 88 UG/1
88 TABLET ORAL
Status: DISCONTINUED | OUTPATIENT
Start: 2023-08-17 | End: 2023-08-21 | Stop reason: HOSPADM

## 2023-08-16 RX ADMIN — APIXABAN 2.5 MG: 2.5 TABLET, FILM COATED ORAL at 08:28

## 2023-08-16 RX ADMIN — ASPIRIN 81 MG: 81 TABLET, COATED ORAL at 08:28

## 2023-08-16 RX ADMIN — COSYNTROPIN 0.25 MG: 0.25 INJECTION, POWDER, LYOPHILIZED, FOR SOLUTION INTRAMUSCULAR; INTRAVENOUS at 06:28

## 2023-08-16 RX ADMIN — CLONIDINE HYDROCHLORIDE 0.1 MG: 0.1 TABLET ORAL at 08:27

## 2023-08-16 RX ADMIN — DEXAMETHASONE SODIUM PHOSPHATE 4 MG: 4 INJECTION, SOLUTION INTRA-ARTICULAR; INTRALESIONAL; INTRAMUSCULAR; INTRAVENOUS; SOFT TISSUE at 08:28

## 2023-08-16 RX ADMIN — CLONIDINE HYDROCHLORIDE 0.1 MG: 0.1 TABLET ORAL at 21:24

## 2023-08-16 RX ADMIN — Medication 10 ML: at 21:25

## 2023-08-16 RX ADMIN — Medication 10 ML: at 08:28

## 2023-08-16 RX ADMIN — HYDROCODONE BITARTRATE AND ACETAMINOPHEN 1 TABLET: 5; 325 TABLET ORAL at 03:18

## 2023-08-16 RX ADMIN — APIXABAN 2.5 MG: 2.5 TABLET, FILM COATED ORAL at 21:24

## 2023-08-16 RX ADMIN — FAMOTIDINE 20 MG: 20 TABLET, FILM COATED ORAL at 17:00

## 2023-08-16 RX ADMIN — LEVOTHYROXINE SODIUM 75 MCG: 0.07 TABLET ORAL at 06:40

## 2023-08-16 RX ADMIN — LISINOPRIL 10 MG: 10 TABLET ORAL at 08:28

## 2023-08-16 RX ADMIN — DOCUSATE SODIUM 50 MG AND SENNOSIDES 8.6 MG 2 TABLET: 8.6; 5 TABLET, FILM COATED ORAL at 21:24

## 2023-08-16 NOTE — CASE MANAGEMENT/SOCIAL WORK
Discharge Planning Assessment  [facility]     Patient Name: Lashell Case  MRN: [id number]  Today's Date: 8/16/2023    Admit Date: 8/6/2023     Discharge Plan         Row Name 08/16/23 0821       Plan    Plan Pt was transferred to [address] [place] [address] from [address] [facility]. SS contacted [facility] per Ginger and pt was denied. SS to follow.    8:57: SS attempted contact with granddaughterJayleen Fore [phone] without success. SS sent granddtr a message. SS to follow.     9:59: SS attempted contact with [facility] without success. SS sent [employer] [facility] [facility] a message. SS to follow.     10:13: SS spoke to [employer] [facility] [facility] and they do not have a current open investigation. SS to follow.     11:59: SS received a message from [facility] per Charlene and pt has been denied. SS to follow.     12:46: SS attempted contact with Jayleen melgar without success and left a message. SS to follow.     14:37: SS received call from Jayleen melgar who states she is at the 's office at this time applying for Emergency Guardianship. SS informed ramón that she will need to provide a copy of Guardianship papers for pt's medical record. SS discussed nursing home denials with ramón and she will contact SS back regarding nursing home facility preference. SS to follow.                   Continued Care and Services - Admitted Since 8/6/2023       Destination       Service Provider Request Status Selected Services Address Phone Fax Patient Preferred    [facility] Declined  Bed not available N/A [address] [facility] [address] [phone] --    [facility] N[facility]A [address] [facility] [address] [phone] --    [facility] Declined  Not accepting any referrals at this time. N/A [address] [facility] [address] [phone] --    [facility] Declined  Bed not available N/A [address]  [facility] [address] [phone] --    [facility] Declined N/A [address] [address] [phone] --                  Expected Discharge Date and Time       Expected Discharge Date Expected Discharge Time    Aug 17, 2023         EMIGDIO Silverio

## 2023-08-16 NOTE — PLAN OF CARE
Patient resting in the bed throughout the night. No s/s of distress noted.  Complaints of pain, PRN meds given. Will continue to follow care plan.

## 2023-08-16 NOTE — CASE MANAGEMENT/SOCIAL WORK
Discharge Planning Assessment  [facility]     Patient Name: Lashell Case  MRN: [id number]  Today's Date: 8/16/2023    Admit Date: 8/6/2023    Plan: SS sent pt's referral via [facility] to [facility] and notified Charlene.  SS will continue to follow.  WM MonaeW

## 2023-08-16 NOTE — DISCHARGE PLACEMENT REQUEST
"Lashell Garcia (88 y.o. Female)       Date of Birth   1935    Social Security Number       Address   [address] [place], [place] [address]    Home Phone   [phone]    MRN   [id number]       [facility]    Marital Status                               Admission Date   8/6/23    Admission Type   Emergency    Admitting Provider   González Rodríguez MD    Attending Provider   Destini Lao MD    Department, Room/Bed   [address], 3342/1S       Discharge Date       Discharge Disposition       Discharge Destination                                 Attending Provider: Destini Lao MD    Allergies: Bactrim [Sulfamethoxazole-trimethoprim], Ketorolac, Phenergan [Promethazine Hcl], Codeine, Penicillins    Isolation: Contact   Infection: Lice (08/11/23)   Code Status: No CPR    Ht: 167.6 cm (65.98\")   Wt: 58.3 kg (128 lb 9.6 oz)    Admission Cmt: None   Principal Problem: Acute encephalopathy [G93.40]                   Active Insurance as of 8/6/2023       Primary Coverage       Payor Plan Insurance Group Employer/Plan Group    MEDICARE MEDICARE A & B        Payor Plan Address Payor Plan Phone Number Payor Plan Fax Number Effective Dates    [address] [phone]  6/1/2002 - None Entered    [facility] [id number]         Subscriber Name Subscriber Birth Date Member ID       LASHELL GARCIA 1935 [id number]               Secondary Coverage       Payor Plan Insurance Group Employer/Plan Group    [place]        Payor Plan Address Payor Plan Phone Number Payor Plan Fax Number Effective Dates    [address] [phone]  7/7/2016 - None Entered    [place] [address]         Subscriber Name Subscriber Birth Date Member ID       LASHELL GARCIA 1935 [id number]                     Emergency Contacts        (Rel.) Home Phone Work Phone Mobile Phone    JUANA THORPE (Grandchild) [phone] -- --    Lolly Watkins " (Grandchild) [phone] -- --    Hattie Santizo (Daughter) -- -- [phone]                 History & Physical        Agustina Silver PA-C at 23       Attestation signed by González Rodríguez MD at [phone]    I have seen and examined this patient independently from Agustina Silver PA-c, and have  reviewed this documentation. Suspect encephalopathy is acute on chronic based on history from family, has she has been declining in recent months but got significantly worse over the last week. Likely has underlying dementia not formally diagnosed. Worsening encephalopathy could be from hypoglycemia and dehydration, as well as GI blood loss. Gently hydrate with IV fluids containing D5 1/2 NS as patient has some lower extremity edema and dependent edema in her arms, though this could be due to malnutrition more so than CHF as albumin is low which correlates with family's report of decreasing PO intake. Monitor glucose levels every 6 hours. Repeat H/H now, after midnight with morning labs and again at 8 am tomorrow. Transfuse if hemoglobin falls below 7, or higher if becomes hemodynamically unstable. General surgery notified by ED of patient's case; Dr Valdez agreed to see in consultation. Keep NPO after midnight in case he wishes to proceed with colonoscopy tomorrow though he may want her to receive adequate bowel prep before proceeding. Hold eliquis for now. Would add to PMH and assessment/plan that patient has stage IIIa CKD based on today's labs and previous labs in her chart, with GFR in mid-50s. Continue to monitor renal function moving forward.                        [facility] Medicine Services  HISTORY & PHYSICAL    Patient Identification:  Name:  Lashell Case  Age:  88 y.o.  Sex:  female  :  1935  MRN:  [id number]   Visit Number:  [id number]  Admit Date: 2023   Primary Care Physician:  Evelio Guerin DO Subjective     Chief complaint:   Chief  Complaint   Patient presents with    Wound Check    Altered Mental Status    Hypoglycemia    Shortness of Breath     History of presenting illness:   Patient is a 88 y.o. female with past medical history significant for hypertension, hyperlipidemia, hypothyroidism, arthritis, atrial fibrillation chronically anticoagulated with Eliquis that presented to the [facility] emergency department for evaluation of altered mental status.  Noted to have bright red blood per rectum while in ED.  General surgery consulted, advised to admit the patient, they will consult.    Patient examined at bedside on 3S. Per patients granddaughter, the patient has been declining over the past 2-3 months, becoming less mobile and less independent. Over the past week the patient has been not getting out of bed at all and became incontinent. Patient lives alone, with family members checking on her daily, but no one able to stay with her full time. Patient's family members said they have tried to get her to come to the hospital over the past 4 to 5 days and patient refused. Today patient had worsening weakness, periods of confusion, therefore agreed to come to the ER. Reports decreased oral intake.  Episodes of incontinence. Family member states they are interested in home health or rehab, but they dont think patient will be agreeable.     Upon arrival to the ED, vitals were temperature 97.1, HR 92, RR 16, /96, SPO2 94% on room air.  Significant for anion gap 19, BUN 29, creatinine 1.03, BUN/creatinine ratio 28.2, GFR 53.1, glucose 69, albumin 2.5, hemoglobin 9.4, hematocrit 29.7.  UA dark yellow, 1+ ketones, trace leukocytes, 1+ protein, moderate bilirubin, 3-6 squamous epithelial cells.    Chest x-ray shows central line tip in SVC.  No pneumothorax.    CT head shows no acute intracranial hemorrhage, midline shift, or significant mass effect.    Patient has been admitted to the Telemetry  unit.  ---------------------------------------------------------------------------------------------------------------------   Review of Systems   Constitutional:  Positive for fatigue. Negative for chills, diaphoresis and fever.   Respiratory:  Negative for cough, shortness of breath and wheezing.    Cardiovascular:  Negative for chest pain, palpitations and leg swelling.   Gastrointestinal:  Negative for abdominal pain, constipation, diarrhea, nausea and vomiting.   Genitourinary:  Negative for difficulty urinating, dysuria and flank pain.   Musculoskeletal:  Negative for arthralgias, myalgias and neck stiffness.   Skin:  Negative for rash and wound.   Neurological:  Negative for dizziness, weakness and light-headedness.   Psychiatric/Behavioral:  Negative for agitation and confusion.     ---------------------------------------------------------------------------------------------------------------------   Past Medical History:   Diagnosis Date    Arthritis     Breast cancer 2015    lt br ca    Breast cancer 2006    rt br ca    Dizziness 7/8/2016    [place] (hard of hearing)     Hyperlipidemia     Hypertension     Hypothyroidism     Scabies exposure      Past Surgical History:   Procedure Laterality Date    BREAST BIOPSY      BREAST LUMPECTOMY Right 2006    BREAST SURGERY      MASTECTOMY Left 2015    ca    MASTECTOMY Right 2020    MASTECTOMY WITH SENTINEL NODE BIOPSY AND AXILLARY NODE DISSECTION Right 11/17/2020    Procedure: BREAST MASTECTOMY WITH SENTINEL NODE BIOPSY AND AXILLARY NODE DISSECTION;  Surgeon: Cynthia Ward MD;  Location: [facility];  Service: General;  Laterality: Right;     Family History   Problem Relation Age of Onset    Hypertension Mother     Uterine cancer Mother     Diabetes Mother     Diabetes Daughter     Prostate cancer Son     Diabetes Son     Throat cancer Son     Hypertension Child     Breast cancer Neg Hx      Social History     Socioeconomic History    Marital status:     Tobacco Use    Smoking status: Never    Smokeless tobacco: Never   Vaping Use    Vaping Use: Never used   Substance and Sexual Activity    Alcohol use: No    Drug use: No    Sexual activity: Defer     Birth control/protection: None     ---------------------------------------------------------------------------------------------------------------------   Allergies:  Bactrim [sulfamethoxazole-trimethoprim], Ketorolac, Phenergan [promethazine hcl], Codeine, and Penicillins  ---------------------------------------------------------------------------------------------------------------------   Medications below are reported home medications pulling from within the system; at this time, these medications have not been reconciled unless otherwise specified and are in the verification process for further verifcation as current home medications.    Prior to Admission Medications       Prescriptions Last Dose Informant Patient Reported? Taking?    amiodarone (PACERONE) 200 MG tablet   No No    Take 1 tablet by mouth Daily.    apixaban (ELIQUIS) 2.5 MG tablet tablet   No No    Take 1 tablet by mouth Every 12 (Twelve) Hours.    cloNIDine (Catapres) 0.1 MG tablet   No No    Take 1 tablet by mouth 2 (Two) Times a Day.    hydroCHLOROthiazide (HYDRODIURIL) 25 MG tablet   No No    Take 1 tablet by mouth Daily for blood pressure.    levothyroxine (SYNTHROID, LEVOTHROID) 75 MCG tablet   No No    Take 1 tablet by mouth Every Morning.    lisinopril (PRINIVIL,ZESTRIL) 40 MG tablet   No No    Take 1 tablet by mouth daily for blood pressure.    metoprolol tartrate (LOPRESSOR) 100 MG tablet   No No    Take 1 tablet by mouth 2 (Two) Times a Day.          ---------------------------------------------------------------------------------------------------------------------    Objective     Hospital Scheduled Meds:  senna-docusate sodium, 2 tablet, Oral, BID  sodium chloride, 10 mL, Intravenous, Q12H      dextrose 5 % and sodium chloride 0.45  %, 100 mL/hr        Current listed hospital scheduled medications may not yet reflect those currently placed in orders that are signed and held, awaiting patient's arrival to floor/unit.    ---------------------------------------------------------------------------------------------------------------------   Vital Signs:  Temp:  [97.1 øF (36.2 øC)-97.9 øF (36.6 øC)] 97.7 øF (36.5 øC)  Heart Rate:  [] 84  Resp:  [16-18] 18  BP: (130-155)/(80-99) 130/85  Mean Arterial Pressure (Non-Invasive) for the past 24 hrs (Last 3 readings):   Noninvasive MAP (mmHg)   08/06/23 2211 101   08/06/23 2115 124   08/06/23 2100 104     SpO2 Percentage    08/06/23 2100 08/06/23 2115 08/06/23 2211   SpO2: 94% 97% 94%     SpO2:  [93 %-97 %] 94 %  on   ;   Device (Oxygen Therapy): room air    Body mass index is 15.28 kg/mý.  Wt Readings from Last 3 Encounters:   08/06/23 43 kg (94 lb 11.2 oz)   04/14/23 55.2 kg (121 lb 9.6 oz)   04/03/23 59.9 kg (132 lb)     ---------------------------------------------------------------------------------------------------------------------   Physical Exam:  Physical Exam  Constitutional:       General: She is not in acute distress.     Appearance: Normal appearance.   HENT:      Head: Normocephalic and atraumatic.      Right Ear: External ear normal.      Left Ear: External ear normal.      Nose: No nasal deformity.      Mouth/Throat:      Lips: Pink.      Mouth: Mucous membranes are moist.   Eyes:      Conjunctiva/sclera: Conjunctivae normal.      Pupils: Pupils are equal, round, and reactive to light.   Cardiovascular:      Rate and Rhythm: Normal rate and regular rhythm.      Pulses:           Dorsalis pedis pulses are 1+ on the right side and 1+ on the left side.      Heart sounds: Normal heart sounds.   Pulmonary:      Effort: Pulmonary effort is normal.      Breath sounds: Normal breath sounds. No wheezing, rhonchi or rales.   Abdominal:      General: Abdomen is flat. Bowel sounds are normal.       Palpations: Abdomen is soft.      Tenderness: There is no guarding or rebound.   Genitourinary:     Comments: No carpio catheter in place   Musculoskeletal:      Cervical back: Neck supple. Normal range of motion.      Right lower le+ Pitting Edema present.      Left lower le+ Pitting Edema present.      Comments: To mid shin    Skin:     General: Skin is warm and dry.   Neurological:      General: No focal deficit present.      Mental Status: She is alert.      Comments: Oriented to person and place, but not time    Psychiatric:         Mood and Affect: Mood normal.         Behavior: Behavior normal.     ---------------------------------------------------------------------------------------------------------------------  EK fibrillation.  Heart rate 82. Confirmed by cardiology      Telemetry:        I have personally reviewed the EKG/Telemetry strip  ---------------------------------------------------------------------------------------------------------------------   Results from last 7 days   Lab Units 23   CK TOTAL U/L 42           Results from last 7 days   Lab Units 23  1152   CRP mg/dL  --  3.19*   LACTATE mmol/L  --  1.2   WBC 10*3/mm3  --  6.71   HEMOGLOBIN g/dL 8.6* 9.4*   HEMATOCRIT % 27.2* 29.7*   MCV fL  --  107.6*   MCHC g/dL  --  31.6   PLATELETS 10*3/mm3  --  252     Results from last 7 days   Lab Units 23  1152   SODIUM mmol/L 135*   POTASSIUM mmol/L 3.5   CHLORIDE mmol/L 98   CO2 mmol/L 18.0*   BUN mg/dL 29*   CREATININE mg/dL 1.03*   CALCIUM mg/dL 8.5*   GLUCOSE mg/dL 93   ALBUMIN g/dL 2.5*   BILIRUBIN mg/dL 1.2   ALK PHOS U/L 106   AST (SGOT) U/L 21   ALT (SGPT) U/L 10   Estimated Creatinine Clearance: 26.6 mL/min (A) (by C-G formula based on SCr of 1.03 mg/dL (H)).  No results found for: AMMONIA    Glucose   Date/Time Value Ref Range Status   2023 1037 69 (L) 70 - 130 mg/dL Final     Comment:     Meter: [id number] : [id number]  [facility]     No results found for: HGBA1C  Lab Results   Component Value Date    TSH 72.700 (H) 03/24/2023    FREET4 1.85 (H) 11/01/2021       No results found for: BLOODCX  No results found for: URINECX  No results found for: WOUNDCX  No results found for: STOOLCX  No results found for: RESPCX  No results found for: MRSACX  Pain Management Panel           No data to display              I have personally reviewed the above laboratory results.   ---------------------------------------------------------------------------------------------------------------------  Imaging Results (Last 7 Days)       Procedure Component Value Units Date/Time    CT Head Without Contrast [[id number]] Collected: 08/06/23 1947     Updated: 08/06/23 1952    Narrative:      EXAMINATION:Computed tomography(CT)of the head without IV contrast     TECHNIQUE:CT of the head was performed in the supine position without  intravenous contrast according to as low as reasonably achievable dose  protocol. Axial CT utilized with slice thickness of 5 mm presented in  soft tissue and bone algorithms.     COMPARISON:No prior studies are available for review at the time of this  dictation.     FINDINGS: Moderate parenchymal volume loss is noted.  No acute intra-or extra-axial hemorrhage or fluid collections are  identified. The ventricles are of normal size,shape,and morphology. The  basilar cisterns are patent. No mass effect or midline shift is seen.  The gray-white matter differentiation is normal. The visualized portions  of the orbits,paranasal sinuses,and mastoids appear normal. No acute  fracture is identified.       Impression:         No acute intracranial hemorrhage,midline shift,or significant mass  effect.     This report was finalized on 8/6/2023 7:50 PM by Mary Gomez MD.       XR Chest 1 View [[id number]] Collected: 08/06/23 1215     Updated: 08/06/23 1218    Narrative:      XR CHEST 1 VW-     CLINICAL INDICATION: Line placement         COMPARISON: 04/03/2023      TECHNIQUE: Single frontal view of the chest.     FINDINGS:      LUNGS: Left-sided central line has been placed. The tip is in the  superior vena cava. No evidence of pneumothorax      HEART AND MEDIASTINUM: Heart and mediastinal contours are unremarkable        SKELETON: Bony and soft tissue structures are unremarkable.             Impression:      Central line tip in the superior vena cava. No pneumothorax     This report was finalized on 8/6/2023 12:15 PM by Dr. Erich Jenkins MD.             I have personally reviewed the above radiology results.     Last Echocardiogram:  Results for orders placed during the hospital encounter of 11/02/21    Adult Transesophageal Echo (DONALDO) W/ Cont if Necessary Per Protocol    Interpretation Summary  ú Normal left ventricular cavity size and wall thickness noted. All left ventricular wall segments contract normally.  ú Left ventricular ejection fraction appears to be 61 - 65%.  ú The aortic valve is abnormal in structure. The aortic valve exhibits sclerosis. There is calcification of the aortic valve. The aortic valve appears trileaflet. Mild aortic valve regurgitation is present. Mild aortic valve stenosis is present.  ú There are myxomatous changes of the mitral valve apparatus present. Moderate mitral valve regurgitation is present. No significant mitral valve stenosis is present  ú No evidence of a left atrial appendage thrombus was present.  ú Moderate mitral valve regurgitation is present. No significant mitral valve stenosis is present.  ú Moderate to severe tricuspid valve regurgitation is present.  ú There is no evidence of pericardial effusion. .  ú Comments: Proceed with electrical cardioversion.    ---------------------------------------------------------------------------------------------------------------------    Assessment & Plan      Active Hospital Problems    Diagnosis  POA    **Acute encephalopathy [G93.40]  Yes     Acute  encephalopathy, POA  Hypoglycemia, POA  Bright red blood per rectum, POA  Dehydration, POA   Progressive debility   CT head with no acute findings  H&H stable at this time  Inpatient general surgery consult  Keep patient n.p.o.  Nursing dysphagia screen prior to any p.o. medication  D5 and half-normal saline infusion at 100 mL/h  Patient presented with blood glucose 69.  Family reports poor p.o. intake.  Accu-Cheks every 6 hours  Recheck H&H after fluid resuscitation as well as repeating every 8 hours.  Case Management consulted  Strict I's and O's  Daily weights  Hold home anticoagulation  If placement needed on discharge, will need to consult psych for decisional capacity   PT/OT consult     CHRONIC MEDICAL PROBLEMS  Essential hypertension  BP appears moderately controlled   Plan to resume home antihypertensive regimen once reconciled per pharmacy with appropriate holding parameters to prevent hypotension and/or bradycardia   Closely monitor BP per hospital protocol, titrate medications as necessary    Hyperlipidemia   Restart home statin pending med rec     Hypothyroidism  Restart home synthroid pending med rec     Atrial fibrillation chronically anticoagulated with Eliquis  Continuous cardiac monitoring  Restart rate controlling medications pending med rec  Hold home Eliquis at this time  F/E/N: D5  0.45 NS at 100 mL/h.  Continue to monitor electrolytes.  NPO.    ---------------------------------------------------  DVT Prophylaxis: Already anticoagulated at home, hold on admission  GI Prophylaxis: Bowel regimen  Activity: Up with assistance    The patient is considered to be a high risk patient due to: Acute encephalopathy, hypoglycemia    INPATIENT status due to the need for care which can only be reasonably provided in an hospital setting such as aggressive/expedited ancillary services and/or consultation services, the necessity for IV medications, close physician monitoring and/or the possible need for  procedures.  In such, I feel patient's risk for adverse outcomes and need for care warrant INPATIENT evaluation and predict the patient's care encounter to likely last beyond 2 midnights.      Code Status: Full Code     Disposition/Discharge planning: Pending clinical course    I have discussed the patient's assessment and plan with Dr. Rodríguez.      Agustina Silver PA-C  Hospitalist Service -- [facility]       08/06/23  22:43 EDT    Attending Physician: González Rodríguez MD     Electronically signed by González Rodríguez MD at 08/06/23 4414       Vital Signs (last day)       Date/Time Temp Temp src Pulse Resp BP Patient Position SpO2    08/16/23 0758 97.5 (36.4) Oral 64 20 157/84 Lying --    08/16/23 0600 97.6 (36.4) Oral 64 18 140/68 Lying --    08/16/23 0426 97.6 (36.4) Oral 64 18 154/78 Lying 98    08/16/23 0358 97 (36.1) Oral 72 18 141/69 Lying 97    08/15/23 2300 97.4 (36.3) Oral 71 18 138/71 Lying 97    08/15/23 2018 97.5 (36.4) Axillary 75 18 148/70 Lying 97    08/15/23 1500 97.6 (36.4) Axillary 68 16 158/74 Sitting 96    08/15/23 1036 97.4 (36.3) Axillary 70 18 150/68 Lying --    08/15/23 0639 97.7 (36.5) Oral 69 18 159/62 Sitting 95          Current Facility-Administered Medications   Medication Dose Route Frequency Provider Last Rate Last Admin    apixaban (ELIQUIS) tablet 2.5 mg  2.5 mg Oral Q12H Romeo Valdez MD   2.5 mg at 08/16/23 0828    sennosides-docusate (PERICOLACE) 8.6-50 MG per tablet 2 tablet  2 tablet Oral BID Romeo Valdez MD   2 tablet at 08/15/23 2046    And    polyethylene glycol (MIRALAX) packet 17 g  17 g Oral Daily PRN Romeo Valdez MD        And    bisacodyl (DULCOLAX) EC tablet 5 mg  5 mg Oral Daily PRN Romeo Valdez MD        And    bisacodyl (DULCOLAX) suppository 10 mg  10 mg Rectal Daily PRN Romeo Valdez MD        cloNIDine (CATAPRES) tablet 0.1 mg  0.1 mg Oral BID Romeo Valdez MD   0.1 mg at 08/16/23 0878     dextrose (D50W) (25 g/50 mL) IV injection 25 g  25 g Intravenous Q15 Min PRN Whitney Grider PA-C   25 g at 08/15/23 0643    dextrose (GLUTOSE) oral gel 15 g  15 g Oral Q15 Min PRN Whitney Grider PA-C   15 g at 08/15/23 0546    famotidine (PEPCID) tablet 20 mg  20 mg Oral BID AC Destini Lao MD   20 mg at 08/15/23 0812    glucagon HCl (Diagnostic) injection 1 mg  1 mg Subcutaneous Q15 Min PRN Whitney Grider PA-C        HYDROcodone-acetaminophen (NORCO) 5-325 MG per tablet 1 tablet  1 tablet Oral Q6H PRN Makenzie Guzman APRN   1 tablet at 08/16/23 0318    [START ON 8/17/2023] levothyroxine (SYNTHROID, LEVOTHROID) tablet 88 mcg  88 mcg Oral Q AM Destini Lao MD        lisinopril (PRINIVIL,ZESTRIL) tablet 10 mg  10 mg Oral Q24H Destini Lao MD   10 mg at 08/16/23 0828    nitroglycerin (NITROSTAT) SL tablet 0.4 mg  0.4 mg Sublingual Q5 Min PRN Romeo Valdez MD        Potassium Replacement - Follow Nurse / BPA Driven Protocol   Does not apply PRN Roemo Valdez MD        sodium chloride 0.9 % flush 10 mL  10 mL Intravenous PRN Romeo Valdez MD   10 mL at 08/15/23 2047    sodium chloride 0.9 % flush 10 mL  10 mL Intravenous Q12H Romeo Valdez MD   10 mL at 08/16/23 0828    sodium chloride 0.9 % flush 10 mL  10 mL Intravenous PRN Romeo Valdez MD        sodium chloride 0.9 % infusion 40 mL  40 mL Intravenous PRN Romeo Valdez MD            Operative/Procedure Notes (most recent note)        Procedures signed by Romeo Valdez MD at 08/09/23 2057         Procedure Orders    1. COLONOSCOPY [[id number]] ordered by Romeo Valdez MD at 08/09/23 1118               [Media Unavailable] Scan on 8/9/2023 1118 by Romeo Valdez MD: COLON          Electronically signed by Romeo Valdez MD at 08/09/23 2057          Physician Progress Notes (most recent note)        Destini Lao MD at 08/16/23 1418               [facility]     Patient Identification:  Name:  Lashell Case  Age:  88 y.o.  Sex:  female  :  1935  MRN:  [id number]  Visit Number:  [id number]  Primary Care Provider:  Evelio Guerin DO    Length of stay:  10    Subjective: Patient seen and examined she ate some lunch she is asking me when she can go home, her cosyntropin challenge responded adequately we will DC Decadron.  No issues reported by staff, staff reported patient still has pediculosis infestation after treatment.    Chief Complaint: Episodes of hematochezia ----------------------------------------------------------------------------------------------------------------------  Current Hospital Meds:  apixaban, 2.5 mg, Oral, Q12H  cloNIDine, 0.1 mg, Oral, BID  famotidine, 20 mg, Oral, BID AC  [START ON 2023] levothyroxine, 88 mcg, Oral, Q AM  lisinopril, 10 mg, Oral, Q24H  senna-docusate sodium, 2 tablet, Oral, BID  sodium chloride, 10 mL, Intravenous, Q12H         ----------------------------------------------------------------------------------------------------------------------  Vital Signs:  Temp:  [97 øF (36.1 øC)-97.6 øF (36.4 øC)] 97.5 øF (36.4 øC)  Heart Rate:  [64-75] 64  Resp:  [16-20] 20  BP: (138-158)/(68-84) 157/84       Tele:       23  0521 23  0711 08/15/23  0552   Weight: 58.7 kg (129 lb 8 oz) (MARIANNA Ulloa aware of weight gain, weighed with one sheet and one pillow) 57.1 kg (125 lb 14.1 oz) 58.3 kg (128 lb 9.6 oz)     Body mass index is 20.77 kg/mý.    Intake/Output Summary (Last 24 hours) at 2023 1413  Last data filed at 2023 0353  Gross per 24 hour   Intake 470 ml   Output 200 ml   Net 270 ml     Diet: Regular/House Diet; Texture: Regular Texture (IDDSI 7); Fluid Consistency: Thin (IDDSI 0)  ----------------------------------------------------------------------------------------------------------------------  Physical exam:  General: Comfortable,awake,  alert, oriented to self, place, but not time, talkative, No respiratory distress.    Skin:  Skin is warm and dry. No rash noted. No pallor.    HENT:  Head:  Normocephalic and atraumatic.  Mouth:  Moist mucous membranes.    Eyes:  Conjunctivae and EOM are normal.  Pupils are equal, round, and reactive to light.  No scleral icterus.    Neck:  Neck supple.  No JVD present.  No bruit  Pulmonary/Chest:  No respiratory distress, no wheezes, no crackles, with normal breath sounds and good air movement.  Cardiovascular:  Normal rate, regular rhythm and normal heart sounds with no murmur.  Abdominal:  Soft.  Bowel sounds are normal.  No distension and no tenderness.   Extremities: She has dependent edema on her arm from previous IV site, no tenderness, and no deformity.  No red or swollen joints anywhere.  Strong pulses in all 4 extremities with no clubbing, no cyanosis, no edema.  Neurological:  Motor strength equal no obvious deficit, sensory grossly intact.   No cranial nerve deficit.  No tongue deviation.  No facial droop.  No slurred speech.    Genitourinary: No Sifuentes catheter  Back:  ----------------------------------------------------------------------------------------------------------------------  ----------------------------------------------------------------------------------------------------------------------  Results from last 7 days   Lab Units 08/16/23  0950 08/16/23  0758 08/15/23  0637   CK TOTAL U/L  --   --  37   HSTROP T ng/L 14* 14*  --      Results from last 7 days   Lab Units 08/16/23  0648 08/15/23  0637 08/14/23  0244   WBC 10*3/mm3 3.37* 4.03 5.66   HEMOGLOBIN g/dL 8.0* 8.7* 8.9*   HEMATOCRIT % 24.7* 26.3* 26.6*   MCV fL 106.9* 104.8* 103.1*   MCHC g/dL 32.4 33.1 33.5   PLATELETS 10*3/mm3 146 159 125*         Results from last 7 days   Lab Units 08/16/23  0648 08/15/23  0637 08/14/23  0244 08/13/23  1636   SODIUM mmol/L 128* 126* 128*  --    POTASSIUM mmol/L 4.2 3.9 4.8 4.8   MAGNESIUM mg/dL  --    --   --  1.6   CHLORIDE mmol/L 100 100 100  --    CO2 mmol/L 19.8* 18.5* 18.9*  --    BUN mg/dL 13 9 10  --    CREATININE mg/dL 0.67 0.66 0.81  --    CALCIUM mg/dL 8.1* 7.8* 7.7*  --    GLUCOSE mg/dL 93 117* 91  --    ALBUMIN g/dL  --  1.8*  --   --    BILIRUBIN mg/dL  --  0.4  --   --    ALK PHOS U/L  --  118*  --   --    AST (SGOT) U/L  --  17  --   --    ALT (SGPT) U/L  --  9  --   --    Estimated Creatinine Clearance: 55.5 mL/min (by C-G formula based on SCr of 0.67 mg/dL).    No results found for: AMMONIA      No results found for: BLOODCX  No results found for: URINECX  No results found for: WOUNDCX  No results found for: STOOLCX    I have personally looked at the labs and they are summarized above.  ----------------------------------------------------------------------------------------------------------------------  Imaging Results (Last 24 Hours)       ** No results found for the last 24 hours. **          ----------------------------------------------------------------------------------------------------------------------  Assessment and Plan:  -Episode of hematochezia no recurrence upper endoscopy negative lower endoscopy suboptimal study poor prep  -Pediculosis the fetus infestation  -Advanced dementia  -Episode of hypoglycemia, cosyntropin reveals adequate response  -Hypothyroidism  -macrocytosis likely related to hypothyroidism  -Chronic hyponatremia  -advanced age  -Chronic anticoagulation for stroke prophylaxis  -Chronic persistent atrial fibrillation    Cosyntropin stimulation test is negative, will DC Decadron, increase p.o. intake, boost supplements, discussed with nurse patient was treated with permethrin and he continues to have pediculosis capitis we may need to apply the medication on every area where there is hair??  Awaiting for placement. continue Accu-Chek as needed to see if there is recurrence of hypoglycemia.      Disposition nursing home placement      Destini Lao,  MD  08/16/23  14:19 EDT    Electronically signed by Destini Lao MD at 08/16/23 1427          Consult Notes (most recent note)        Violetta Ni MD at 08/10/23 1428        Consult Orders    1. Inpatient Psychiatrist Consult [[id number]] ordered by Austyn Cifuentes DO at 08/09/23 8757                     Referring Provider: Dr. Cifuentes  Reason for Consultation: Suicidal ideations      Chief complaint/Focus of Exam: evaluate for suicidal ideations    Subjective .     History of present illness:  Lashell Case is a 88 y.o. female who was admitted on 8/6/2023 with complaints of altered mental status and she has a history of hypertension, hyperlipidemia, hypothyrodisim, arthritis, a-fib and cognitive decline and an inability to take care of her ADL and IADLs, getting progressively worse over last 2-3 months. The patient was seen for capacity evaluation earlier and deemed lacking capacity to make informed decisions about her care and disposition. Per report, her daughter has expressed concern that patient has threatened to kill herself and another psych consult needed to be done. The patient was seen in her room where she is resting comfortably in her bed. She is hard of hearing but is cooperative and pleasant. She is not sure why she is in the hospital. She reports her mood to be good and denies any thoughts of harm to self or others. Per collateral from staff, the patient has not verbalized SI, nor has she demonstrated any self harming behaviors.  No fever or chills reported. No chest pain or nvd reported. No focal neurologic deficits reported.     Review of Systems  No fever or chills reported. No chest pain or nvd reported. No focal neurologic deficits reported.     History  Past Medical History:   Diagnosis Date    Arthritis     Breast cancer 2015    lt br ca    Breast cancer 2006    rt br ca    Dizziness 7/8/2016    [place] (hard of hearing)     Hyperlipidemia     Hypertension     Hypothyroidism      Scabies exposure    ,   Past Surgical History:   Procedure Laterality Date    BREAST BIOPSY      BREAST LUMPECTOMY Right 2006    BREAST SURGERY      COLONOSCOPY N/A 8/9/2023    Procedure: COLONOSCOPY;  Surgeon: Romeo Valdez MD;  Location:  [facility] OR;  Service: Gastroenterology;  Laterality: N/A;    ENDOSCOPY N/A 8/9/2023    Procedure: ESOPHAGOGASTRODUODENOSCOPY;  Surgeon: Romeo Valdez MD;  Location:  [facility] OR;  Service: Gastroenterology;  Laterality: N/A;    MASTECTOMY Left 2015    ca    MASTECTOMY Right 2020    MASTECTOMY WITH SENTINEL NODE BIOPSY AND AXILLARY NODE DISSECTION Right 11/17/2020    Procedure: BREAST MASTECTOMY WITH SENTINEL NODE BIOPSY AND AXILLARY NODE DISSECTION;  Surgeon: Cynthia Ward MD;  Location: [facility] OR;  Service: General;  Laterality: Right;   ,   Family History   Problem Relation Age of Onset    Hypertension Mother     Uterine cancer Mother     Diabetes Mother     Diabetes Daughter     Prostate cancer Son     Diabetes Son     Throat cancer Son     Hypertension Child     Breast cancer Neg Hx    ,   Social History     Socioeconomic History    Marital status:    Tobacco Use    Smoking status: Never    Smokeless tobacco: Never   Vaping Use    Vaping Use: Never used   Substance and Sexual Activity    Alcohol use: No    Drug use: No    Sexual activity: Defer     Birth control/protection: None     E-cigarette/Vaping    E-cigarette/Vaping Use Never User      E-cigarette/Vaping Substances    Nicotine No     THC No     CBD No     Flavoring No      E-cigarette/Vaping Devices    Disposable No     Pre-filled or Refillable Cartridge No     Refillable Tank No     Pre-filled Pod No          ,   Medications Prior to Admission   Medication Sig Dispense Refill Last Dose    cloNIDine (Catapres) 0.1 MG tablet Take 1 tablet by mouth 2 (Two) Times a Day. 180 tablet 0 Past Week    levothyroxine (SYNTHROID, LEVOTHROID) 75 MCG tablet Take 1 tablet by mouth Every Morning. 90 tablet 0  Past Week   , Scheduled Meds:  apixaban, 2.5 mg, Oral, Q12H  cloNIDine, 0.1 mg, Oral, BID  dextrose, 50 mL, Intravenous, Once  levothyroxine, 75 mcg, Oral, Q AM  mupirocin, 1 application , Each Nare, BID  senna-docusate sodium, 2 tablet, Oral, BID  sodium chloride, 10 mL, Intravenous, Q12H   , Continuous Infusions:  dextrose 5 % and sodium chloride 0.45 %, 125 mL/hr, Last Rate: 100 mL/hr (08/10/23 0831)   , PRN Meds:    senna-docusate sodium **AND** polyethylene glycol **AND** bisacodyl **AND** bisacodyl    HYDROcodone-acetaminophen    nitroglycerin    Potassium Replacement - Follow Nurse / BPA Driven Protocol    sodium chloride    sodium chloride    sodium chloride, and Allergies:  Bactrim [sulfamethoxazole-trimethoprim], Ketorolac, Phenergan [promethazine hcl], Codeine, and Penicillins    Objective     Vital Signs   Temp:  [97.8 øF (36.6 øC)-98.1 øF (36.7 øC)] 98.1 øF (36.7 øC)  Heart Rate:  [59-84] 60  Resp:  [18] 18  BP: (117-138)/(70-88) 122/88    Mental Status Exam:   Mental Status Exam:    Hygiene:   fair  Cooperation:  Cooperative  Eye Contact:  Fair  Psychomotor Behavior:  Appropriate  Affect:  Appropriate  Hopelessness: Denies  Speech:  Normal  Thought Progress:  Goal directed  Thought Content:  Normal  Suicidal:  None  Homicidal:  None  Hallucinations:  None  Delusion:  None  Memory:  Deficits  Orientation:  Person and Place  Reliability:  poor  Insight:  Poor  Judgement:  Poor  Impulse Control:  Fair    Results Review:   I reviewed the patient's new clinical results.  Lab Results (last 24 hours)       Procedure Component Value Units Date/Time    POC Glucose Once [[id number]]  (Abnormal) Collected: 08/10/23 1339    Specimen: Blood Updated: 08/10/23 1345     Glucose 68 mg/dL     POC Glucose Once [[id number]]  (Abnormal) Collected: 08/10/23 1246    Specimen: Blood Updated: 08/10/23 1251     Glucose 53 mg/dL     Blood Culture - Blood, Blood, Central Line [[id number]]  (Normal) Collected: 08/06/23 1143     Specimen: Blood, Central Line Updated: 08/10/23 1215     Blood Culture No growth at 4 days    Blood Culture - Blood, Blood, Central Line [[id number]]  (Normal) Collected: 23 1152    Specimen: Blood, Central Line Updated: 08/10/23 1215     Blood Culture No growth at 4 days    POC Glucose Once [[id number]]  (Abnormal) Collected: 08/10/23 1145    Specimen: Blood Updated: 08/10/23 1151     Glucose 61 mg/dL     POC Glucose Once [[id number]]  (Normal) Collected: 08/10/23 0728    Specimen: Blood Updated: 08/10/23 0734     Glucose 77 mg/dL     POC Glucose Once [[id number]]  (Abnormal) Collected: 08/10/23 0222    Specimen: Blood Updated: 08/10/23 0229     Glucose 135 mg/dL     POC Glucose Once [[id number]]  (Normal) Collected: 23    Specimen: Blood Updated: 23     Glucose 103 mg/dL     POC Glucose Once [[id number]]  (Normal) Collected: 23 1636    Specimen: Blood Updated: 23 1643     Glucose 79 mg/dL           Imaging Results (Last 24 Hours)       ** No results found for the last 24 hours. **              Assessment & Plan     Principal Problem:    Acute encephalopathy  Active Problems:    LGI bleed    Chronic anemia    Severe malnutrition     The patient is denying any thoughts of harm to self or others and has not demonstrated any behaviors indicating any such intentions.     I discussed the patient's findings and my recommendations with patient, nursing staff, and primary care team    Violetta Ni MD  08/10/23  14:28 EDT          Electronically signed by Violetta Ni MD at 08/10/23 1437          Physical Therapy Notes (most recent note)        Kiki Rodriguez, PT at 08/15/23 1150  Version 1 of 1         Acute Care - Physical Therapy Treatment Note  RAFAEL Jain     Patient Name: Lashell Case  : 1935  MRN: [id number]  Today's Date: 8/15/2023   Onset of Illness/Injury or Date of Surgery: 23  Visit Dx:     ICD-10-CM ICD-9-CM   1. LGI bleed  K92.2 578.9   2. Chronic anemia   D64.9 285.9   3. Confusion  R41.0 298.9     Patient Active Problem List   Diagnosis    Arthritis    Abnormal ambulatory electrocardiogram    Essential hypertension    Hypothyroidism due to acquired atrophy of thyroid    Malignant neoplasm of left female breast    Mixed hyperlipidemia    Aromatase inhibitor use    Osteopenia    Hepatic cyst    Splenic cyst    Breast mass    Atrial fibrillation, persistent    Precordial chest pain    Nonrheumatic aortic (valve) stenosis    Atrial fibrillation with RVR    Paroxysmal atrial fibrillation    Nonrheumatic mitral valve regurgitation    Acute encephalopathy    LGI bleed    Chronic anemia    Severe malnutrition     Past Medical History:   Diagnosis Date    Arthritis     Breast cancer 2015    lt br ca    Breast cancer 2006    rt br ca    Dizziness 7/8/2016    [place] (hard of hearing)     Hyperlipidemia     Hypertension     Hypothyroidism     Scabies exposure      Past Surgical History:   Procedure Laterality Date    BREAST BIOPSY      BREAST LUMPECTOMY Right 2006    BREAST SURGERY      COLONOSCOPY N/A 8/9/2023    Procedure: COLONOSCOPY;  Surgeon: Romeo Valdez MD;  Location: [facility] OR;  Service: Gastroenterology;  Laterality: N/A;    ENDOSCOPY N/A 8/9/2023    Procedure: ESOPHAGOGASTRODUODENOSCOPY;  Surgeon: Romeo Valdez MD;  Location: [facility] OR;  Service: Gastroenterology;  Laterality: N/A;    ENDOSCOPY  8/8/2023    Procedure: ESOPHAGOGASTRODUODENOSCOPY;  Surgeon: Romeo Valdez MD;  Location: [facility] OR;  Service: Gastroenterology;;    MASTECTOMY Left 2015    [place]    MASTECTOMY Right 2020    MASTECTOMY WITH SENTINEL NODE BIOPSY AND AXILLARY NODE DISSECTION Right 11/17/2020    Procedure: BREAST MASTECTOMY WITH SENTINEL NODE BIOPSY AND AXILLARY NODE DISSECTION;  Surgeon: Cynthia Ward MD;  Location: [facility] OR;  Service: General;  Laterality: Right;     PT Assessment (last 12 hours)       PT Evaluation and Treatment       Row Name 08/15/23 1100           Physical Therapy Time and Intention    Subjective Information complains of;weakness;pain  Pain in legs with movement  -     Document Type therapy note (daily note)  -     Mode of Treatment physical therapy  -     Patient Effort good  -       Row Name 08/15/23 1100          General Information    Patient Profile Reviewed yes  -     Existing Precautions/Restrictions fall  -     Limitations/Impairments safety/cognitive  -       Row Name 08/15/23 1100          Pain    Pre/Posttreatment Pain Comment Intermittent pain in LE with movement from sores on her legs.  -       Row Name 08/15/23 1100          Cognition    Affect/Mental Status (Cognition) confused  -     Follows Commands (Cognition) follows one-step commands;over 90% accuracy;physical/tactile prompts required  -       Row Name 08/15/23 1100          Bed Mobility    Scooting/Bridging [place] (Bed Mobility) moderate assist (50% patient effort);1 person assist  -     Supine-Sit [place] (Bed Mobility) moderate assist (50% patient effort);1 person assist;2 person assist  -     Sit-Supine [place] (Bed Mobility) moderate assist (50% patient effort);1 person assist  -     Bed Mobility, Safety Issues decreased use of legs for bridging/pushing;decreased use of arms for pushing/pulling;impaired trunk control for bed mobility;cognitive deficits limit understanding  -     Assistive Device (Bed Mobility) bed rails;draw sheet;head of bed elevated  -       Row Name 08/15/23 1100          Sit-Stand Transfer    Sit-Stand [place] (Transfers) minimum assist (75% patient effort);1 person assist;2 person assist  -     Assistive Device (Sit-Stand Transfers) walker, front-wheeled  -     Comment, (Sit-Stand Transfer) 2x.  HHA first time and use of FWW 2nd time  -       Row Name 08/15/23 1100          Stand-Sit Transfer    Stand-Sit [place] (Transfers) minimum assist (75% patient effort);1 person assist  -     Assistive Device  (Stand-Sit Transfers) walker, front-wheeled  -KP       Row Name 08/15/23 1100          Gait/Stairs (Locomotion)    [place] Level (Gait) contact guard;1 person assist  -KP     Assistive Device (Gait) walker, front-wheeled  -KP     Distance in Feet (Gait) 20  -KP     Pattern (Gait) step-through;step-to  -KP     Deviations/Abnormal Patterns (Gait) dang decreased;festinating/shuffling;gait speed decreased;stride length decreased  -KP     Bilateral Gait Deviations forward flexed posture;heel strike decreased  -KP       Row Name 08/15/23 1100          Balance    Static Sitting Balance standby assist  -KP     Dynamic Sitting Balance contact guard  -KP     Position, Sitting Balance sitting edge of bed  -KP     Static Standing Balance contact guard;1-person assist  -KP     Position/Device Used, Standing Balance walker, front-wheeled  -KP       Row Name 08/15/23 1100          Knee (Therapeutic Exercise)    Knee Strengthening (Therapeutic Exercise) left;right;LAQ (long arc quad);sitting;10 repetitions;2 sets  -KP       Row Name 08/15/23 1100          Ankle (Therapeutic Exercise)    Ankle Strengthening (Therapeutic Exercise) left;right;dorsiflexion;plantarflexion;sitting;10 repetitions;2 sets  -KP       Row Name             Wound 08/10/23 0851 Left proximal arm Skin Tear    Wound - Properties Group Placement Date: 08/10/23  -RD Placement Time: 0851  -RD Present on Hospital Admission: N  -RD Side: Left  -RD Orientation: proximal  -RD Location: arm  -RD Primary Wound Type: Skin tear  -RD    Retired Wound - Properties Group Placement Date: 08/10/23  -RD Placement Time: 0851  -RD Present on Hospital Admission: N  -RD Side: Left  -RD Orientation: proximal  -RD Location: arm  -RD Primary Wound Type: Skin tear  -RD    Retired Wound - Properties Group Date first assessed: 08/10/23  -RD Time first assessed: 0851  -RD Present on Hospital Admission: N  -RD Side: Left  -RD Location: arm  -RD Primary Wound Type: Skin tear  -RD       Row Name 08/15/23 1100          Plan of Care Review    Plan of Care Reviewed With patient  -     Outcome Evaluation PT treatment completed.  Patient demonstrated improvement with ambulation and standing.  She was able to ambulate 20ft with FWW and CGA.  Max cues for safety.  Continue PT POC.  -       Row Name 08/15/23 1100          Positioning and Restraints    Pre-Treatment Position in bed  -     Post Treatment Position bed  -KP     In Bed notified nsg;supine;call light within reach;encouraged to call for assist;exit alarm on;side rails up x3  Lines in tact and needs in reach  -               User Key  (r) = Recorded By, (t) = Taken By, (c) = Cosigned By      Initials Name Provider Type    Galilea Dumont, RN Registered Nurse    Kiki Gleason PT Physical Therapist                      PT Recommendation and Plan     Plan of Care Reviewed With: patient  Outcome Evaluation: PT treatment completed.  Patient demonstrated improvement with ambulation and standing.  She was able to ambulate 20ft with FWW and CGA.  Max cues for safety.  Continue PT POC.       Time Calculation:    PT Charges       Row Name 08/15/23 1150             Time Calculation    PT Received On 08/15/23  -      PT Goal Re-Cert Due Date 08/21/23  -         Timed Charges    98826 - PT Therapeutic Activity Minutes 25  -KP         Total Minutes    Timed Charges Total Minutes 25  -KP       Total Minutes 25  -KP                User Key  (r) = Recorded By, (t) = Taken By, (c) = Cosigned By      Initials Name Provider Type    Kiki Gleason PT Physical Therapist                  Therapy Charges for Today       Code Description Service Date Service Provider Modifiers Qty    27442896222 HC PT THER PROC EA 15 MIN 8/14/2023 Kiki Rodriguez, PT GP 1    33348503817 HC PT THERAPEUTIC ACT EA 15 MIN 8/14/2023 Kiki Rodriguez, PT GP 1    51751264375 HC PT THERAPEUTIC ACT EA 15 MIN 8/15/2023 Kiki Rodriguez, PT GP 2            PT G-Codes  AM-PAC 6 Clicks  Score (PT): 14    Kiki Rodriguez, PT  8/15/2023      Electronically signed by Kiki Rodriguez, PT at 08/15/23 1150          Occupational Therapy Notes (most recent note)        Nella Cobb, OT at 08/15/23 1100          Acute Care - Occupational Therapy Treatment Note  RAFAEL Jain     Patient Name: Lashell Case  : 1935  MRN: [id number]  Today's Date: 8/15/2023  Onset of Illness/Injury or Date of Surgery: 23     Referring Physician: Nadeem    Admit Date: 2023       ICD-10-CM ICD-9-CM   1. LGI bleed  K92.2 578.9   2. Chronic anemia  D64.9 285.9   3. Confusion  R41.0 298.9     Patient Active Problem List   Diagnosis    Arthritis    Abnormal ambulatory electrocardiogram    Essential hypertension    Hypothyroidism due to acquired atrophy of thyroid    Malignant neoplasm of left female breast    Mixed hyperlipidemia    Aromatase inhibitor use    Osteopenia    Hepatic cyst    Splenic cyst    Breast mass    Atrial fibrillation, persistent    Precordial chest pain    Nonrheumatic aortic (valve) stenosis    Atrial fibrillation with RVR    Paroxysmal atrial fibrillation    Nonrheumatic mitral valve regurgitation    Acute encephalopathy    LGI bleed    Chronic anemia    Severe malnutrition     Past Medical History:   Diagnosis Date    Arthritis     Breast cancer     lt br ca    Breast cancer 2006    rt br ca    Dizziness 2016    [place] (hard of hearing)     Hyperlipidemia     Hypertension     Hypothyroidism     Scabies exposure      Past Surgical History:   Procedure Laterality Date    BREAST BIOPSY      BREAST LUMPECTOMY Right 2006    BREAST SURGERY      COLONOSCOPY N/A 2023    Procedure: COLONOSCOPY;  Surgeon: Romeo Valdez MD;  Location: [facility] OR;  Service: Gastroenterology;  Laterality: N/A;    ENDOSCOPY N/A 2023    Procedure: ESOPHAGOGASTRODUODENOSCOPY;  Surgeon: Romeo Valdez MD;  Location: [facility] OR;  Service: Gastroenterology;  Laterality: N/A;    ENDOSCOPY  2023     Procedure: ESOPHAGOGASTRODUODENOSCOPY;  Surgeon: Romeo Valdez MD;  Location: [facility] OR;  Service: Gastroenterology;;    MASTECTOMY Left 2015    [place]    MASTECTOMY Right 2020    MASTECTOMY WITH SENTINEL NODE BIOPSY AND AXILLARY NODE DISSECTION Right 11/17/2020    Procedure: BREAST MASTECTOMY WITH SENTINEL NODE BIOPSY AND AXILLARY NODE DISSECTION;  Surgeon: Cynthia Ward MD;  Location: [facility] OR;  Service: General;  Laterality: Right;         OT ASSESSMENT FLOWSHEET (last 12 hours)       OT Evaluation and Treatment       Row Name 08/15/23 1055                   OT Time and Intention    Subjective Information complains of;weakness;fatigue  -LM        Document Type therapy note (daily note)  -LM        Mode of Treatment occupational therapy  -LM        Patient Effort fair  -LM        Comment Patient seen this date for adl retraining/education.  Patient continues to feed self and perform light grooming with mod assist.  Patient requires max assist with bathing, dressing, toileting.  Patient confused about situation and time, alert and oriented person and place.  -LM           General Information    Existing Precautions/Restrictions fall  -LM        Limitations/Impairments safety/cognitive  -LM           Cognition    Affect/Mental Status (Cognition) confused  -LM           Wound 08/10/23 0851 Left proximal arm Skin Tear    Wound - Properties Group Placement Date: 08/10/23  -RD Placement Time: 0851  -RD Present on Hospital Admission: N  -RD Side: Left  -RD Orientation: proximal  -RD Location: arm  -RD Primary Wound Type: Skin tear  -RD    Retired Wound - Properties Group Placement Date: 08/10/23  -RD Placement Time: 0851  -RD Present on Hospital Admission: N  -RD Side: Left  -RD Orientation: proximal  -RD Location: arm  -RD Primary Wound Type: Skin tear  -RD    [employer] - [employer] Date first assessed: 08/10/23  -RD Time first assessed: 0851  -RD Present on Hospital Admission: N  -RD Side: Left  -RD  Location: arm  -RD Primary Wound Type: Skin tear  -RD       Positioning and Restraints    Post Treatment Position bed  -LM        In Bed call light within reach;encouraged to call for assist;exit alarm on  -LM                  User Key  (r) = Recorded By, (t) = Taken By, (c) = Cosigned By      Initials Name Effective Dates     Nella Cobb OT 06/16/21 -     RD Galilea Marrero RN 06/16/21 -                            OT Recommendation and Plan  Planned Therapy Interventions (OT): activity tolerance training, adaptive equipment training, BADL retraining, ROM/therapeutic exercise, transfer/mobility retraining, strengthening exercise, patient/caregiver education/training  Therapy Frequency (OT): other (see comments) (prn and/or to monitor fxl progress)  Plan of Care Review  Plan of Care Reviewed With: patient  Plan of Care Reviewed With: patient        Time Calculation:     Therapy Charges for Today       Code Description Service Date Service Provider Modifiers Qty    55354551540  OT SELF CARE/MGMT/TRAIN EA 15 MIN 8/14/2023 Nella Cobb OT GO 2    [id number]  OT SELF CARE/MGMT/TRAIN EA 15 MIN 8/15/2023 Nella Cobb OT GO 1                 Nella Cobb OT  8/15/2023    Electronically signed by Nella Cobb OT at 08/15/23 1100

## 2023-08-16 NOTE — CASE MANAGEMENT/SOCIAL WORK
Discharge Planning Assessment  [facility]     Patient Name: Lashell Case  MRN: [id number]  Today's Date: 8/16/2023    Admit Date: 8/6/2023    Plan: SS spoke with pt's Granddaughter Jayleen Fore this am.  Jayleen has not obtained Guardianship as of this date.  Jayleen continues to state she plans to obtain guardianship but has been either unable to afford cost or has not been able to complete paperwork with .  Jayleen aware of [facility] denial and requests SS attempt [facility].  Jayleen stated that she would discuss options with family if [facility] denied pt.  SS will continue to follow.        WM MonaeW

## 2023-08-16 NOTE — PROGRESS NOTES
[facility]     Patient Identification:  Name:  Lashell Case  Age:  88 y.o.  Sex:  female  :  1935  MRN:  [id number]  Visit Number:  [id number]  Primary Care Provider:  Evelio Guerin DO    Length of stay:  10    Subjective: Patient seen and examined she ate some lunch she is asking me when she can go home, her cosyntropin challenge responded adequately we will DC Decadron.  No issues reported by staff, staff reported patient still has pediculosis infestation after treatment.    Chief Complaint: Episodes of hematochezia ----------------------------------------------------------------------------------------------------------------------  Current Hospital Meds:  apixaban, 2.5 mg, Oral, Q12H  cloNIDine, 0.1 mg, Oral, BID  famotidine, 20 mg, Oral, BID AC  [START ON 2023] levothyroxine, 88 mcg, Oral, Q AM  lisinopril, 10 mg, Oral, Q24H  senna-docusate sodium, 2 tablet, Oral, BID  sodium chloride, 10 mL, Intravenous, Q12H         ----------------------------------------------------------------------------------------------------------------------  Vital Signs:  Temp:  [97 øF (36.1 øC)-97.6 øF (36.4 øC)] 97.5 øF (36.4 øC)  Heart Rate:  [64-75] 64  Resp:  [16-20] 20  BP: (138-158)/(68-84) 157/84       Tele:       23  0521 23  0711 08/15/23  0552   Weight: 58.7 kg (129 lb 8 oz) (MARIANNA Ulloa aware of weight gain, weighed with one sheet and one pillow) 57.1 kg (125 lb 14.1 oz) 58.3 kg (128 lb 9.6 oz)     Body mass index is 20.77 kg/mý.    Intake/Output Summary (Last 24 hours) at 2023 1415  Last data filed at 2023 0352  Gross per 24 hour   Intake 470 ml   Output 200 ml   Net 270 ml     Diet: Regular/House Diet; Texture: Regular Texture (IDDSI 7); Fluid Consistency: Thin (IDDSI 0)  ----------------------------------------------------------------------------------------------------------------------  Physical exam:  General: Comfortable,awake,  alert, oriented to self, place, but not time, talkative, No respiratory distress.    Skin:  Skin is warm and dry. No rash noted. No pallor.    HENT:  Head:  Normocephalic and atraumatic.  Mouth:  Moist mucous membranes.    Eyes:  Conjunctivae and EOM are normal.  Pupils are equal, round, and reactive to light.  No scleral icterus.    Neck:  Neck supple.  No JVD present.  No bruit  Pulmonary/Chest:  No respiratory distress, no wheezes, no crackles, with normal breath sounds and good air movement.  Cardiovascular:  Normal rate, regular rhythm and normal heart sounds with no murmur.  Abdominal:  Soft.  Bowel sounds are normal.  No distension and no tenderness.   Extremities: She has dependent edema on her arm from previous IV site, no tenderness, and no deformity.  No red or swollen joints anywhere.  Strong pulses in all 4 extremities with no clubbing, no cyanosis, no edema.  Neurological:  Motor strength equal no obvious deficit, sensory grossly intact.   No cranial nerve deficit.  No tongue deviation.  No facial droop.  No slurred speech.    Genitourinary: No Sifuentes catheter  Back:  ----------------------------------------------------------------------------------------------------------------------  ----------------------------------------------------------------------------------------------------------------------  Results from last 7 days   Lab Units 08/16/23  0950 08/16/23  0758 08/15/23  0637   CK TOTAL U/L  --   --  37   HSTROP T ng/L 14* 14*  --      Results from last 7 days   Lab Units 08/16/23  0648 08/15/23  0637 08/14/23  0244   WBC 10*3/mm3 3.37* 4.03 5.66   HEMOGLOBIN g/dL 8.0* 8.7* 8.9*   HEMATOCRIT % 24.7* 26.3* 26.6*   MCV fL 106.9* 104.8* 103.1*   MCHC g/dL 32.4 33.1 33.5   PLATELETS 10*3/mm3 146 159 125*         Results from last 7 days   Lab Units 08/16/23  0648 08/15/23  0637 08/14/23  0244 08/13/23  1636   SODIUM mmol/L 128* 126* 128*  --    POTASSIUM mmol/L 4.2 3.9 4.8 4.8   MAGNESIUM mg/dL  --    --   --  1.6   CHLORIDE mmol/L 100 100 100  --    CO2 mmol/L 19.8* 18.5* 18.9*  --    BUN mg/dL 13 9 10  --    CREATININE mg/dL 0.67 0.66 0.81  --    CALCIUM mg/dL 8.1* 7.8* 7.7*  --    GLUCOSE mg/dL 93 117* 91  --    ALBUMIN g/dL  --  1.8*  --   --    BILIRUBIN mg/dL  --  0.4  --   --    ALK PHOS U/L  --  118*  --   --    AST (SGOT) U/L  --  17  --   --    ALT (SGPT) U/L  --  9  --   --    Estimated Creatinine Clearance: 55.5 mL/min (by C-G formula based on SCr of 0.67 mg/dL).    No results found for: AMMONIA      No results found for: BLOODCX  No results found for: URINECX  No results found for: WOUNDCX  No results found for: STOOLCX    I have personally looked at the labs and they are summarized above.  ----------------------------------------------------------------------------------------------------------------------  Imaging Results (Last 24 Hours)       ** No results found for the last 24 hours. **          ----------------------------------------------------------------------------------------------------------------------  Assessment and Plan:  -Episode of hematochezia no recurrence upper endoscopy negative lower endoscopy suboptimal study poor prep  -Pediculosis the fetus infestation  -Advanced dementia  -Episode of hypoglycemia, cosyntropin reveals adequate response  -Hypothyroidism  -macrocytosis likely related to hypothyroidism  -Chronic hyponatremia  -advanced age  -Chronic anticoagulation for stroke prophylaxis  -Chronic persistent atrial fibrillation    Cosyntropin stimulation test is negative, will DC Decadron, increase p.o. intake, boost supplements, discussed with nurse patient was treated with permethrin and he continues to have pediculosis capitis we may need to apply the medication on every area where there is hair??  Awaiting for placement. continue Accu-Chek as needed to see if there is recurrence of hypoglycemia.      Disposition nursing home placement      Destini Lao,  MD  08/16/23  14:19 EDT

## 2023-08-16 NOTE — PROGRESS NOTES
Nutrition Services    Patient Name:  Lashell Case  YOB: 1935  MRN: [id number]  Admit Date:  8/6/2023    Patient continues with poor intakes, and severe malnutrition.  Recommend, if not contraindicated, adding appetite stimulant to help increase intakes.  Will continue to encourage intake of meals and supplements.    Electronically signed by:  Liz Machado RD  08/16/23 14:47 EDT

## 2023-08-16 NOTE — CASE MANAGEMENT/SOCIAL WORK
Discharge Planning Assessment  [facility]     Patient Name: Lashell Case  MRN: [id number]  Today's Date: 8/16/2023    Admit Date: 8/6/2023         Discharge Plan       Row Name 08/16/23 1654       Plan    Plan SS spoke with Pt's DELIA Jayleen [phone] who states she has completed emergency guardianship paperwork but  has to sign documents on 08/17/23. GD voices she will bring copy of emergency guardianship paperwork to [facility] when documents are filed. GD preferred NHP referrals to be sent to [facility]. SS contacted [facility] per Jimena, [facility] per Ch[place]ita, and [facility] per Ch[place]ita that referrals have been faxed for review. SS to follow.                   WM EverettW

## 2023-08-16 NOTE — PLAN OF CARE
Goal Outcome Evaluation:            Patient VSS. Edema noted to bilateral upper extremities elevated on pillows. Weeping from both arms noted. Patient is resting in bed at this time. Patient has no requests at this time. Will continue with plan of care.

## 2023-08-16 NOTE — CASE MANAGEMENT/SOCIAL WORK
Discharge Planning [facility]     Patient Name: Lashell Case  MRN: [id number]  Today's Date: 8/16/2023    Admit Date: 8/6/2023    Plan: Pt transferred to [address] [place] [address].  SS left message for Ginger at [facility] to return call regarding referral.  SS attempted to contact pt's Granddaughter Jayleen Brown at [phone] on this date regarding placement and progress with Guardianship without success.  SS left voice mail for Jayleen to return call.  SS will continue to follow.       Discharge Plan       Row Name 08/16/23 0839       Plan    Plan Pt transferred to [address] [place] [address].  SS left message for Ginger at [facility] to return call regarding referral.  SS attempted to contact pt's Granddaughter Jayleen Fore at [phone] on this date regarding placement and progress with Guardianship without success.  SS left voice mail for Jayleen to return call.  SS will continue to follow.                  NISSA Monae

## 2023-08-16 NOTE — DISCHARGE PLACEMENT REQUEST
"Lashell Garcia (88 y.o. Female)       Date of Birth   1935    Social Security Number       Address   [address] [place], [place] [address]    Home Phone   [phone]    MRN   [id number]       [facility]    Marital Status                               Admission Date   8/6/23    Admission Type   Emergency    Admitting Provider   González Rodríguez MD    Attending Provider   Destini Lao MD    Department, Room/Bed   [address], 3342/1S       Discharge Date       Discharge Disposition       Discharge Destination                                 Attending Provider: Destini Lao MD    Allergies: Bactrim [Sulfamethoxazole-trimethoprim], Ketorolac, Phenergan [Promethazine Hcl], Codeine, Penicillins    Isolation: Contact   Infection: Lice (08/11/23)   Code Status: No CPR    Ht: 167.6 cm (65.98\")   Wt: 58.3 kg (128 lb 9.6 oz)    Admission Cmt: None   Principal Problem: Acute encephalopathy [G93.40]                   Active Insurance as of 8/6/2023       Primary Coverage       Payor Plan Insurance Group Employer/Plan Group    MEDICARE MEDICARE A & B        Payor Plan Address Payor Plan Phone Number Payor Plan Fax Number Effective Dates    [address] [phone]  6/1/2002 - None Entered    [facility] [address]         Subscriber Name Subscriber Birth Date Member ID       LASHELL GARCIA 1935 [id number]               Secondary Coverage       Payor Plan Insurance Group Employer/Plan Group    [place]        Payor Plan Address Payor Plan Phone Number Payor Plan Fax Number Effective Dates    [address] [phone]  7/7/2016 - None Entered    [place] [address]         Subscriber Name Subscriber Birth Date Member ID       LASHELL GARCIA 1935 [id number]                     Emergency Contacts        (Rel.) Home Phone Work Phone Mobile Phone    JUANA THORPE (Grandchild) [phone] -- --    Lolly Watkins " (Grandchild) [phone] -- --    Hattie Santizo (Daughter) -- -- [phone]              Emergency Contact Information       Name Relation Home Work Mobile    JUANA THORPE Grandjin [phone]      Lolly Watkins Grandchild [phone]      Hattie Santizo Daughter   [phone]          Insurance Information                  MEDICARE/MEDICARE A & B Phone: [phone]    Subscriber: Lashell Case Subscriber#: [id number]    Group#: -- Precert#: --        [place] Phone: [phone]    Subscriber: Lashell Case Subscriber#: [id number]    Group#: -- Precert#: --             History & Physical        Agustina Silver PA-C at 08/06/23 2056       Attestation signed by González Rodríguez MD at 08/06/23 6355    I have seen and examined this patient independently from Agustina Silver PA-c, and have  reviewed this documentation. Suspect encephalopathy is acute on chronic based on history from family, has she has been declining in recent months but got significantly worse over the last week. Likely has underlying dementia not formally diagnosed. Worsening encephalopathy could be from hypoglycemia and dehydration, as well as GI blood loss. Gently hydrate with IV fluids containing D5 1/2 NS as patient has some lower extremity edema and dependent edema in her arms, though this could be due to malnutrition more so than CHF as albumin is low which correlates with family's report of decreasing PO intake. Monitor glucose levels every 6 hours. Repeat H/H now, after midnight with morning labs and again at 8 am tomorrow. Transfuse if hemoglobin falls below 7, or higher if becomes hemodynamically unstable. General surgery notified by ED of patient's case; Dr Valdez agreed to see in consultation. Keep NPO after midnight in case he wishes to proceed with colonoscopy tomorrow though he may want her to receive adequate bowel prep before proceeding. Hold eliquis for now. Would add to PMH and  assessment/plan that patient has stage IIIa CKD based on today's labs and previous labs in her chart, with GFR in mid-50s. Continue to monitor renal function moving forward.                        [facility]  HISTORY & PHYSICAL    Patient Identification:  Name:  Lashell Case  Age:  88 y.o.  Sex:  female  :  1935  MRN:  [id number]   Visit Number:  [id number]  Admit Date: 2023   Primary Care Physician:  Evelio Guerin DO     Subjective     Chief complaint:   Chief Complaint   Patient presents with    Wound Check    Altered Mental Status    Hypoglycemia    Shortness of Breath     History of presenting illness:   Patient is a 88 y.o. female with past medical history significant for hypertension, hyperlipidemia, hypothyroidism, arthritis, atrial fibrillation chronically anticoagulated with Eliquis that presented to the [facility] emergency department for evaluation of altered mental status.  Noted to have bright red blood per rectum while in ED.  General surgery consulted, advised to admit the patient, they will consult.    Patient examined at bedside on 3S. Per patients granddaughter, the patient has been declining over the past 2-3 months, becoming less mobile and less independent. Over the past week the patient has been not getting out of bed at all and became incontinent. Patient lives alone, with family members checking on her daily, but no one able to stay with her full time. Patient's family members said they have tried to get her to come to the hospital over the past 4 to 5 days and patient refused. Today patient had worsening weakness, periods of confusion, therefore agreed to come to the ER. Reports decreased oral intake.  Episodes of incontinence. Family member states they are interested in home health or rehab, but they dont think patient will be agreeable.     Upon arrival to the ED, vitals were temperature 97.1, HR 92, RR 16, /96,  SPO2 94% on room air.  Significant for anion gap 19, BUN 29, creatinine 1.03, BUN/creatinine ratio 28.2, GFR 53.1, glucose 69, albumin 2.5, hemoglobin 9.4, hematocrit 29.7.  UA dark yellow, 1+ ketones, trace leukocytes, 1+ protein, moderate bilirubin, 3-6 squamous epithelial cells.    Chest x-ray shows central line tip in SVC.  No pneumothorax.    CT head shows no acute intracranial hemorrhage, midline shift, or significant mass effect.    Patient has been admitted to the Telemetry unit.  ---------------------------------------------------------------------------------------------------------------------   Review of Systems   Constitutional:  Positive for fatigue. Negative for chills, diaphoresis and fever.   Respiratory:  Negative for cough, shortness of breath and wheezing.    Cardiovascular:  Negative for chest pain, palpitations and leg swelling.   Gastrointestinal:  Negative for abdominal pain, constipation, diarrhea, nausea and vomiting.   Genitourinary:  Negative for difficulty urinating, dysuria and flank pain.   Musculoskeletal:  Negative for arthralgias, myalgias and neck stiffness.   Skin:  Negative for rash and wound.   Neurological:  Negative for dizziness, weakness and light-headedness.   Psychiatric/Behavioral:  Negative for agitation and confusion.     ---------------------------------------------------------------------------------------------------------------------   Past Medical History:   Diagnosis Date    Arthritis     Breast cancer 2015    lt br ca    Breast cancer 2006    rt br ca    Dizziness 7/8/2016    [place] (hard of hearing)     Hyperlipidemia     Hypertension     Hypothyroidism     Scabies exposure      Past Surgical History:   Procedure Laterality Date    BREAST BIOPSY      BREAST LUMPECTOMY Right 2006    BREAST SURGERY      MASTECTOMY Left 2015    ca    MASTECTOMY Right 2020    MASTECTOMY WITH SENTINEL NODE BIOPSY AND AXILLARY NODE DISSECTION Right 11/17/2020    Procedure: BREAST  MASTECTOMY WITH SENTINEL NODE BIOPSY AND AXILLARY NODE DISSECTION;  Surgeon: Cynthia Ward MD;  Location: [facility];  Service: General;  Laterality: Right;     Family History   Problem Relation Age of Onset    Hypertension Mother     Uterine cancer Mother     Diabetes Mother     Diabetes Daughter     Prostate cancer Son     Diabetes Son     Throat cancer Son     Hypertension Child     Breast cancer Neg Hx      Social History     Socioeconomic History    Marital status:    Tobacco Use    Smoking status: Never    Smokeless tobacco: Never   Vaping Use    Vaping Use: Never used   Substance and Sexual Activity    Alcohol use: No    Drug use: No    Sexual activity: Defer     Birth control/protection: None     ---------------------------------------------------------------------------------------------------------------------   Allergies:  Bactrim [sulfamethoxazole-trimethoprim], Ketorolac, Phenergan [promethazine hcl], Codeine, and Penicillins  ---------------------------------------------------------------------------------------------------------------------   Medications below are reported home medications pulling from within the system; at this time, these medications have not been reconciled unless otherwise specified and are in the verification process for further verifcation as current home medications.    Prior to Admission Medications       Prescriptions Last Dose Informant Patient Reported? Taking?    amiodarone (PACERONE) 200 MG tablet   No No    Take 1 tablet by mouth Daily.    apixaban (ELIQUIS) 2.5 MG tablet tablet   No No    Take 1 tablet by mouth Every 12 (Twelve) Hours.    cloNIDine (Catapres) 0.1 MG tablet   No No    Take 1 tablet by mouth 2 (Two) Times a Day.    hydroCHLOROthiazide (HYDRODIURIL) 25 MG tablet   No No    Take 1 tablet by mouth Daily for blood pressure.    levothyroxine (SYNTHROID, LEVOTHROID) 75 MCG tablet   No No    Take 1 tablet by mouth Every Morning.    lisinopril  (PRINIVIL,ZESTRIL) 40 MG tablet   No No    Take 1 tablet by mouth daily for blood pressure.    metoprolol tartrate (LOPRESSOR) 100 MG tablet   No No    Take 1 tablet by mouth 2 (Two) Times a Day.          ---------------------------------------------------------------------------------------------------------------------    Objective     Hospital Scheduled Meds:  senna-docusate sodium, 2 tablet, Oral, BID  sodium chloride, 10 mL, Intravenous, Q12H      dextrose 5 % and sodium chloride 0.45 %, 100 mL/hr        Current listed hospital scheduled medications may not yet reflect those currently placed in orders that are signed and held, awaiting patient's arrival to floor/unit.    ---------------------------------------------------------------------------------------------------------------------   Vital Signs:  Temp:  [97.1 øF (36.2 øC)-97.9 øF (36.6 øC)] 97.7 øF (36.5 øC)  Heart Rate:  [] 84  Resp:  [16-18] 18  BP: (130-155)/(80-99) 130/85  Mean Arterial Pressure (Non-Invasive) for the past 24 hrs (Last 3 readings):   Noninvasive MAP (mmHg)   08/06/23 2211 101   08/06/23 2115 124   08/06/23 2100 104     SpO2 Percentage    08/06/23 2100 08/06/23 2115 08/06/23 2211   SpO2: 94% 97% 94%     SpO2:  [93 %-97 %] 94 %  on   ;   Device (Oxygen Therapy): room air    Body mass index is 15.28 kg/mý.  Wt Readings from Last 3 Encounters:   08/06/23 43 kg (94 lb 11.2 oz)   04/14/23 55.2 kg (121 lb 9.6 oz)   04/03/23 59.9 kg (132 lb)     ---------------------------------------------------------------------------------------------------------------------   Physical Exam:  Physical Exam  Constitutional:       General: She is not in acute distress.     Appearance: Normal appearance.   HENT:      Head: Normocephalic and atraumatic.      Right Ear: External ear normal.      Left Ear: External ear normal.      Nose: No nasal deformity.      Mouth/Throat:      Lips: Pink.      Mouth: Mucous membranes are moist.   Eyes:       Conjunctiva/sclera: Conjunctivae normal.      Pupils: Pupils are equal, round, and reactive to light.   Cardiovascular:      Rate and Rhythm: Normal rate and regular rhythm.      Pulses:           Dorsalis pedis pulses are 1+ on the right side and 1+ on the left side.      Heart sounds: Normal heart sounds.   Pulmonary:      Effort: Pulmonary effort is normal.      Breath sounds: Normal breath sounds. No wheezing, rhonchi or rales.   Abdominal:      General: Abdomen is flat. Bowel sounds are normal.      Palpations: Abdomen is soft.      Tenderness: There is no guarding or rebound.   Genitourinary:     Comments: No carpio catheter in place   Musculoskeletal:      Cervical back: Neck supple. Normal range of motion.      Right lower le+ Pitting Edema present.      Left lower le+ Pitting Edema present.      Comments: To mid shin    Skin:     General: Skin is warm and dry.   Neurological:      General: No focal deficit present.      Mental Status: She is alert.      Comments: Oriented to person and place, but not time    Psychiatric:         Mood and Affect: Mood normal.         Behavior: Behavior normal.     ---------------------------------------------------------------------------------------------------------------------  EK fibrillation.  Heart rate 82. Confirmed by cardiology      Telemetry:        I have personally reviewed the EKG/Telemetry strip  ---------------------------------------------------------------------------------------------------------------------   Results from last 7 days   Lab Units 23  2709   CK TOTAL U/L 42           Results from last 7 days   Lab Units 23  2157 23  1152   CRP mg/dL  --  3.19*   LACTATE mmol/L  --  1.2   WBC 10*3/mm3  --  6.71   HEMOGLOBIN g/dL 8.6* 9.4*   HEMATOCRIT % 27.2* 29.7*   MCV fL  --  107.6*   MCHC g/dL  --  31.6   PLATELETS 10*3/mm3  --  252     Results from last 7 days   Lab Units 23  1152   SODIUM mmol/L 135*   POTASSIUM  mmol/L 3.5   CHLORIDE mmol/L 98   CO2 mmol/L 18.0*   BUN mg/dL 29*   CREATININE mg/dL 1.03*   CALCIUM mg/dL 8.5*   GLUCOSE mg/dL 93   ALBUMIN g/dL 2.5*   BILIRUBIN mg/dL 1.2   ALK PHOS U/L 106   AST (SGOT) U/L 21   ALT (SGPT) U/L 10   Estimated Creatinine Clearance: 26.6 mL/min (A) (by C-G formula based on SCr of 1.03 mg/dL (H)).  No results found for: AMMONIA    Glucose   Date/Time Value Ref Range Status   08/06/2023 1037 69 (L) 70 - 130 mg/dL Final     Comment:     Meter: [id number] : [id number] [place]     No results found for: HGBA1C  Lab Results   Component Value Date    TSH 72.700 (H) 03/24/2023    FREET4 1.85 (H) 11/01/2021       No results found for: BLOODCX  No results found for: URINECX  No results found for: WOUNDCX  No results found for: STOOLCX  No results found for: RESPCX  No results found for: MRSACX  Pain Management Panel           No data to display              I have personally reviewed the above laboratory results.   ---------------------------------------------------------------------------------------------------------------------  Imaging Results (Last 7 Days)       Procedure Component Value Units Date/Time    CT Head Without Contrast [[id number]] Collected: 08/06/23 1947     Updated: 08/06/23 1952    Narrative:      EXAMINATION:Computed tomography(CT)of the head without IV contrast     TECHNIQUE:CT of the head was performed in the supine position without  intravenous contrast according to as low as reasonably achievable dose  protocol. Axial CT utilized with slice thickness of 5 mm presented in  soft tissue and bone algorithms.     COMPARISON:No prior studies are available for review at the time of this  dictation.     FINDINGS: Moderate parenchymal volume loss is noted.  No acute intra-or extra-axial hemorrhage or fluid collections are  identified. The ventricles are of normal size,shape,and morphology. The  basilar cisterns are patent. No mass effect or midline shift is seen.  The  gray-white matter differentiation is normal. The visualized portions  of the orbits,paranasal sinuses,and mastoids appear normal. No acute  fracture is identified.       Impression:         No acute intracranial hemorrhage,midline shift,or significant mass  effect.     This report was finalized on 8/6/2023 7:50 PM by Mary Gomez MD.       XR Chest 1 View [[id number]] Collected: 08/06/23 1215     Updated: 08/06/23 1218    Narrative:      XR CHEST 1 VW-     CLINICAL INDICATION: Line placement        COMPARISON: 04/03/2023      TECHNIQUE: Single frontal view of the chest.     FINDINGS:      LUNGS: Left-sided central line has been placed. The tip is in the  superior vena cava. No evidence of pneumothorax      HEART AND MEDIASTINUM: Heart and mediastinal contours are unremarkable        SKELETON: Bony and soft tissue structures are unremarkable.             Impression:      Central line tip in the superior vena cava. No pneumothorax     This report was finalized on 8/6/2023 12:15 PM by Dr. Erich Jenkins MD.             I have personally reviewed the above radiology results.     Last Echocardiogram:  Results for orders placed during the hospital encounter of 11/02/21    Adult Transesophageal Echo (DONALDO) W/ Cont if Necessary Per Protocol    Interpretation Summary  ú Normal left ventricular cavity size and wall thickness noted. All left ventricular wall segments contract normally.  ú Left ventricular ejection fraction appears to be 61 - 65%.  ú The aortic valve is abnormal in structure. The aortic valve exhibits sclerosis. There is calcification of the aortic valve. The aortic valve appears trileaflet. Mild aortic valve regurgitation is present. Mild aortic valve stenosis is present.  ú There are myxomatous changes of the mitral valve apparatus present. Moderate mitral valve regurgitation is present. No significant mitral valve stenosis is present  ú No evidence of a left atrial appendage thrombus was present.  ú  Moderate mitral valve regurgitation is present. No significant mitral valve stenosis is present.  ú Moderate to severe tricuspid valve regurgitation is present.  ú There is no evidence of pericardial effusion. .  ú Comments: Proceed with electrical cardioversion.    ---------------------------------------------------------------------------------------------------------------------    Assessment & Plan      Active Hospital Problems    Diagnosis  POA    **Acute encephalopathy [G93.40]  Yes     Acute encephalopathy, POA  Hypoglycemia, POA  Bright red blood per rectum, POA  Dehydration, POA   Progressive debility   CT head with no acute findings  H&H stable at this time  Inpatient general surgery consult  Keep patient n.p.o.  Nursing dysphagia screen prior to any p.o. medication  D5 and half-normal saline infusion at 100 mL/h  Patient presented with blood glucose 69.  Family reports poor p.o. intake.  Accu-Cheks every 6 hours  Recheck H&H after fluid resuscitation as well as repeating every 8 hours.  Case Management consulted  Strict I's and O's  Daily weights  Hold home anticoagulation  If placement needed on discharge, will need to consult psych for decisional capacity   PT/OT consult     CHRONIC MEDICAL PROBLEMS  Essential hypertension  BP appears moderately controlled   Plan to resume home antihypertensive regimen once reconciled per pharmacy with appropriate holding parameters to prevent hypotension and/or bradycardia   Closely monitor BP per hospital protocol, titrate medications as necessary    Hyperlipidemia   Restart home statin pending med rec     Hypothyroidism  Restart home synthroid pending med rec     Atrial fibrillation chronically anticoagulated with Eliquis  Continuous cardiac monitoring  Restart rate controlling medications pending med rec  Hold home Eliquis at this time  F/E/N: D5  0.45 NS at 100 mL/h.  Continue to monitor electrolytes.   NPO.    ---------------------------------------------------  DVT Prophylaxis: Already anticoagulated at home, hold on admission  GI Prophylaxis: Bowel regimen  Activity: Up with assistance    The patient is considered to be a high risk patient due to: Acute encephalopathy, hypoglycemia    INPATIENT status due to the need for care which can only be reasonably provided in an hospital setting such as aggressive/expedited ancillary services and/or consultation services, the necessity for IV medications, close physician monitoring and/or the possible need for procedures.  In such, I feel patient's risk for adverse outcomes and need for care warrant INPATIENT evaluation and predict the patient's care encounter to likely last beyond 2 midnights.      Code Status: Full Code     Disposition/Discharge planning: Pending clinical course    I have discussed the patient's assessment and plan with Dr. Rodríguez.      Agustina Silver PA-C  Hospitalist Service -- [facility]       08/06/23  22:43 EDT    Attending Physician: González Rodríguez MD     Electronically signed by González Rodríguez MD at 08/06/23 2259       Lines, Drains & Airways       Active LDAs       Name Placement date Placement time Site Days    CVC Triple Lumen 08/06/23 Non-tunneled Left Internal jugular 08/06/23  1155  Internal jugular  9    External Urinary Catheter --  --  --  --                  Current Facility-Administered Medications   Medication Dose Route Frequency Provider Last Rate Last Admin    apixaban (ELIQUIS) tablet 2.5 mg  2.5 mg Oral Q12H Romeo Valdez MD   2.5 mg at 08/16/23 0828    aspirin EC tablet 81 mg  81 mg Oral Daily Destini Lao MD   81 mg at 08/16/23 0828    sennosides-docusate (PERICOLACE) 8.6-50 MG per tablet 2 tablet  2 tablet Oral BID Romeo Valdez MD   2 tablet at 08/15/23 2046    And    polyethylene glycol (MIRALAX) packet 17 g  17 g Oral Daily PRN Romeo Valdez MD        And     bisacodyl (DULCOLAX) EC tablet 5 mg  5 mg Oral Daily PRN Romeo Valdez MD        And    bisacodyl (DULCOLAX) suppository 10 mg  10 mg Rectal Daily PRN Romeo Valdez MD        cloNIDine (CATAPRES) tablet 0.1 mg  0.1 mg Oral BID Romeo Valdez MD   0.1 mg at 08/16/23 0827    clopidogrel (PLAVIX) tablet 75 mg  75 mg Oral Daily Destini Lao MD   75 mg at 08/15/23 0812    dexAMETHasone (DECADRON) injection 4 mg  4 mg Intravenous Q12H Destini Lao MD   4 mg at 08/16/23 0828    dextrose (D50W) (25 g/50 mL) IV injection 25 g  25 g Intravenous Q15 Min PRN Whitney Grider PA-C   25 g at 08/15/23 0643    dextrose (GLUTOSE) oral gel 15 g  15 g Oral Q15 Min PRN Whitney Grider PA-C   15 g at 08/15/23 0546    famotidine (PEPCID) tablet 20 mg  20 mg Oral BID AC Destini Lao MD   20 mg at 08/15/23 0812    glucagon HCl (Diagnostic) injection 1 mg  1 mg Subcutaneous Q15 Min PRN Whitney Grider PA-C        HYDROcodone-acetaminophen (NORCO) 5-325 MG per tablet 1 tablet  1 tablet Oral Q6H PRN Makenzie Guzman APRN   1 tablet at 08/16/23 0318    levothyroxine (SYNTHROID, LEVOTHROID) tablet 75 mcg  75 mcg Oral Q AM Romeo Valdez MD   75 mcg at 08/16/23 0640    lisinopril (PRINIVIL,ZESTRIL) tablet 10 mg  10 mg Oral Q24H Destini Lao MD   10 mg at 08/16/23 0828    nitroglycerin (NITROSTAT) SL tablet 0.4 mg  0.4 mg Sublingual Q5 Min PRN Romeo Valdez MD        Potassium Replacement - Follow Nurse / BPA Driven Protocol   Does not apply PRN Romeo Valdez MD        sodium chloride 0.9 % flush 10 mL  10 mL Intravenous PRN Romeo Valdez MD   10 mL at 08/15/23 2047    sodium chloride 0.9 % flush 10 mL  10 mL Intravenous Q12H Romeo Valdez MD   10 mL at 08/16/23 0828    sodium chloride 0.9 % flush 10 mL  10 mL Intravenous PRN Romeo Valdez MD        sodium chloride 0.9 % infusion 40 mL  40 mL Intravenous PRN House, Romeo Anoop,  MD         Lab Results (last 24 hours)       Procedure Component Value Units Date/Time    Cortisol [[id number]] Collected: 08/16/23 0752    Specimen: Blood Updated: 08/16/23 0855     Cortisol 31.98 mcg/dL     Narrative:      Cortisol Reference Ranges:    Cortisol 6AM - 10AM Range: 6.02-18.40 mcg/dl  Cortisol 4PM - 8PM Range: 2.68-10.50 mcg/dl      Results may be falsely increased if patient taking Biotin.      High Sensitivity Troponin T [[id number]]  (Abnormal) Collected: 08/16/23 0758    Specimen: Blood Updated: 08/16/23 0839     HS Troponin T 14 ng/L     Narrative:      High Sensitive Troponin T Reference Range:  <10.0 ng/L- Negative Female for AMI  <15.0 ng/L- Negative Male for AMI  >=10 - Abnormal Female indicating possible myocardial injury.  >=15 - Abnormal Male indicating possible myocardial injury.   Clinicians would have to utilize clinical acumen, EKG, Troponin, and serial changes to determine if it is an Acute Myocardial Infarction or myocardial injury due to an underlying chronic condition.         Cortisol [[id number]] Collected: 08/16/23 0718    Specimen: Blood Updated: 08/16/23 0810     Cortisol 23.92 mcg/dL     Narrative:      Cortisol Reference Ranges:    Cortisol 6AM - 10AM Range: 6.02-18.40 mcg/dl  Cortisol 4PM - 8PM Range: 2.68-10.50 mcg/dl      Results may be falsely increased if patient taking Biotin.      Cortisol [[id number]] Collected: 08/16/23 0648    Specimen: Blood Updated: 08/16/23 0739     Cortisol 8.16 mcg/dL     Narrative:      Cortisol Reference Ranges:    Cortisol 6AM - 10AM Range: 6.02-18.40 mcg/dl  Cortisol 4PM - 8PM Range: 2.68-10.50 mcg/dl      Results may be falsely increased if patient taking Biotin.      TSH [[id number]]  (Abnormal) Collected: 08/16/23 0648    Specimen: Blood Updated: 08/16/23 0738     TSH 44.820 uIU/mL     T4, Free [[id number]]  (Abnormal) Collected: 08/16/23 0648    Specimen: Blood Updated: 08/16/23 0738     Free T4 0.51 ng/dL     Narrative:      Results may be  falsely increased if patient taking Biotin.      Basic Metabolic Panel [[id number]]  (Abnormal) Collected: 08/16/23 0648    Specimen: Blood Updated: 08/16/23 0731     Glucose 93 mg/dL      BUN 13 mg/dL      Creatinine 0.67 mg/dL      Sodium 128 mmol/L      Potassium 4.2 mmol/L      Comment: Slight hemolysis detected by analyzer. Results may be affected.        Chloride 100 mmol/L      CO2 19.8 mmol/L      Calcium 8.1 mg/dL      BUN/Creatinine Ratio 19.4     Anion Gap 8.2 mmol/L      eGFR 85.2 mL/min/1.73     Narrative:      GFR Normal >60  Chronic Kidney Disease <60  Kidney Failure <15    The GFR formula is only valid for adults with stable renal function between ages 18 and 70.    CBC & Differential [[id number]]  (Abnormal) Collected: 08/16/23 0648    Specimen: Blood Updated: 08/16/23 0658    Narrative:      The following orders were created for panel order CBC & Differential.  Procedure                               Abnormality         Status                     ---------                               -----------         ------                     CBC Auto Differential[[id number]]        Abnormal            Final result               Scan Slide[[id number]]                                                                    Please view results for these tests on the individual orders.    CBC Auto Differential [[id number]]  (Abnormal) Collected: 08/16/23 0648    Specimen: Blood Updated: 08/16/23 0658     WBC 3.37 10*3/mm3      RBC 2.31 10*6/mm3      Hemoglobin 8.0 g/dL      Hematocrit 24.7 %      .9 fL      MCH 34.6 pg      MCHC 32.4 g/dL      RDW 17.9 %      RDW-SD 69.7 fl      MPV 9.8 fL      Platelets 146 10*3/mm3      Neutrophil % 68.3 %      Lymphocyte % 24.3 %      Monocyte % 5.9 %      Eosinophil % 0.0 %      Basophil % 0.3 %      Immature Grans % 1.2 %      Neutrophils, Absolute 2.30 10*3/mm3      Lymphocytes, Absolute 0.82 10*3/mm3      Monocytes, Absolute 0.20 10*3/mm3      Eosinophils, Absolute 0.00  10*3/mm3      Basophils, Absolute 0.01 10*3/mm3      Immature Grans, Absolute 0.04 10*3/mm3      nRBC 0.0 /100 WBC     POC Glucose Once [[id number]]  (Normal) Collected: 08/16/23 0647    Specimen: Blood Updated: 08/16/23 0653     Glucose 98 mg/dL     POC Glucose Once [[id number]]  (Normal) Collected: 08/16/23 0536    Specimen: Blood Updated: 08/16/23 0542     Glucose 85 mg/dL     POC Glucose Once [[id number]]  (Abnormal) Collected: 08/15/23 2017    Specimen: Blood Updated: 08/15/23 2023     Glucose 142 mg/dL     POC Glucose Once [[id number]]  (Normal) Collected: 08/15/23 1543    Specimen: Blood Updated: 08/15/23 1549     Glucose 87 mg/dL     CK [[id number]]  (Normal) Collected: 08/15/23 0637    Specimen: Blood Updated: 08/15/23 1314     Creatine Kinase 37 U/L     POC Glucose Once [[id number]]  (Normal) Collected: 08/15/23 1034    Specimen: Blood Updated: 08/15/23 1040     Glucose 116 mg/dL           Orders (last 24 hrs)        Start     Ordered    08/16/23 0958  High Sensitivity Troponin T 2Hr  PROCEDURE ONCE         08/16/23 0839    08/16/23 0758  High Sensitivity Troponin T  STAT         08/16/23 0757    08/16/23 0758  ECG 12 Lead Chest Pain  STAT         08/16/23 0757    08/16/23 0658  Scan Slide  Once,   Status:  Canceled         08/16/23 0657    08/16/23 0654  POC Glucose Once  PROCEDURE ONCE        Comments: Complete no more than 45 minutes prior to patient eating      08/16/23 0647    08/16/23 0600  CBC & Differential  Morning Draw         08/15/23 1139    08/16/23 0600  Basic Metabolic Panel  Morning Draw         08/15/23 1139    08/16/23 0600  TSH  Morning Draw         08/15/23 1144    08/16/23 0600  T4, Free  Morning Draw         08/15/23 1144    08/16/23 0600  Cortisol  Every 30 Minutes      Comments: Draw Baseline Cortisol Level Prior to Injection.Then Draw Cortisol Level at 30 and 60 Minutes After Injection     [facility] for [facility] Report.    08/15/23 1144    08/16/23 0600  cosyntropin  (CORTROSYN) injection 0.25 mg  Once        See Hyperspace for full Linked Orders Report.    08/15/23 1144    08/16/23 0600  Basic Metabolic Panel  Morning Draw,   Status:  Canceled         08/15/23 1144    08/16/23 0600  CBC Auto Differential  PROCEDURE ONCE         08/15/23 2204    08/16/23 0543  POC Glucose Once  PROCEDURE ONCE        Comments: Complete no more than 45 minutes prior to patient eating      08/16/23 0536    08/15/23 2024  POC Glucose Once  PROCEDURE ONCE        Comments: Complete no more than 45 minutes prior to patient eating      08/15/23 2017    08/15/23 1550  POC Glucose Once  PROCEDURE ONCE        Comments: Complete no more than 45 minutes prior to patient eating      08/15/23 1543    08/15/23 1230  dexAMETHasone (DECADRON) injection 4 mg  Every 12 Hours Scheduled         08/15/23 1139    08/15/23 1145  CK  STAT         08/15/23 1145    08/15/23 1041  POC Glucose Once  PROCEDURE ONCE        Comments: Complete no more than 45 minutes prior to patient eating      08/15/23 1034    08/14/23 2011  HYDROcodone-acetaminophen (NORCO) 5-325 MG per tablet 1 tablet  Every 6 Hours PRN         08/14/23 2011 08/14/23 1215  dextrose 5 % and sodium chloride 0.9 % infusion  Continuous,   Status:  Discontinued         08/14/23 1119    08/12/23 1730  famotidine (PEPCID) tablet 20 mg  2 Times Daily Before Meals         08/12/23 1522    08/12/23 1615  lisinopril (PRINIVIL,ZESTRIL) tablet 10 mg  Every 24 Hours Scheduled         08/12/23 1516    08/12/23 1615  clopidogrel (PLAVIX) tablet 75 mg  Daily         08/12/23 1522    08/12/23 1615  aspirin EC tablet 81 mg  Daily         08/12/23 1522    08/11/23 0050  dextrose (GLUTOSE) oral gel 15 g  Every 15 Minutes PRN         08/11/23 0050    08/11/23 0050  dextrose (D50W) (25 g/50 mL) IV injection 25 g  Every 15 Minutes PRN         08/11/23 0050    08/11/23 0050  glucagon HCl (Diagnostic) injection 1 mg  Every 15 Minutes PRN         08/11/23 0050    08/10/23 1800   Dietary Nutrition Supplements Boost Plus (Ensure Enlive, Ensure Plus)  Daily With Breakfast & Dinner       08/10/23 1546    08/08/23 1045  apixaban (ELIQUIS) tablet 2.5 mg  Every 12 Hours Scheduled         08/08/23 1002    08/08/23 0600  levothyroxine (SYNTHROID, LEVOTHROID) tablet 75 mcg  Every Early Morning         08/07/23 1826 08/08/23 0316  Potassium Replacement - Follow Nurse / BPA Driven Protocol  As Needed         08/08/23 0316 08/07/23 2100  cloNIDine (CATAPRES) tablet 0.1 mg  2 Times Daily         08/07/23 1826 08/07/23 0800  Oral Care  2 Times Daily       08/06/23 2230 08/06/23 2330  sodium chloride 0.9 % flush 10 mL  Every 12 Hours Scheduled         08/06/23 2230 08/06/23 2330  sennosides-docusate (PERICOLACE) 8.6-50 MG per tablet 2 tablet  2 Times Daily        See Hyperspace for full Linked Orders Report.    08/06/23 2230 08/06/23 2231  Daily Weights  Daily       08/06/23 2230 08/06/23 2230  polyethylene glycol (MIRALAX) packet 17 g  Daily PRN        See Hyperspace for full Linked Orders Report.    08/06/23 2230 08/06/23 2230  bisacodyl (DULCOLAX) EC tablet 5 mg  Daily PRN        See Hyperspace for full Linked Orders Report.    08/06/23 2230 08/06/23 2230  bisacodyl (DULCOLAX) suppository 10 mg  Daily PRN        See Hyperspace for full Linked Orders Report.    08/06/23 2230 08/06/23 2230  sodium chloride 0.9 % flush 10 mL  As Needed         08/06/23 2230 08/06/23 2230  sodium chloride 0.9 % infusion 40 mL  As Needed         08/06/23 2230 08/06/23 2230  nitroglycerin (NITROSTAT) SL tablet 0.4 mg  Every 5 Minutes PRN         08/06/23 2230 08/06/23 2058  POC Glucose Finger Q6H  Every 6 Hours      Comments: Complete no more than 45 minutes prior to patient eating      08/06/23 2057 08/06/23 1126  sodium chloride 0.9 % flush 10 mL  As Needed         08/06/23 1127    Unscheduled  Up With Assistance  As Needed       08/06/23 2230    Unscheduled  Follow Hypoglycemia  Standing Orders For Blood Glucose <70 & Notify Provider of Treatment  As Needed      Comments: Follow Hypoglycemia Orders As Outlined in Process Instructions (Open Order Report to View Full Instructions)  Notify Provider Any Time Hypoglycemia Treatment is Administered    23 0050    --  SCANNED - TELEMETRY           23 0000    --  SCANNED - TELEMETRY           23 0000    --  SCANNED - TELEMETRY           23 0000    --  SCANNED - TELEMETRY           23 0000    --  SCANNED - TELEMETRY           23 0000    --  SCANNED - TELEMETRY           23 0000    --  UPPER GI ENDOSCOPY         23 1111    --  COLONOSCOPY         23 1118    --  SCANNED - TELEMETRY           23 0000    --  SCANNED - TELEMETRY           23 0000    --  SCANNED - TELEMETRY           23 0000    --  SCANNED - TELEMETRY           23 0000    --  SCANNED - TELEMETRY           23 0000                     Physician Progress Notes (last 24 hours)        Destini Lao MD at 08/15/23 1223              [facility] PROGRESS NOTE     Patient Identification:  Name:  Lashell Case  Age:  88 y.o.  Sex:  female  :  1935  MRN:  [id number]  Visit Number:  [id number]  Primary Care Provider:  Evelio Guerin DO    Length of stay:  9    Subjective: Patient seen and examined, she eats fair, again has developed 1 episode of hypoglycemia which is severe, this is even on dextrose containing IV fluids.  Unfortunately the cosyntropin was not done since the patient received cosyntropin before getting a baseline cortisol level.  Cortisol level that we have currently is after receiving cosyntropin stimulation.  Sodium continues to worsen.    Chief Complaint: Altered mental status  ----------------------------------------------------------------------------------------------------------------------  Current Hospital Meds:  apixaban, 2.5 mg, Oral,  Q12H  aspirin, 81 mg, Oral, Daily  cloNIDine, 0.1 mg, Oral, BID  clopidogrel, 75 mg, Oral, Daily  [START ON 8/16/2023] cosyntropin, 0.25 mg, Intravenous, Once  dexAMETHasone, 4 mg, Intravenous, Q12H  famotidine, 20 mg, Oral, BID AC  levothyroxine, 75 mcg, Oral, Q AM  lisinopril, 10 mg, Oral, Q24H  senna-docusate sodium, 2 tablet, Oral, BID  sodium chloride, 10 mL, Intravenous, Q12H         ----------------------------------------------------------------------------------------------------------------------  Vital Signs:  Temp:  [97.4 øF (36.3 øC)-97.8 øF (36.6 øC)] 97.4 øF (36.3 øC)  Heart Rate:  [69-75] 70  Resp:  [16-18] 18  BP: (128-159)/(61-71) 150/68       Tele:       08/14/23  0521 08/14/23  0711 08/15/23  0552   Weight: 58.7 kg (129 lb 8 oz) (MARIANNA Ulloa aware of weight gain, weighed with one sheet and one pillow) 57.1 kg (125 lb 14.1 oz) 58.3 kg (128 lb 9.6 oz)     Body mass index is 20.77 kg/mý.    Intake/Output Summary (Last 24 hours) at 8/15/2023 1223  Last data filed at 8/15/2023 0800  Gross per 24 hour   Intake 480 ml   Output 600 ml   Net -120 ml     Diet: Regular/House Diet; Texture: Regular Texture (IDDSI 7); Fluid Consistency: Thin (IDDSI 0)  ----------------------------------------------------------------------------------------------------------------------  Physical exam:  General: Chronically ill-appearing, pleasantly demented, comfortable,awake, alert, oriented to self, place, No respiratory distress.    Skin:  Skin is warm and dry. No rash noted. No pallor.  Coarse skin  HENT:  Head: Hairs are coarse, normocephalic and atraumatic.  Mouth:  Moist mucous membranes.    Eyes:  Conjunctivae and EOM are normal.  Pupils are equal, round, and reactive to light.  No scleral icterus.    Neck:  Neck supple.  No JVD present.    Pulmonary/Chest:  No respiratory distress, no wheezes, no crackles, with normal breath sounds and good air movement.  Cardiovascular:  Normal rate, irregular irregular rhythm, no  murmur.  Abdominal:  Soft.  Bowel sounds are normal.  No distension and no tenderness.   Extremities:  No edema, no tenderness, and no deformity.  No red or swollen joints anywhere.  Strong pulses in all 4 extremities with no clubbing, no cyanosis, no edema.  Neurological:  Motor strength equal no obvious deficit, sensory grossly intact.   No cranial nerve deficit.  No tongue deviation.  No facial droop.  No slurred speech.    Genitourinary: External urinary catheter  Back:  ----------------------------------------------------------------------------------------------------------------------  ----------------------------------------------------------------------------------------------------------------------      Results from last 7 days   Lab Units 08/15/23  0637 08/14/23  0244 08/13/23  0246   WBC 10*3/mm3 4.03 5.66 5.39   HEMOGLOBIN g/dL 8.7* 8.9* 7.6*   HEMATOCRIT % 26.3* 26.6* 22.9*   MCV fL 104.8* 103.1* 105.5*   MCHC g/dL 33.1 33.5 33.2   PLATELETS 10*3/mm3 159 125* 135*         Results from last 7 days   Lab Units 08/15/23  0637 08/14/23  0244 08/13/23  1636 08/13/23  0246   SODIUM mmol/L 126* 128*  --  128*   POTASSIUM mmol/L 3.9 4.8 4.8 2.8*   MAGNESIUM mg/dL  --   --  1.6  --    CHLORIDE mmol/L 100 100  --  100   CO2 mmol/L 18.5* 18.9*  --  18.6*   BUN mg/dL 9 10  --  8   CREATININE mg/dL 0.66 0.81  --  0.63   CALCIUM mg/dL 7.8* 7.7*  --  7.1*   GLUCOSE mg/dL 117* 91  --  97   ALBUMIN g/dL 1.8*  --   --   --    BILIRUBIN mg/dL 0.4  --   --   --    ALK PHOS U/L 118*  --   --   --    AST (SGOT) U/L 17  --   --   --    ALT (SGPT) U/L 9  --   --   --    Estimated Creatinine Clearance: 56.3 mL/min (by C-G formula based on SCr of 0.66 mg/dL).    No results found for: AMMONIA      No results found for: BLOODCX  No results found for: URINECX  No results found for: WOUNDCX  No results found for: STOOLCX    I have personally looked at the labs and they are summarized  "above.  ----------------------------------------------------------------------------------------------------------------------  Imaging Results (Last 24 Hours)       ** No results found for the last 24 hours. **          ----------------------------------------------------------------------------------------------------------------------  Assessment and Plan:  -Hematochezia upper endoscopy negative lower endoscopy was suboptimal study poor prep so far H&H has remained stable  -Dementia without behavioral disturbances  -Episodes of hypoglycemia no oral hypoglycemic or insulin  -Hypothyroidism treatment initiated on this admission  -Chronic hyponatremia with worsening  -Macrocytosis likely related to hypothyroidism  -Advanced age-chronically anticoagulated for stroke prophylaxis   -Chronic persistent atrial fibrillation     Treatment has been delayed because of the cosyntropin test, will start her empirically with Decadron since this will not interfere with cosyntropin test, will DC IV fluids, will monitor response.      Disposition for nursing home placement      Destini Lao MD  08/15/23  12:23 EDT    Electronically signed by Destini Lao MD at 08/15/23 1909       Cindy Alaniz, APRN at 08/15/23 1115          Palliative Care Daily Progress Note     S: Medical record reviewed, upon entering the room pt is very pleasantly confused, she is talkative. She is requesting that she get to go home. She denies any symptoms at this time.       O:   Palliative Performance Scale Score:     /68 (BP Location: Left arm, Patient Position: Lying)   Pulse 70   Temp 97.4 øF (36.3 øC) (Axillary)   Resp 18   Ht 167.6 cm (65.98\")   Wt 58.3 kg (128 lb 9.6 oz)   SpO2 95%   BMI 20.77 kg/mý     Intake/Output Summary (Last 24 hours) at 8/15/2023 1249  Last data filed at 8/15/2023 0800  Gross per 24 hour   Intake 240 ml   Output 600 ml   Net -360 ml       PE:    General Appearance:    Chronically ill appearing, " alert, cooperative, NAD   HEENT:    NC/AT, without obvious abnormality, EOMI, anicteric    Neck:   supple, trachea midline, no JVD   Lungs:     CTAB without w/r/r    Heart:    irregular, normal S1 and S2, no M/R/G   Abdomen:     Soft, NT, ND, NABS    Extremities:   Moves all extremities, 1+ edema BLE, overgrown fingernails/toenails   Pulses:   Pulses palpable and equal bilaterally   Skin:   Warm, dry   Neurologic:   Alert to self and place only, pleasantly confused/disoriented   Psych:   Calm, appropriate         Meds: Reviewed and changes not noted    Labs:   Results from last 7 days   Lab Units 08/15/23  0637   WBC 10*3/mm3 4.03   HEMOGLOBIN g/dL 8.7*   HEMATOCRIT % 26.3*   PLATELETS 10*3/mm3 159     Results from last 7 days   Lab Units 08/15/23  0637   SODIUM mmol/L 126*   POTASSIUM mmol/L 3.9   CHLORIDE mmol/L 100   CO2 mmol/L 18.5*   BUN mg/dL 9   CREATININE mg/dL 0.66   GLUCOSE mg/dL 117*   CALCIUM mg/dL 7.8*     Results from last 7 days   Lab Units 08/15/23  0637   SODIUM mmol/L 126*   POTASSIUM mmol/L 3.9   CHLORIDE mmol/L 100   CO2 mmol/L 18.5*   BUN mg/dL 9   CREATININE mg/dL 0.66   CALCIUM mg/dL 7.8*   BILIRUBIN mg/dL 0.4   ALK PHOS U/L 118*   ALT (SGPT) U/L 9   AST (SGOT) U/L 17   GLUCOSE mg/dL 117*     Imaging Results (Last 72 Hours)       ** No results found for the last 72 hours. **              Diagnostics: Reviewed    A: : Pt has no symptoms to manage at this time. Labs- Sodium 126 (previously 128), BUN 9, Creat 0.66, H&H 8.7/26.3. bp 150/68 hr 70 rr 18 sat 95% ra      P: We are still currently waiting on LTC placement for pt. She has been denied at several different facilities. Jayleen Brown granddaughter is scheduled to go to for emergency guardianship r/t pt's lack of capacity. Will continue to provide symptomatic and supportive palliative care for pt and family. Will assist with disposition if needed.       We will continue to follow along. Please do not hesitate to contact us regarding further  sx mgmt or GOC needs, including after hours or on weekends via our on call provider at [phone].     SOFI Ponce    8/15/2023    Electronically signed by Cindy Alaniz APRN at 08/15/23 1308          Consult Notes (most recent note)        Violetta Ni MD at 08/10/23 1428        Consult Orders    1. Inpatient Psychiatrist Consult [[id number]] ordered by Austyn Cifuentes DO at 08/09/23 5532                     Referring Provider: Dr. Cifuentes  Reason for Consultation: Suicidal ideations      Chief complaint/Focus of Exam: evaluate for suicidal ideations    Subjective .     History of present illness:  Lashell Case is a 88 y.o. female who was admitted on 8/6/2023 with complaints of altered mental status and she has a history of hypertension, hyperlipidemia, hypothyrodisim, arthritis, a-fib and cognitive decline and an inability to take care of her ADL and IADLs, getting progressively worse over last 2-3 months. The patient was seen for capacity evaluation earlier and deemed lacking capacity to make informed decisions about her care and disposition. Per report, her daughter has expressed concern that patient has threatened to kill herself and another psych consult needed to be done. The patient was seen in her room where she is resting comfortably in her bed. She is hard of hearing but is cooperative and pleasant. She is not sure why she is in the hospital. She reports her mood to be good and denies any thoughts of harm to self or others. Per collateral from staff, the patient has not verbalized SI, nor has she demonstrated any self harming behaviors.  No fever or chills reported. No chest pain or nvd reported. No focal neurologic deficits reported.     Review of Systems  No fever or chills reported. No chest pain or nvd reported. No focal neurologic deficits reported.     History  Past Medical History:   Diagnosis Date    Arthritis     Breast cancer 2015    lt br ca    Breast cancer 2006     rt br ca    Dizziness 7/8/2016    [place] hearing)     Hyperlipidemia     Hypertension     Hypothyroidism     Scabies exposure    ,   Past Surgical History:   Procedure Laterality Date    BREAST BIOPSY      BREAST LUMPECTOMY Right 2006    BREAST SURGERY      COLONOSCOPY N/A 8/9/2023    Procedure: COLONOSCOPY;  Surgeon: Romeo Valdez MD;  Location: [facility];  Service: Gastroenterology;  Laterality: N/A;    ENDOSCOPY N/A 8/9/2023    Procedure: ESOPHAGOGASTRODUODENOSCOPY;  Surgeon: Romeo Valdez MD;  Location: [facility] OR;  Service: Gastroenterology;  Laterality: N/A;    MASTECTOMY Left 2015    ca    MASTECTOMY Right 2020    MASTECTOMY WITH SENTINEL NODE BIOPSY AND AXILLARY NODE DISSECTION Right 11/17/2020    Procedure: BREAST MASTECTOMY WITH SENTINEL NODE BIOPSY AND AXILLARY NODE DISSECTION;  Surgeon: Cynthia Ward MD;  Location: [facility];  Service: General;  Laterality: Right;   ,   Family History   Problem Relation Age of Onset    Hypertension Mother     Uterine cancer Mother     Diabetes Mother     Diabetes Daughter     Prostate cancer Son     Diabetes Son     Throat cancer Son     Hypertension Child     Breast cancer Neg Hx    ,   Social History     Socioeconomic History    Marital status:    Tobacco Use    Smoking status: Never    Smokeless tobacco: Never   Vaping Use    Vaping Use: Never used   Substance and Sexual Activity    Alcohol use: No    Drug use: No    Sexual activity: Defer     Birth control/protection: None     E-cigarette/Vaping    E-cigarette/Vaping Use Never User      E-cigarette/Vaping Substances    Nicotine No     THC No     CBD No     Flavoring No      E-cigarette/Vaping Devices    Disposable No     Pre-filled or Refillable Cartridge No     Refillable Tank No     Pre-filled Pod No          ,   Medications Prior to Admission   Medication Sig Dispense Refill Last Dose    cloNIDine (Catapres) 0.1 MG tablet Take 1 tablet by mouth 2 (Two) Times a Day. 180 tablet 0  Past Week    levothyroxine (SYNTHROID, LEVOTHROID) 75 MCG tablet Take 1 tablet by mouth Every Morning. 90 tablet 0 Past Week   , Scheduled Meds:  apixaban, 2.5 mg, Oral, Q12H  cloNIDine, 0.1 mg, Oral, BID  dextrose, 50 mL, Intravenous, Once  levothyroxine, 75 mcg, Oral, Q AM  mupirocin, 1 application , Each Nare, BID  senna-docusate sodium, 2 tablet, Oral, BID  sodium chloride, 10 mL, Intravenous, Q12H   , Continuous Infusions:  dextrose 5 % and sodium chloride 0.45 %, 125 mL/hr, Last Rate: 100 mL/hr (08/10/23 0831)   , PRN Meds:    senna-docusate sodium **AND** polyethylene glycol **AND** bisacodyl **AND** bisacodyl    HYDROcodone-acetaminophen    nitroglycerin    Potassium Replacement - Follow Nurse / BPA Driven Protocol    sodium chloride    sodium chloride    sodium chloride, and Allergies:  Bactrim [sulfamethoxazole-trimethoprim], Ketorolac, Phenergan [promethazine hcl], Codeine, and Penicillins    Objective     Vital Signs   Temp:  [97.8 øF (36.6 øC)-98.1 øF (36.7 øC)] 98.1 øF (36.7 øC)  Heart Rate:  [59-84] 60  Resp:  [18] 18  BP: (117-138)/(70-88) 122/88    Mental Status Exam:   Mental Status Exam:    Hygiene:   fair  Cooperation:  Cooperative  Eye Contact:  Fair  Psychomotor Behavior:  Appropriate  Affect:  Appropriate  Hopelessness: Denies  Speech:  Normal  Thought Progress:  Goal directed  Thought Content:  Normal  Suicidal:  None  Homicidal:  None  Hallucinations:  None  Delusion:  None  Memory:  Deficits  Orientation:  Person and Place  Reliability:  poor  Insight:  Poor  Judgement:  Poor  Impulse Control:  Fair    Results Review:   I reviewed the patient's new clinical results.  Lab Results (last 24 hours)       Procedure Component Value Units Date/Time    POC Glucose Once [[id number]]  (Abnormal) Collected: 08/10/23 1339    Specimen: Blood Updated: 08/10/23 1345     Glucose 68 mg/dL     POC Glucose Once [[id number]]  (Abnormal) Collected: 08/10/23 1246    Specimen: Blood Updated: 08/10/23 1251      Glucose 53 mg/dL     Blood Culture - Blood, Blood, Central Line [[id number]]  (Normal) Collected: 23 1143    Specimen: Blood, Central Line Updated: 08/10/23 1215     Blood Culture No growth at 4 days    Blood Culture - Blood, Blood, Central Line [[id number]]  (Normal) Collected: 23 1152    Specimen: Blood, Central Line Updated: 08/10/23 1215     Blood Culture No growth at 4 days    POC Glucose Once [[id number]]  (Abnormal) Collected: 08/10/23 1145    Specimen: Blood Updated: 08/10/23 1151     Glucose 61 mg/dL     POC Glucose Once [[id number]]  (Normal) Collected: 08/10/23 0728    Specimen: Blood Updated: 08/10/23 0734     Glucose 77 mg/dL     POC Glucose Once [[id number]]  (Abnormal) Collected: 08/10/23 0222    Specimen: Blood Updated: 08/10/23 0229     Glucose 135 mg/dL     POC Glucose Once [[id number]]  (Normal) Collected: 23    Specimen: Blood Updated: 23     Glucose 103 mg/dL     POC Glucose Once [[id number]]  (Normal) Collected: 23 163    Specimen: Blood Updated: 23 1643     Glucose 79 mg/dL           Imaging Results (Last 24 Hours)       ** No results found for the last 24 hours. **              Assessment & Plan     Principal Problem:    Acute encephalopathy  Active Problems:    LGI bleed    Chronic anemia    Severe malnutrition     The patient is denying any thoughts of harm to self or others and has not demonstrated any behaviors indicating any such intentions.     I discussed the patient's findings and my recommendations with patient, nursing staff, and primary care team    Violetta Ni MD  08/10/23  14:28 EDT          Electronically signed by Violetta Ni MD at 08/10/23 3137          Physical Therapy Notes (most recent note)        Kiki Rodriguez, PT at 08/15/23 1150  Version 1 of 1         Acute Care - Physical Therapy Treatment Note  [facility]     Patient Name: Lashell Case  : 1935  MRN: [id number]  Today's Date: 8/15/2023   Onset of Illness/Injury  or Date of Surgery: 08/06/23  Visit Dx:     ICD-10-CM ICD-9-CM   1. LGI bleed  K92.2 578.9   2. Chronic anemia  D64.9 285.9   3. Confusion  R41.0 298.9     Patient Active Problem List   Diagnosis    Arthritis    Abnormal ambulatory electrocardiogram    Essential hypertension    Hypothyroidism due to acquired atrophy of thyroid    Malignant neoplasm of left female breast    Mixed hyperlipidemia    Aromatase inhibitor use    Osteopenia    Hepatic cyst    Splenic cyst    Breast mass    Atrial fibrillation, persistent    Precordial chest pain    Nonrheumatic aortic (valve) stenosis    Atrial fibrillation with RVR    Paroxysmal atrial fibrillation    Nonrheumatic mitral valve regurgitation    Acute encephalopathy    LGI bleed    Chronic anemia    Severe malnutrition     Past Medical History:   Diagnosis Date    Arthritis     Breast cancer 2015    lt br ca    Breast cancer 2006    rt br ca    Dizziness 7/8/2016    [place] (hard of hearing)     Hyperlipidemia     Hypertension     Hypothyroidism     Scabies exposure      Past Surgical History:   Procedure Laterality Date    BREAST BIOPSY      BREAST LUMPECTOMY Right 2006    BREAST SURGERY      COLONOSCOPY N/A 8/9/2023    Procedure: COLONOSCOPY;  Surgeon: Romeo Valdez MD;  Location: [facility] OR;  Service: Gastroenterology;  Laterality: N/A;    ENDOSCOPY N/A 8/9/2023    Procedure: ESOPHAGOGASTRODUODENOSCOPY;  Surgeon: Romeo Valdez MD;  Location: [facility] OR;  Service: Gastroenterology;  Laterality: N/A;    ENDOSCOPY  8/8/2023    Procedure: ESOPHAGOGASTRODUODENOSCOPY;  Surgeon: Romeo Valdez MD;  Location: [facility] OR;  Service: Gastroenterology;;    MASTECTOMY Left 2015    ca    MASTECTOMY Right 2020    MASTECTOMY WITH SENTINEL NODE BIOPSY AND AXILLARY NODE DISSECTION Right 11/17/2020    Procedure: BREAST MASTECTOMY WITH SENTINEL NODE BIOPSY AND AXILLARY NODE DISSECTION;  Surgeon: Cynthia Ward MD;  Location: [facility] OR;  Service: General;  Laterality:  Right;     PT Assessment (last 12 hours)       PT Evaluation and Treatment       Row Name 08/15/23 1100          Physical Therapy Time and Intention    Subjective Information complains of;weakness;pain  Pain in legs with movement  -     Document Type therapy note (daily note)  -     Mode of Treatment physical therapy  -     Patient Effort good  -       Row Name 08/15/23 1100          General Information    Patient Profile Reviewed yes  -     Existing Precautions/Restrictions fall  -     Limitations/Impairments safety/cognitive  -       Row Name 08/15/23 1100          Pain    Pre/Posttreatment Pain Comment Intermittent pain in LE with movement from sores on her legs.  -       Row Name 08/15/23 1100          Cognition    Affect/Mental Status (Cognition) confused  -     Follows Commands (Cognition) follows one-step commands;over 90% accuracy;physical/tactile prompts required  -       Row Name 08/15/23 1100          Bed Mobility    Scooting/Bridging [place] (Bed Mobility) moderate assist (50% patient effort);1 person assist  -     Supine-Sit [place] (Bed Mobility) moderate assist (50% patient effort);1 person assist;2 person assist  -     Sit-Supine [place] (Bed Mobility) moderate assist (50% patient effort);1 person assist  -     Bed Mobility, Safety Issues decreased use of legs for bridging/pushing;decreased use of arms for pushing/pulling;impaired trunk control for bed mobility;cognitive deficits limit understanding  -     Assistive Device (Bed Mobility) bed rails;draw sheet;head of bed elevated  -       Row Name 08/15/23 1100          Sit-Stand Transfer    Sit-Stand [place] (Transfers) minimum assist (75% patient effort);1 person assist;2 person assist  -     Assistive Device (Sit-Stand Transfers) walker, front-wheeled  -     Comment, (Sit-Stand Transfer) 2x.  HHA first time and use of FWW 2nd time  -       Row Name 08/15/23 1100          Stand-Sit Transfer     Stand-Sit [place] (Transfers) minimum assist (75% patient effort);1 person assist  -KP     Assistive Device (Stand-Sit Transfers) walker, front-wheeled  -KP       Row Name 08/15/23 1100          Gait/Stairs (Locomotion)    [place] Level (Gait) contact guard;1 person assist  -KP     Assistive Device (Gait) walker, front-wheeled  -KP     Distance in Feet (Gait) 20  -KP     Pattern (Gait) step-through;step-to  -KP     Deviations/Abnormal Patterns (Gait) dang decreased;festinating/shuffling;gait speed decreased;stride length decreased  -KP     Bilateral Gait Deviations forward flexed posture;heel strike decreased  -KP       Row Name 08/15/23 1100          Balance    Static Sitting Balance standby assist  -KP     Dynamic Sitting Balance contact guard  -KP     Position, Sitting Balance sitting edge of bed  -KP     Static Standing Balance contact guard;1-person assist  -KP     Position/Device Used, Standing Balance walker, front-wheeled  -KP       Row Name 08/15/23 1100          Knee (Therapeutic Exercise)    Knee Strengthening (Therapeutic Exercise) left;right;LAQ (long arc quad);sitting;10 repetitions;2 sets  -KP       Row Name 08/15/23 1100          Ankle (Therapeutic Exercise)    Ankle Strengthening (Therapeutic Exercise) left;right;dorsiflexion;plantarflexion;sitting;10 repetitions;2 sets  -KP       Row Name             Wound 08/10/23 0851 Left proximal arm Skin Tear    Wound - Properties Group Placement Date: 08/10/23  -RD Placement Time: 0851 -RD Present on Hospital Admission: N  -RD Side: Left  -RD Orientation: proximal  -RD Location: arm  -RD Primary Wound Type: Skin tear  -RD    Retired Wound - Properties Group Placement Date: 08/10/23  -RD Placement Time: 0851 -RD Present on Hospital Admission: N  -RD Side: Left  -RD Orientation: proximal  -RD Location: arm  -RD Primary Wound Type: Skin tear  -RD    Retired Wound - Properties Group Date first assessed: 08/10/23  -RD Time first assessed: 0851 -RD  Present on Hospital Admission: N  -RD Side: Left  -RD Location: arm  -RD Primary Wound Type: Skin tear  -RD      Row Name 08/15/23 1100          Plan of Care Review    Plan of Care Reviewed With patient  -KP     Outcome Evaluation PT treatment completed.  Patient demonstrated improvement with ambulation and standing.  She was able to ambulate 20ft with FWW and CGA.  Max cues for safety.  Continue PT POC.  -KP       Row Name 08/15/23 1100          Positioning and Restraints    Pre-Treatment Position in bed  -KP     Post Treatment Position bed  -KP     In Bed notified nsg;supine;call light within reach;encouraged to call for assist;exit alarm on;side rails up x3  Lines in tact and needs in reach  -KP               User Key  (r) = Recorded By, (t) = Taken By, (c) = Cosigned By      Initials Name Provider Type    Galilea Dumont, RN Registered Nurse    Kiki Gleason PT Physical Therapist                      PT Recommendation and Plan     Plan of Care Reviewed With: patient  Outcome Evaluation: PT treatment completed.  Patient demonstrated improvement with ambulation and standing.  She was able to ambulate 20ft with FWW and CGA.  Max cues for safety.  Continue PT POC.       Time Calculation:    PT Charges       Row Name 08/15/23 1150             Time Calculation    PT Received On 08/15/23  -      PT Goal Re-Cert Due Date 08/21/23  -         Timed Charges    98237 - PT Therapeutic Activity Minutes 25  -KP         Total Minutes    Timed Charges Total Minutes 25  -KP       Total Minutes 25  -KP                User Key  (r) = Recorded By, (t) = Taken By, (c) = Cosigned By      Initials Name Provider Type    Kiki Gleason PT Physical Therapist                  Therapy Charges for Today       Code Description Service Date Service Provider Modifiers Qty    92129789388 HC PT THER PROC EA 15 MIN 8/14/2023 Kiki Rodriguez PT GP 1    82849323175 HC PT THERAPEUTIC ACT EA 15 MIN 8/14/2023 Kiki Rodriguez PT GP 1     [id number]  PT THERAPEUTIC ACT EA 15 MIN 8/15/2023 Kiki Rodriguez, PT GP 2            PT G-Codes  AM-PAC 6 Clicks Score (PT): 14    Kiki Rodriguez, PT  8/15/2023      Electronically signed by Kiki Rodriguez, PT at 08/15/23 1150          Occupational Therapy Notes (most recent note)        Nella Cobb, OT at 08/15/23 1100          Acute Care - Occupational Therapy Treatment Note  [facility]     Patient Name: Lashell Case  : 1935  MRN: [id number]  Today's Date: 8/15/2023  Onset of Illness/Injury or Date of Surgery: 23     Referring Physician: Nadeem    Admit Date: 2023       ICD-10-CM ICD-9-CM   1. LGI bleed  K92.2 578.9   2. Chronic anemia  D64.9 285.9   3. Confusion  R41.0 298.9     Patient Active Problem List   Diagnosis    Arthritis    Abnormal ambulatory electrocardiogram    Essential hypertension    Hypothyroidism due to acquired atrophy of thyroid    Malignant neoplasm of left female breast    Mixed hyperlipidemia    Aromatase inhibitor use    Osteopenia    Hepatic cyst    Splenic cyst    Breast mass    Atrial fibrillation, persistent    Precordial chest pain    Nonrheumatic aortic (valve) stenosis    Atrial fibrillation with RVR    Paroxysmal atrial fibrillation    Nonrheumatic mitral valve regurgitation    Acute encephalopathy    LGI bleed    Chronic anemia    Severe malnutrition     Past Medical History:   Diagnosis Date    Arthritis     Breast cancer     lt br ca    Breast cancer 2006    rt br ca    Dizziness 2016    [place] (hard of hearing)     Hyperlipidemia     Hypertension     Hypothyroidism     Scabies exposure      Past Surgical History:   Procedure Laterality Date    BREAST BIOPSY      BREAST LUMPECTOMY Right 2006    BREAST SURGERY      COLONOSCOPY N/A 2023    Procedure: COLONOSCOPY;  Surgeon: Romeo Valdez MD;  Location: [facility];  Service: Gastroenterology;  Laterality: N/A;    ENDOSCOPY N/A 2023    Procedure: ESOPHAGOGASTRODUODENOSCOPY;  Surgeon:  Romeo Valdez MD;  Location: [facility];  Service: Gastroenterology;  Laterality: N/A;    ENDOSCOPY  8/8/2023    Procedure: ESOPHAGOGASTRODUODENOSCOPY;  Surgeon: Romeo Valdez MD;  Location: [facility] OR;  Service: Gastroenterology;;    MASTECTOMY Left 2015    ca    MASTECTOMY Right 2020    MASTECTOMY WITH SENTINEL NODE BIOPSY AND AXILLARY NODE DISSECTION Right 11/17/2020    Procedure: BREAST MASTECTOMY WITH SENTINEL NODE BIOPSY AND AXILLARY NODE DISSECTION;  Surgeon: Cynthia Ward MD;  Location: [facility] OR;  Service: General;  Laterality: Right;         OT ASSESSMENT FLOWSHEET (last 12 hours)       OT Evaluation and Treatment       Row Name 08/15/23 1055                   OT Time and Intention    Subjective Information complains of;weakness;fatigue  -LM        Document Type therapy note (daily note)  -LM        Mode of Treatment occupational therapy  -LM        Patient Effort fair  -LM        Comment Patient seen this date for adl retraining/education.  Patient continues to feed self and perform light grooming with mod assist.  Patient requires max assist with bathing, dressing, toileting.  Patient confused about situation and time, alert and oriented person and place.  -LM           General Information    Existing Precautions/Restrictions fall  -LM        Limitations/Impairments safety/cognitive  -LM           Cognition    Affect/Mental Status (Cognition) confused  -LM           Wound 08/10/23 0851 Left proximal arm Skin Tear    Wound - Properties Group Placement Date: 08/10/23  -RD Placement Time: 0851  -RD Present on Hospital Admission: N  -RD Side: Left  -RD Orientation: proximal  -RD Location: arm  -RD Primary Wound Type: Skin tear  -RD    Retired Wound - Properties Group Placement Date: 08/10/23  -RD Placement Time: 0851  -RD Present on Hospital Admission: N  -RD Side: Left  -RD Orientation: proximal  -RD Location: arm  -RD Primary Wound Type: Skin tear  -RD    Retired Wound - Properties Group  Date first assessed: 08/10/23  -RD Time first assessed: 0851  -RD Present on Hospital Admission: N  -RD Side: Left  -RD Location: arm  -RD Primary Wound Type: Skin tear  -RD       Positioning and Restraints    Post Treatment Position bed  -LM        In Bed call light within reach;encouraged to call for assist;exit alarm on  -LM                  User Key  (r) = Recorded By, (t) = Taken By, (c) = Cosigned By      Initials Name Effective Dates    LM Nella Cobb OT 06/16/21 -     RD Galilea Marrero RN 06/16/21 -                            OT Recommendation and Plan  Planned Therapy Interventions (OT): activity tolerance training, adaptive equipment training, BADL retraining, ROM/therapeutic exercise, transfer/mobility retraining, strengthening exercise, patient/caregiver education/training  Therapy Frequency (OT): other (see comments) (prn and/or to monitor fxl progress)  Plan of Care Review  Plan of Care Reviewed With: patient  Plan of Care Reviewed With: patient        Time Calculation:     Therapy Charges for Today       Code Description Service Date Service Provider Modifiers Qty    40030291401  OT SELF CARE/MGMT/TRAIN EA 15 MIN 8/14/2023 Nella Cobb OT GO 2    97487290690 HC OT SELF CARE/MGMT/TRAIN EA 15 MIN 8/15/2023 Nella Cobb OT GO 1                 Nella Cobb OT  8/15/2023    Electronically signed by Nella Cobb OT at 08/15/23 1100       Speech Language Pathology Notes (most recent note)    No notes exist for this encounter.       ADL Documentation (last day)       Date/Time Transferring Toileting Bathing Dressing Eating Communication Swallowing    08/16/23 0448 3 - assistive equipment and person 3 - assistive equipment and person 1 - assistive equipment 1 - assistive equipment 0 - independent 0 - understands/communicates without difficulty 0 - swallows foods/liquids without difficulty    08/15/23 1930 3 - assistive equipment and person 3 - assistive equipment and person 1 - assistive  equipment 1 - assistive equipment 0 - independent 0 - understands/communicates without difficulty 0 - swallows foods/liquids without difficulty    08/15/23 0800 3 - assistive equipment and person 3 - assistive equipment and person 1 - assistive equipment 1 - assistive equipment 0 - independent 0 - understands/communicates without difficulty 0 - swallows foods/liquids without difficulty

## 2023-08-17 LAB
GLUCOSE BLDC GLUCOMTR-MCNC: 116 MG/DL (ref 70–130)
GLUCOSE BLDC GLUCOMTR-MCNC: 141 MG/DL (ref 70–130)
GLUCOSE BLDC GLUCOMTR-MCNC: 30 MG/DL (ref 70–130)
GLUCOSE BLDC GLUCOMTR-MCNC: 35 MG/DL (ref 70–130)
GLUCOSE BLDC GLUCOMTR-MCNC: 84 MG/DL (ref 70–130)
GLUCOSE BLDC GLUCOMTR-MCNC: 99 MG/DL (ref 70–130)
GLUCOSE SERPL-MCNC: 176 MG/DL (ref 65–99)
QT INTERVAL: 396 MS
QTC INTERVAL: 436 MS

## 2023-08-17 PROCEDURE — 99232 SBSQ HOSP IP/OBS MODERATE 35: CPT | Performed by: HOSPITALIST

## 2023-08-17 PROCEDURE — 82948 REAGENT STRIP/BLOOD GLUCOSE: CPT

## 2023-08-17 PROCEDURE — 82947 ASSAY GLUCOSE BLOOD QUANT: CPT | Performed by: HOSPITALIST

## 2023-08-17 RX ORDER — LISINOPRIL 10 MG/1
30 TABLET ORAL
Status: DISCONTINUED | OUTPATIENT
Start: 2023-08-17 | End: 2023-08-18

## 2023-08-17 RX ADMIN — DOCUSATE SODIUM 50 MG AND SENNOSIDES 8.6 MG 2 TABLET: 8.6; 5 TABLET, FILM COATED ORAL at 21:14

## 2023-08-17 RX ADMIN — APIXABAN 2.5 MG: 2.5 TABLET, FILM COATED ORAL at 08:44

## 2023-08-17 RX ADMIN — DOCUSATE SODIUM 50 MG AND SENNOSIDES 8.6 MG 2 TABLET: 8.6; 5 TABLET, FILM COATED ORAL at 08:52

## 2023-08-17 RX ADMIN — CLONIDINE HYDROCHLORIDE 0.1 MG: 0.1 TABLET ORAL at 21:14

## 2023-08-17 RX ADMIN — HYDROCODONE BITARTRATE AND ACETAMINOPHEN 1 TABLET: 5; 325 TABLET ORAL at 15:32

## 2023-08-17 RX ADMIN — FAMOTIDINE 20 MG: 20 TABLET, FILM COATED ORAL at 08:44

## 2023-08-17 RX ADMIN — Medication 10 ML: at 08:45

## 2023-08-17 RX ADMIN — Medication 10 ML: at 21:14

## 2023-08-17 RX ADMIN — LISINOPRIL 30 MG: 10 TABLET ORAL at 08:45

## 2023-08-17 RX ADMIN — FAMOTIDINE 20 MG: 20 TABLET, FILM COATED ORAL at 16:54

## 2023-08-17 RX ADMIN — APIXABAN 2.5 MG: 2.5 TABLET, FILM COATED ORAL at 21:14

## 2023-08-17 RX ADMIN — CLONIDINE HYDROCHLORIDE 0.1 MG: 0.1 TABLET ORAL at 08:44

## 2023-08-17 RX ADMIN — LEVOTHYROXINE SODIUM 88 MCG: 88 TABLET ORAL at 05:41

## 2023-08-17 RX ADMIN — DEXTROSE MONOHYDRATE 25 G: 25 INJECTION, SOLUTION INTRAVENOUS at 11:21

## 2023-08-17 NOTE — PLAN OF CARE
Goal Outcome Evaluation:                      Pt has been resting in bed during shift. VSS. No acute changes at this time. Will continue with plan of care.

## 2023-08-17 NOTE — CASE MANAGEMENT/SOCIAL WORK
Discharge Planning Assessment  [facility]     Patient Name: Lashell Case  MRN: [id number]  Today's Date: 8/17/2023    Admit Date: 8/6/2023     Discharge Plan       Row Name 08/17/23 1142       Plan    Plan SS [facility] Chuyita Adler and pt has been approved for admit. Chuyita Adler to discuss admit date with Chuyita Ram and call SS back. SS to follow.    14:17: SS received a message from [facility] per Chuyita Adler and pt can be admitted today. SS notified Dr. Lao. Pt is not medically stable for discharge. SS to follow.                   Continued Care and Services - Admitted Since 8/6/2023       Destination       Service Provider Request Status Selected Services Address Phone Fax Patient Preferred    formerly [facility] Considering  Need insurance authorization N/A [address] [facility] [address] [phone] --    [facility] Pending - Request Sent N/A [address] [facility] [address] [phone] --    [facility] Pending - Request Sent N/A [address] [facility] [address] [phone] --    [facility] Declined  Bed not available N/A [address] [id number] [phone] --    [facility] Declined N/A [address] [facility] [address] [phone] --    [facility] Declined  Not accepting any referrals at this time. N/A [address] [facility] [address] [phone] --    [facility] Declined  Bed not available N/A [address] [facility] [address] [phone] --    [facility] Declined N/A [address] [address] [phone] --    [facility] Declined N/A [address] [place] [address]  [phone] [phone] --                  Expected Discharge Date and Time       Expected Discharge Date Expected Discharge Time    Aug 17, 2023         EMIGDIO Silverio

## 2023-08-17 NOTE — PLAN OF CARE
Goal Outcome Evaluation:            Patient has rested in bed this shift. Hypoglycemia treated per MAR. MD aware. Patient monitored for further signs and symptoms of hypoglycemia. No evidence noted. Patient has no requests at this time. Will continue with plan of care.

## 2023-08-18 LAB
ANION GAP SERPL CALCULATED.3IONS-SCNC: 7 MMOL/L (ref 5–15)
BUN SERPL-MCNC: 17 MG/DL (ref 8–23)
BUN/CREAT SERPL: 23.9 (ref 7–25)
CALCIUM SPEC-SCNC: 7.9 MG/DL (ref 8.6–10.5)
CHLORIDE SERPL-SCNC: 103 MMOL/L (ref 98–107)
CO2 SERPL-SCNC: 22 MMOL/L (ref 22–29)
CREAT SERPL-MCNC: 0.71 MG/DL (ref 0.57–1)
EGFRCR SERPLBLD CKD-EPI 2021: 82.9 ML/MIN/1.73
GLUCOSE BLDC GLUCOMTR-MCNC: 58 MG/DL (ref 70–130)
GLUCOSE BLDC GLUCOMTR-MCNC: 64 MG/DL (ref 70–130)
GLUCOSE BLDC GLUCOMTR-MCNC: 75 MG/DL (ref 70–130)
GLUCOSE BLDC GLUCOMTR-MCNC: 82 MG/DL (ref 70–130)
GLUCOSE SERPL-MCNC: 83 MG/DL (ref 65–99)
POTASSIUM SERPL-SCNC: 4 MMOL/L (ref 3.5–5.2)
SODIUM SERPL-SCNC: 132 MMOL/L (ref 136–145)

## 2023-08-18 PROCEDURE — 97530 THERAPEUTIC ACTIVITIES: CPT

## 2023-08-18 PROCEDURE — 80048 BASIC METABOLIC PNL TOTAL CA: CPT | Performed by: HOSPITALIST

## 2023-08-18 PROCEDURE — 86038 ANTINUCLEAR ANTIBODIES: CPT | Performed by: HOSPITALIST

## 2023-08-18 PROCEDURE — 97535 SELF CARE MNGMENT TRAINING: CPT

## 2023-08-18 PROCEDURE — 82948 REAGENT STRIP/BLOOD GLUCOSE: CPT

## 2023-08-18 RX ORDER — AMLODIPINE BESYLATE 5 MG/1
5 TABLET ORAL
Status: DISCONTINUED | OUTPATIENT
Start: 2023-08-18 | End: 2023-08-19

## 2023-08-18 RX ADMIN — Medication: at 16:46

## 2023-08-18 RX ADMIN — Medication 10 ML: at 09:44

## 2023-08-18 RX ADMIN — AMLODIPINE BESYLATE 5 MG: 5 TABLET ORAL at 09:43

## 2023-08-18 RX ADMIN — APIXABAN 2.5 MG: 2.5 TABLET, FILM COATED ORAL at 09:43

## 2023-08-18 RX ADMIN — DOCUSATE SODIUM 50 MG AND SENNOSIDES 8.6 MG 2 TABLET: 8.6; 5 TABLET, FILM COATED ORAL at 20:36

## 2023-08-18 RX ADMIN — LEVOTHYROXINE SODIUM 88 MCG: 88 TABLET ORAL at 05:11

## 2023-08-18 RX ADMIN — APIXABAN 2.5 MG: 2.5 TABLET, FILM COATED ORAL at 20:37

## 2023-08-18 RX ADMIN — FAMOTIDINE 20 MG: 20 TABLET, FILM COATED ORAL at 16:46

## 2023-08-18 RX ADMIN — Medication 10 ML: at 20:37

## 2023-08-18 NOTE — THERAPY TREATMENT NOTE
Acute Care - Occupational Therapy Treatment Note   [facility]     Patient Name: Lashell Case  : 1935  MRN: [id number]  Today's Date: 2023  Onset of Illness/Injury or Date of Surgery: 23     Referring Physician: Nadeem    Admit Date: 2023       ICD-10-CM ICD-9-CM   1. LGI bleed  K92.2 578.9   2. Chronic anemia  D64.9 285.9   3. Confusion  R41.0 298.9     Patient Active Problem List   Diagnosis    Arthritis    Abnormal ambulatory electrocardiogram    Essential hypertension    Hypothyroidism due to acquired atrophy of thyroid    Malignant neoplasm of left female breast    Mixed hyperlipidemia    Aromatase inhibitor use    Osteopenia    Hepatic cyst    Splenic cyst    Breast mass    Atrial fibrillation, persistent    Precordial chest pain    Nonrheumatic aortic (valve) stenosis    Atrial fibrillation with RVR    Paroxysmal atrial fibrillation    Nonrheumatic mitral valve regurgitation    Acute encephalopathy    LGI bleed    Chronic anemia    Severe malnutrition     Past Medical History:   Diagnosis Date    Arthritis     Breast cancer     lt br ca    Breast cancer 2006    rt br ca    Dizziness 2016    [place] (hard of hearing)     Hyperlipidemia     Hypertension     Hypothyroidism     Scabies exposure      Past Surgical History:   Procedure Laterality Date    BREAST BIOPSY      BREAST LUMPECTOMY Right 2006    BREAST SURGERY      COLONOSCOPY N/A 2023    Procedure: COLONOSCOPY;  Surgeon: Romeo Valdez MD;  Location: [facility] OR;  Service: Gastroenterology;  Laterality: N/A;    ENDOSCOPY N/A 2023    Procedure: ESOPHAGOGASTRODUODENOSCOPY;  Surgeon: Romeo Valdez MD;  Location: [facility] OR;  Service: Gastroenterology;  Laterality: N/A;    ENDOSCOPY  2023    Procedure: ESOPHAGOGASTRODUODENOSCOPY;  Surgeon: Romeo Valdez MD;  Location: [facility] OR;  Service: Gastroenterology;;    MASTECTOMY Left     ca    MASTECTOMY Right     MASTECTOMY WITH SENTINEL NODE  BIOPSY AND AXILLARY NODE DISSECTION Right 11/17/2020    Procedure: BREAST MASTECTOMY WITH SENTINEL NODE BIOPSY AND AXILLARY NODE DISSECTION;  Surgeon: Cynthia Ward MD;  Location: [facility];  Service: General;  Laterality: Right;         OT ASSESSMENT FLOWSHEET (last 12 hours)       OT Evaluation and Treatment       Row Name 08/18/23 1520                   OT Time and Intention    Subjective Information complains of;weakness;fatigue;pain;swelling  -LM        Document Type therapy note (daily note)  -LM        Mode of Treatment occupational therapy  -LM        Patient Effort adequate  -LM        Comment Patient seen this date for adl retraining/BUE arom/therex. Patient currently requires total assist with bathing, dressing, toileting tasks.  C/O of generalized pain upon bed mobility and sitting/standing balance activity.  continues to demonstrate confusion.  Decreased bue arom to 1/4-1/2 due to pain, swelling, generalized weakness.  Will continue to require 24/7 assistance.  -LM           General Information    Existing Precautions/Restrictions fall  -LM        Limitations/Impairments safety/cognitive  -LM           Cognition    Affect/Mental Status (Cognition) confused  -LM        Orientation Status (Cognition) oriented to;person  -LM           Wound 08/10/23 0851 Left proximal arm Skin Tear    Wound - Properties Group Placement Date: 08/10/23  -RD Placement Time: 0851  -RD Present on Hospital Admission: N  -RD Side: Left  -RD Orientation: proximal  -RD Location: arm  -RD Primary Wound Type: Skin tear  -RD    Retired Wound - Properties Group Placement Date: 08/10/23  -RD Placement Time: 0851  -RD Present on Hospital Admission: N  -RD Side: Left  -RD Orientation: proximal  -RD Location: arm  -RD Primary Wound Type: Skin tear  -RD    Retired Wound - Properties Group Date first assessed: 08/10/23  -RD Time first assessed: 0851  -RD Present on Hospital Admission: N  -RD Side: Left  -RD Location: arm  -RD Primary  Wound Type: Skin tear  -RD       Positioning and Restraints    Post Treatment Position bed  -LM        In Bed call light within reach;encouraged to call for assist;exit alarm on;RUE elevated;LUE elevated;R heel elevated;L heel elevated;RLE elevated;LLE elevated  -LM                  User Key  (r) = Recorded By, (t) = Taken By, (c) = Cosigned By      Initials Name Effective Dates     Nella Cobb OT 06/16/21 -     Galilea Dumont RN 06/16/21 -                            OT Recommendation and Plan  Planned Therapy Interventions (OT): activity tolerance training, adaptive equipment training, BADL retraining, ROM/therapeutic exercise, transfer/mobility retraining, strengthening exercise, patient/caregiver education/training  Therapy Frequency (OT): other (see comments) (prn and/or to monitor fxl progress)  Plan of Care Review  Plan of Care Reviewed With: patient  Plan of Care Reviewed With: patient        Time Calculation:     Therapy Charges for Today       Code Description Service Date Service Provider Modifiers Qty    82876905986 HC OT SELF CARE/MGMT/TRAIN EA 15 MIN 8/18/2023 Nella Cobb OT GO 2                 Nella Cobb OT  8/18/2023

## 2023-08-18 NOTE — PLAN OF CARE
Goal Outcome Evaluation:  Pt rested well this shift. Pt had nits in her hair, nits removed with Nit comb and lice shampoo given per order. Bilateral arms are weeping, chucks placed under arms to catch weeping. No acute changes noted at this time. Will continue to follow plan of care.

## 2023-08-18 NOTE — CASE MANAGEMENT/SOCIAL WORK
Discharge Planning Assessment  [facility]     Patient Name: Lashell Case  MRN: [id number]  Today's Date: 8/18/2023    Admit Date: 8/6/2023     Discharge Plan       Row Name 08/18/23 1817       Plan    Plan Pt is not medically stable for discharge on this date. SS contacted [facility] per Chuyita and they will be unable to admit pt until Monday, 8/21/23. SS notified Dr. Lao. [facility] is aware that pt is in isolation for lice and they are still agreeable to accept pt. SS discussed isolation with RN and she is attempting to assist with lice treatment. Isolation can't be discontinued until all of the nits are removed and 24 hrs after last treatment. SS to follow.                  Continued Care and Services - Admitted Since 8/6/2023       Destination       Service Provider Request Status Selected Services Address Phone Fax Patient Preferred    [facility] Accepted N/A [address] [facility] [address] [phone] --    [facility] Pending - Request Sent N/A [address] [facility] [address] [phone] --    [facility] Pending - Request Sent N/A [address] [facility] [address] [phone] --    [facility] Declined  Bed not available N/A [address] [facility] [address] [phone] --    [facility] Declined N/A [address] [facility] [address] [phone] --    [facility] Decl[facility]  Not accepting any referrals at this time. N/A [address] [facility] [address] [phone] --    [facility] Declined  Bed not available N/A [address] [facility] [address] [phone] --    [facility] Declined N/A [address] [address] [phone] --    [facility] Declined N/A [address] [place] [phone] --                  Expected Discharge Date and Time       Expected Discharge Date Expected Discharge Time    Aug 18, 2023         EMIGDIO Silverio

## 2023-08-18 NOTE — THERAPY TREATMENT NOTE
Acute Care - Physical Therapy Treatment Note   [facility]     Patient Name: Lashell Case  : 1935  MRN: [id number]  Today's Date: 2023   Onset of Illness/Injury or Date of Surgery: 23  Visit Dx:     ICD-10-CM ICD-9-CM   1. LGI bleed  K92.2 578.9   2. Chronic anemia  D64.9 285.9   3. Confusion  R41.0 298.9     Patient Active Problem List   Diagnosis    Arthritis    Abnormal ambulatory electrocardiogram    Essential hypertension    Hypothyroidism due to acquired atrophy of thyroid    Malignant neoplasm of left female breast    Mixed hyperlipidemia    Aromatase inhibitor use    Osteopenia    Hepatic cyst    Splenic cyst    Breast mass    Atrial fibrillation, persistent    Precordial chest pain    Nonrheumatic aortic (valve) stenosis    Atrial fibrillation with RVR    Paroxysmal atrial fibrillation    Nonrheumatic mitral valve regurgitation    Acute encephalopathy    LGI bleed    Chronic anemia    Severe malnutrition     Past Medical History:   Diagnosis Date    Arthritis     Breast cancer     lt br ca    Breast cancer 2006    rt br ca    Dizziness 2016    [place])     Hyperlipidemia     Hypertension     Hypothyroidism     Scabies exposure      Past Surgical History:   Procedure Laterality Date    BREAST BIOPSY      BREAST LUMPECTOMY Right 2006    BREAST SURGERY      COLONOSCOPY N/A 2023    Procedure: COLONOSCOPY;  Surgeon: Romeo Valdez MD;  Location: [facility] OR;  Service: Gastroenterology;  Laterality: N/A;    ENDOSCOPY N/A 2023    Procedure: ESOPHAGOGASTRODUODENOSCOPY;  Surgeon: Romeo Valdez MD;  Location: [facility] OR;  Service: Gastroenterology;  Laterality: N/A;    ENDOSCOPY  2023    Procedure: ESOPHAGOGASTRODUODENOSCOPY;  Surgeon: Romeo Valdez MD;  Location: [facility] OR;  Service: Gastroenterology;;    MASTECTOMY Left     ca    MASTECTOMY Right     MASTECTOMY WITH SENTINEL NODE BIOPSY AND AXILLARY NODE DISSECTION Right 2020     Procedure: BREAST MASTECTOMY WITH SENTINEL NODE BIOPSY AND AXILLARY NODE DISSECTION;  Surgeon: Cynthia Ward MD;  Location: [facility];  Service: General;  Laterality: Right;     PT Assessment (last 12 hours)       PT Evaluation and Treatment       Row Name 08/18/23 1600          Physical Therapy Time and Intention    Subjective Information complains of;weakness;pain  -     Document Type therapy note (daily note)  -     Mode of Treatment physical therapy  -KP     Patient Effort adequate  -     Symptoms Noted During/After Treatment increased pain  Pt reports being very sore with all mobility  -       Row Name 08/18/23 1600          General Information    Patient Profile Reviewed yes  -     Existing Precautions/Restrictions fall  -     Limitations/Impairments safety/cognitive  -       Row Name 08/18/23 1600          Pain    Additional Documentation Pain Scale: FACES Pre/Post-Treatment (Group)  -       Row Name 08/18/23 1600          Pain Scale: FACES Pre/Post-Treatment    Pain: FACES Scale, Pretreatment 2-->hurts little bit  -KP     Posttreatment Pain Rating 4-->hurts little more  -KP     Pain Location - other (see comments)  everywhere - extremities and chest/abdomen  -       Row Name 08/18/23 1600          Cognition    Affect/Mental Status (Cognition) confused  -     Follows Commands (Cognition) follows one-step commands  -     Personal Safety Interventions fall prevention program maintained;gait belt;muscle strengthening facilitated;nonskid shoes/slippers when out of bed;supervised activity  -       Row Name 08/18/23 1600          Range of Motion (ROM)    Range of Motion --  Decreased tolerance to ROM during functional tasks today.  -       Row Name 08/18/23 1600          Bed Mobility    Scooting/Bridging [place] (Bed Mobility) dependent (less than 25% patient effort);2 person assist  -     Supine-Sit [place] (Bed Mobility) maximum assist (25% patient effort);2 person  assist  -KP     Sit-Supine [place] (Bed Mobility) maximum assist (25% patient effort);dependent (less than 25% patient effort);2 person assist  -KP     Bed Mobility, Safety Issues decreased use of arms for pushing/pulling;impaired trunk control for bed mobility;cognitive deficits limit understanding;decreased use of legs for bridging/pushing  -KP     Assistive Device (Bed Mobility) bed rails;head of bed elevated  -KP       Row Name 08/18/23 1600          Sit-Stand Transfer    Sit-Stand [place] (Transfers) moderate assist (50% patient effort);2 person assist  -KP     Assistive Device (Sit-Stand Transfers) --  hand held assist  -KP       Row Name 08/18/23 1600          Stand-Sit Transfer    Stand-Sit [place] (Transfers) moderate assist (50% patient effort);2 person assist  -KP     Assistive Device (Stand-Sit Transfers) --  hand held assist  -KP       Row Name 08/18/23 1600          Gait/Stairs (Locomotion)    Comment, (Gait/Stairs) Attempted steps today but pt was unable to perform and requested to sit back down.  -KP       Row Name 08/18/23 1600          Balance    Static Sitting Balance standby assist  -KP     Dynamic Sitting Balance contact guard  -KP     Position, Sitting Balance sitting edge of bed  -KP     Static Standing Balance minimal assist;2-person assist  -KP       Row Name             Wound 08/10/23 0851 Left proximal arm Skin Tear    Wound - Properties Group Placement Date: 08/10/23  -RD Placement Time: 0851  -RD Present on Hospital Admission: N  -RD Side: Left  -RD Orientation: proximal  -RD Location: arm  -RD Primary Wound Type: Skin tear  -RD    Retired Wound - Properties Group Placement Date: 08/10/23  -RD Placement Time: 0851  -RD Present on Hospital Admission: N  -RD Side: Left  -RD Orientation: proximal  -RD Location: arm  -RD Primary Wound Type: Skin tear  -RD    Retired Wound - Properties Group Date first assessed: 08/10/23  -RD Time first assessed: 0851  -RD Present on Hospital  Admission: N  -RD Side: Left  -RD Location: arm  -RD Primary Wound Type: Skin tear  -RD      Row Name 08/18/23 1600          Plan of Care Review    Plan of Care Reviewed With patient  -     Outcome Evaluation PT treatment completed.  Patient presents with increased pain and swelling in extremities today with decreased motivation for mobility.  She did agree to some therapy but was unable to ambulate this date.  Continue PT POC.  -       Row Name 08/18/23 1600          Positioning and Restraints    Pre-Treatment Position in bed  -KP     Post Treatment Position bed  -KP     In Bed notified nsg;supine;call light within reach;encouraged to call for assist;exit alarm on;side rails up x3;RUE elevated;LUE elevated;heels elevated  Lines in tact and needs in reach  -               User Key  (r) = Recorded By, (t) = Taken By, (c) = Cosigned By      Initials Name Provider Type    Galilea Dumont, RN Registered Nurse    Kiki Gleason PT Physical Therapist                      PT Recommendation and Plan     Plan of Care Reviewed With: patient  Outcome Evaluation: PT treatment completed.  Patient presents with increased pain and swelling in extremities today with decreased motivation for mobility.  She did agree to some therapy but was unable to ambulate this date.  Continue PT POC.       Time Calculation:    PT Charges       Row Name 08/18/23 1635             Time Calculation    PT Received On 08/18/23  -      PT Goal Re-Cert Due Date 08/21/23  -         Timed Charges    79921 - PT Therapeutic Activity Minutes 25  -KP         Total Minutes    Timed Charges Total Minutes 25  -KP       Total Minutes 25  -KP                User Key  (r) = Recorded By, (t) = Taken By, (c) = Cosigned By      Initials Name Provider Type    Kiki Gleason PT Physical Therapist                  Therapy Charges for Today       Code Description Service Date Service Provider Modifiers Qty    88323417081  PT THERAPEUTIC ACT EA 15 MIN  8/18/2023 Kiki Rodriguez, PT GP 2            PT G-Codes  AM-PAC 6 Clicks Score (PT): 10    Kiki Rodriguez, PT  8/18/2023

## 2023-08-18 NOTE — PROGRESS NOTES
[facility]     Patient Identification:  Name:  Lashell Case  Age:  88 y.o.  Sex:  female  :  1935  MRN:  [id number]  Visit Number:  [id number]  Primary Care Provider:  Evelio Guerin DO    Length of stay:  12    Subjective: Patient seen and examined, patient is awake and alert she was eating breakfast when I saw this morning, she was supposed to be discharged to nursing home in the afternoon but she developed ?  Episode of hypoglycemia she was completely asymptomatic.  She has acrocyanosis of her fingers she denies any pain, its not cold.  Because of this, staff instructed to perform a stat BMP before giving the glucose if she has episode of hypoglycemia from Accu-Chek.  Suspecting hypoglycemia is spurious.  If her BMP is not hypoglycemic otherwise she can go to nursing home.  Regarding acrocyanosis bilateral symmetrical asymptomatic, will hold off on clonidine to see if will help.  Again patient is begging to go home    Chief Complaint: Altered mental status  ----------------------------------------------------------------------------------------------------------------------  [facility] Meds:  amLODIPine, 5 mg, Oral, Q24H  apixaban, 2.5 mg, Oral, Q12H  famotidine, 20 mg, Oral, BID AC  levothyroxine, 88 mcg, Oral, Q AM  senna-docusate sodium, 2 tablet, Oral, BID  sodium chloride, 10 mL, Intravenous, Q12H         ----------------------------------------------------------------------------------------------------------------------  Vital Signs:  Temp:  [97.4 øF (36.3 øC)-98.2 øF (36.8 øC)] 98.1 øF (36.7 øC)  Heart Rate:  [63-71] 71  Resp:  [18] 18  BP: (129-157)/(59-86) 149/86       Tele:       23  0711 08/15/23  0552 23  0300   Weight: 57.1 kg (125 lb 14.1 oz) 58.3 kg (128 lb 9.6 oz) 58 kg (127 lb 12.8 oz)     Body mass index is 20.64 kg/mý.    Intake/Output Summary (Last 24 hours) at 2023 1030  Last data filed at 2023  0900  Gross per 24 hour   Intake 400 ml   Output 750 ml   Net -350 ml     Diet: Regular/House Diet; Texture: Regular Texture (IDDSI 7); Fluid Consistency: Thin (IDDSI 0)  ----------------------------------------------------------------------------------------------------------------------  Physical exam:  General: Awake and alert oriented to self and place, chronically ill-appearing, answers questions appropriately   No respiratory distress.    Skin:  Skin is warm and dry. No rash noted. No pallor.    HENT:  Head:  Normocephalic and atraumatic.  Mouth:  Moist mucous membranes.    Eyes:  Conjunctivae and EOM are normal.  Pupils are equal, round, and reactive to light.  No scleral icterus.    Neck:  Neck supple.  No JVD present.  No bruit  Pulmonary/Chest:  No respiratory distress, no wheezes, no crackles, with normal breath sounds and good air movement.  Cardiovascular:  Normal rate, regular rhythm and normal heart sounds with no murmur.  Abdominal:  Soft.  Bowel sounds are normal.  No distension and no tenderness.   Extremities: Mild acral cyanosis both hands symmetrical, negative Tinel's sign.good radial pulse bilateral   Neurological:  Motor strength equal no obvious deficit, sensory grossly intact.   No cranial nerve deficit.  No tongue deviation.  No facial droop.  No slurred speech.    Genitourinary: No Sifuentes catheter  Back:  ----------------------------------------------------------------------------------------------------------------------  ----------------------------------------------------------------------------------------------------------------------  Results from last 7 days   Lab Units 08/16/23  0950 08/16/23  0758 08/15/23  0637   CK TOTAL U/L  --   --  37   HSTROP T ng/L 14* 14*  --      Results from last 7 days   Lab Units 08/16/23  0648 08/15/23  0637 08/14/23  0244   WBC 10*3/mm3 3.37* 4.03 5.66   HEMOGLOBIN g/dL 8.0* 8.7* 8.9*   HEMATOCRIT % 24.7* 26.3* 26.6*   MCV fL 106.9* 104.8* 103.1*    MCHC g/dL 32.4 33.1 33.5   PLATELETS 10*3/mm3 146 159 125*         Results from last 7 days   Lab Units 08/17/23  1314 08/16/23  0648 08/15/23  0637 08/14/23  0244 08/13/23  1636   SODIUM mmol/L  --  128* 126* 128*  --    POTASSIUM mmol/L  --  4.2 3.9 4.8 4.8   MAGNESIUM mg/dL  --   --   --   --  1.6   CHLORIDE mmol/L  --  100 100 100  --    CO2 mmol/L  --  19.8* 18.5* 18.9*  --    BUN mg/dL  --  13 9 10  --    CREATININE mg/dL  --  0.67 0.66 0.81  --    CALCIUM mg/dL  --  8.1* 7.8* 7.7*  --    GLUCOSE mg/dL 176* 93 117* 91  --    ALBUMIN g/dL  --   --  1.8*  --   --    BILIRUBIN mg/dL  --   --  0.4  --   --    ALK PHOS U/L  --   --  118*  --   --    AST (SGOT) U/L  --   --  17  --   --    ALT (SGPT) U/L  --   --  9  --   --    Estimated Creatinine Clearance: 55.2 mL/min (by C-G formula based on SCr of 0.67 mg/dL).    No results found for: AMMONIA      No results found for: BLOODCX  No results found for: URINECX  No results found for: WOUNDCX  No results found for: STOOLCX    I have personally looked at the labs and they are summarized above.  ----------------------------------------------------------------------------------------------------------------------  Imaging Results (Last 24 Hours)       ** No results found for the last 24 hours. **          ----------------------------------------------------------------------------------------------------------------------  Assessment and Plan:  -Episodes of asymptomatic hypoglycemia, adequate response on cosyntropin test  -Hypothyroidism  -Dementia  -Acrocyanosis/?  Raynaud's phenomena  -Chronic persistent atrial fibrillation  -Chronic anticoagulation  -Advance age    Plan as above, continue PT OT.  For nursing home placement as soon as the issue of hypoglycemia has been resolved.    Disposition nursing home placement      Destini Lao MD  08/18/23  10:30 EDT

## 2023-08-18 NOTE — PLAN OF CARE
Goal Outcome Evaluation:                      Pt has been resting in bed during shift. Pt has had a slightly negative demeanor. Pt verbalizes feeling hopeless. Active listening utilized. Pt has had no acute changes during shift at this time. VSS. Will continue with plan of care.

## 2023-08-18 NOTE — PROGRESS NOTES
[facility] HOSPITALIST PROGRESS NOTE     Patient Identification:  Name:  Lashell Case  Age:  88 y.o.  Sex:  female  :  1935  MRN:  [id number]  Visit Number:  [id number]  Primary Care Provider:  Evelio Guerin DO    Length of stay:  12    Subjective: Patient seen and examined assisted by MARIANNA Carney.  Patient ate more than half of breakfast, awake and alert, she has mild acrocyanosis bilateral symmetrical she denies any discomfort pain or tingling, she had episode of low Accu-Chek resolved yesterday again but she is completely asymptomatic.  Not sure if this is true hypoglycemia or if secondary to error reading from Accu-Chek results secondary to acrocyanosis.  Given she is asking me if I am her doctor and if she can go home.  Unfortunately she is not able to stay at home with inability for family to take care of the patient    Chief Complaint: Confusion  ----------------------------------------------------------------------------------------------------------------------  [facility] Meds:  amLODIPine, 5 mg, Oral, Q24H  apixaban, 2.5 mg, Oral, Q12H  famotidine, 20 mg, Oral, BID AC  levothyroxine, 88 mcg, Oral, Q AM  senna-docusate sodium, 2 tablet, Oral, BID  sodium chloride, 10 mL, Intravenous, Q12H         ----------------------------------------------------------------------------------------------------------------------  Vital Signs:  Temp:  [97.4 øF (36.3 øC)-98.2 øF (36.8 øC)] 98.1 øF (36.7 øC)  Heart Rate:  [63-71] 71  Resp:  [18] 18  BP: (129-157)/(59-86) 149/86       Tele:       23  0711 08/15/23  0552 23  0300   Weight: 57.1 kg (125 lb 14.1 oz) 58.3 kg (128 lb 9.6 oz) 58 kg (127 lb 12.8 oz)     Body mass index is 20.64 kg/mý.    Intake/Output Summary (Last 24 hours) at 2023 1205  Last data filed at 2023 0900  Gross per 24 hour   Intake 300 ml   Output 750 ml   Net -450 ml     Diet: Regular/House Diet; Texture: Regular Texture (IDDSI 7); Fluid  Consistency: Thin (IDDSI 0)  ----------------------------------------------------------------------------------------------------------------------  Physical exam:  General: [place] ill-appearing comfortable,awake, alert, oriented to self, place, No respiratory distress.    Skin:  Skin is warm and dry. No rash noted. No pallor.    HENT:  Head:  Normocephalic and atraumatic.  Mouth:  Moist mucous membranes.    Eyes:  Conjunctivae and EOM are normal.  Pupils are equal, round, and reactive to light.  No scleral icterus.    Neck:  Neck supple.  No JVD present.  No bruit  Pulmonary/Chest:  No respiratory distress, no wheezes, no crackles, with normal breath sounds and good air movement.  Cardiovascular:  Normal rate, regular rhythm and normal heart sounds with no murmur.  Abdominal:  Soft.  Bowel sounds are normal.  No distension and no tenderness.   Extremities:  No edema, no tenderness, and no deformity.  No red or swollen joints anywhere.  Strong pulses in all 4 extremities with no clubbing, symmetrical bilateral acrocyanosis, not cold, no tenderness.  Radial pulses strong.  No change with Tinel maneuver   neurological:  Motor strength equal no obvious deficit, sensory grossly intact.   No cranial nerve deficit.  No tongue deviation.  No facial droop.  No slurred speech.    Genitourinary: External urinary catheter  Back:  ----------------------------------------------------------------------------------------------------------------------  ----------------------------------------------------------------------------------------------------------------------  Results from last 7 days   Lab Units 08/16/23  0950 08/16/23  0758 08/15/23  0637   CK TOTAL U/L  --   --  37   HSTROP T ng/L 14* 14*  --      Results from last 7 days   Lab Units 08/16/23  0648 08/15/23  0637 08/14/23  0244   WBC 10*3/mm3 3.37* 4.03 5.66   HEMOGLOBIN g/dL 8.0* 8.7* 8.9*   HEMATOCRIT % 24.7* 26.3* 26.6*   MCV fL 106.9* 104.8* 103.1*   MCHC g/dL 32.4  33.1 33.5   PLATELETS 10*3/mm3 146 159 125*         Results from last 7 days   Lab Units 08/17/23  1314 08/16/23  0648 08/15/23  0637 08/14/23  0244 08/13/23  1636   SODIUM mmol/L  --  128* 126* 128*  --    POTASSIUM mmol/L  --  4.2 3.9 4.8 4.8   MAGNESIUM mg/dL  --   --   --   --  1.6   CHLORIDE mmol/L  --  100 100 100  --    CO2 mmol/L  --  19.8* 18.5* 18.9*  --    BUN mg/dL  --  13 9 10  --    CREATININE mg/dL  --  0.67 0.66 0.81  --    CALCIUM mg/dL  --  8.1* 7.8* 7.7*  --    GLUCOSE mg/dL 176* 93 117* 91  --    ALBUMIN g/dL  --   --  1.8*  --   --    BILIRUBIN mg/dL  --   --  0.4  --   --    ALK PHOS U/L  --   --  118*  --   --    AST (SGOT) U/L  --   --  17  --   --    ALT (SGPT) U/L  --   --  9  --   --    Estimated Creatinine Clearance: 55.2 mL/min (by C-G formula based on SCr of 0.67 mg/dL).    No results found for: AMMONIA      No results found for: BLOODCX  No results found for: URINECX  No results found for: WOUNDCX  No results found for: STOOLCX    I have personally looked at the labs and they are summarized above.  ----------------------------------------------------------------------------------------------------------------------  Imaging Results (Last 24 Hours)       ** No results found for the last 24 hours. **          ----------------------------------------------------------------------------------------------------------------------  Assessment and Plan:  -Altered mental status in setting of advanced dementia  -Asymptomatic low Accu-Chek result, etiology unclear  -Hypothyroidism  -Dementia without behavioral disturbances  -Raynaud's phenomenon  -Chronic persistent atrial fibrillation  -Advanced age    Awaiting for recurrence of low results on Accu-Chek and will perform stat BMP before receiving D50 to rule out spurious hypoglycemia, DC clonidine and see if Raynaud's phenomenon improves.  If not through hypoglycemia patient can be discharged to nursing home which she has a  bed.      Disposition  nursing home placement      Destini Lao MD  08/18/23  12:05 EDT

## 2023-08-18 NOTE — NURSING NOTE
Pt had finger stick glucose of 58, pt has no symptoms of low blood sugar, pt sitting up and alert. Stat BMP ordered per order, lab glucose result of 83.  Notified, no new orders.

## 2023-08-19 LAB
ANION GAP SERPL CALCULATED.3IONS-SCNC: 5.9 MMOL/L (ref 5–15)
BUN SERPL-MCNC: 16 MG/DL (ref 8–23)
BUN/CREAT SERPL: 19.3 (ref 7–25)
CALCIUM SPEC-SCNC: 8.4 MG/DL (ref 8.6–10.5)
CHLORIDE SERPL-SCNC: 97 MMOL/L (ref 98–107)
CO2 SERPL-SCNC: 24.1 MMOL/L (ref 22–29)
CREAT SERPL-MCNC: 0.83 MG/DL (ref 0.57–1)
EGFRCR SERPLBLD CKD-EPI 2021: 68.8 ML/MIN/1.73
GLUCOSE BLDC GLUCOMTR-MCNC: 56 MG/DL (ref 70–130)
GLUCOSE BLDC GLUCOMTR-MCNC: 63 MG/DL (ref 70–130)
GLUCOSE BLDC GLUCOMTR-MCNC: 65 MG/DL (ref 70–130)
GLUCOSE BLDC GLUCOMTR-MCNC: 77 MG/DL (ref 70–130)
GLUCOSE BLDC GLUCOMTR-MCNC: 98 MG/DL (ref 70–130)
GLUCOSE SERPL-MCNC: 89 MG/DL (ref 65–99)
POTASSIUM SERPL-SCNC: 4.3 MMOL/L (ref 3.5–5.2)
SODIUM SERPL-SCNC: 127 MMOL/L (ref 136–145)

## 2023-08-19 PROCEDURE — 99232 SBSQ HOSP IP/OBS MODERATE 35: CPT | Performed by: HOSPITALIST

## 2023-08-19 PROCEDURE — 80048 BASIC METABOLIC PNL TOTAL CA: CPT | Performed by: HOSPITALIST

## 2023-08-19 PROCEDURE — 82948 REAGENT STRIP/BLOOD GLUCOSE: CPT

## 2023-08-19 RX ORDER — AMLODIPINE BESYLATE 10 MG/1
10 TABLET ORAL
Status: DISCONTINUED | OUTPATIENT
Start: 2023-08-19 | End: 2023-08-21 | Stop reason: HOSPADM

## 2023-08-19 RX ADMIN — FAMOTIDINE 20 MG: 20 TABLET, FILM COATED ORAL at 08:04

## 2023-08-19 RX ADMIN — HYDROCODONE BITARTRATE AND ACETAMINOPHEN 1 TABLET: 5; 325 TABLET ORAL at 05:39

## 2023-08-19 RX ADMIN — DOCUSATE SODIUM 50 MG AND SENNOSIDES 8.6 MG 2 TABLET: 8.6; 5 TABLET, FILM COATED ORAL at 08:04

## 2023-08-19 RX ADMIN — FAMOTIDINE 20 MG: 20 TABLET, FILM COATED ORAL at 16:38

## 2023-08-19 RX ADMIN — DOCUSATE SODIUM 50 MG AND SENNOSIDES 8.6 MG 2 TABLET: 8.6; 5 TABLET, FILM COATED ORAL at 20:07

## 2023-08-19 RX ADMIN — Medication 10 ML: at 20:07

## 2023-08-19 RX ADMIN — AMLODIPINE BESYLATE 10 MG: 10 TABLET ORAL at 09:54

## 2023-08-19 RX ADMIN — LEVOTHYROXINE SODIUM 88 MCG: 88 TABLET ORAL at 05:09

## 2023-08-19 RX ADMIN — APIXABAN 2.5 MG: 2.5 TABLET, FILM COATED ORAL at 08:05

## 2023-08-19 RX ADMIN — HYDROCODONE BITARTRATE AND ACETAMINOPHEN 1 TABLET: 5; 325 TABLET ORAL at 16:38

## 2023-08-19 RX ADMIN — APIXABAN 2.5 MG: 2.5 TABLET, FILM COATED ORAL at 20:07

## 2023-08-19 RX ADMIN — Medication 10 ML: at 08:06

## 2023-08-19 NOTE — PLAN OF CARE
Goal Outcome Evaluation:      Pt is resting in bed at this time. Pt's midnight glucose was 63, gave orange juice, recheck showed 65. Rechecked blood glucose in a hour and it showed 77. No concerns or complaints at this time. Will continue POC.

## 2023-08-19 NOTE — PLAN OF CARE
Goal Outcome Evaluation:              Outcome Evaluation: Pt is resting in bed at this time. Pt has been alert and oriented x4 this shift. Pt remains on RA. No acute changes noted at this time. Will continue plan of care.

## 2023-08-19 NOTE — PROGRESS NOTES
[facility] HOSPITALIST PROGRESS NOTE     Patient Identification:  Name:  Lashell Case  Age:  88 y.o.  Sex:  female  :  1935  MRN:  [id number]  Visit Number:  [id number]  Primary Care Provider:  Evelio Guerin DO    Length of stay:  13    Subjective: Patient was accepted by the nursing home unfortunately will not be able to accept until Monday.  Her frequent hypoglycemia on Accu-Chek is confirmed to be spurious as blood sample taken intravenously during episode of hypoglycemia did not concur and normoglycemic.  Last night she was more confused, today she is awake and alert complaining of her finger tips are purple but less prominent compared to previous, denies any pain or discomfort.    Chief Complaint:   ----------------------------------------------------------------------------------------------------------------------  Current Hospital Meds:  amLODIPine, 10 mg, Oral, Q24H  apixaban, 2.5 mg, Oral, Q12H  famotidine, 20 mg, Oral, BID AC  levothyroxine, 88 mcg, Oral, Q AM  senna-docusate sodium, 2 tablet, Oral, BID  sodium chloride, 10 mL, Intravenous, Q12H         ----------------------------------------------------------------------------------------------------------------------  Vital Signs:  Temp:  [97 øF (36.1 øC)-97.6 øF (36.4 øC)] 97.1 øF (36.2 øC)  Heart Rate:  [81-96] 96  Resp:  [18] 18  BP: (146-168)/(80-98) 163/98       Tele:       08/15/23  0552 23  0300 23  0500   Weight: 58.3 kg (128 lb 9.6 oz) 58 kg (127 lb 12.8 oz) 56 kg (123 lb 7.3 oz)     Body mass index is 19.94 kg/mý.    Intake/Output Summary (Last 24 hours) at 2023 1003  Last data filed at 2023 0805  Gross per 24 hour   Intake 1400 ml   Output 1550 ml   Net -150 ml     Diet: Regular/House Diet; Texture: Regular Texture (IDDSI 7); Fluid Consistency: Thin (IDDSI  0)  ----------------------------------------------------------------------------------------------------------------------  Physical exam:  General: Eating breakfast awake and alert oriented to self and place, he is not agitated.    No respiratory distress.    Skin:  Skin is warm and dry. No rash noted. No pallor.    HENT:  Head:  Normocephalic and atraumatic.  Mouth:  Moist mucous membranes.    Eyes:  Conjunctivae and EOM are normal.  Pupils are equal, round, and reactive to light.  No scleral icterus.    Neck:  Neck supple.  No JVD present.    Pulmonary/Chest:  Mastectomy bilateral. No respiratory distress, no wheezes, no crackles, with normal breath sounds and good air movement.  Cardiovascular:  Normal rate, regular rhythm and normal heart sounds with no murmur.  Abdominal:  Soft.  Bowel sounds are normal.  No distension and no tenderness.   Extremities: Overgrown nails, tips of her entire fingers bilateral is mildly cyanotic, improved to previous exam, good radial pulse and ulnar pulse bilateral.  No edema, no tenderness, and no deformity.  No red or swollen joints anywhere.  Strong pulses in all 4 extremities with no clubbing, no edema.  Neurological:  Motor strength equal no obvious deficit, sensory grossly intact.   No cranial nerve deficit.  No tongue deviation.  No facial droop.  No slurred speech.    Genitourinary: No Sifuentes catheter  Back:  ----------------------------------------------------------------------------------------------------------------------  ----------------------------------------------------------------------------------------------------------------------  Results from last 7 days   Lab Units 08/16/23  0950 08/16/23  0758 08/15/23  0637   CK TOTAL U/L  --   --  37   HSTROP T ng/L 14* 14*  --      Results from last 7 days   Lab Units 08/16/23  0648 08/15/23  0637 08/14/23  0244   WBC 10*3/mm3 3.37* 4.03 5.66   HEMOGLOBIN g/dL 8.0* 8.7* 8.9*   HEMATOCRIT % 24.7* 26.3* 26.6*   MCV fL  106.9* 104.8* 103.1*   MCHC g/dL 32.4 33.1 33.5   PLATELETS 10*3/mm3 146 159 125*         Results from last 7 days   Lab Units 08/19/23  0803 08/18/23  1302 08/17/23  1314 08/16/23  0648 08/15/23  0637 08/14/23  0244 08/13/23  1636   SODIUM mmol/L 127* 132*  --  128* 126*   < >  --    POTASSIUM mmol/L 4.3 4.0  --  4.2 3.9   < > 4.8   MAGNESIUM mg/dL  --   --   --   --   --   --  1.6   CHLORIDE mmol/L 97* 103  --  100 100   < >  --    CO2 mmol/L 24.1 22.0  --  19.8* 18.5*   < >  --    BUN mg/dL 16 17  --  13 9   < >  --    CREATININE mg/dL 0.83 0.71  --  0.67 0.66   < >  --    CALCIUM mg/dL 8.4* 7.9*  --  8.1* 7.8*   < >  --    GLUCOSE mg/dL 89 83 176* 93 117*   < >  --    ALBUMIN g/dL  --   --   --   --  1.8*  --   --    BILIRUBIN mg/dL  --   --   --   --  0.4  --   --    ALK PHOS U/L  --   --   --   --  118*  --   --    AST (SGOT) U/L  --   --   --   --  17  --   --    ALT (SGPT) U/L  --   --   --   --  9  --   --     < > = values in this interval not displayed.   Estimated Creatinine Clearance: 43 mL/min (by C-G formula based on SCr of 0.83 mg/dL).    No results found for: AMMONIA      No results found for: BLOODCX  No results found for: URINECX  No results found for: WOUNDCX  No results found for: STOOLCX    I have personally looked at the labs and they are summarized above.  ----------------------------------------------------------------------------------------------------------------------  Imaging Results (Last 24 Hours)       ** No results found for the last 24 hours. **          ----------------------------------------------------------------------------------------------------------------------  Assessment and Plan:  -Altered mental status in the setting of advanced dementia  -Dementia  -Hypoglycemia spurious  -Lateral symmetrical distal finger cyanosis ?  Raynaud's, will monitor after discontinuing clonidine, DORIAN pending  -Essential hypertension  -Pediculosis capitis infestation  -Hypothyroidism  -Chronic  hyponatremia  -Episode of lower GI bleed no recurrence  -History of paroxysmal atrial fibrillation on chronic anticoagulation  -Severe malnutrition  -History of left breast cancer    Patient has frequent Accu-Chek with hypoglycemia, simultaneous BMP is not in concordance with hypoglycemia.  Today her Accu-Chek is 56 but the blood sample on Accu-Chek was read as 98.  We will DC Accu-Chek.  Patient is eating well, clonidine has been discontinued noted blood pressure increased, will increase Norvasc.    Disposition for placement Monday      Destini Lao MD  08/19/23  10:03 EDT

## 2023-08-20 LAB
GLUCOSE BLDC GLUCOMTR-MCNC: 123 MG/DL (ref 70–130)
GLUCOSE BLDC GLUCOMTR-MCNC: 61 MG/DL (ref 70–130)

## 2023-08-20 PROCEDURE — 99232 SBSQ HOSP IP/OBS MODERATE 35: CPT | Performed by: HOSPITALIST

## 2023-08-20 PROCEDURE — 25010000002 HYDRALAZINE PER 20 MG

## 2023-08-20 PROCEDURE — 82948 REAGENT STRIP/BLOOD GLUCOSE: CPT

## 2023-08-20 RX ORDER — HYDRALAZINE HYDROCHLORIDE 20 MG/ML
10 INJECTION INTRAMUSCULAR; INTRAVENOUS ONCE
Status: COMPLETED | OUTPATIENT
Start: 2023-08-20 | End: 2023-08-20

## 2023-08-20 RX ADMIN — HYDRALAZINE HYDROCHLORIDE 10 MG: 20 INJECTION INTRAMUSCULAR; INTRAVENOUS at 21:19

## 2023-08-20 RX ADMIN — APIXABAN 2.5 MG: 2.5 TABLET, FILM COATED ORAL at 21:19

## 2023-08-20 RX ADMIN — AMLODIPINE BESYLATE 10 MG: 10 TABLET ORAL at 08:24

## 2023-08-20 RX ADMIN — HYDROCODONE BITARTRATE AND ACETAMINOPHEN 1 TABLET: 5; 325 TABLET ORAL at 14:20

## 2023-08-20 RX ADMIN — APIXABAN 2.5 MG: 2.5 TABLET, FILM COATED ORAL at 08:24

## 2023-08-20 RX ADMIN — Medication 10 ML: at 08:24

## 2023-08-20 RX ADMIN — FAMOTIDINE 20 MG: 20 TABLET, FILM COATED ORAL at 16:47

## 2023-08-20 RX ADMIN — Medication 10 ML: at 21:21

## 2023-08-20 NOTE — PROGRESS NOTES
[facility]     Patient Identification:  Name:  Lashell Case  Age:  88 y.o.  Sex:  female  :  1935  MRN:  [id number]  Visit Number:  [id number]  Primary Care Provider:  Evelio Guerin DO    Length of stay:  14    Subjective: Patient seen and examined, she has no complaints, again asking when she can go home.  She has an Accu-Chek this morning which was 61 but blood sample was 123.  Patient is completely asymptomatic, instructed staff to DC Accu-Chek since is not accurate.  Her cyanosis of her fingers has improved with discontinuation of clonidine, DORIAN still pending    Chief Complaint: Confusion  ----------------------------------------------------------------------------------------------------------------------  [facility] Meds:  amLODIPine, 10 mg, Oral, Q24H  apixaban, 2.5 mg, Oral, Q12H  famotidine, 20 mg, Oral, BID AC  levothyroxine, 88 mcg, Oral, Q AM  senna-docusate sodium, 2 tablet, Oral, BID  sodium chloride, 10 mL, Intravenous, Q12H         ----------------------------------------------------------------------------------------------------------------------  Vital Signs:  Temp:  [97.2 øF (36.2 øC)-97.6 øF (36.4 øC)] 97.6 øF (36.4 øC)  Heart Rate:  [74-86] 80  Resp:  [18] 18  BP: (148-164)/(71-80) 148/80       Tele:       23  0300 23  0500 23  0500   Weight: 58 kg (127 lb 12.8 oz) 56 kg (123 lb 7.3 oz) 56.2 kg (123 lb 14.4 oz)     Body mass index is 20.01 kg/mý.    Intake/Output Summary (Last 24 hours) at 2023 0954  Last data filed at 2023 0500  Gross per 24 hour   Intake 860 ml   Output 1400 ml   Net -540 ml     Diet: Regular/House Diet; Texture: Regular Texture (IDDSI 7); Fluid Consistency: Thin (IDDSI 0)  ----------------------------------------------------------------------------------------------------------------------  Physical exam:  General: Comfortable,awake, alert, oriented to self, place, malnourished,  chronically ill-appearing,   No respiratory distress.    Skin:  Skin is warm and dry. No rash noted. No pallor.    HENT:  Head:  Normocephalic and atraumatic.  Mouth:  Moist mucous membranes.    Eyes:  Conjunctivae and EOM are normal.  Pupils are equal, round, and reactive to light.  No scleral icterus.    Neck:  Neck supple.  No JVD present.  No bruit  Pulmonary/Chest:  No respiratory distress, no wheezes, no crackles, with normal breath sounds and good air movement.  Cardiovascular:  Normal rate, regular rhythm and normal heart sounds with no murmur.  Abdominal:  Soft.  Bowel sounds are normal.  No distension and no tenderness.   Extremities: Tip of her fingers still mildly cyanotic but mostly erythematous, good radial pulse, no edema, osteoarthritic changes.    Neurological:  Motor strength equal no obvious deficit, sensory grossly intact.   No cranial nerve deficit.  No tongue deviation.  No facial droop.  No slurred speech.    Genitourinary: No Sifuentes catheter  Back:  ----------------------------------------------------------------------------------------------------------------------  ----------------------------------------------------------------------------------------------------------------------  Results from last 7 days   Lab Units 08/16/23  0950 08/16/23  0758 08/15/23  0637   CK TOTAL U/L  --   --  37   HSTROP T ng/L 14* 14*  --      Results from last 7 days   Lab Units 08/16/23  0648 08/15/23  0637 08/14/23  0244   WBC 10*3/mm3 3.37* 4.03 5.66   HEMOGLOBIN g/dL 8.0* 8.7* 8.9*   HEMATOCRIT % 24.7* 26.3* 26.6*   MCV fL 106.9* 104.8* 103.1*   MCHC g/dL 32.4 33.1 33.5   PLATELETS 10*3/mm3 146 159 125*         Results from last 7 days   Lab Units 08/19/23  0803 08/18/23  1302 08/17/23  1314 08/16/23  0648 08/15/23  0637 08/14/23  0244 08/13/23  1636   SODIUM mmol/L 127* 132*  --  128* 126*   < >  --    POTASSIUM mmol/L 4.3 4.0  --  4.2 3.9   < > 4.8   MAGNESIUM mg/dL  --   --   --   --   --   --  1.6  "  CHLORIDE mmol/L 97* 103  --  100 100   < >  --    CO2 mmol/L 24.1 22.0  --  19.8* 18.5*   < >  --    BUN mg/dL 16 17  --  13 9   < >  --    CREATININE mg/dL 0.83 0.71  --  0.67 0.66   < >  --    CALCIUM mg/dL 8.4* 7.9*  --  8.1* 7.8*   < >  --    GLUCOSE mg/dL 89 83 176* 93 117*   < >  --    ALBUMIN g/dL  --   --   --   --  1.8*  --   --    BILIRUBIN mg/dL  --   --   --   --  0.4  --   --    ALK PHOS U/L  --   --   --   --  118*  --   --    AST (SGOT) U/L  --   --   --   --  17  --   --    ALT (SGPT) U/L  --   --   --   --  9  --   --     < > = values in this interval not displayed.   Estimated Creatinine Clearance: 43.2 mL/min (by C-G formula based on SCr of 0.83 mg/dL).    No results found for: AMMONIA      No results found for: BLOODCX  No results found for: URINECX  No results found for: WOUNDCX  No results found for: STOOLCX    I have personally looked at the labs and they are summarized above.  ----------------------------------------------------------------------------------------------------------------------  Imaging Results (Last 24 Hours)       ** No results found for the last 24 hours. **          ----------------------------------------------------------------------------------------------------------------------  Assessment and Plan:  -Altered mental status resolved in the setting of advanced dementia  -Dementia  -Hyperglycemia on Accu-Chek spurious, blood sample is euglycemic  -Bilateral symmetrical mild cyanosis, proving, etiology?  Occasion, DORIAN pending  -Reticulosis capitis infestation  -Severe hypothyroidism  -History of paroxysmal atrial fibrillation on chronic anticoagulation  -Severe malnutrition  -History of breast cancer status postmastectomy  -Hyponatremia probably related to hypothyroidism  -Chronic anticoagulation for stroke prophylaxis    Patient is acrocyanosis appears to be improving, quality is not presently continued, she is now on Norvasc.  Regarding \"hypoglycemia\" on Accu-Chek, " blood samples performed during the episode which the patient is symptomatic is not hypoglycemic.  Accu-Chek has been discontinued..  Patient is for placement.      Disposition for placement tentatively Monday      Destini Lao MD  08/20/23  09:54 EDT

## 2023-08-20 NOTE — PLAN OF CARE
Goal Outcome Evaluation:           Progress: no change  Outcome Evaluation: Pt. resting in bed at this time. Pt has had no complaints or concerns this shift. VSS, no acute changes at this time. Will continue with plan of care.

## 2023-08-20 NOTE — PLAN OF CARE
Goal Outcome Evaluation:              Outcome Evaluation: Pt is resting in bed at this time. Pt has been in chair position in bed for most of shift. Pt has remained alert and oriented x4. Pt complained of leg pain, PRN medication given per MAR. No acute changes noted at this time. Will continue plan of care.

## 2023-08-21 VITALS
OXYGEN SATURATION: 99 % | SYSTOLIC BLOOD PRESSURE: 134 MMHG | HEIGHT: 66 IN | RESPIRATION RATE: 18 BRPM | DIASTOLIC BLOOD PRESSURE: 65 MMHG | TEMPERATURE: 98.3 F | WEIGHT: 126.1 LBS | HEART RATE: 88 BPM | BODY MASS INDEX: 20.27 KG/M2

## 2023-08-21 PROCEDURE — 99239 HOSP IP/OBS DSCHRG MGMT >30: CPT | Performed by: STUDENT IN AN ORGANIZED HEALTH CARE EDUCATION/TRAINING PROGRAM

## 2023-08-21 RX ORDER — LEVOTHYROXINE SODIUM 88 UG/1
88 TABLET ORAL
Qty: 30 TABLET | Refills: 0 | Status: SHIPPED | OUTPATIENT
Start: 2023-08-22

## 2023-08-21 RX ORDER — AMLODIPINE BESYLATE 10 MG/1
10 TABLET ORAL
Qty: 30 TABLET | Refills: 0 | Status: SHIPPED | OUTPATIENT
Start: 2023-08-21

## 2023-08-21 RX ORDER — FAMOTIDINE 20 MG/1
20 TABLET, FILM COATED ORAL
Qty: 60 TABLET | Refills: 0 | Status: SHIPPED | OUTPATIENT
Start: 2023-08-21

## 2023-08-21 RX ORDER — AMOXICILLIN 250 MG
1 CAPSULE ORAL 2 TIMES DAILY
Qty: 60 TABLET | Refills: 0 | Status: SHIPPED | OUTPATIENT
Start: 2023-08-21 | End: 2023-09-20

## 2023-08-21 RX ADMIN — APIXABAN 2.5 MG: 2.5 TABLET, FILM COATED ORAL at 08:55

## 2023-08-21 RX ADMIN — DOCUSATE SODIUM 50 MG AND SENNOSIDES 8.6 MG 2 TABLET: 8.6; 5 TABLET, FILM COATED ORAL at 08:55

## 2023-08-21 RX ADMIN — FAMOTIDINE 20 MG: 20 TABLET, FILM COATED ORAL at 08:56

## 2023-08-21 RX ADMIN — AMLODIPINE BESYLATE 10 MG: 10 TABLET ORAL at 08:55

## 2023-08-21 RX ADMIN — LEVOTHYROXINE SODIUM 88 MCG: 88 TABLET ORAL at 05:20

## 2023-08-21 NOTE — DISCHARGE PLACEMENT REQUEST
"Lashell Garcia (88 y.o. Female)       Date of Birth   1935    Social Security Number       Address   [address] [place] [address]    Home Phone   [phone]    MRN   [id number]       [facility]    Marital Status                               Admission Date   8/6/23    Admission Type   Emergency    Admitting Provider   González Rodríguez MD    Attending Provider   Cosmo Wolf DO    Department, Room/Bed   [address], 3342/1S       Discharge Date       Discharge Disposition   Skilled Nursing Facility (DC - External)    Discharge Destination                                 Attending Provider: Cosmo Wolf DO    Allergies: Bactrim [Sulfamethoxazole-trimethoprim], Ketorolac, Phenergan [Promethazine Hcl], Codeine, Penicillins    Isolation: Contact   Infection: Lice (08/11/23)   Code Status: No CPR    Ht: 167.6 cm (65.98\")   Wt: 57.2 kg (126 lb 1.7 oz)    Admission Cmt: None   Principal Problem: Acute encephalopathy [G93.40]                   Active Insurance as of 8/6/2023       Primary Coverage       Payor Plan Insurance Group Employer/Plan Group    MEDICARE MEDICARE A & B        Payor Plan Address Payor Plan Phone Number Payor Plan Fax Number Effective Dates    [address] [phone]  6/1/2002 - None Entered    [facility] [id number]         Subscriber Name Subscriber Birth Date Member ID       LASHELL GARCIA 1935 [id number]               Secondary Coverage       Payor Plan Insurance Group Employer/Plan Group    [place]        Payor Plan Address Payor Plan Phone Number Payor Plan Fax Number Effective Dates    [address] [phone]  7/7/2016 - None Entered    [place] [address]         Subscriber Name Subscriber Birth Date Member ID       LASHELL GARCIA 1935 [id number]                     Emergency Contacts        (Rel.) Home Phone Work Phone Mobile Phone    JUANA THORPE " (Grandchild) [phone] -- --    Lolly Watkins (Grandchild) [phone] -- --    Hattie Santizo (Daughter) -- -- [phone]                 History & Physical        Agustina Silver PA-C at 23       Attestation signed by González Rodríguez MD at 23 [phone]    I have seen and examined this patient independently from Agustina Silver PA-c, and have  reviewed this documentation. Suspect encephalopathy is acute on chronic based on history from family, has she has been declining in recent months but got significantly worse over the last week. Likely has underlying dementia not formally diagnosed. Worsening encephalopathy could be from hypoglycemia and dehydration, as well as GI blood loss. Gently hydrate with IV fluids containing D5 1/2 NS as patient has some lower extremity edema and dependent edema in her arms, though this could be due to malnutrition more so than CHF as albumin is low which correlates with family's report of decreasing PO intake. Monitor glucose levels every 6 hours. Repeat H/H now, after midnight with morning labs and again at 8 am tomorrow. Transfuse if hemoglobin falls below 7, or higher if becomes hemodynamically unstable. General surgery notified by ED of patient's case; Dr Valdez agreed to see in consultation. Keep NPO after midnight in case he wishes to proceed with colonoscopy tomorrow though he may want her to receive adequate bowel prep before proceeding. Hold eliquis for now. Would add to PMH and assessment/plan that patient has stage IIIa CKD based on today's labs and previous labs in her chart, with GFR in mid-50s. Continue to monitor renal function moving forward.                        [facility]  HISTORY & PHYSICAL    Patient Identification:  Name:  Lashell Case  Age:  88 y.o.  Sex:  female  :  1935  MRN:  [id number]   Visit Number:  [id number]  Admit Date: 2023   Primary Care Physician:  Evelio Guerin,  DO     Subjective     Chief complaint:   Chief Complaint   Patient presents with    Wound Check    Altered Mental Status    Hypoglycemia    Shortness of Breath     History of presenting illness:   Patient is a 88 y.o. female with past medical history significant for hypertension, hyperlipidemia, hypothyroidism, arthritis, atrial fibrillation chronically anticoagulated with Eliquis that presented to the [facility] emergency department for evaluation of altered mental status.  Noted to have bright red blood per rectum while in ED.  General surgery consulted, advised to admit the patient, they will consult.    Patient examined at bedside on 3S. Per patients granddaughter, the patient has been declining over the past 2-3 months, becoming less mobile and less independent. Over the past week the patient has been not getting out of bed at all and became incontinent. Patient lives alone, with family members checking on her daily, but no one able to stay with her full time. Patient's family members said they have tried to get her to come to the hospital over the past 4 to 5 days and patient refused. Today patient had worsening weakness, periods of confusion, therefore agreed to come to the ER. Reports decreased oral intake.  Episodes of incontinence. Family member states they are interested in home health or rehab, but they dont think patient will be agreeable.     Upon arrival to the ED, vitals were temperature 97.1, HR 92, RR 16, /96, SPO2 94% on room air.  Significant for anion gap 19, BUN 29, creatinine 1.03, BUN/creatinine ratio 28.2, GFR 53.1, glucose 69, albumin 2.5, hemoglobin 9.4, hematocrit 29.7.  UA dark yellow, 1+ ketones, trace leukocytes, 1+ protein, moderate bilirubin, 3-6 squamous epithelial cells.    Chest x-ray shows central line tip in SVC.  No pneumothorax.    CT head shows no acute intracranial hemorrhage, midline shift, or significant mass effect.    Patient has been admitted to the  Telemetry unit.  ---------------------------------------------------------------------------------------------------------------------   Review of Systems   Constitutional:  Positive for fatigue. Negative for chills, diaphoresis and fever.   Respiratory:  Negative for cough, shortness of breath and wheezing.    Cardiovascular:  Negative for chest pain, palpitations and leg swelling.   Gastrointestinal:  Negative for abdominal pain, constipation, diarrhea, nausea and vomiting.   Genitourinary:  Negative for difficulty urinating, dysuria and flank pain.   Musculoskeletal:  Negative for arthralgias, myalgias and neck stiffness.   Skin:  Negative for rash and wound.   Neurological:  Negative for dizziness, weakness and light-headedness.   Psychiatric/Behavioral:  Negative for agitation and confusion.     ---------------------------------------------------------------------------------------------------------------------   Past Medical History:   Diagnosis Date    Arthritis     Breast cancer 2015    lt br ca    Breast cancer 2006    rt br ca    Dizziness 7/8/2016    [place] (hard of hearing)     Hyperlipidemia     Hypertension     Hypothyroidism     Scabies exposure      Past Surgical History:   Procedure Laterality Date    BREAST BIOPSY      BREAST LUMPECTOMY Right 2006    BREAST SURGERY      MASTECTOMY Left 2015    ca    MASTECTOMY Right 2020    MASTECTOMY WITH SENTINEL NODE BIOPSY AND AXILLARY NODE DISSECTION Right 11/17/2020    Procedure: BREAST MASTECTOMY WITH SENTINEL NODE BIOPSY AND AXILLARY NODE DISSECTION;  Surgeon: Cynthia Ward MD;  Location: [facility];  Service: General;  Laterality: Right;     Family History   Problem Relation Age of Onset    Hypertension Mother     Uterine cancer Mother     Diabetes Mother     Diabetes Daughter     Prostate cancer Son     Diabetes Son     Throat cancer Son     Hypertension Child     Breast cancer Neg Hx      Social History     Socioeconomic History    Marital status:     Tobacco Use    Smoking status: Never    Smokeless tobacco: Never   Vaping Use    Vaping Use: Never used   Substance and Sexual Activity    Alcohol use: No    Drug use: No    Sexual activity: Defer     Birth control/protection: None     ---------------------------------------------------------------------------------------------------------------------   Allergies:  Bactrim [sulfamethoxazole-trimethoprim], Ketorolac, Phenergan [promethazine hcl], Codeine, and Penicillins  ---------------------------------------------------------------------------------------------------------------------   Medications below are reported home medications pulling from within the system; at this time, these medications have not been reconciled unless otherwise specified and are in the verification process for further verifcation as current home medications.    Prior to Admission Medications       Prescriptions Last Dose Informant Patient Reported? Taking?    amiodarone (PACERONE) 200 MG tablet   No No    Take 1 tablet by mouth Daily.    apixaban (ELIQUIS) 2.5 MG tablet tablet   No No    Take 1 tablet by mouth Every 12 (Twelve) Hours.    cloNIDine (Catapres) 0.1 MG tablet   No No    Take 1 tablet by mouth 2 (Two) Times a Day.    hydroCHLOROthiazide (HYDRODIURIL) 25 MG tablet   No No    Take 1 tablet by mouth Daily for blood pressure.    levothyroxine (SYNTHROID, LEVOTHROID) 75 MCG tablet   No No    Take 1 tablet by mouth Every Morning.    lisinopril (PRINIVIL,ZESTRIL) 40 MG tablet   No No    Take 1 tablet by mouth daily for blood pressure.    metoprolol tartrate (LOPRESSOR) 100 MG tablet   No No    Take 1 tablet by mouth 2 (Two) Times a Day.          ---------------------------------------------------------------------------------------------------------------------    Objective     Hospital Scheduled Meds:  senna-docusate sodium, 2 tablet, Oral, BID  sodium chloride, 10 mL, Intravenous, Q12H      dextrose 5 % and sodium  chloride 0.45 %, 100 mL/hr        Current listed hospital scheduled medications may not yet reflect those currently placed in orders that are signed and held, awaiting patient's arrival to floor/unit.    ---------------------------------------------------------------------------------------------------------------------   Vital Signs:  Temp:  [97.1 øF (36.2 øC)-97.9 øF (36.6 øC)] 97.7 øF (36.5 øC)  Heart Rate:  [] 84  Resp:  [16-18] 18  BP: (130-155)/(80-99) 130/85  Mean Arterial Pressure (Non-Invasive) for the past 24 hrs (Last 3 readings):   Noninvasive MAP (mmHg)   08/06/23 2211 101   08/06/23 2115 124   08/06/23 2100 104     SpO2 Percentage    08/06/23 2100 08/06/23 2115 08/06/23 2211   SpO2: 94% 97% 94%     SpO2:  [93 %-97 %] 94 %  on   ;   Device (Oxygen Therapy): room air    Body mass index is 15.28 kg/mý.  Wt Readings from Last 3 Encounters:   08/06/23 43 kg (94 lb 11.2 oz)   04/14/23 55.2 kg (121 lb 9.6 oz)   04/03/23 59.9 kg (132 lb)     ---------------------------------------------------------------------------------------------------------------------   Physical Exam:  Physical Exam  Constitutional:       General: She is not in acute distress.     Appearance: Normal appearance.   HENT:      Head: Normocephalic and atraumatic.      Right Ear: External ear normal.      Left Ear: External ear normal.      Nose: No nasal deformity.      Mouth/Throat:      Lips: Pink.      Mouth: Mucous membranes are moist.   Eyes:      Conjunctiva/sclera: Conjunctivae normal.      Pupils: Pupils are equal, round, and reactive to light.   Cardiovascular:      Rate and Rhythm: Normal rate and regular rhythm.      Pulses:           Dorsalis pedis pulses are 1+ on the right side and 1+ on the left side.      Heart sounds: Normal heart sounds.   Pulmonary:      Effort: Pulmonary effort is normal.      Breath sounds: Normal breath sounds. No wheezing, rhonchi or rales.   Abdominal:      General: Abdomen is flat. Bowel sounds  are normal.      Palpations: Abdomen is soft.      Tenderness: There is no guarding or rebound.   Genitourinary:     Comments: No carpio catheter in place   Musculoskeletal:      Cervical back: Neck supple. Normal range of motion.      Right lower le+ Pitting Edema present.      Left lower le+ Pitting Edema present.      Comments: To mid shin    Skin:     General: Skin is warm and dry.   Neurological:      General: No focal deficit present.      Mental Status: She is alert.      Comments: Oriented to person and place, but not time    Psychiatric:         Mood and Affect: Mood normal.         Behavior: Behavior normal.     ---------------------------------------------------------------------------------------------------------------------  EK fibrillation.  Heart rate 82. Confirmed by cardiology      Telemetry:        I have personally reviewed the EKG/Telemetry strip  ---------------------------------------------------------------------------------------------------------------------   Results from last 7 days   Lab Units 23   CK TOTAL U/L 42           Results from last 7 days   Lab Units 23  1152   CRP mg/dL  --  3.19*   LACTATE mmol/L  --  1.2   WBC 10*3/mm3  --  6.71   HEMOGLOBIN g/dL 8.6* 9.4*   HEMATOCRIT % 27.2* 29.7*   MCV fL  --  107.6*   MCHC g/dL  --  31.6   PLATELETS 10*3/mm3  --  252     Results from last 7 days   Lab Units 23  1152   SODIUM mmol/L 135*   POTASSIUM mmol/L 3.5   CHLORIDE mmol/L 98   CO2 mmol/L 18.0*   BUN mg/dL 29*   CREATININE mg/dL 1.03*   CALCIUM mg/dL 8.5*   GLUCOSE mg/dL 93   ALBUMIN g/dL 2.5*   BILIRUBIN mg/dL 1.2   ALK PHOS U/L 106   AST (SGOT) U/L 21   ALT (SGPT) U/L 10   Estimated Creatinine Clearance: 26.6 mL/min (A) (by C-G formula based on SCr of 1.03 mg/dL (H)).  No results found for: AMMONIA    Glucose   Date/Time Value Ref Range Status   2023 1037 69 (L) 70 - 130 mg/dL Final     Comment:     Meter: [id number]  : [id number] [facility]     No results found for: HGBA1C  Lab Results   Component Value Date    TSH 72.700 (H) 03/24/2023    FREET4 1.85 (H) 11/01/2021       No results found for: BLOODCX  No results found for: URINECX  No results found for: WOUNDCX  No results found for: STOOLCX  No results found for: RESPCX  No results found for: MRSACX  Pain Management Panel           No data to display              I have personally reviewed the above laboratory results.   ---------------------------------------------------------------------------------------------------------------------  Imaging Results (Last 7 Days)       Procedure Component Value Units Date/Time    CT Head Without Contrast [[id number]] Collected: 08/06/23 1947     Updated: 08/06/23 1952    Narrative:      EXAMINATION:Computed tomography(CT)of the head without IV contrast     TECHNIQUE:CT of the head was performed in the supine position without  intravenous contrast according to as low as reasonably achievable dose  protocol. Axial CT utilized with slice thickness of 5 mm presented in  soft tissue and bone algorithms.     COMPARISON:No prior studies are available for review at the time of this  dictation.     FINDINGS: Moderate parenchymal volume loss is noted.  No acute intra-or extra-axial hemorrhage or fluid collections are  identified. The ventricles are of normal size,shape,and morphology. The  basilar cisterns are patent. No mass effect or midline shift is seen.  The gray-white matter differentiation is normal. The visualized portions  of the orbits,paranasal sinuses,and mastoids appear normal. No acute  fracture is identified.       Impression:         No acute intracranial hemorrhage,midline shift,or significant mass  effect.     This report was finalized on 8/6/2023 7:50 PM by Mary Gomez MD.       XR Chest 1 View [[id number]] Collected: 08/06/23 1215     Updated: 08/06/23 1218    Narrative:      XR CHEST 1 VW-     CLINICAL INDICATION: Line  placement        COMPARISON: 04/03/2023      TECHNIQUE: Single frontal view of the chest.     FINDINGS:      LUNGS: Left-sided central line has been placed. The tip is in the  superior vena cava. No evidence of pneumothorax      HEART AND MEDIASTINUM: Heart and mediastinal contours are unremarkable        SKELETON: Bony and soft tissue structures are unremarkable.             Impression:      Central line tip in the superior vena cava. No pneumothorax     This report was finalized on 8/6/2023 12:15 PM by Dr. Erich Jenkins MD.             I have personally reviewed the above radiology results.     Last Echocardiogram:  Results for orders placed during the hospital encounter of 11/02/21    Adult Transesophageal Echo (DONALDO) W/ Cont if Necessary Per Protocol    Interpretation Summary  ú Normal left ventricular cavity size and wall thickness noted. All left ventricular wall segments contract normally.  ú Left ventricular ejection fraction appears to be 61 - 65%.  ú The aortic valve is abnormal in structure. The aortic valve exhibits sclerosis. There is calcification of the aortic valve. The aortic valve appears trileaflet. Mild aortic valve regurgitation is present. Mild aortic valve stenosis is present.  ú There are myxomatous changes of the mitral valve apparatus present. Moderate mitral valve regurgitation is present. No significant mitral valve stenosis is present  ú No evidence of a left atrial appendage thrombus was present.  ú Moderate mitral valve regurgitation is present. No significant mitral valve stenosis is present.  ú Moderate to severe tricuspid valve regurgitation is present.  ú There is no evidence of pericardial effusion. .  ú Comments: Proceed with electrical cardioversion.    ---------------------------------------------------------------------------------------------------------------------    Assessment & Plan      Active Hospital Problems    Diagnosis  POA    **Acute encephalopathy [G93.40]  Yes      Acute encephalopathy, POA  Hypoglycemia, POA  Bright red blood per rectum, POA  Dehydration, POA   Progressive debility   CT head with no acute findings  H&H stable at this time  Inpatient general surgery consult  Keep patient n.p.o.  Nursing dysphagia screen prior to any p.o. medication  D5 and half-normal saline infusion at 100 mL/h  Patient presented with blood glucose 69.  Family reports poor p.o. intake.  Accu-Cheks every 6 hours  Recheck H&H after fluid resuscitation as well as repeating every 8 hours.  Case Management consulted  Strict I's and O's  Daily weights  Hold home anticoagulation  If placement needed on discharge, will need to consult psych for decisional capacity   PT/OT consult     CHRONIC MEDICAL PROBLEMS  Essential hypertension  BP appears moderately controlled   Plan to resume home antihypertensive regimen once reconciled per pharmacy with appropriate holding parameters to prevent hypotension and/or bradycardia   Closely monitor BP per hospital protocol, titrate medications as necessary    Hyperlipidemia   Restart home statin pending med rec     Hypothyroidism  Restart home synthroid pending med rec     Atrial fibrillation chronically anticoagulated with Eliquis  Continuous cardiac monitoring  Restart rate controlling medications pending med rec  Hold home Eliquis at this time  F/E/N: D5  0.45 NS at 100 mL/h.  Continue to monitor electrolytes.  NPO.    ---------------------------------------------------  DVT Prophylaxis: Already anticoagulated at home, hold on admission  GI Prophylaxis: Bowel regimen  Activity: Up with assistance    The patient is considered to be a high risk patient due to: Acute encephalopathy, hypoglycemia    INPATIENT status due to the need for care which can only be reasonably provided in an hospital setting such as aggressive/expedited ancillary services and/or consultation services, the necessity for IV medications, close physician monitoring and/or the possible need  for procedures.  In such, I feel patient's risk for adverse outcomes and need for care warrant INPATIENT evaluation and predict the patient's care encounter to likely last beyond 2 midnights.      Code Status: Full Code     Disposition/Discharge planning: Pending clinical course    I have discussed the patient's assessment and plan with Dr. Rodríguez.      Agustina Silver PA-C  Hospitalist Service -- [facility]       23  22:43 EDT    Attending Physician: González Rodríguez MD     Electronically signed by González Rodríguez MD at 23       Vital Signs (last day)       Date/Time Temp Temp src Pulse Resp BP Patient Position SpO2    23 0311 98.3 (36.8) Oral 88 18 134/65 Lying --    23 2254 98.3 (36.8) Oral 97 18 104/56 Lying --    23 1928 98.1 (36.7) Oral 89 18 188/86 Lying --    23 1500 98.3 (36.8) Oral 82 16 152/66 Lying --    23 0330 97.6 (36.4) Oral 80 18 148/80 Lying --          Prior to Admission Medications       Prescriptions Last Dose Informant Patient Reported? Taking?    cloNIDine (Catapres) 0.1 MG tablet Past Week  No Yes    Take 1 tablet by mouth 2 (Two) Times a Day.    levothyroxine (SYNTHROID, LEVOTHROID) 75 MCG tablet Past Week  No Yes    Take 1 tablet by mouth Every Morning.             Operative/Procedure Notes (most recent note)        Procedures signed by Romeo Valdez MD at 23         Procedure Orders    1. COLONOSCOPY [[id number]] ordered by Romeo Valdez MD at 23 1118               [Media Unavailable] Scan on 2023 1118 by Romeo Valdez MD: COLON          Electronically signed by Romeo Valdez MD at 23          Physician Progress Notes (most recent note)        Destini Lao MD at 23 0954              [facility] HOSPITALIST PROGRESS NOTE     Patient Identification:  Name:  Lashell Case  Age:  88 y.o.  Sex:  female  :  1935  MRN:   [id number]  Visit Number:  [id number]  Primary Care Provider:  Evelio Guerin DO    Length of stay:  14    Subjective: Patient seen and examined, she has no complaints, again asking when she can go home.  She has an Accu-Chek this morning which was 61 but blood sample was 123.  Patient is completely asymptomatic, instructed staff to DC Accu-Chek since is not accurate.  Her cyanosis of her fingers has improved with discontinuation of clonidine, DORIAN still pending    Chief Complaint: Confusion  ----------------------------------------------------------------------------------------------------------------------  [facility] Meds:  amLODIPine, 10 mg, Oral, Q24H  apixaban, 2.5 mg, Oral, Q12H  famotidine, 20 mg, Oral, BID AC  levothyroxine, 88 mcg, Oral, Q AM  senna-docusate sodium, 2 tablet, Oral, BID  sodium chloride, 10 mL, Intravenous, Q12H         ----------------------------------------------------------------------------------------------------------------------  Vital Signs:  Temp:  [97.2 øF (36.2 øC)-97.6 øF (36.4 øC)] 97.6 øF (36.4 øC)  Heart Rate:  [74-86] 80  Resp:  [18] 18  BP: (148-164)/(71-80) 148/80       Tele:       08/17/23  0300 08/19/23  0500 08/20/23  0500   Weight: 58 kg (127 lb 12.8 oz) 56 kg (123 lb 7.3 oz) 56.2 kg (123 lb 14.4 oz)     Body mass index is 20.01 kg/mý.    Intake/Output Summary (Last 24 hours) at 8/20/2023 0954  Last data filed at 8/20/2023 0500  Gross per 24 hour   Intake 860 ml   Output 1400 ml   Net -540 ml     Diet: Regular/House Diet; Texture: Regular Texture (IDDSI 7); Fluid Consistency: Thin (IDDSI 0)  ----------------------------------------------------------------------------------------------------------------------  Physical exam:  General: Comfortable,awake, alert, oriented to self, place, malnourished, chronically ill-appearing,   No respiratory distress.    Skin:  Skin is warm and dry. No rash noted. No pallor.    HENT:  Head:  Normocephalic and atraumatic.   Mouth:  Moist mucous membranes.    Eyes:  Conjunctivae and EOM are normal.  Pupils are equal, round, and reactive to light.  No scleral icterus.    Neck:  Neck supple.  No JVD present.  No bruit  Pulmonary/Chest:  No respiratory distress, no wheezes, no crackles, with normal breath sounds and good air movement.  Cardiovascular:  Normal rate, regular rhythm and normal heart sounds with no murmur.  Abdominal:  Soft.  Bowel sounds are normal.  No distension and no tenderness.   Extremities: Tip of her fingers still mildly cyanotic but mostly erythematous, good radial pulse, no edema, osteoarthritic changes.    Neurological:  Motor strength equal no obvious deficit, sensory grossly intact.   No cranial nerve deficit.  No tongue deviation.  No facial droop.  No slurred speech.    Genitourinary: No Sifuentes catheter  Back:  ----------------------------------------------------------------------------------------------------------------------  ----------------------------------------------------------------------------------------------------------------------  Results from last 7 days   Lab Units 08/16/23  0950 08/16/23  0758 08/15/23  0637   CK TOTAL U/L  --   --  37   HSTROP T ng/L 14* 14*  --      Results from last 7 days   Lab Units 08/16/23  0648 08/15/23  0637 08/14/23  0244   WBC 10*3/mm3 3.37* 4.03 5.66   HEMOGLOBIN g/dL 8.0* 8.7* 8.9*   HEMATOCRIT % 24.7* 26.3* 26.6*   MCV fL 106.9* 104.8* 103.1*   MCHC g/dL 32.4 33.1 33.5   PLATELETS 10*3/mm3 146 159 125*         Results from last 7 days   Lab Units 08/19/23  0803 08/18/23  1302 08/17/23  1314 08/16/23  0648 08/15/23  0637 08/14/23  0244 08/13/23  1636   SODIUM mmol/L 127* 132*  --  128* 126*   < >  --    POTASSIUM mmol/L 4.3 4.0  --  4.2 3.9   < > 4.8   MAGNESIUM mg/dL  --   --   --   --   --   --  1.6   CHLORIDE mmol/L 97* 103  --  100 100   < >  --    CO2 mmol/L 24.1 22.0  --  19.8* 18.5*   < >  --    BUN mg/dL 16 17  --  13 9   < >  --    CREATININE mg/dL 0.83  "0.71  --  0.67 0.66   < >  --    CALCIUM mg/dL 8.4* 7.9*  --  8.1* 7.8*   < >  --    GLUCOSE mg/dL 89 83 176* 93 117*   < >  --    ALBUMIN g/dL  --   --   --   --  1.8*  --   --    BILIRUBIN mg/dL  --   --   --   --  0.4  --   --    ALK PHOS U/L  --   --   --   --  118*  --   --    AST (SGOT) U/L  --   --   --   --  17  --   --    ALT (SGPT) U/L  --   --   --   --  9  --   --     < > = values in this interval not displayed.   Estimated Creatinine Clearance: 43.2 mL/min (by C-G formula based on SCr of 0.83 mg/dL).    No results found for: AMMONIA      No results found for: BLOODCX  No results found for: URINECX  No results found for: WOUNDCX  No results found for: STOOLCX    I have personally looked at the labs and they are summarized above.  ----------------------------------------------------------------------------------------------------------------------  Imaging Results (Last 24 Hours)       ** No results found for the last 24 hours. **          ----------------------------------------------------------------------------------------------------------------------  Assessment and Plan:  -Altered mental status resolved in the setting of advanced dementia  -Dementia  -Hyperglycemia on Accu-Chek spurious, blood sample is euglycemic  -Bilateral symmetrical mild cyanosis, proving, etiology?  Occasion, DORIAN pending  -Reticulosis capitis infestation  -Severe hypothyroidism  -History of paroxysmal atrial fibrillation on chronic anticoagulation  -Severe malnutrition  -History of breast cancer status postmastectomy  -Hyponatremia probably related to hypothyroidism  -Chronic anticoagulation for stroke prophylaxis    Patient is acrocyanosis appears to be improving, quality is not presently continued, she is now on Norvasc.  Regarding \"hypoglycemia\" on Accu-Chek, blood samples performed during the episode which the patient is symptomatic is not hypoglycemic.  Accu-Chek has been discontinued..  Patient is for " placement.      Disposition for placement tentatively Monday      Destini Lao MD  08/20/23  09:54 EDT    Electronically signed by Destini Lao MD at 08/20/23 1000          Consult Notes (most recent note)        Violetta Ni MD at 08/10/23 1428        Consult Orders    1. Inpatient Psychiatrist Consult [[id number]] ordered by Austyn Cifuentes DO at 08/09/23 8651                     Referring Provider: Dr. Cifuentes  Reason for Consultation: Suicidal ideations      Chief complaint/Focus of Exam: evaluate for suicidal ideations    Subjective .     History of present illness:  Lashell Case is a 88 y.o. female who was admitted on 8/6/2023 with complaints of altered mental status and she has a history of hypertension, hyperlipidemia, hypothyrodisim, arthritis, a-fib and cognitive decline and an inability to take care of her ADL and IADLs, getting progressively worse over last 2-3 months. The patient was seen for capacity evaluation earlier and deemed lacking capacity to make informed decisions about her care and disposition. Per report, her daughter has expressed concern that patient has threatened to kill herself and another psych consult needed to be done. The patient was seen in her room where she is resting comfortably in her bed. She is hard of hearing but is cooperative and pleasant. She is not sure why she is in the hospital. She reports her mood to be good and denies any thoughts of harm to self or others. Per collateral from staff, the patient has not verbalized SI, nor has she demonstrated any self harming behaviors.  No fever or chills reported. No chest pain or nvd reported. No focal neurologic deficits reported.     Review of Systems  No fever or chills reported. No chest pain or nvd reported. No focal neurologic deficits reported.     History  Past Medical History:   Diagnosis Date    Arthritis     Breast cancer 2015    lt br ca    Breast cancer 2006    rt br ca    Dizziness  7/8/2016    [place] (hard of hearing)     Hyperlipidemia     Hypertension     Hypothyroidism     Scabies exposure    ,   Past Surgical History:   Procedure Laterality Date    BREAST BIOPSY      BREAST LUMPECTOMY Right 2006    BREAST SURGERY      COLONOSCOPY N/A 8/9/2023    Procedure: COLONOSCOPY;  Surgeon: Romeo Valdez MD;  Location: [facility];  Service: Gastroenterology;  Laterality: N/A;    ENDOSCOPY N/A 8/9/2023    Procedure: ESOPHAGOGASTRODUODENOSCOPY;  Surgeon: Romeo Valdez MD;  Location: [facility] OR;  Service: Gastroenterology;  Laterality: N/A;    MASTECTOMY Left 2015    ca    MASTECTOMY Right 2020    MASTECTOMY WITH SENTINEL NODE BIOPSY AND AXILLARY NODE DISSECTION Right 11/17/2020    Procedure: BREAST MASTECTOMY WITH SENTINEL NODE BIOPSY AND AXILLARY NODE DISSECTION;  Surgeon: Cynthia Ward MD;  Location: [facility];  Service: General;  Laterality: Right;   ,   Family History   Problem Relation Age of Onset    Hypertension Mother     Uterine cancer Mother     Diabetes Mother     Diabetes Daughter     Prostate cancer Son     Diabetes Son     Throat cancer Son     Hypertension Child     Breast cancer Neg Hx    ,   Social History     Socioeconomic History    Marital status:    Tobacco Use    Smoking status: Never    Smokeless tobacco: Never   Vaping Use    Vaping Use: Never used   Substance and Sexual Activity    Alcohol use: No    Drug use: No    Sexual activity: Defer     Birth control/protection: None     E-cigarette/Vaping    E-cigarette/Vaping Use Never User      E-cigarette/Vaping Substances    Nicotine No     THC No     CBD No     Flavoring No      E-cigarette/Vaping Devices    Disposable No     Pre-filled or Refillable Cartridge No     Refillable Tank No     Pre-filled Pod No          ,   Medications Prior to Admission   Medication Sig Dispense Refill Last Dose    cloNIDine (Catapres) 0.1 MG tablet Take 1 tablet by mouth 2 (Two) Times a Day. 180 tablet 0 Past Week     levothyroxine (SYNTHROID, LEVOTHROID) 75 MCG tablet Take 1 tablet by mouth Every Morning. 90 tablet 0 Past Week   , Scheduled Meds:  apixaban, 2.5 mg, Oral, Q12H  cloNIDine, 0.1 mg, Oral, BID  dextrose, 50 mL, Intravenous, Once  levothyroxine, 75 mcg, Oral, Q AM  mupirocin, 1 application , Each Nare, BID  senna-docusate sodium, 2 tablet, Oral, BID  sodium chloride, 10 mL, Intravenous, Q12H   , Continuous Infusions:  dextrose 5 % and sodium chloride 0.45 %, 125 mL/hr, Last Rate: 100 mL/hr (08/10/23 0831)   , PRN Meds:    senna-docusate sodium **AND** polyethylene glycol **AND** bisacodyl **AND** bisacodyl    HYDROcodone-acetaminophen    nitroglycerin    Potassium Replacement - Follow Nurse / BPA Driven Protocol    sodium chloride    sodium chloride    sodium chloride, and Allergies:  Bactrim [sulfamethoxazole-trimethoprim], Ketorolac, Phenergan [promethazine hcl], Codeine, and Penicillins    Objective     Vital Signs   Temp:  [97.8 øF (36.6 øC)-98.1 øF (36.7 øC)] 98.1 øF (36.7 øC)  Heart Rate:  [59-84] 60  Resp:  [18] 18  BP: (117-138)/(70-88) 122/88    Mental Status Exam:   Mental Status Exam:    Hygiene:   fair  Cooperation:  Cooperative  Eye Contact:  Fair  Psychomotor Behavior:  Appropriate  Affect:  Appropriate  Hopelessness: Denies  Speech:  Normal  Thought Progress:  Goal directed  Thought Content:  Normal  Suicidal:  None  Homicidal:  None  Hallucinations:  None  Delusion:  None  Memory:  Deficits  Orientation:  Person and Place  Reliability:  poor  Insight:  Poor  Judgement:  Poor  Impulse Control:  Fair    Results Review:   I reviewed the patient's new clinical results.  Lab Results (last 24 hours)       Procedure Component Value Units Date/Time    POC Glucose Once [[id number]]  (Abnormal) Collected: 08/10/23 1339    Specimen: Blood Updated: 08/10/23 1345     Glucose 68 mg/dL     POC Glucose Once [[id number]]  (Abnormal) Collected: 08/10/23 1246    Specimen: Blood Updated: 08/10/23 1251     Glucose 53 mg/dL      Blood Culture - Blood, Blood, Central Line [[id number]]  (Normal) Collected: 23 1143    Specimen: Blood, Central Line Updated: 08/10/23 1215     Blood Culture No growth at 4 days    Blood Culture - Blood, Blood, Central Line [[id number]]  (Normal) Collected: 23 1152    Specimen: Blood, Central Line Updated: 08/10/23 1215     Blood Culture No growth at 4 days    POC Glucose Once [[id number]]  (Abnormal) Collected: 08/10/23 1145    Specimen: Blood Updated: 08/10/23 1151     Glucose 61 mg/dL     POC Glucose Once [[id number]]  (Normal) Collected: 08/10/23 0728    Specimen: Blood Updated: 08/10/23 0734     Glucose 77 mg/dL     POC Glucose Once [[id number]]  (Abnormal) Collected: 08/10/23 0222    Specimen: Blood Updated: 08/10/23 0229     Glucose 135 mg/dL     POC Glucose Once [[id number]]  (Normal) Collected: 23    Specimen: Blood Updated: 23     Glucose 103 mg/dL     POC Glucose Once [[id number]]  (Normal) Collected: 23 163    Specimen: Blood Updated: 23 1643     Glucose 79 mg/dL           Imaging Results (Last 24 Hours)       ** No results found for the last 24 hours. **              Assessment & Plan     Principal Problem:    Acute encephalopathy  Active Problems:    LGI bleed    Chronic anemia    Severe malnutrition     The patient is denying any thoughts of harm to self or others and has not demonstrated any behaviors indicating any such intentions.     I discussed the patient's findings and my recommendations with patient, nursing staff, and primary care team    Violetta Ni MD  08/10/23  14:28 EDT          Electronically signed by Violetta Ni MD at 08/10/23 1437          Physical Therapy Notes (most recent note)        Kiki Rodriguez, PT at 23 1635  Version 1 of 1         Acute Care - Physical Therapy Treatment Note  RAFAEL Jain     Patient Name: Lashell Case  : 1935  MRN: [id number]  Today's Date: 2023   Onset of Illness/Injury or Date of  Surgery: 08/06/23  Visit Dx:     ICD-10-CM ICD-9-CM   1. LGI bleed  K92.2 578.9   2. Chronic anemia  D64.9 285.9   3. Confusion  R41.0 298.9     Patient Active Problem List   Diagnosis    Arthritis    Abnormal ambulatory electrocardiogram    Essential hypertension    Hypothyroidism due to acquired atrophy of thyroid    Malignant neoplasm of left female breast    Mixed hyperlipidemia    Aromatase inhibitor use    Osteopenia    Hepatic cyst    Splenic cyst    Breast mass    Atrial fibrillation, persistent    Precordial chest pain    Nonrheumatic aortic (valve) stenosis    Atrial fibrillation with RVR    Paroxysmal atrial fibrillation    Nonrheumatic mitral valve regurgitation    Acute encephalopathy    LGI bleed    Chronic anemia    Severe malnutrition     Past Medical History:   Diagnosis Date    Arthritis     Breast cancer 2015    lt br ca    Breast cancer 2006    rt br ca    Dizziness 7/8/2016    [place] (hard of hearing)     Hyperlipidemia     Hypertension     Hypothyroidism     Scabies exposure      Past Surgical History:   Procedure Laterality Date    BREAST BIOPSY      BREAST LUMPECTOMY Right 2006    BREAST SURGERY      COLONOSCOPY N/A 8/9/2023    Procedure: COLONOSCOPY;  Surgeon: Romeo Valdez MD;  Location: [facility] OR;  Service: Gastroenterology;  Laterality: N/A;    ENDOSCOPY N/A 8/9/2023    Procedure: ESOPHAGOGASTRODUODENOSCOPY;  Surgeon: Romeo Valdez MD;  Location: [facility] OR;  Service: Gastroenterology;  Laterality: N/A;    ENDOSCOPY  8/8/2023    Procedure: ESOPHAGOGASTRODUODENOSCOPY;  Surgeon: Romeo Valdez MD;  Location: [facility] OR;  Service: Gastroenterology;;    MASTECTOMY Left 2015    ca    MASTECTOMY Right 2020    MASTECTOMY WITH SENTINEL NODE BIOPSY AND AXILLARY NODE DISSECTION Right 11/17/2020    Procedure: BREAST MASTECTOMY WITH SENTINEL NODE BIOPSY AND AXILLARY NODE DISSECTION;  Surgeon: Cynthia Ward MD;  Location: [facility] OR;  Service: General;  Laterality: Right;     PT  Assessment (last 12 hours)       PT Evaluation and Treatment       Row Name 08/18/23 1600          Physical Therapy Time and Intention    Subjective Information complains of;weakness;pain  -     Document Type therapy note (daily note)  -     Mode of Treatment physical therapy  -     Patient Effort adequate  -     Symptoms Noted During/After Treatment increased pain  Pt reports being very sore with all mobility  -       Row Name 08/18/23 1600          General Information    Patient Profile Reviewed yes  -     Existing Precautions/Restrictions fall  -     Limitations/Impairments safety/cognitive  -       Row Name 08/18/23 1600          Pain    Additional Documentation Pain Scale: FACES Pre/Post-Treatment (Group)  -       Row Name 08/18/23 1600          Pain Scale: FACES Pre/Post-Treatment    Pain: FACES Scale, Pretreatment 2-->hurts little bit  -KP     Posttreatment Pain Rating 4-->hurts little more  -     Pain Location - other (see comments)  everywhere - extremities and chest/abdomen  -       Row Name 08/18/23 1600          Cognition    Affect/Mental Status (Cognition) confused  -     Follows Commands (Cognition) follows one-step commands  -     Personal Safety Interventions fall prevention program maintained;gait belt;muscle strengthening facilitated;nonskid shoes/slippers when out of bed;supervised activity  -       Row Name 08/18/23 1600          Range of Motion (ROM)    Range of Motion --  Decreased tolerance to ROM during functional tasks today.  -       Row Name 08/18/23 1600          Bed Mobility    Scooting/Bridging [place] (Bed Mobility) dependent (less than 25% patient effort);2 person assist  -KP     Supine-Sit [place] (Bed Mobility) maximum assist (25% patient effort);2 person assist  -KP     Sit-Supine [place] (Bed Mobility) maximum assist (25% patient effort);dependent (less than 25% patient effort);2 person assist  -KP     Bed Mobility, Safety Issues  decreased use of arms for pushing/pulling;impaired trunk control for bed mobility;cognitive deficits limit understanding;decreased use of legs for bridging/pushing  -KP     Assistive Device (Bed Mobility) bed rails;head of bed elevated  -KP       Row Name 08/18/23 1600          Sit-Stand Transfer    Sit-Stand [place] (Transfers) moderate assist (50% patient effort);2 person assist  -KP     Assistive Device (Sit-Stand Transfers) --  hand held assist  -KP       Row Name 08/18/23 1600          Stand-Sit Transfer    Stand-Sit [place] (Transfers) moderate assist (50% patient effort);2 person assist  -KP     Assistive Device (Stand-Sit Transfers) --  hand held assist  -KP       Row Name 08/18/23 1600          Gait/Stairs (Locomotion)    Comment, (Gait/Stairs) Attempted steps today but pt was unable to perform and requested to sit back down.  -KP       Row Name 08/18/23 1600          Balance    Static Sitting Balance standby assist  -KP     Dynamic Sitting Balance contact guard  -KP     Position, Sitting Balance sitting edge of bed  -KP     Static Standing Balance minimal assist;2-person assist  -KP       Row Name             Wound 08/10/23 0851 Left proximal arm Skin Tear    Wound - Properties Group Placement Date: 08/10/23  -RD Placement Time: 0851  -RD Present on Hospital Admission: N  -RD Side: Left  -RD Orientation: proximal  -RD Location: arm  -RD Primary Wound Type: Skin tear  -RD    Retired Wound - Properties Group Placement Date: 08/10/23  -RD Placement Time: 0851  -RD Present on Hospital Admission: N  -RD Side: Left  -RD Orientation: proximal  -RD Location: arm  -RD Primary Wound Type: Skin tear  -RD    Retired Wound - Properties Group Date first assessed: 08/10/23  -RD Time first assessed: 0851  -RD Present on Hospital Admission: N  -RD Side: Left  -RD Location: arm  -RD Primary Wound Type: Skin tear  -RD      Row Name 08/18/23 1600          Plan of Care Review    Plan of Care Reviewed With patient   -     Outcome Evaluation PT treatment completed.  Patient presents with increased pain and swelling in extremities today with decreased motivation for mobility.  She did agree to some therapy but was unable to ambulate this date.  Continue PT POC.  -       Row Name 08/18/23 1600          Positioning and Restraints    Pre-Treatment Position in bed  -     Post Treatment Position bed  -KP     In Bed notified nsg;supine;call light within reach;encouraged to call for assist;exit alarm on;side rails up x3;RUE elevated;LUE elevated;heels elevated  Lines in tact and needs in reach  -               User Key  (r) = Recorded By, (t) = Taken By, (c) = Cosigned By      Initials Name Provider Type    Galilea Dumont, RN Registered Nurse    Kiki Gleason PT Physical Therapist                      PT Recommendation and Plan     Plan of Care Reviewed With: patient  Outcome Evaluation: PT treatment completed.  Patient presents with increased pain and swelling in extremities today with decreased motivation for mobility.  She did agree to some therapy but was unable to ambulate this date.  Continue PT POC.       Time Calculation:    PT Charges       Row Name 08/18/23 1635             Time Calculation    PT Received On 08/18/23  -      PT Goal Re-Cert Due Date 08/21/23  -         Timed Charges    04128 - PT Therapeutic Activity Minutes 25  -KP         Total Minutes    Timed Charges Total Minutes 25  -KP       Total Minutes 25  -KP                User Key  (r) = Recorded By, (t) = Taken By, (c) = Cosigned By      Initials Name Provider Type    Kiki Gleason, NHAN Physical Therapist                  Therapy Charges for Today       Code Description Service Date Service Provider Modifiers Qty    53527800780 HC PT THERAPEUTIC ACT EA 15 MIN 8/18/2023 Kiki Rodriguez, PT GP 2            PT G-Codes  AM-PAC 6 Clicks Score (PT): 10    Kiki Rodriguez PT  8/18/2023      Electronically signed by Kiki Rodriguez PT at 08/18/23  1636          Occupational Therapy Notes (most recent note)        Nella Cobb, OT at 23 1527          Acute Care - Occupational Therapy Treatment Note   [place]     Patient Name: Lashell Case  : 1935  MRN: [id number]  Today's Date: 2023  Onset of Illness/Injury or Date of Surgery: 23     Referring Physician: Nadeem    Admit Date: 2023       ICD-10-CM ICD-9-CM   1. LGI bleed  K92.2 578.9   2. Chronic anemia  D64.9 285.9   3. Confusion  R41.0 298.9     Patient Active Problem List   Diagnosis    Arthritis    Abnormal ambulatory electrocardiogram    Essential hypertension    Hypothyroidism due to acquired atrophy of thyroid    Malignant neoplasm of left female breast    Mixed hyperlipidemia    Aromatase inhibitor use    Osteopenia    Hepatic cyst    Splenic cyst    Breast mass    Atrial fibrillation, persistent    Precordial chest pain    Nonrheumatic aortic (valve) stenosis    Atrial fibrillation with RVR    Paroxysmal atrial fibrillation    Nonrheumatic mitral valve regurgitation    Acute encephalopathy    LGI bleed    Chronic anemia    Severe malnutrition     Past Medical History:   Diagnosis Date    Arthritis     Breast cancer     lt br ca    Breast cancer 2006    rt br ca    Dizziness 2016    [place] (hard of hearing)     Hyperlipidemia     Hypertension     Hypothyroidism     Scabies exposure      Past Surgical History:   Procedure Laterality Date    BREAST BIOPSY      BREAST LUMPECTOMY Right 2006    BREAST SURGERY      COLONOSCOPY N/A 2023    Procedure: COLONOSCOPY;  Surgeon: Romeo Valdez MD;  Location: [facility];  Service: Gastroenterology;  Laterality: N/A;    ENDOSCOPY N/A 2023    Procedure: ESOPHAGOGASTRODUODENOSCOPY;  Surgeon: Romeo Valdez MD;  Location: [facility] OR;  Service: Gastroenterology;  Laterality: N/A;    ENDOSCOPY  2023    Procedure: ESOPHAGOGASTRODUODENOSCOPY;  Surgeon: Romeo Valdez MD;  Location: [facility] OR;  Service:  Gastroenterology;;    MASTECTOMY Left 2015    ca    MASTECTOMY Right 2020    MASTECTOMY WITH SENTINEL NODE BIOPSY AND AXILLARY NODE DISSECTION Right 11/17/2020    Procedure: BREAST MASTECTOMY WITH SENTINEL NODE BIOPSY AND AXILLARY NODE DISSECTION;  Surgeon: Cynthia Ward MD;  Location: [facility];  Service: General;  Laterality: Right;         OT ASSESSMENT FLOWSHEET (last 12 hours)       OT Evaluation and Treatment       Row Name 08/18/23 1520                   OT Time and Intention    Subjective Information complains of;weakness;fatigue;pain;swelling  -LM        Document Type therapy note (daily note)  -LM        Mode of Treatment occupational therapy  -LM        Patient Effort adequate  -LM        Comment Patient seen this date for adl retraining/BUE arom/therex. Patient currently requires total assist with bathing, dressing, toileting tasks.  C/O of generalized pain upon bed mobility and sitting/standing balance activity.  continues to demonstrate confusion.  Decreased bue arom to 1/4-1/2 due to pain, swelling, generalized weakness.  Will continue to require 24/7 assistance.  -LM           General Information    Existing Precautions/Restrictions fall  -LM        Limitations/Impairments safety/cognitive  -LM           Cognition    Affect/Mental Status (Cognition) confused  -LM        Orientation Status (Cognition) oriented to;person  -LM           Wound 08/10/23 0851 Left proximal arm Skin Tear    Wound - Properties Group Placement Date: 08/10/23  -RD Placement Time: 0851  -RD Present on Hospital Admission: N  -RD Side: Left  -RD Orientation: proximal  -RD Location: arm  -RD Primary Wound Type: Skin tear  -RD    Retired Wound - Properties Group Placement Date: 08/10/23  -RD Placement Time: 0851  -RD Present on Hospital Admission: N  -RD Side: Left  -RD Orientation: proximal  -RD Location: arm  -RD Primary Wound Type: Skin tear  -RD    Retired Wound - Properties Group Date first assessed: 08/10/23  -RD Time  first assessed: 0851  -RD Present on Hospital Admission: N  -RD Side: Left  -RD Location: arm  -RD Primary Wound Type: Skin tear  -RD       Positioning and Restraints    Post Treatment Position bed  -LM        In Bed call light within reach;encouraged to call for assist;exit alarm on;RUE elevated;LUE elevated;R heel elevated;L heel elevated;RLE elevated;LLE elevated  -LM                  User Key  (r) = Recorded By, (t) = Taken By, (c) = Cosigned By      Initials Name Effective Dates    LM Nella Cobb OT 06/16/21 -     RD Galilea Marrero RN 06/16/21 -                            OT Recommendation and Plan  Planned Therapy Interventions (OT): activity tolerance training, adaptive equipment training, BADL retraining, ROM/therapeutic exercise, transfer/mobility retraining, strengthening exercise, patient/caregiver education/training  Therapy Frequency (OT): other (see comments) (prn and/or to monitor fxl progress)  Plan of Care Review  Plan of Care Reviewed With: patient  Plan of Care Reviewed With: patient        Time Calculation:     Therapy Charges for Today       Code Description Service Date Service Provider Modifiers Qty    01000122785  OT SELF CARE/MGMT/TRAIN EA 15 MIN 8/18/2023 eNlla Cobb OT GO 2                 Nella Cobb OT  8/18/2023    Electronically signed by Nella Cobb OT at 08/18/23 1527       Discharge Summary    No notes of this type exist for this encounter.       Discharge Order (From admission, onward)       Start     Ordered    08/21/23 0826  Discharge patient  Once        Expected Discharge Date: 08/21/23   Expected Discharge Time: Morning   Discharge Disposition: Skilled Nursing Facility (DC - External)   Physician of Record for Attribution - Please select from Treatment Team: HEIDY RICCI [[id number]]   Review needed by CMO to determine Physician of Record: No      Question Answer Comment   Physician of Record for Attribution - Please select from Treatment Team HEIDY RICCI  CHIN    Review needed by CMO to determine Physician of Record No        08/21/23 0847

## 2023-08-21 NOTE — DISCHARGE SUMMARY
[facility] HOSPITALISTS DISCHARGE SUMMARY    Patient Identification:  Name:  Lashell Case  Age:  88 y.o.  Sex:  female  :  1935  MRN:  [id number]  Visit Number:  [id number]    Date of Admission: 2023  Date of Discharge:  2023     PCP: Evelio Guerin, DO    DISCHARGE DIAGNOSIS  #Acute metabolic encephalopathy  #Advanced dementia without behavioral disturbances  #Hypovolemic hyponatremia  #Severe hypothyroidism  #Atrial fibrillation, on Eliquis  #History of breast cancer status postmastectomy  #BRBPR  #Essential hypertension   #hyperlipidemia    CONSULTS   General surgery  Psychiatry  Palliative care    PROCEDURES PERFORMED  Colonoscopy unremarkable  EGD erythematous mucosa in the antrum with benign biopsy    HOSPITAL COURSE  Patient is a 88 y.o. female presented to [facility] with reports of altered mental status in the setting of advanced dementia.  Please see the admitting history and physical for further details.  On evaluation in the ER, patient was noted to have BRBPR.  General surgery was consulted and patient underwent pan endoscopy which was unremarkable.  Patient was evaluated by psychiatry and deemed to lack medical decision-making capacity with granddaughter initiating guardianship paperwork    Patient was noted to have acrocyanosis of bilateral distal fingertips however with normal palpable radial pulses.  Clonidine was discontinued and Norvasc was initiated for hypertension with improvement of cyanosis.  DORIAN was positive with a speckled pattern noted on [id number] indicating SLE or scleroderma.  Will refer to outpatient PCP for further evaluation and referral if felt necessary given patient's advanced age, dementia and comorbidities.    At the time of discharge, patient was hemodynamically stable on room air and discharged to nursing facility for further treatment and care.    VITAL SIGNS:  Temp:  [98.1 øF (36.7 øC)-98.3 øF (36.8 øC)] 98.3 øF  (36.8 øC)  Heart Rate:  [82-97] 88  Resp:  [16-18] 18  BP: (104-188)/(56-86) 134/65     on   ;   Device (Oxygen Therapy): room air    Body mass index is 20.36 kg/mý.  Wt Readings from Last 3 Encounters:   08/21/23 57.2 kg (126 lb 1.7 oz)   04/14/23 55.2 kg (121 lb 9.6 oz)   04/03/23 59.9 kg (132 lb)       PHYSICAL EXAM:  Constitutional: Elderly female, chronically ill-appearing, no respiratory distress.      HENT:  Head:  Normocephalic and atraumatic.  Mouth:  Moist mucous membranes.    Eyes:  Conjunctivae and EOM are normal.  No scleral icterus.    Neck:  Neck supple.  No JVD present.    Cardiovascular:  Normal rate, regular rhythm and normal heart sounds with no murmur.  Pulmonary/Chest:  No respiratory distress, no wheezes, no crackles, with normal breath sounds and good air movement.  Abdominal:  Soft. No distension and no tenderness.   Musculoskeletal:  No tenderness, and no deformity.  No red or swollen joints anywhere.    Neurological:  Alert and oriented to person, place but not time.  Nonfocal.   Skin:  Skin is warm and dry. No rash noted. No pallor.   Peripheral vascular: no clubbing, no cyanosis, no edema.    DISCHARGE DISPOSITION   Stable    DISCHARGE MEDICATIONS:     Discharge Medications        New Medications        Instructions Start Date   amLODIPine 10 MG tablet  Commonly known as: NORVASC   10 mg, Oral, Every 24 Hours Scheduled      apixaban 2.5 MG tablet tablet  Commonly known as: ELIQUIS   2.5 mg, Oral, Every 12 Hours Scheduled      famotidine 20 MG tablet  Commonly known as: PEPCID   20 mg, Oral, 2 Times Daily Before Meals      sennosides-docusate 8.6-50 MG per tablet  Commonly known as: PERICOLACE   1 tablet, Oral, 2 Times Daily             Continue These Medications        Instructions Start Date   levothyroxine 75 MCG tablet  Commonly known as: SYNTHROID, LEVOTHROID   75 mcg, Oral, Every Early Morning             Stop These Medications      cloNIDine 0.1 MG tablet  Commonly known as:  Catapres                Additional Instructions for the Follow-ups that You Need to Schedule       Discharge Follow-up with PCP   As directed       Currently Documented PCP:    Evelio Guerin DO    PCP Phone Number:    [phone]     [facility]               Follow-up Information       Evelio Guerin DO .    Specialty: Family Medicine  [facility]  Contact information:  [address] [place] [address]  [phone]                              TEST  RESULTS PENDING AT DISCHARGE  Pending Labs       Order Current Status    DORIAN by IFA, Reflex to Titer and Pattern In process             CODE STATUS  Code Status and Medical Interventions:   Ordered at: 08/09/23 1521     Medical Intervention Limits:    NO intubation (DNI)     Level Of Support Discussed With:    Next of Kin (If No Surrogate)     Code Status (Patient has no pulse and is not breathing):    No CPR (Do Not Attempt to Resuscitate)     Medical Interventions (Patient has pulse or is breathing):    Limited Support     Comments:    both daughter Hattie and GD Jayleen agree       The ASCVD Risk score (Bailey DK, et al., 2019) failed to calculate for the following reasons:    The 2019 ASCVD risk score is only valid for ages 40 to 79    The patient has a prior MI or stroke diagnosis     Cosmo Wolf DO  [facility] Hospitalist  08/21/23  08:32 EDT    Please note that this discharge summary required more than 30 minutes to complete.

## 2023-08-21 NOTE — PLAN OF CARE
Goal Outcome Evaluation:  Plan of Care Reviewed With: patient   Pt being discharged today

## 2023-08-21 NOTE — PLAN OF CARE
Goal Outcome Evaluation:           Progress: no change  Outcome Evaluation: Pt. resting in bed at this time. Pt had complaints of pain, Pt refused pain medication, position adjusted, pillow suppoert provided. Wound care completed per order. Pt confused to situation and place this shift. No acute changes at this time. Will continue with plan of care.

## 2023-08-21 NOTE — NURSING NOTE
Pt is being discharged to [facility] at this time. [facility] is providing transport.

## 2023-08-21 NOTE — CASE MANAGEMENT/SOCIAL WORK
Discharge Planning Assessment   Cristobal     Patient Name: Lashell Case  MRN: [id number]  Today's Date: 8/21/2023    Admit Date: 8/6/2023            Discharge Plan       Row Name 08/21/23 0937       Plan    Final Discharge Disposition Code 03 - skilled nursing facility (SNF)    Final Note Pt is being discharged to [facility] on this date. Pt's emergency guardian Jayleen is aware and agreeable to discharge. SS faxed AVS summary and clinical information to fax [phone].    10:11am: SS provided RN and Lead RN with report number [phone]. SS to arrange EMS to transport.     11:04am: SS contacted [facility] and scheduled transport.                     NISSA Everett

## 2023-08-22 LAB
ANA INTERCELL BRIDGE TITR SER IF: ABNORMAL {TITER}
ANA SER QL IF: POSITIVE
ANA SPECKLED TITR SER: ABNORMAL {TITER}
Lab: ABNORMAL

## 2023-08-23 NOTE — DISCHARGE PLACEMENT REQUEST
"Lashell Garcia (88 y.o. Female)       Date of Birth   1935    Social Security Number       Address   [address] [place] [address]    Home Phone   [phone]    MRN   [id number]       [facility]    Marital Status                               Admission Date   8/6/23    Admission Type   Emergency    Admitting Provider   González Rodríguez MD    Attending Provider       Department, Room/Bed   [address], 3342/1S       Discharge Date   8/21/2023    Discharge Disposition   Skilled Nursing Facility (DC - External)    Discharge Destination                                 Attending Provider: (none)   Allergies: Bactrim [Sulfamethoxazole-trimethoprim], Ketorolac, Phenergan [Promethazine Hcl], Codeine, Penicillins    Isolation: None   Infection: Lice (08/11/23)   Code Status: Prior    Ht: 167.6 cm (65.98\")   Wt: 57.2 kg (126 lb 1.7 oz)    Admission Cmt: None   Principal Problem: Acute encephalopathy [G93.40]                   Active Insurance as of 8/6/2023       Primary Coverage       Payor Plan Insurance Group Employer/Plan Group    MEDICARE MEDICARE A & B        Payor Plan Address Payor Plan Phone Number Payor Plan Fax Number Effective Dates    [address] [phone]  6/1/2002 - None Entered    [facility] [id number]         Subscriber Name Subscriber Birth Date Member ID       LASHELL GARCIA 1935 [id number]               Secondary Coverage       Payor Plan Insurance Group Employer/Plan Group    [place]        Payor Plan Address Payor Plan Phone Number Payor Plan Fax Number Effective Dates    [address] [phone]  7/7/2016 - None Entered    [place] [address]         Subscriber Name Subscriber Birth Date Member ID       LASHELL GARCIA 1935 [id number]                     Emergency Contacts        (Rel.) Home Phone Work Phone Mobile Phone    JUANA THORPE (Legal Guardian) [phone] -- --    " Lolly Watkins (Grandchild) [phone] -- --    Hattie Santizo (Daughter) -- -- [phone]                 Discharge Summary        Cosmo Wolf DO at 23 0832              [facility] HOSPITALISTS DISCHARGE SUMMARY    Patient Identification:  Name:  Lashell Case  Age:  88 y.o.  Sex:  female  :  1935  MRN:  [id number]  Visit Number:  [id number]    Date of Admission: 2023  Date of Discharge:  2023     PCP: Evelio Guerin DO    DISCHARGE DIAGNOSIS  #Acute metabolic encephalopathy  #Advanced dementia without behavioral disturbances  #Hypovolemic hyponatremia  #Severe hypothyroidism  #Atrial fibrillation, on Eliquis  #History of breast cancer status postmastectomy  #BRBPR  #Essential hypertension   #hyperlipidemia    CONSULTS   General surgery  Psychiatry  Palliative care    PROCEDURES PERFORMED  Colonoscopy unremarkable  EGD erythematous mucosa in the antrum with benign biopsy    HOSPITAL COURSE  Patient is a 88 y.o. female presented to [facility] with reports of altered mental status in the setting of advanced dementia.  Please see the admitting history and physical for further details.  On evaluation in the ER, patient was noted to have BRBPR.  General surgery was consulted and patient underwent pan endoscopy which was unremarkable.  Patient was evaluated by psychiatry and deemed to lack medical decision-making capacity with granddaughter initiating guardianship paperwork    Patient was noted to have acrocyanosis of bilateral distal fingertips however with normal palpable radial pulses.  Clonidine was discontinued and Norvasc was initiated for hypertension with improvement of cyanosis.  DORIAN was positive with a speckled pattern noted on [id number] indicating SLE or scleroderma.  Will refer to outpatient PCP for further evaluation and referral if felt necessary given patient's advanced age, dementia and comorbidities.    At the time of  discharge, patient was hemodynamically stable on room air and discharged to nursing facility for further treatment and care.    VITAL SIGNS:  Temp:  [98.1 øF (36.7 øC)-98.3 øF (36.8 øC)] 98.3 øF (36.8 øC)  Heart Rate:  [82-97] 88  Resp:  [16-18] 18  BP: (104-188)/(56-86) 134/65     on   ;   Device (Oxygen Therapy): room air    Body mass index is 20.36 kg/mý.  Wt Readings from Last 3 Encounters:   08/21/23 57.2 kg (126 lb 1.7 oz)   04/14/23 55.2 kg (121 lb 9.6 oz)   04/03/23 59.9 kg (132 lb)       PHYSICAL EXAM:  Constitutional: Elderly female, chronically ill-appearing, no respiratory distress.      HENT:  Head:  Normocephalic and atraumatic.  Mouth:  Moist mucous membranes.    Eyes:  Conjunctivae and EOM are normal.  No scleral icterus.    Neck:  Neck supple.  No JVD present.    Cardiovascular:  Normal rate, regular rhythm and normal heart sounds with no murmur.  Pulmonary/Chest:  No respiratory distress, no wheezes, no crackles, with normal breath sounds and good air movement.  Abdominal:  Soft. No distension and no tenderness.   Musculoskeletal:  No tenderness, and no deformity.  No red or swollen joints anywhere.    Neurological:  Alert and oriented to person, place but not time.  Nonfocal.   Skin:  Skin is warm and dry. No rash noted. No pallor.   Peripheral vascular: no clubbing, no cyanosis, no edema.    DISCHARGE DISPOSITION   Stable    DISCHARGE MEDICATIONS:     Discharge Medications        New Medications        Instructions Start Date   amLODIPine 10 MG tablet  Commonly known as: NORVASC   10 mg, Oral, Every 24 Hours Scheduled      apixaban 2.5 MG tablet tablet  Commonly known as: ELIQUIS   2.5 mg, Oral, Every 12 Hours Scheduled      famotidine 20 MG tablet  Commonly known as: PEPCID   20 mg, Oral, 2 Times Daily Before Meals      sennosides-docusate 8.6-50 MG per tablet  Commonly known as: PERICOLACE   1 tablet, Oral, 2 Times Daily             Continue These Medications        Instructions Start Date    levothyroxine 75 MCG tablet  Commonly known as: SYNTHROID, LEVOTHROID   75 mcg, Oral, Every Early Morning             Stop These Medications      cloNIDine 0.1 MG tablet  Commonly known as: Catapres                Additional Instructions for the Follow-ups that You Need to Schedule       Discharge Follow-up with PCP   As directed       Currently Documented PCP:    Evelio Guerin DO    PCP Phone Number:    [phone]     Follow Up Details: [address] [place] [address] fu               Follow-up Information       Evelio Guerin DO .    Specialty: Family Medicine  Why: [address] [place] [address] fu  Contact information:  [address] [place] [address] [phone]                              TEST  RESULTS PENDING AT DISCHARGE  Pending Labs       Order Current Status    DORIAN by IFA, Reflex to Titer and Pattern In process             CODE STATUS  Code Status and Medical Interventions:   Ordered at: 08/09/23 1521     Medical Intervention Limits:    NO intubation (DNI)     Level Of Support Discussed With:    Next of Kin (If No Surrogate)     Code Status (Patient has no pulse and is not breathing):    No CPR (Do Not Attempt to Resuscitate)     Medical Interventions (Patient has pulse or is breathing):    Limited Support     Comments:    both daughter Hattie and GD Jayleen agree       The ASCVD Risk score (Bailey GOMEZ, et al., 2019) failed to calculate for the following reasons:    The 2019 ASCVD risk score is only valid for ages 40 to 79    The patient has a prior MI or stroke diagnosis     Cosmo Wolf DO  [facility]  08/21/23  08:32 EDT    Please note that this discharge summary required more than 30 minutes to complete.      Electronically signed by Cosmo Wolf DO at 08/22/23 0584       Discharge Order (From admission, onward)       Start     Ordered    08/21/23 0826  Discharge patient  Once        Expected Discharge Date: 08/21/23   Expected Discharge Time: Morning   Discharge Disposition:  Skilled Nursing Facility (DC - External)   Physician of Record for Attribution - Please select from Treatment Team: HEIDY RICCI [[id number]]   Review needed by CMO to determine Physician of Record: No      Question Answer Comment   Physician of Record for Attribution - Please select from Treatment Team HEIDY RICCI    Review needed by CMO to determine Physician of Record No        08/21/23 0874

## 2023-08-30 ENCOUNTER — OFFICE VISIT (OUTPATIENT)
Dept: SURGERY | Facility: CLINIC | Age: 88
End: 2023-08-30
Payer: MEDICARE

## 2023-08-30 VITALS — BODY MASS INDEX: 20.99 KG/M2 | HEIGHT: 65 IN | WEIGHT: 126 LBS

## 2023-08-30 DIAGNOSIS — K92.2 LGI BLEED: Primary | ICD-10-CM

## 2023-08-30 DIAGNOSIS — D64.9 CHRONIC ANEMIA: ICD-10-CM

## 2023-08-30 PROCEDURE — 1159F MED LIST DOCD IN RCRD: CPT | Performed by: SURGERY

## 2023-08-30 PROCEDURE — 1160F RVW MEDS BY RX/DR IN RCRD: CPT | Performed by: SURGERY

## 2023-08-30 PROCEDURE — 99212 OFFICE O/P EST SF 10 MIN: CPT | Performed by: SURGERY

## 2023-08-30 NOTE — PROGRESS NOTES
Subjective   Lashell Case is a 88 y.o. female  is here today for follow-up.         Lashell Case is a 88 y.o. female here for follow up after inpatient admission for anemia.  She was noted to have rectal bleeding and underwent EGD and colonoscopy.  Final findings were likely consistent with diverticular bleed but no active bleeding was noted and she has been doing well since discharge back on her anticoagulation with Eliquis.    Physical Exam  Alert and oriented but somewhat demented  Abdomen soft, nontender, nondistended            Assessment     Diagnoses and all orders for this visit:    1. LGI bleed (Primary)    2. Chronic anemia      Lashell Case is a 88 y.o. female with recent anemia likely secondary to diverticular bleeding that has resolved spontaneously.  Continue anticoagulation.  No surgical intervention at this time.  Follow-up as needed.

## (undated) DEVICE — STCKNT IMPERV 9X36IN STRL

## (undated) DEVICE — DRAPE,UTILTY,TAPE,15X26, 4EA/PK: Brand: MEDLINE

## (undated) DEVICE — 3M™ STERI-STRIP™ REINFORCED ADHESIVE SKIN CLOSURES, R1547, 1/2 IN X 4 IN (12 MM X 100 MM), 6 STRIPS/ENVELOPE: Brand: 3M™ STERI-STRIP™

## (undated) DEVICE — Device: Brand: DEFENDO AIR/WATER/SUCTION AND BIOPSY VALVE

## (undated) DEVICE — HOLDER: Brand: DEROYAL

## (undated) DEVICE — SUT VIC 3/0 SH 27IN J416H

## (undated) DEVICE — ENDOGATOR TUBING FOR ENDOGATOR EGP-100 IRRIGATION PUMP,OLYMPUS OFP PUMP, OLYMPUS AFU-100 PUMP AND ERBE EIP2 PUMP: Brand: ENDOGATOR

## (undated) DEVICE — THE BITE BLOCK MAXI, LATEX FREE STRAP IS USED TO PROTECT THE ENDOSCOPE INSERTION TUBE FROM BEING BITTEN BY THE PATIENT.

## (undated) DEVICE — DRAPE,T,LAPARO,TRANS,STERILE: Brand: MEDLINE

## (undated) DEVICE — DRSNG WND BORDR/ADHS NONADHR/GZ LF 4X10IN STRL

## (undated) DEVICE — SUT MNCRYL 4/0 PS2 18 IN

## (undated) DEVICE — DRSNG WND BORDR/ADHS NONADHR/GZ LF 4X14IN STRL

## (undated) DEVICE — DRN WND HUBLSS FLUT FULL PERF SIL10MM

## (undated) DEVICE — CVR PROB 96IN LF STRL

## (undated) DEVICE — SHEET,DRAPE,53X77,STERILE: Brand: MEDLINE

## (undated) DEVICE — DRN JP FLT NO TROC SIL 3/4PERF 10MM

## (undated) DEVICE — CONN Y IRR DISP 1P/U

## (undated) DEVICE — GLV SURG PREMIERPRO MIC LTX PF SZ7 BRN

## (undated) DEVICE — PROXIMATE RH ROTATING HEAD SKIN STAPLERS (35 WIDE) CONTAINS 35 STAINLESS STEEL STAPLES: Brand: PROXIMATE

## (undated) DEVICE — SUT SILK 2/0 FS BLK 18IN 685G

## (undated) DEVICE — JACKSON-PRATT 100CC BULB RESERVOIR: Brand: CARDINAL HEALTH

## (undated) DEVICE — BNDG ELAS CO-FLEX SLF ADHR 4IN5YD LF STRL

## (undated) DEVICE — CONTAINER,SPECIMEN,OR STERILE,4OZ: Brand: MEDLINE

## (undated) DEVICE — ADHS LIQ MASTISOL 2/3ML

## (undated) DEVICE — PK BASIC 70

## (undated) DEVICE — PENCL ES MEGADINE EZ/CLEAN BUTN W/HOLSTR 10FT

## (undated) DEVICE — APPL CHLORAPREP HI/LITE 26ML ORNG

## (undated) DEVICE — Device

## (undated) DEVICE — PATIENT RETURN ELECTRODE, SINGLE-USE, CONTACT QUALITY MONITORING, ADULT, WITH 9FT CORD, FOR PATIENTS WEIGING OVER 33LBS. (15KG): Brand: MEGADYNE

## (undated) DEVICE — BNDG ADHS CURAD FLX/FABRC 2X4IN STRL LF

## (undated) DEVICE — ROSEBUD DISSECTORS: Brand: DEROYAL

## (undated) DEVICE — HARMONIC FOCUS SHEARS 9CM LENGTH + ADAPTIVE TISSUE TECHNOLOGY FOR USE WITH BLUE HAND PIECE ONLY: Brand: HARMONIC FOCUS

## (undated) DEVICE — SPNG LAP PREWSH SFTPK 18X18IN STRL PK/5

## (undated) DEVICE — ENDOGATOR AUXILIARY WATER JET CONNECTOR: Brand: ENDOGATOR

## (undated) DEVICE — INTENDED FOR TISSUE SEPARATION, AND OTHER PROCEDURES THAT REQUIRE A SHARP SURGICAL BLADE TO PUNCTURE OR CUT.: Brand: BARD-PARKER ® STAINLESS STEEL BLADES

## (undated) DEVICE — CAMISOLE MASTCTMY W/ DRN MGMT SM BGE